# Patient Record
Sex: MALE | Race: BLACK OR AFRICAN AMERICAN | NOT HISPANIC OR LATINO | Employment: FULL TIME | URBAN - NONMETROPOLITAN AREA
[De-identification: names, ages, dates, MRNs, and addresses within clinical notes are randomized per-mention and may not be internally consistent; named-entity substitution may affect disease eponyms.]

---

## 2017-02-10 DIAGNOSIS — I73.9 PAD (PERIPHERAL ARTERY DISEASE) (HCC): Primary | ICD-10-CM

## 2017-03-10 ENCOUNTER — HOSPITAL ENCOUNTER (OUTPATIENT)
Dept: ULTRASOUND IMAGING | Facility: HOSPITAL | Age: 57
Discharge: HOME | End: 2017-03-10
Admitting: NURSE PRACTITIONER

## 2017-03-10 ENCOUNTER — OFFICE VISIT (OUTPATIENT)
Dept: VASCULAR SURGERY | Facility: CLINIC | Age: 57
End: 2017-03-10

## 2017-03-10 VITALS
HEART RATE: 76 BPM | HEIGHT: 67 IN | BODY MASS INDEX: 25.27 KG/M2 | WEIGHT: 161 LBS | SYSTOLIC BLOOD PRESSURE: 152 MMHG | DIASTOLIC BLOOD PRESSURE: 90 MMHG

## 2017-03-10 DIAGNOSIS — E78.5 HYPERLIPIDEMIA, UNSPECIFIED HYPERLIPIDEMIA TYPE: ICD-10-CM

## 2017-03-10 DIAGNOSIS — I73.9 PAD (PERIPHERAL ARTERY DISEASE) (HCC): Primary | ICD-10-CM

## 2017-03-10 DIAGNOSIS — I73.9 PAD (PERIPHERAL ARTERY DISEASE) (HCC): ICD-10-CM

## 2017-03-10 DIAGNOSIS — I10 ESSENTIAL HYPERTENSION: ICD-10-CM

## 2017-03-10 DIAGNOSIS — Z72.0 TOBACCO ABUSE: ICD-10-CM

## 2017-03-10 PROCEDURE — 99213 OFFICE O/P EST LOW 20 MIN: CPT | Performed by: NURSE PRACTITIONER

## 2017-03-10 PROCEDURE — 93923 UPR/LXTR ART STDY 3+ LVLS: CPT

## 2017-03-10 PROCEDURE — 93924 LWR XTR VASC STDY BILAT: CPT | Performed by: SURGERY

## 2017-03-10 RX ORDER — VALSARTAN AND HYDROCHLOROTHIAZIDE 320; 25 MG/1; MG/1
1 TABLET, FILM COATED ORAL DAILY
COMMUNITY
End: 2020-07-30 | Stop reason: HOSPADM

## 2017-03-10 NOTE — PROGRESS NOTES
"03/10/2017      Thanh Weeks MD  [address]   [place] [address]        Milton ANNA Rodrgiuez  1960    Chief Complaint   Patient presents with   • Follow-up     CARLOS results.Right leg is better still some discomfort.No problem with left leg.       Dear Thanh Weeks MD:    HPI     I had the pleasure of seeing you patient in the office today for follow up.  As you recall, the patient is a 56 y.o. male who we are currently following for lower extremity PAD.  Initially he was seen with complaints of significant short distance right lower extremity claudication.  He did undergo revascularization of his right leg by an endovascular approach and is now back for continued follow-up.  He states his right leg continues to be better but he still has some intermittent claudication.  He did have noninvasive testing today, which I did review in office.     Visit Vitals   • /90   • Pulse 76   • Ht 67\" (170.2 cm)   • Wt 161 lb (73 kg)   • BMI 25.22 kg/m2     Physical Exam   Constitutional: He is oriented to person, place, and time. He appears well-developed and well-nourished. No distress.   HENT:   Head: Normocephalic and atraumatic.   Mouth/Throat: Oropharynx is clear and moist and mucous membranes are normal.   Neck: No JVD present. Carotid bruit is not present.   Cardiovascular: Normal rate, regular rhythm, S1 normal, S2 normal, normal heart sounds and intact distal pulses.  Exam reveals no gallop and no friction rub.    No murmur heard.  Pulses:       Femoral pulses are 2+ on the right side, and 2+ on the left side.       Popliteal pulses are 2+ on the right side, and 2+ on the left side.        Dorsalis pedis pulses are 2+ on the right side, and 2+ on the left side.        Posterior tibial pulses are 0 on the right side, and 2+ on the left side.   Right: doppler PT, peroneal  Swelling to bilateral lower extremities      Pulmonary/Chest: Effort normal and breath sounds normal.   Abdominal: " Soft. Normal appearance, normal aorta and bowel sounds are normal. He exhibits no abdominal bruit. There is no hepatosplenomegaly. There is no tenderness.   Musculoskeletal: Normal range of motion.       Vascular Status -  His exam exhibits no right foot edema. His exam exhibits no left foot edema.  Neurological: He is alert and oriented to person, place, and time. He has normal strength. No cranial nerve deficit.   Skin: Skin is warm, dry and intact. He is not diaphoretic.   Psychiatric: He has a normal mood and affect. His behavior is normal. Judgment and thought content normal. Cognition and memory are normal.       DIAGNOSTIC DATA:  History: PAD      Comments: Bilateral lower extremity arterial with multi-level pulse  volume recordings and segmental pressures were performed at rest and  stress.      The right ankle/brachial index is 0.72. The waveforms are biphasic  without dampening.These findings are consistent with moderate arterial  insufficiency of the right lower extremity at rest. The postexercise  ABIs 0.64.      The left ankle/brachial index is 1.08. The waveforms are triphasic  without dampening. These findings are consistent with no significant  arterial insufficiency of the left lower extremity at rest. The  postexercise ABIs 1.06.      IMPRESSION:  Impression:  1. Moderate arterial insufficiency of the right lower extremity at rest.  2. No significant arterial insufficiency of the left lower extremity at  rest.          This report was finalized on 03/10/2017 14:15 by Dr. Victorino Valera MD.  Patient Active Problem List   Diagnosis   • Tobacco abuse   • Hypertension   • PAD (peripheral artery disease)   • Hyperlipidemia   • Diabetes mellitus         ICD-10-CM ICD-9-CM   1. PAD (peripheral artery disease) I73.9 443.9   2. Essential hypertension I10 401.9   3. Hyperlipidemia, unspecified hyperlipidemia type E78.5 272.4   4. Tobacco abuse Z72.0 305.1       Lab Frequency Next Occurrence   US ankle /  brachial indices extremity complete Once 9/1/2016       PLAN: After thoroughly evaluating Milton Rodriguez, I believe the best course of action is to remain conservative from vascular standpoint.  He is doing well and states he has some intermittent claudication.  He does have significant swelling to bilateral lower extremities.  I did give him a prescription for compression stockings in the 20-30 mm pressure gradient range.  Administer to him on how to wear these.  We will see him back in 1 year with repeat noninvasive testing for continued surveillance.  I did  extensively on smoking cessation, and the patient was advised of the continued risks of smoking.  I provided over 10 minutes counseling on this matter.  The patient is to continue taking their medications as previously discussed.   This was all discussed in full with complete understanding.  Thank you for allowing me to participate in the care of your patient.  Please do not hesitate to call with any questions or concerns.  We will keep you aware of any further encounters with Milton Rodriguez.      Sincerely Yours,        DARIEN Sepulveda

## 2017-04-13 ENCOUNTER — OFFICE VISIT (OUTPATIENT)
Dept: CARDIOLOGY | Facility: CLINIC | Age: 57
End: 2017-04-13

## 2017-04-13 VITALS
DIASTOLIC BLOOD PRESSURE: 80 MMHG | BODY MASS INDEX: 24.96 KG/M2 | HEART RATE: 90 BPM | SYSTOLIC BLOOD PRESSURE: 140 MMHG | HEIGHT: 67 IN | RESPIRATION RATE: 18 BRPM | WEIGHT: 159 LBS

## 2017-04-13 DIAGNOSIS — E78.5 HYPERLIPIDEMIA, UNSPECIFIED HYPERLIPIDEMIA TYPE: ICD-10-CM

## 2017-04-13 DIAGNOSIS — I48.0 PAROXYSMAL ATRIAL FIBRILLATION (HCC): ICD-10-CM

## 2017-04-13 DIAGNOSIS — R60.0 LOWER LEG EDEMA: Primary | ICD-10-CM

## 2017-04-13 DIAGNOSIS — I10 ESSENTIAL HYPERTENSION: ICD-10-CM

## 2017-04-13 DIAGNOSIS — I73.9 PAD (PERIPHERAL ARTERY DISEASE) (HCC): ICD-10-CM

## 2017-04-13 DIAGNOSIS — R06.02 SHORTNESS OF BREATH: ICD-10-CM

## 2017-04-13 DIAGNOSIS — Z72.0 TOBACCO ABUSE: ICD-10-CM

## 2017-04-13 PROCEDURE — 99214 OFFICE O/P EST MOD 30 MIN: CPT | Performed by: NURSE PRACTITIONER

## 2017-04-13 PROCEDURE — 93000 ELECTROCARDIOGRAM COMPLETE: CPT | Performed by: NURSE PRACTITIONER

## 2017-04-13 RX ORDER — DILTIAZEM HYDROCHLORIDE 180 MG/1
360 CAPSULE, COATED, EXTENDED RELEASE ORAL DAILY
Status: ON HOLD | COMMUNITY
End: 2020-07-29
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
## 2017-04-21 ENCOUNTER — HOSPITAL ENCOUNTER (OUTPATIENT)
Dept: CARDIOLOGY | Facility: HOSPITAL | Age: 57
Discharge: HOME | End: 2017-04-21
Admitting: NURSE PRACTITIONER

## 2017-04-21 VITALS — BODY MASS INDEX: 25.9 KG/M2 | HEIGHT: 67 IN | WEIGHT: 165 LBS

## 2017-04-21 PROCEDURE — 93306 TTE W/DOPPLER COMPLETE: CPT | Performed by: INTERNAL MEDICINE

## 2017-04-21 PROCEDURE — 93306 TTE W/DOPPLER COMPLETE: CPT

## 2017-04-22 LAB
BH CV ECHO MEAS - AO MAX PG (FULL): 1.6 MMHG
BH CV ECHO MEAS - AO MAX PG: 7.2 MMHG
BH CV ECHO MEAS - AO MEAN PG (FULL): 1 MMHG
BH CV ECHO MEAS - AO MEAN PG: 4 MMHG
BH CV ECHO MEAS - AO ROOT AREA (BSA CORRECTED): 1.8
BH CV ECHO MEAS - AO ROOT AREA: 8.6 CM^2
BH CV ECHO MEAS - AO ROOT DIAM: 3.3 CM
BH CV ECHO MEAS - AO V2 MAX: 134 CM/SEC
BH CV ECHO MEAS - AO V2 MEAN: 89.9 CM/SEC
BH CV ECHO MEAS - AO V2 VTI: 30.6 CM
BH CV ECHO MEAS - AVA(I,A): 3 CM^2
BH CV ECHO MEAS - AVA(I,D): 3 CM^2
BH CV ECHO MEAS - AVA(V,A): 3.1 CM^2
BH CV ECHO MEAS - AVA(V,D): 3.1 CM^2
BH CV ECHO MEAS - BSA(HAYCOCK): 1.9 M^2
BH CV ECHO MEAS - BSA: 1.9 M^2
BH CV ECHO MEAS - BZI_BMI: 25.8 KILOGRAMS/M^2
BH CV ECHO MEAS - BZI_METRIC_HEIGHT: 170.2 CM
BH CV ECHO MEAS - BZI_METRIC_WEIGHT: 74.8 KG
BH CV ECHO MEAS - CONTRAST EF 4CH: 55.1 ML/M^2
BH CV ECHO MEAS - EDV(CUBED): 169.1 ML
BH CV ECHO MEAS - EDV(MOD-SP4): 123 ML
BH CV ECHO MEAS - EDV(TEICH): 149.3 ML
BH CV ECHO MEAS - EF(CUBED): 66.5 %
BH CV ECHO MEAS - EF(MOD-SP4): 55.1 %
BH CV ECHO MEAS - EF(TEICH): 57.4 %
BH CV ECHO MEAS - ESV(CUBED): 56.6 ML
BH CV ECHO MEAS - ESV(MOD-SP4): 55.2 ML
BH CV ECHO MEAS - ESV(TEICH): 63.5 ML
BH CV ECHO MEAS - FS: 30.6 %
BH CV ECHO MEAS - IVS/LVPW: 1
BH CV ECHO MEAS - IVSD: 1.2 CM
BH CV ECHO MEAS - LA DIMENSION: 4.6 CM
BH CV ECHO MEAS - LA/AO: 1.4
BH CV ECHO MEAS - LAT PEAK E' VEL: 10 CM/SEC
BH CV ECHO MEAS - LV DIASTOLIC VOL/BSA (35-75): 66 ML/M^2
BH CV ECHO MEAS - LV MASS(C)D: 287.5 GRAMS
BH CV ECHO MEAS - LV MASS(C)DI: 154.3 GRAMS/M^2
BH CV ECHO MEAS - LV MAX PG: 5.6 MMHG
BH CV ECHO MEAS - LV MEAN PG: 3 MMHG
BH CV ECHO MEAS - LV SYSTOLIC VOL/BSA (12-30): 29.6 ML/M^2
BH CV ECHO MEAS - LV V1 MAX: 118 CM/SEC
BH CV ECHO MEAS - LV V1 MEAN: 76.2 CM/SEC
BH CV ECHO MEAS - LV V1 VTI: 26.3 CM
BH CV ECHO MEAS - LVIDD: 5.5 CM
BH CV ECHO MEAS - LVIDS: 3.8 CM
BH CV ECHO MEAS - LVLD AP4: 8.6 CM
BH CV ECHO MEAS - LVLS AP4: 7.2 CM
BH CV ECHO MEAS - LVOT AREA (M): 3.5 CM^2
BH CV ECHO MEAS - LVOT AREA: 3.5 CM^2
BH CV ECHO MEAS - LVOT DIAM: 2.1 CM
BH CV ECHO MEAS - LVPWD: 1.2 CM
BH CV ECHO MEAS - MED PEAK E' VEL: 7.62 CM/SEC
BH CV ECHO MEAS - MV A MAX VEL: 61.6 CM/SEC
BH CV ECHO MEAS - MV DEC TIME: 0.18 SEC
BH CV ECHO MEAS - MV E MAX VEL: 95.6 CM/SEC
BH CV ECHO MEAS - MV E/A: 1.6
BH CV ECHO MEAS - RAP SYSTOLE: 10 MMHG
BH CV ECHO MEAS - RVSP: 49.2 MMHG
BH CV ECHO MEAS - SI(AO): 140.5 ML/M^2
BH CV ECHO MEAS - SI(CUBED): 60.4 ML/M^2
BH CV ECHO MEAS - SI(LVOT): 48.9 ML/M^2
BH CV ECHO MEAS - SI(MOD-SP4): 36.4 ML/M^2
BH CV ECHO MEAS - SI(TEICH): 46 ML/M^2
BH CV ECHO MEAS - SV(AO): 261.7 ML
BH CV ECHO MEAS - SV(CUBED): 112.5 ML
BH CV ECHO MEAS - SV(LVOT): 91.1 ML
BH CV ECHO MEAS - SV(MOD-SP4): 67.8 ML
BH CV ECHO MEAS - SV(TEICH): 85.8 ML
BH CV ECHO MEAS - TR MAX VEL: 313 CM/SEC
LEFT ATRIUM VOLUME INDEX: 54.8 ML/M2
LEFT ATRIUM VOLUME: 102 CM3
LV EF 2D ECHO EST: 60 %

## 2017-04-24 ENCOUNTER — TELEPHONE (OUTPATIENT)
Dept: CARDIOLOGY | Facility: CLINIC | Age: 57
End: 2017-04-24

## 2017-04-24 NOTE — TELEPHONE ENCOUNTER
----- Message from DARIEN Jones sent at 4/24/2017  8:31 AM CDT -----  Please let patient know his echo is good. Doesn't show heart failure. He needs to follow with his regular doctor and vascular for his legs. Thanks

## 2017-09-29 ENCOUNTER — OFFICE VISIT (OUTPATIENT)
Dept: UROLOGY | Facility: CLINIC | Age: 57
End: 2017-09-29

## 2017-09-29 VITALS — WEIGHT: 173 LBS | BODY MASS INDEX: 27.15 KG/M2 | TEMPERATURE: 98.6 F | HEIGHT: 67 IN

## 2017-09-29 DIAGNOSIS — N52.8 OTHER MALE ERECTILE DYSFUNCTION: Primary | ICD-10-CM

## 2017-09-29 DIAGNOSIS — N43.41 SPERMATOCELE OF EPIDIDYMIS, SINGLE: ICD-10-CM

## 2017-09-29 LAB
BILIRUB BLD-MCNC: NEGATIVE MG/DL
CLARITY, POC: CLEAR
COLOR UR: YELLOW
GLUCOSE UR STRIP-MCNC: NEGATIVE MG/DL
KETONES UR QL: NEGATIVE
LEUKOCYTE EST, POC: ABNORMAL
NITRITE UR-MCNC: NEGATIVE MG/ML
PH UR: 6 [PH] (ref 5–8)
PROT UR STRIP-MCNC: ABNORMAL MG/DL
RBC # UR STRIP: ABNORMAL /UL
SP GR UR: 1.01 (ref 1–1.03)
UROBILINOGEN UR QL: NORMAL

## 2017-09-29 PROCEDURE — 99203 OFFICE O/P NEW LOW 30 MIN: CPT | Performed by: UROLOGY

## 2017-09-29 PROCEDURE — 81003 URINALYSIS AUTO W/O SCOPE: CPT | Performed by: UROLOGY

## 2017-09-29 RX ORDER — FUROSEMIDE 20 MG/1
20 TABLET ORAL 2 TIMES DAILY
COMMUNITY
End: 2017-10-11 | Stop reason: SDUPTHER
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
## 2017-10-11 ENCOUNTER — OFFICE VISIT (OUTPATIENT)
Dept: CARDIOLOGY | Facility: CLINIC | Age: 57
End: 2017-10-11

## 2017-10-11 VITALS
SYSTOLIC BLOOD PRESSURE: 111 MMHG | DIASTOLIC BLOOD PRESSURE: 63 MMHG | HEIGHT: 67 IN | BODY MASS INDEX: 26.21 KG/M2 | HEART RATE: 120 BPM | OXYGEN SATURATION: 98 % | WEIGHT: 167 LBS

## 2017-10-11 DIAGNOSIS — I73.9 PAD (PERIPHERAL ARTERY DISEASE) (HCC): ICD-10-CM

## 2017-10-11 DIAGNOSIS — I48.92 ATRIAL FLUTTER, UNSPECIFIED TYPE (HCC): Primary | ICD-10-CM

## 2017-10-11 DIAGNOSIS — I10 ESSENTIAL HYPERTENSION: ICD-10-CM

## 2017-10-11 DIAGNOSIS — R60.0 LOWER EXTREMITY EDEMA: ICD-10-CM

## 2017-10-11 DIAGNOSIS — M79.605 PAIN IN BOTH LOWER EXTREMITIES: ICD-10-CM

## 2017-10-11 DIAGNOSIS — M79.604 PAIN IN BOTH LOWER EXTREMITIES: ICD-10-CM

## 2017-10-11 DIAGNOSIS — E78.5 HYPERLIPIDEMIA, UNSPECIFIED HYPERLIPIDEMIA TYPE: ICD-10-CM

## 2017-10-11 PROCEDURE — 99214 OFFICE O/P EST MOD 30 MIN: CPT | Performed by: NURSE PRACTITIONER

## 2017-10-11 PROCEDURE — 93000 ELECTROCARDIOGRAM COMPLETE: CPT | Performed by: NURSE PRACTITIONER

## 2017-10-11 RX ORDER — FUROSEMIDE 20 MG/1
20 TABLET ORAL 2 TIMES DAILY
Qty: 65 TABLET | Refills: 11 | Status: SHIPPED | OUTPATIENT
Start: 2017-10-11 | End: 2018-11-30 | Stop reason: SDUPTHER

## 2017-10-11 RX ORDER — NEBIVOLOL 20 MG/1
20 TABLET ORAL DAILY
Qty: 30 TABLET | Refills: 11 | Status: SHIPPED | OUTPATIENT
Start: 2017-10-11 | End: 2017-11-10
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
## 2017-10-12 ENCOUNTER — TELEPHONE (OUTPATIENT)
Dept: CARDIOLOGY | Facility: CLINIC | Age: 57
End: 2017-10-12

## 2017-10-12 NOTE — TELEPHONE ENCOUNTER
----- Message from Delbert Patel MD sent at 10/12/2017  9:17 AM CDT -----  Looks ok to me  ----- Message -----     From: DARIEN Ramsay     Sent: 10/11/2017   5:51 PM       To: Delbert Patel MD    I saw this patient today for atrial flutter, . Asymptomatic. He is already on Eliquis, cardizem 360, bystolic 20, and multaq. I ordered a cardioversion for 3-4 weeks because he missed a dose or 2 of Eliquis. sbp 110. Do you want any changes on his rate/rhythm meds before cardioversion? He also has massive lower extremity edema. Not convinced its heart failure; normal EF, mild to moderate LVH, mild to moderate LAE. Sent him back to vascular, increased his lasix.

## 2017-10-13 DIAGNOSIS — I48.92 ATRIAL FLUTTER, UNSPECIFIED TYPE (HCC): Primary | ICD-10-CM

## 2017-10-26 ENCOUNTER — TELEPHONE (OUTPATIENT)
Dept: VASCULAR SURGERY | Facility: CLINIC | Age: 57
End: 2017-10-26

## 2017-10-27 ENCOUNTER — OFFICE VISIT (OUTPATIENT)
Dept: CARDIOLOGY | Facility: CLINIC | Age: 57
End: 2017-10-27

## 2017-10-27 ENCOUNTER — LAB (OUTPATIENT)
Dept: LAB | Facility: HOSPITAL | Age: 57
End: 2017-10-27

## 2017-10-27 VITALS
SYSTOLIC BLOOD PRESSURE: 115 MMHG | RESPIRATION RATE: 18 BRPM | WEIGHT: 164 LBS | BODY MASS INDEX: 25.74 KG/M2 | HEIGHT: 67 IN | HEART RATE: 125 BPM | DIASTOLIC BLOOD PRESSURE: 75 MMHG

## 2017-10-27 DIAGNOSIS — I73.9 PAD (PERIPHERAL ARTERY DISEASE) (HCC): ICD-10-CM

## 2017-10-27 DIAGNOSIS — I48.92 ATRIAL FLUTTER, UNSPECIFIED TYPE (HCC): ICD-10-CM

## 2017-10-27 DIAGNOSIS — E78.2 MIXED HYPERLIPIDEMIA: ICD-10-CM

## 2017-10-27 DIAGNOSIS — Z72.0 TOBACCO ABUSE: ICD-10-CM

## 2017-10-27 DIAGNOSIS — I48.92 ATRIAL FLUTTER, UNSPECIFIED TYPE (HCC): Primary | ICD-10-CM

## 2017-10-27 DIAGNOSIS — I10 ESSENTIAL HYPERTENSION: ICD-10-CM

## 2017-10-27 DIAGNOSIS — Z79.01 CHRONIC ANTICOAGULATION: ICD-10-CM

## 2017-10-27 DIAGNOSIS — R60.0 LOWER EXTREMITY EDEMA: ICD-10-CM

## 2017-10-27 DIAGNOSIS — E87.6 HYPOKALEMIA: ICD-10-CM

## 2017-10-27 DIAGNOSIS — E11.8 TYPE 2 DIABETES MELLITUS WITH COMPLICATION, WITHOUT LONG-TERM CURRENT USE OF INSULIN (HCC): ICD-10-CM

## 2017-10-27 LAB
ANION GAP SERPL CALCULATED.3IONS-SCNC: 11 MMOL/L (ref 4–13)
BUN BLD-MCNC: 41 MG/DL (ref 5–21)
BUN/CREAT SERPL: 25.6 (ref 7–25)
CALCIUM SPEC-SCNC: 9 MG/DL (ref 8.4–10.4)
CHLORIDE SERPL-SCNC: 100 MMOL/L (ref 98–110)
CO2 SERPL-SCNC: 29 MMOL/L (ref 24–31)
CREAT BLD-MCNC: 1.6 MG/DL (ref 0.5–1.4)
GFR SERPL CREATININE-BSD FRML MDRD: 54 ML/MIN/1.73
GLUCOSE BLD-MCNC: 82 MG/DL (ref 70–100)
POTASSIUM BLD-SCNC: 2.8 MMOL/L (ref 3.5–5.3)
SODIUM BLD-SCNC: 140 MMOL/L (ref 135–145)

## 2017-10-27 PROCEDURE — 80048 BASIC METABOLIC PNL TOTAL CA: CPT | Performed by: NURSE PRACTITIONER

## 2017-10-27 PROCEDURE — 99214 OFFICE O/P EST MOD 30 MIN: CPT | Performed by: NURSE PRACTITIONER

## 2017-10-27 PROCEDURE — 36415 COLL VENOUS BLD VENIPUNCTURE: CPT

## 2017-10-27 PROCEDURE — 93000 ELECTROCARDIOGRAM COMPLETE: CPT | Performed by: NURSE PRACTITIONER

## 2017-10-27 RX ORDER — POTASSIUM CHLORIDE 20 MEQ/1
40 TABLET, EXTENDED RELEASE ORAL DAILY
Qty: 60 TABLET | Refills: 0 | Status: SHIPPED | OUTPATIENT
Start: 2017-10-27 | End: 2017-11-23 | Stop reason: SDUPTHER

## 2017-10-27 RX ORDER — POTASSIUM CHLORIDE 20 MEQ/1
40 TABLET, EXTENDED RELEASE ORAL 2 TIMES DAILY
Qty: 4 TABLET | Refills: 0 | Status: SHIPPED | OUTPATIENT
Start: 2017-10-27 | End: 2017-10-28
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
## 2017-10-30 ENCOUNTER — LAB (OUTPATIENT)
Dept: LAB | Facility: HOSPITAL | Age: 57
End: 2017-10-30

## 2017-10-30 DIAGNOSIS — I48.92 ATRIAL FLUTTER, UNSPECIFIED TYPE (HCC): ICD-10-CM

## 2017-10-30 LAB
ANION GAP SERPL CALCULATED.3IONS-SCNC: 13 MMOL/L (ref 4–13)
BUN BLD-MCNC: 46 MG/DL (ref 5–21)
BUN/CREAT SERPL: 25.1 (ref 7–25)
CALCIUM SPEC-SCNC: 8.8 MG/DL (ref 8.4–10.4)
CHLORIDE SERPL-SCNC: 103 MMOL/L (ref 98–110)
CO2 SERPL-SCNC: 25 MMOL/L (ref 24–31)
CREAT BLD-MCNC: 1.83 MG/DL (ref 0.5–1.4)
GFR SERPL CREATININE-BSD FRML MDRD: 46 ML/MIN/1.73
GLUCOSE BLD-MCNC: 137 MG/DL (ref 70–100)
POTASSIUM BLD-SCNC: 3.5 MMOL/L (ref 3.5–5.3)
SODIUM BLD-SCNC: 141 MMOL/L (ref 135–145)

## 2017-10-30 PROCEDURE — 80048 BASIC METABOLIC PNL TOTAL CA: CPT

## 2017-10-30 PROCEDURE — 36415 COLL VENOUS BLD VENIPUNCTURE: CPT

## 2017-11-10 ENCOUNTER — OFFICE VISIT (OUTPATIENT)
Dept: VASCULAR SURGERY | Facility: CLINIC | Age: 57
End: 2017-11-10

## 2017-11-10 VITALS
HEIGHT: 67 IN | WEIGHT: 166 LBS | DIASTOLIC BLOOD PRESSURE: 62 MMHG | HEART RATE: 126 BPM | SYSTOLIC BLOOD PRESSURE: 102 MMHG | BODY MASS INDEX: 26.06 KG/M2

## 2017-11-10 DIAGNOSIS — Z72.0 TOBACCO ABUSE: ICD-10-CM

## 2017-11-10 DIAGNOSIS — I73.9 PAD (PERIPHERAL ARTERY DISEASE) (HCC): Primary | ICD-10-CM

## 2017-11-10 DIAGNOSIS — I10 ESSENTIAL HYPERTENSION: ICD-10-CM

## 2017-11-10 DIAGNOSIS — R60.0 LOWER EXTREMITY EDEMA: ICD-10-CM

## 2017-11-10 PROCEDURE — 99213 OFFICE O/P EST LOW 20 MIN: CPT | Performed by: NURSE PRACTITIONER

## 2017-11-10 RX ORDER — ALLOPURINOL 100 MG/1
TABLET ORAL
Refills: 5 | Status: ON HOLD | COMMUNITY
Start: 2017-11-01 | End: 2020-07-29

## 2017-11-10 NOTE — PROGRESS NOTES
"11/10/2017       Thanh Weeks MD  [address]   [place] [address]        Milton ANNA Rodriguez  1960    Chief Complaint   Patient presents with   • Follow-up     Complaint of right leg pain.Pain to right thigh and calf when walking.       Dear Thanh Weeks MD:    HPI     I had the pleasure of seeing you patient in the office today for follow up.  As you recall, the patient is a 56 y.o. male who we are currently following for lower extremity PAD.  Initially he was seen with complaints of significant short distance right lower extremity claudication.  He did undergo revascularization of his right leg by an endovascular approach and is now back for continued follow-up.  He states his right leg has been cramping over the past month.   He did have noninvasive testing today, which I did review in office.     /62  Pulse (!) 126  Ht 67\" (170.2 cm)  Wt 166 lb (75.3 kg)  BMI 26 kg/m2  Physical Exam   Constitutional: He is oriented to person, place, and time. He appears well-developed and well-nourished. No distress.   HENT:   Head: Normocephalic and atraumatic.   Mouth/Throat: Oropharynx is clear and moist and mucous membranes are normal.   Neck: No JVD present. Carotid bruit is not present.   Cardiovascular: Normal rate, regular rhythm, S1 normal, S2 normal, normal heart sounds and intact distal pulses.  Exam reveals no gallop and no friction rub.    No murmur heard.  Pulses:       Femoral pulses are 2+ on the right side, and 2+ on the left side.       Popliteal pulses are 2+ on the right side, and 2+ on the left side.        Dorsalis pedis pulses are 2+ on the right side, and 2+ on the left side.        Posterior tibial pulses are 0 on the right side, and 2+ on the left side.   Right: doppler PT, peroneal  Swelling to bilateral lower extremities      Pulmonary/Chest: Effort normal and breath sounds normal.   Abdominal: Soft. Normal appearance, normal aorta and bowel sounds are normal. " He exhibits no abdominal bruit. There is no hepatosplenomegaly. There is no tenderness.   Musculoskeletal: Normal range of motion.       Vascular Status -  His exam exhibits no right foot edema. His exam exhibits no left foot edema.  Neurological: He is alert and oriented to person, place, and time. He has normal strength. No cranial nerve deficit.   Skin: Skin is warm, dry and intact. He is not diaphoretic.   Psychiatric: He has a normal mood and affect. His behavior is normal. Judgment and thought content normal. Cognition and memory are normal.       Patient Active Problem List   Diagnosis   • Tobacco abuse   • Hypertension   • PAD (peripheral artery disease)   • Hyperlipidemia   • Diabetes mellitus   • Atrial flutter   • Lower extremity edema   • Chronic anticoagulation   • Hypokalemia         ICD-10-CM ICD-9-CM   1. PAD (peripheral artery disease) I73.9 443.9   2. Essential hypertension I10 401.9   3. Tobacco abuse Z72.0 305.1   4. Lower extremity edema R60.0 782.3       Lab Frequency Next Occurrence   US ankle / brachial indices extremity complete Once 9/1/2016       PLAN: After thoroughly evaluating Milton Rodriguez, I believe the best course of action is to proceed with testing, as he did not have today.  I will order ABIs and have him come back in about a week.   He does have significant swelling to bilateral lower extremities.  I did, again,  give him a prescription for compression stockings in the 20-30 mm pressure gradient range.   I did  extensively on smoking cessation, and the patient was advised of the continued risks of smoking.  I provided over 10 minutes counseling on this matter.  The patient is to continue taking their medications as previously discussed.   This was all discussed in full with complete understanding.    Thank you for allowing me to participate in the care of your patient.  Please do not hesitate to call with any questions or concerns.  We will keep you aware of any  further encounters with Milton Rodriguez.      Sincerely Yours,        Kina Leon, DARIEN

## 2017-11-14 ENCOUNTER — TELEPHONE (OUTPATIENT)
Dept: VASCULAR SURGERY | Facility: CLINIC | Age: 57
End: 2017-11-14

## 2017-11-14 NOTE — TELEPHONE ENCOUNTER
Patient called and confirmed that he will be present for his testing and appointment on 11/16/2017

## 2017-11-14 NOTE — TELEPHONE ENCOUNTER
Left message for a patient advising that his testing was scheduled for 11/16/2017 at 8:00 at the [facility] with a 7:30 am check in.  His appointment time at the office has been moved up to 9:15 am.  Requested that patient return call to confirm that message was received.

## 2017-11-15 ENCOUNTER — TELEPHONE (OUTPATIENT)
Dept: VASCULAR SURGERY | Facility: CLINIC | Age: 57
End: 2017-11-15

## 2017-11-15 NOTE — TELEPHONE ENCOUNTER
Left message for Mr. Rodriguez reminding him of his appointments tomorrow morning. Reminded him of his testing appointment at 8 am and his follow up appointment with us at 915 am and advised him if he had any questions or needed to reschedule to please give our office a call at [phone]

## 2017-11-16 ENCOUNTER — OFFICE VISIT (OUTPATIENT)
Dept: VASCULAR SURGERY | Facility: CLINIC | Age: 57
End: 2017-11-16

## 2017-11-16 ENCOUNTER — HOSPITAL ENCOUNTER (OUTPATIENT)
Dept: ULTRASOUND IMAGING | Facility: HOSPITAL | Age: 57
Discharge: HOME | End: 2017-11-16
Admitting: NURSE PRACTITIONER

## 2017-11-16 VITALS
HEART RATE: 118 BPM | WEIGHT: 167 LBS | HEIGHT: 67 IN | BODY MASS INDEX: 26.21 KG/M2 | SYSTOLIC BLOOD PRESSURE: 134 MMHG | DIASTOLIC BLOOD PRESSURE: 78 MMHG

## 2017-11-16 DIAGNOSIS — I73.9 PAD (PERIPHERAL ARTERY DISEASE) (HCC): ICD-10-CM

## 2017-11-16 DIAGNOSIS — Z72.0 TOBACCO ABUSE: ICD-10-CM

## 2017-11-16 DIAGNOSIS — I73.9 PAD (PERIPHERAL ARTERY DISEASE) (HCC): Primary | ICD-10-CM

## 2017-11-16 DIAGNOSIS — E78.2 MIXED HYPERLIPIDEMIA: ICD-10-CM

## 2017-11-16 DIAGNOSIS — I10 ESSENTIAL HYPERTENSION: ICD-10-CM

## 2017-11-16 PROCEDURE — 93924 LWR XTR VASC STDY BILAT: CPT | Performed by: SURGERY

## 2017-11-16 PROCEDURE — 99213 OFFICE O/P EST LOW 20 MIN: CPT | Performed by: NURSE PRACTITIONER

## 2017-11-16 PROCEDURE — 93923 UPR/LXTR ART STDY 3+ LVLS: CPT

## 2017-11-16 NOTE — PROGRESS NOTES
"11/16/2017       Thanh Weeks MD  [address]   [place] [address]        Milton ANNA Rodriguez  1960    Chief Complaint   Patient presents with   • Follow-up     Patient here for CARLOS results.Right leg discomfort when walking distance.       Dear Thanh Weeks MD:    HPI     I had the pleasure of seeing you patient in the office today for follow up.  As you recall, the patient is a 56 y.o. male who we are currently following for lower extremity PAD.  Initially he was seen with complaints of significant short distance right lower extremity claudication.  He did undergo revascularization of his right leg by an endovascular approach and is now back for continued follow-up.  He states his right leg has been cramping over the past month.  He can walk about 2.5 blocks before he has discomfort.  He did have noninvasive testing today, which I did review in office.     /78  Pulse 118  Ht 67\" (170.2 cm)  Wt 167 lb (75.8 kg)  BMI 26.16 kg/m2  Physical Exam   Constitutional: He is oriented to person, place, and time. He appears well-developed and well-nourished. No distress.   HENT:   Head: Normocephalic and atraumatic.   Mouth/Throat: Oropharynx is clear and moist and mucous membranes are normal.   Neck: No JVD present. Carotid bruit is not present.   Cardiovascular: Normal rate, regular rhythm, S1 normal, S2 normal, normal heart sounds and intact distal pulses.  Exam reveals no gallop and no friction rub.    No murmur heard.  Pulses:       Femoral pulses are 2+ on the right side, and 2+ on the left side.       Popliteal pulses are 2+ on the right side, and 2+ on the left side.        Dorsalis pedis pulses are 2+ on the right side, and 2+ on the left side.        Posterior tibial pulses are 0 on the right side, and 2+ on the left side.   Right: doppler PT, peroneal  Swelling to bilateral lower extremities      Pulmonary/Chest: Effort normal and breath sounds normal.   Abdominal: Soft. " Normal appearance, normal aorta and bowel sounds are normal. He exhibits no abdominal bruit. There is no hepatosplenomegaly. There is no tenderness.   Musculoskeletal: Normal range of motion.       Vascular Status -  His exam exhibits no right foot edema. His exam exhibits no left foot edema.  Neurological: He is alert and oriented to person, place, and time. He has normal strength. No cranial nerve deficit.   Skin: Skin is warm, dry and intact. He is not diaphoretic.   Psychiatric: He has a normal mood and affect. His behavior is normal. Judgment and thought content normal. Cognition and memory are normal.     Diagnostic Data: Noninvasive testing including ABIs show a right CARLOS of 0.83 that decreases to 0.58 post exercise and left CARLOS of 1.11 that decreases to 0.93 post exercise.    Patient Active Problem List   Diagnosis   • Tobacco abuse   • Hypertension   • PAD (peripheral artery disease)   • Hyperlipidemia   • Diabetes mellitus   • Atrial flutter   • Lower extremity edema   • Chronic anticoagulation   • Hypokalemia         ICD-10-CM ICD-9-CM   1. PAD (peripheral artery disease) I73.9 443.9   2. Tobacco abuse Z72.0 305.1   3. Essential hypertension I10 401.9   4. Mixed hyperlipidemia E78.2 272.2       Lab Frequency Next Occurrence   US ankle / brachial indices extremity complete Once 9/1/2016       PLAN: After thoroughly evaluating Milton Rodriguez, I believe the best course of action is to remain conservative from a vascular standpoint.  His testing is stable.  We will see him back in 6 months with repeat noninvasive testing for continued surveillance.  We did discuss beginning a walking regimen.  He is interested in discussing new walking exercise program.  He does have significant swelling to bilateral lower extremities.  I did, again,  give him a prescription for compression stockings in the 20-30 mm pressure gradient range.   I did  extensively on smoking cessation, and the patient was advised of  the continued risks of smoking.  I provided over 10 minutes counseling on this matter.  I did discuss vascular risk factors as they pertain to the progression of vascular disease including controlling controlling his hypertension, hyperlipidemia, and smoking cessation.  The patient is to continue taking their medications as previously discussed.   This was all discussed in full with complete understanding.    Thank you for allowing me to participate in the care of your patient.  Please do not hesitate to call with any questions or concerns.  We will keep you aware of any further encounters with Milton Rodriguez.      Sincerely Yours,        DARIEN Sepulveda

## 2017-11-16 NOTE — PATIENT INSTRUCTIONS
Steps to Quit Smoking   Smoking tobacco can be harmful to your health and can affect almost every organ in your body. Smoking puts you, and those around you, at risk for developing many serious chronic diseases. Quitting smoking is difficult, but it is one of the best things that you can do for your health. It is never too late to quit.  WHAT ARE THE BENEFITS OF QUITTING SMOKING?  When you quit smoking, you lower your risk of developing serious diseases and conditions, such as:  · Lung cancer or lung disease, such as COPD.  · Heart disease.  · Stroke.  · Heart attack.  · Infertility.  · Osteoporosis and bone fractures.  Additionally, symptoms such as coughing, wheezing, and shortness of breath may get better when you quit. You may also find that you get sick less often because your body is stronger at fighting off colds and infections. If you are pregnant, quitting smoking can help to reduce your chances of having a baby of low birth weight.  HOW DO I GET READY TO QUIT?  When you decide to quit smoking, create a plan to make sure that you are successful. Before you quit:  · Pick a date to quit. Set a date within the next two weeks to give you time to prepare.  · Write down the reasons why you are quitting. Keep this list in places where you will see it often, such as on your bathroom mirror or in your car or wallet.  · Identify the people, places, things, and activities that make you want to smoke (triggers) and avoid them. Make sure to take these actions:    Throw away all cigarettes at home, at work, and in your car.    Throw away smoking accessories, such as ashtrays and lighters.    Clean your car and make sure to empty the ashtray.    Clean your home, including curtains and carpets.  · Tell your family, friends, and coworkers that you are quitting. Support from your loved ones can make quitting easier.  · Talk with your health care provider about your options for quitting smoking.  · Find out what treatment  "options are covered by your health insurance.  WHAT STRATEGIES CAN I USE TO QUIT SMOKING?   Talk with your healthcare provider about different strategies to quit smoking. Some strategies include:  · Quitting smoking altogether instead of gradually lessening how much you smoke over a period of time. Research shows that quitting \"cold turkey\" is more successful than gradually quitting.  · Attending in-person counseling to help you build problem-solving skills. You are more likely to have success in quitting if you attend several counseling sessions. Even short sessions of 10 minutes can be effective.  · Finding resources and support systems that can help you to quit smoking and remain smoke-free after you quit. These resources are most helpful when you use them often. They can include:    Online chats with a counselor.    Telephone quitlines.    Printed self-help materials.    Support groups or group counseling.    Text messaging programs.    Mobile phone applications.  · Taking medicines to help you quit smoking. (If you are pregnant or breastfeeding, talk with your health care provider first.) Some medicines contain nicotine and some do not. Both types of medicines help with cravings, but the medicines that include nicotine help to relieve withdrawal symptoms. Your health care provider may recommend:    Nicotine patches, gum, or lozenges.    Nicotine inhalers or sprays.    Non-nicotine medicine that is taken by mouth.  Talk with your health care provider about combining strategies, such as taking medicines while you are also receiving in-person counseling. Using these two strategies together makes you more likely to succeed in quitting than if you used either strategy on its own.  If you are pregnant or breastfeeding, talk with your health care provider about finding counseling or other support strategies to quit smoking. Do not take medicine to help you quit smoking unless told to do so by your health care " provider.  WHAT THINGS CAN I DO TO MAKE IT EASIER TO QUIT?  Quitting smoking might feel overwhelming at first, but there is a lot that you can do to make it easier. Take these important actions:  · Reach out to your family and friends and ask that they support and encourage you during this time. Call telephone quitlines, reach out to support groups, or work with a counselor for support.  · Ask people who smoke to avoid smoking around you.  · Avoid places that trigger you to smoke, such as bars, parties, or smoke-break areas at work.  · Spend time around people who do not smoke.  · Lessen stress in your life, because stress can be a smoking trigger for some people. To lessen stress, try:    Exercising regularly.    Deep-breathing exercises.    Yoga.    Meditating.    Performing a body scan. This involves closing your eyes, scanning your body from head to toe, and noticing which parts of your body are particularly tense. Purposefully relax the muscles in those areas.  · Download or purchase mobile phone or tablet apps (applications) that can help you stick to your quit plan by providing reminders, tips, and encouragement. There are many free apps, such as QuitGuide from the CDC (Centers for Disease Control and Prevention). You can find other support for quitting smoking (smoking cessation) through smokefree.gov and other websites.  HOW WILL I FEEL WHEN I QUIT SMOKING?  Within the first 24 hours of quitting smoking, you may start to feel some withdrawal symptoms. These symptoms are usually most noticeable 2-3 days after quitting, but they usually do not last beyond 2-3 weeks. Changes or symptoms that you might experience include:  · Mood swings.  · Restlessness, anxiety, or irritation.  · Difficulty concentrating.  · Dizziness.  · Strong cravings for sugary foods in addition to nicotine.  · Mild weight gain.  · Constipation.  · Nausea.  · Coughing or a sore throat.  · Changes in how your medicines work in your  body.  · A depressed mood.  · Difficulty sleeping (insomnia).  After the first 2-3 weeks of quitting, you may start to notice more positive results, such as:  · Improved sense of smell and taste.  · Decreased coughing and sore throat.  · Slower heart rate.  · Lower blood pressure.  · Clearer skin.  · The ability to breathe more easily.  · Fewer sick days.  Quitting smoking is very challenging for most people. Do not get discouraged if you are not successful the first time. Some people need to make many attempts to quit before they achieve long-term success. Do your best to stick to your quit plan, and talk with your health care provider if you have any questions or concerns.     This information is not intended to replace advice given to you by your health care provider. Make sure you discuss any questions you have with your health care provider.     Document Released: 12/12/2002 Document Revised: 05/03/2016 Document Reviewed: 05/03/2016  [employer] Interactive Patient Education ©2017 [employer]  Peripheral Vascular Disease  Peripheral vascular disease (PVD) is a disease of the blood vessels that are not part of your heart and brain. A simple term for PVD is poor circulation. In most cases, PVD narrows the blood vessels that carry blood from your heart to the rest of your body. This can result in a decreased supply of blood to your arms, legs, and internal organs, like your stomach or kidneys. However, it most often affects a person's lower legs and feet.  There are two types of PVD.  · Organic PVD. This is the more common type. It is caused by damage to the structure of blood vessels.  · Functional PVD. This is caused by conditions that make blood vessels contract and tighten (spasm).  Without treatment, PVD tends to get worse over time.  PVD can also lead to acute ischemic limb. This is when an arm or limb suddenly has trouble getting enough blood. This is a medical emergency.  CAUSES  Each type of PVD has many  different causes. The most common cause of PVD is buildup of a fatty material (plaque) inside of your arteries (atherosclerosis). Small amounts of plaque can break off from the walls of the blood vessels and become lodged in a smaller artery. This blocks blood flow and can cause acute ischemic limb.  Other common causes of PVD include:  · Blood clots that form inside of blood vessels.  · Injuries to blood vessels.  · Diseases that cause inflammation of blood vessels or cause blood vessel spasms.  · Health behaviors and health history that increase your risk of developing PVD.  RISK FACTORS   You may have a greater risk of PVD if you:  · Have a family history of PVD.  · Have certain medical conditions, including:    High cholesterol.    Diabetes.    High blood pressure (hypertension).    Coronary heart disease.    Past problems with blood clots.    Past injury, such as burns or a broken bone. These may have damaged blood vessels in your limbs.    Buerger disease. This is caused by inflamed blood vessels in your hands and feet.    Some forms of arthritis.    Rare birth defects that affect the arteries in your legs.  · Use tobacco.  · Do not get enough exercise.  · Are obese.  · Are age 50 or older.  SIGNS AND SYMPTOMS   PVD may cause many different symptoms. Your symptoms depend on what part of your body is not getting enough blood. Some common signs and symptoms include:  · Cramps in your lower legs. This may be a symptom of poor leg circulation (claudication).  · Pain and weakness in your legs while you are physically active that goes away when you rest (intermittent claudication).  · Leg pain when at rest.  · Leg numbness, tingling, or weakness.  · Coldness in a leg or foot, especially when compared with the other leg.  · Skin or hair changes. These can include:    Hair loss.    Shiny skin.    Pale or bluish skin.    Thick toenails.  · Inability to get or maintain an erection (erectile dysfunction).  People with  PVD are more prone to developing ulcers and sores on their toes, feet, or legs. These may take longer than normal to heal.  DIAGNOSIS  Your health care provider may diagnose PVD from your signs and symptoms. The health care provider will also do a physical exam. You may have tests to find out what is causing your PVD and determine its severity. Tests may include:  · Blood pressure recordings from your arms and legs and measurements of the strength of your pulses (pulse volume recordings).  · Imaging studies using sound waves to take pictures of the blood flow through your blood vessels (Doppler ultrasound).  · Injecting a dye into your blood vessels before having imaging studies using:    X-rays (angiogram or arteriogram).    Computer-generated X-rays (CT angiogram).    A powerful electromagnetic field and a computer (magnetic resonance angiogram or MRA).  TREATMENT  Treatment for PVD depends on the cause of your condition and the severity of your symptoms. It also depends on your age. Underlying causes need to be treated and controlled. These include long-lasting (chronic) conditions, such as diabetes, high cholesterol, and high blood pressure. You may need to first try making lifestyle changes and taking medicines. Surgery may be needed if these do not work.  Lifestyle changes may include:  · Quitting smoking.  · Exercising regularly.  · Following a low-fat, low-cholesterol diet.  Medicines may include:  · Blood thinners to prevent blood clots.  · Medicines to improve blood flow.  · Medicines to improve your blood cholesterol levels.  Surgical procedures may include:  · A procedure that uses an inflated balloon to open a blocked artery and improve blood flow (angioplasty).  · A procedure to put in a tube (stent) to keep a blocked artery open (stent implant).  · Surgery to reroute blood flow around a blocked artery (peripheral bypass surgery).  · Surgery to remove dead tissue from an infected wound on the affected  limb.  · Amputation. This is surgical removal of the affected limb. This may be necessary in cases of acute ischemic limb that are not improved through medical or surgical treatments.  HOME CARE INSTRUCTIONS  · Take medicines only as directed by your health care provider.  · Do not use any tobacco products, including cigarettes, chewing tobacco, or electronic cigarettes.  If you need help quitting, ask your health care provider.  · Lose weight if you are overweight, and maintain a healthy weight as directed by your health care provider.  · Eat a diet that is low in fat and cholesterol. If you need help, ask your health care provider.  · Exercise regularly. Ask your health care provider to suggest some good activities for you.  · Use compression stockings or other mechanical devices as directed by your health care provider.  · Take good care of your feet.    Wear comfortable shoes that fit well.    Check your feet often for any cuts or sores.  SEEK MEDICAL CARE IF:  · You have cramps in your legs while walking.  · You have leg pain when you are at rest.  · You have coldness in a leg or foot.  · Your skin changes.  · You have erectile dysfunction.  · You have cuts or sores on your feet that are not healing.  SEEK IMMEDIATE MEDICAL CARE IF:  · Your arm or leg turns cold and blue.  · Your arms or legs become red, warm, swollen, painful, or numb.  · You have chest pain or trouble breathing.  · You suddenly have weakness in your face, arm, or leg.  · You become very confused or lose the ability to speak.  · You suddenly have a very bad headache or lose your vision.     This information is not intended to replace advice given to you by your health care provider. Make sure you discuss any questions you have with your health care provider.     Document Released: 01/25/2006 Document Revised: 01/08/2016 Document Reviewed: 05/28/2015  [employer] Interactive Patient Education ©2017 [employer]

## 2017-11-17 ENCOUNTER — ANESTHESIA EVENT (OUTPATIENT)
Dept: CARDIOLOGY | Facility: HOSPITAL | Age: 57
End: 2017-11-17

## 2017-11-17 ENCOUNTER — ANESTHESIA (OUTPATIENT)
Dept: CARDIOLOGY | Facility: HOSPITAL | Age: 57
End: 2017-11-17

## 2017-11-17 ENCOUNTER — HOSPITAL ENCOUNTER (OUTPATIENT)
Dept: CARDIOLOGY | Facility: HOSPITAL | Age: 57
Discharge: HOME | End: 2017-11-17
Admitting: INTERNAL MEDICINE

## 2017-11-17 VITALS
RESPIRATION RATE: 18 BRPM | BODY MASS INDEX: 25.17 KG/M2 | DIASTOLIC BLOOD PRESSURE: 71 MMHG | HEART RATE: 73 BPM | OXYGEN SATURATION: 99 % | SYSTOLIC BLOOD PRESSURE: 126 MMHG | WEIGHT: 160.38 LBS | HEIGHT: 67 IN | TEMPERATURE: 98.6 F

## 2017-11-17 DIAGNOSIS — I48.92 ATRIAL FLUTTER, UNSPECIFIED TYPE (HCC): ICD-10-CM

## 2017-11-17 PROCEDURE — 93010 ELECTROCARDIOGRAM REPORT: CPT | Performed by: INTERNAL MEDICINE

## 2017-11-17 PROCEDURE — 92960 CARDIOVERSION ELECTRIC EXT: CPT | Performed by: INTERNAL MEDICINE

## 2017-11-17 PROCEDURE — 25010000002 PROPOFOL 10 MG/ML EMULSION: Performed by: NURSE ANESTHETIST, CERTIFIED REGISTERED

## 2017-11-17 PROCEDURE — 93005 ELECTROCARDIOGRAM TRACING: CPT | Performed by: INTERNAL MEDICINE

## 2017-11-17 PROCEDURE — 92960 CARDIOVERSION ELECTRIC EXT: CPT

## 2017-11-17 RX ORDER — SODIUM CHLORIDE 9 MG/ML
50 INJECTION, SOLUTION INTRAVENOUS CONTINUOUS
Status: DISCONTINUED | OUTPATIENT
Start: 2017-11-17 | End: 2017-11-18 | Stop reason: HOSPADM

## 2017-11-17 RX ORDER — PROPOFOL 10 MG/ML
VIAL (ML) INTRAVENOUS AS NEEDED
Status: DISCONTINUED | OUTPATIENT
Start: 2017-11-17 | End: 2017-11-17 | Stop reason: SURG

## 2017-11-17 RX ORDER — SODIUM CHLORIDE 0.9 % (FLUSH) 0.9 %
1-10 SYRINGE (ML) INJECTION AS NEEDED
Status: DISCONTINUED | OUTPATIENT
Start: 2017-11-17 | End: 2017-11-18 | Stop reason: HOSPADM

## 2017-11-17 RX ADMIN — PROPOFOL 50 MG: 10 INJECTION, EMULSION INTRAVENOUS at 13:21

## 2017-11-17 RX ADMIN — PROPOFOL 40 MG: 10 INJECTION, EMULSION INTRAVENOUS at 13:23

## 2017-11-17 RX ADMIN — SODIUM CHLORIDE 50 ML/HR: 9 INJECTION, SOLUTION INTRAVENOUS at 11:30

## 2017-11-17 NOTE — ANESTHESIA POSTPROCEDURE EVALUATION
Patient: Milton Rodriguze    Procedure Summary     Date Anesthesia Start Anesthesia Stop Room / Location    11/17/17 1320 [address] [facility] CLOSE OBSERVATION UNIT       Procedure Diagnosis Scheduled Providers Provider    [facility] Atrial flutter, unspecified type  (atrial flutter) MD Rach Giron, CARIE          Anesthesia Type: general  Last vitals  BP   113/65   Pulse   74   Resp   24   SpO2   100%     Post Anesthesia Care and Evaluation    Patient location during evaluation: PHASE II  Patient participation: complete - patient participated  Level of consciousness: awake  Pain management: adequate  Airway patency: patent  Anesthetic complications: No anesthetic complications  PONV Status: none  Cardiovascular status: acceptable  Respiratory status: acceptable  Hydration status: acceptable

## 2017-11-17 NOTE — ANESTHESIA PREPROCEDURE EVALUATION
Anesthesia Evaluation     Patient summary reviewed and Nursing notes reviewed   no history of anesthetic complications:  NPO Solid Status: > 8 hours  NPO Liquid Status: > 8 hours     Airway   Mallampati: I  TM distance: >3 FB  Neck ROM: full  no difficulty expected  Dental      Pulmonary     breath sounds clear to auscultation  (+) a smoker Current,   Cardiovascular     ECG reviewed  Patient on routine beta blocker and Beta blocker given within 24 hours of surgery  Rhythm: regular  Rate: normal    (+) hypertension, dysrhythmias Atrial Flutter, PVD, hyperlipidemia      Neuro/Psych- negative ROS  (-) seizures, TIA, CVA  GI/Hepatic/Renal/Endo    (+)  renal disease CRI, diabetes mellitus,     Musculoskeletal (-) negative ROS    Abdominal    Substance History - negative use     OB/GYN negative ob/gyn ROS         Other            Phys Exam Other: Upper and lower partials                                Anesthesia Plan    ASA 3     general     intravenous induction   Anesthetic plan and risks discussed with patient.

## 2017-11-27 RX ORDER — POTASSIUM CHLORIDE 1500 MG/1
TABLET, EXTENDED RELEASE ORAL
Qty: 60 TABLET | Refills: 11 | Status: ON HOLD | OUTPATIENT
Start: 2017-11-27 | End: 2020-07-29

## 2018-02-01 ENCOUNTER — TELEPHONE (OUTPATIENT)
Dept: GASTROENTEROLOGY | Facility: CLINIC | Age: 58
End: 2018-02-01

## 2018-02-01 NOTE — TELEPHONE ENCOUNTER
Pt is on schedule for tomorrow  I need something from referring office regarding heme pos stool results, did they do lab work or just stool?    No referral records in computer that I saw    ty

## 2018-02-02 ENCOUNTER — OFFICE VISIT (OUTPATIENT)
Dept: GASTROENTEROLOGY | Facility: CLINIC | Age: 58
End: 2018-02-02

## 2018-02-02 VITALS
WEIGHT: 154 LBS | BODY MASS INDEX: 24.17 KG/M2 | OXYGEN SATURATION: 98 % | DIASTOLIC BLOOD PRESSURE: 80 MMHG | HEART RATE: 76 BPM | TEMPERATURE: 97.1 F | SYSTOLIC BLOOD PRESSURE: 140 MMHG | HEIGHT: 67 IN

## 2018-02-02 DIAGNOSIS — Z79.01 ANTICOAGULATED: ICD-10-CM

## 2018-02-02 DIAGNOSIS — R19.5 HEME POSITIVE STOOL: Primary | ICD-10-CM

## 2018-02-02 PROCEDURE — 99214 OFFICE O/P EST MOD 30 MIN: CPT | Performed by: NURSE PRACTITIONER
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
## 2018-02-16 ENCOUNTER — TELEPHONE (OUTPATIENT)
Dept: CARDIOLOGY | Facility: CLINIC | Age: 58
End: 2018-02-16

## 2018-02-16 NOTE — TELEPHONE ENCOUNTER
Lisa at Dr. Gomez's office calling to check on the surgical clearance for pt to have a endo and colonoscopy and to be off blood thinners.  Please advise.Juan Carlos Killian, CMA

## 2018-02-20 ENCOUNTER — TELEPHONE (OUTPATIENT)
Dept: GASTROENTEROLOGY | Facility: CLINIC | Age: 58
End: 2018-02-20

## 2018-02-20 NOTE — TELEPHONE ENCOUNTER
CALLED SPOKE TO PT'S WIFE INFORMED HER THAT PER DR KIM PT WAS OK TO STOP ELIQUIS 2 DAYS BEFORE PROCEDURE PT'S WIFE VOICED UNDERSTANDING

## 2018-03-09 ENCOUNTER — ANESTHESIA EVENT (OUTPATIENT)
Dept: GASTROENTEROLOGY | Facility: HOSPITAL | Age: 58
End: 2018-03-09

## 2018-03-09 ENCOUNTER — HOSPITAL ENCOUNTER (OUTPATIENT)
Facility: HOSPITAL | Age: 58
Setting detail: HOSPITAL OUTPATIENT SURGERY
Discharge: HOME | End: 2018-03-09
Attending: INTERNAL MEDICINE | Admitting: INTERNAL MEDICINE

## 2018-03-09 ENCOUNTER — ANESTHESIA (OUTPATIENT)
Dept: GASTROENTEROLOGY | Facility: HOSPITAL | Age: 58
End: 2018-03-09

## 2018-03-09 VITALS
BODY MASS INDEX: 25.74 KG/M2 | DIASTOLIC BLOOD PRESSURE: 79 MMHG | RESPIRATION RATE: 23 BRPM | OXYGEN SATURATION: 99 % | TEMPERATURE: 98.1 F | SYSTOLIC BLOOD PRESSURE: 159 MMHG | HEART RATE: 80 BPM | WEIGHT: 164 LBS | HEIGHT: 67 IN

## 2018-03-09 DIAGNOSIS — R19.5 HEME POSITIVE STOOL: ICD-10-CM

## 2018-03-09 LAB — GLUCOSE BLDC GLUCOMTR-MCNC: 86 MG/DL (ref 70–130)

## 2018-03-09 PROCEDURE — 43239 EGD BIOPSY SINGLE/MULTIPLE: CPT | Performed by: INTERNAL MEDICINE

## 2018-03-09 PROCEDURE — 25010000002 PROPOFOL 10 MG/ML EMULSION: Performed by: NURSE ANESTHETIST, CERTIFIED REGISTERED

## 2018-03-09 PROCEDURE — 88305 TISSUE EXAM BY PATHOLOGIST: CPT | Performed by: INTERNAL MEDICINE

## 2018-03-09 PROCEDURE — 82962 GLUCOSE BLOOD TEST: CPT

## 2018-03-09 PROCEDURE — 87081 CULTURE SCREEN ONLY: CPT | Performed by: INTERNAL MEDICINE

## 2018-03-09 PROCEDURE — 45385 COLONOSCOPY W/LESION REMOVAL: CPT | Performed by: INTERNAL MEDICINE

## 2018-03-09 RX ORDER — SODIUM CHLORIDE 9 MG/ML
500 INJECTION, SOLUTION INTRAVENOUS CONTINUOUS PRN
Status: DISCONTINUED | OUTPATIENT
Start: 2018-03-09 | End: 2018-03-09 | Stop reason: HOSPADM

## 2018-03-09 RX ORDER — SODIUM CHLORIDE 0.9 % (FLUSH) 0.9 %
3 SYRINGE (ML) INJECTION AS NEEDED
Status: DISCONTINUED | OUTPATIENT
Start: 2018-03-09 | End: 2018-03-09 | Stop reason: HOSPADM

## 2018-03-09 RX ORDER — METOPROLOL TARTRATE 5 MG/5ML
2 INJECTION INTRAVENOUS ONCE AS NEEDED
Status: COMPLETED | OUTPATIENT
Start: 2018-03-09 | End: 2018-03-09

## 2018-03-09 RX ORDER — IPRATROPIUM BROMIDE AND ALBUTEROL SULFATE 2.5; .5 MG/3ML; MG/3ML
3 SOLUTION RESPIRATORY (INHALATION) ONCE
Status: COMPLETED | OUTPATIENT
Start: 2018-03-09 | End: 2018-03-09

## 2018-03-09 RX ORDER — SODIUM CHLORIDE 9 MG/ML
100 INJECTION, SOLUTION INTRAVENOUS CONTINUOUS
Status: CANCELLED | OUTPATIENT
Start: 2018-03-09

## 2018-03-09 RX ORDER — PROPOFOL 10 MG/ML
VIAL (ML) INTRAVENOUS AS NEEDED
Status: DISCONTINUED | OUTPATIENT
Start: 2018-03-09 | End: 2018-03-09 | Stop reason: SURG

## 2018-03-09 RX ORDER — SODIUM CHLORIDE 0.9 % (FLUSH) 0.9 %
1-10 SYRINGE (ML) INJECTION AS NEEDED
Status: CANCELLED | OUTPATIENT
Start: 2018-03-09

## 2018-03-09 RX ORDER — LIDOCAINE HYDROCHLORIDE 20 MG/ML
INJECTION, SOLUTION INFILTRATION; PERINEURAL AS NEEDED
Status: DISCONTINUED | OUTPATIENT
Start: 2018-03-09 | End: 2018-03-09 | Stop reason: SURG

## 2018-03-09 RX ADMIN — PROPOFOL 50 MG: 10 INJECTION, EMULSION INTRAVENOUS at 09:36

## 2018-03-09 RX ADMIN — PROPOFOL 20 MG: 10 INJECTION, EMULSION INTRAVENOUS at 09:50

## 2018-03-09 RX ADMIN — PROPOFOL 20 MG: 10 INJECTION, EMULSION INTRAVENOUS at 09:49

## 2018-03-09 RX ADMIN — PROPOFOL 20 MG: 10 INJECTION, EMULSION INTRAVENOUS at 09:54

## 2018-03-09 RX ADMIN — PROPOFOL 20 MG: 10 INJECTION, EMULSION INTRAVENOUS at 09:41

## 2018-03-09 RX ADMIN — SODIUM CHLORIDE 500 ML: 9 INJECTION, SOLUTION INTRAVENOUS at 08:28

## 2018-03-09 RX ADMIN — PROPOFOL 20 MG: 10 INJECTION, EMULSION INTRAVENOUS at 09:45

## 2018-03-09 RX ADMIN — PROPOFOL 20 MG: 10 INJECTION, EMULSION INTRAVENOUS at 09:48

## 2018-03-09 RX ADMIN — PROPOFOL 20 MG: 10 INJECTION, EMULSION INTRAVENOUS at 09:38

## 2018-03-09 RX ADMIN — METOPROLOL TARTRATE 2 MG: 1 INJECTION, SOLUTION INTRAVENOUS at 08:28

## 2018-03-09 RX ADMIN — PROPOFOL 20 MG: 10 INJECTION, EMULSION INTRAVENOUS at 09:42

## 2018-03-09 RX ADMIN — PROPOFOL 20 MG: 10 INJECTION, EMULSION INTRAVENOUS at 09:47

## 2018-03-09 RX ADMIN — PROPOFOL 20 MG: 10 INJECTION, EMULSION INTRAVENOUS at 09:43

## 2018-03-09 RX ADMIN — LIDOCAINE HYDROCHLORIDE 50 MG: 20 INJECTION, SOLUTION INFILTRATION; PERINEURAL at 09:34

## 2018-03-09 RX ADMIN — PROPOFOL 20 MG: 10 INJECTION, EMULSION INTRAVENOUS at 09:44

## 2018-03-09 RX ADMIN — PROPOFOL 20 MG: 10 INJECTION, EMULSION INTRAVENOUS at 09:39

## 2018-03-09 RX ADMIN — PROPOFOL 20 MG: 10 INJECTION, EMULSION INTRAVENOUS at 09:40

## 2018-03-09 RX ADMIN — PROPOFOL 20 MG: 10 INJECTION, EMULSION INTRAVENOUS at 09:46

## 2018-03-09 RX ADMIN — PROPOFOL 20 MG: 10 INJECTION, EMULSION INTRAVENOUS at 09:51

## 2018-03-09 RX ADMIN — IPRATROPIUM BROMIDE AND ALBUTEROL SULFATE 3 ML: 2.5; .5 SOLUTION RESPIRATORY (INHALATION) at 08:29

## 2018-03-09 RX ADMIN — PROPOFOL 20 MG: 10 INJECTION, EMULSION INTRAVENOUS at 09:53

## 2018-03-09 RX ADMIN — PROPOFOL 50 MG: 10 INJECTION, EMULSION INTRAVENOUS at 09:34

## 2018-03-09 NOTE — PLAN OF CARE
Problem: Patient Care Overview (Adult)  Goal: Plan of Care Review  Outcome: Ongoing (interventions implemented as appropriate)   03/09/18 0946   Coping/Psychosocial Response Interventions   Plan Of Care Reviewed With patient   Patient Care Overview   Progress improving   Outcome Evaluation   Outcome Summary/Follow up Plan pt tolerating prcoedure well        Problem: GI Endoscopy (Adult)  Goal: Signs and Symptoms of Listed Potential Problems Will be Absent or Manageable (GI Endoscopy)  Outcome: Ongoing (interventions implemented as appropriate)   03/09/18 0946   GI Endoscopy   Problems Assessed (GI Endoscopy) all   Problems Present (GI Endoscopy) none

## 2018-03-09 NOTE — ANESTHESIA POSTPROCEDURE EVALUATION
Patient: Milton Rodriguez    Procedure Summary     Date Anesthesia Start Anesthesia Stop Room / Location    03/09/18 0931 0[facility] ENDOSCOPY 4 / [facility]       Procedure Diagnosis Surgeon Provider    COLONOSCOPY WITH ANESTHESIA (N/A ); ESOPHAGOGASTRODUODENOSCOPY WITH ANESTHESIA (N/A Esophagus) Heme positive stool  (Heme positive stool [R19.5]) DO Tamara Galan, CARIE          Anesthesia Type: general  Last vitals  BP   127/61 (03/09/18 0958)   Temp   98.1 °F (36.7 °C) (03/09/18 0807)   Pulse   84 (03/09/18 0958)   Resp   24 (03/09/18 0958)     SpO2   95 % (03/09/18 0958)     Post Anesthesia Care and Evaluation    Patient location during evaluation: PHASE II  Patient participation: complete - patient participated  Level of consciousness: awake and alert  Pain score: 0  Pain management: adequate  Airway patency: patent  Anesthetic complications: No anesthetic complications    Cardiovascular status: acceptable and stable  Respiratory status: acceptable and unassisted  Hydration status: stable
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
## 2018-03-09 NOTE — PLAN OF CARE
Problem: GI Endoscopy (Adult)  Goal: Signs and Symptoms of Listed Potential Problems Will be Absent or Manageable (GI Endoscopy)  Outcome: Outcome(s) achieved Date Met: 03/09/18

## 2018-03-09 NOTE — PLAN OF CARE
Problem: Patient Care Overview (Adult)  Goal: Plan of Care Review  Outcome: Outcome(s) achieved Date Met: 03/09/18 03/09/18 0959   Coping/Psychosocial Response Interventions   Plan Of Care Reviewed With patient;spouse   Patient Care Overview   Progress improving   Outcome Evaluation   Outcome Summary/Follow up Plan discharge critreria met

## 2018-03-09 NOTE — ANESTHESIA POSTPROCEDURE EVALUATION
Patient: Milton Rodriguez    Procedure Summary     Date Anesthesia Start Anesthesia Stop Room / Location    03/09/18 0931 0[facility] ENDOSCOPY 4 / [facility]       Procedure Diagnosis Surgeon Provider    COLONOSCOPY WITH ANESTHESIA (N/A ); ESOPHAGOGASTRODUODENOSCOPY WITH ANESTHESIA (N/A Esophagus) Heme positive stool  (Heme positive stool [R19.5]) DO Tamara Galan, CARIE          Anesthesia Type: general  Last vitals  BP   159/83 (03/09/18 0807)   Temp   98.1 °F (36.7 °C) (03/09/18 0807)   Pulse   88 (03/09/18 0807)   Resp   20 (03/09/18 0807)     SpO2   98 % (03/09/18 0807)     Post Anesthesia Care and Evaluation    Patient location during evaluation: PHASE II  Patient participation: complete - patient participated  Level of consciousness: awake and alert  Pain score: 0  Pain management: adequate  Airway patency: patent  Anesthetic complications: No anesthetic complications    Cardiovascular status: acceptable and stable  Respiratory status: acceptable and unassisted  Hydration status: stable

## 2018-03-09 NOTE — ANESTHESIA PREPROCEDURE EVALUATION
Anesthesia Evaluation     Patient summary reviewed and Nursing notes reviewed   no history of anesthetic complications:  NPO Solid Status: > 8 hours  NPO Liquid Status: > 2 hours           Airway   Mallampati: I  TM distance: >3 FB  Neck ROM: full  no difficulty expected  Dental          Pulmonary - normal exam    breath sounds clear to auscultation  (+) a smoker (1.5 ppd) Current,   (-) asthma, recent URI, sleep apnea  Cardiovascular   Exercise tolerance: good (4-7 METS) (Limited by leg pain, denies chest pain or SOB)    ECG reviewed  Patient on routine beta blocker and Beta blocker given within 24 hours of surgery  Rhythm: regular  Rate: normal    (+) hypertension, dysrhythmias (Cardioversion 11/17/17) Atrial Flutter, PVD, hyperlipidemia,   (-) past MI, angina, cardiac stents      Neuro/Psych  (-) seizures, TIA, CVA  GI/Hepatic/Renal/Endo    (+)   renal disease CRI, diabetes mellitus,   (-) GERD, liver disease, hypothyroidism, hyperthyroidism    Musculoskeletal     Abdominal    Substance History      OB/GYN          Other            Phys Exam Other: Upper and lower partials                Anesthesia Plan    ASA 3     general     intravenous induction   Anesthetic plan and risks discussed with patient.

## 2018-03-10 LAB — UREASE TISS QL: NEGATIVE

## 2018-03-12 LAB
LAB AP CASE REPORT: NORMAL
Lab: NORMAL
PATH REPORT.FINAL DX SPEC: NORMAL
PATH REPORT.GROSS SPEC: NORMAL

## 2018-03-13 ENCOUNTER — TELEPHONE (OUTPATIENT)
Dept: GASTROENTEROLOGY | Facility: CLINIC | Age: 58
End: 2018-03-13

## 2018-05-18 ENCOUNTER — APPOINTMENT (OUTPATIENT)
Dept: ULTRASOUND IMAGING | Facility: HOSPITAL | Age: 58
End: 2018-05-18

## 2018-06-08 ENCOUNTER — APPOINTMENT (OUTPATIENT)
Dept: ULTRASOUND IMAGING | Facility: HOSPITAL | Age: 58
End: 2018-06-08

## 2018-09-20 NOTE — TELEPHONE ENCOUNTER
Dr. Sapp's office called asking us to send in a refill for the Multaq to [facility].  Pt will need an appt as of the end of Oct.    Juan Carlos Killian, CMA

## 2018-10-19 RX ORDER — FUROSEMIDE 20 MG/1
TABLET ORAL
Qty: 65 TABLET | Refills: 8 | OUTPATIENT
Start: 2018-10-19

## 2018-10-24 ENCOUNTER — HOSPITAL ENCOUNTER (OUTPATIENT)
Dept: GENERAL RADIOLOGY | Facility: HOSPITAL | Age: 58
Discharge: HOME | End: 2018-10-24
Attending: OPHTHALMOLOGY | Admitting: OPHTHALMOLOGY

## 2018-10-24 ENCOUNTER — TRANSCRIBE ORDERS (OUTPATIENT)
Dept: ADMINISTRATIVE | Facility: HOSPITAL | Age: 58
End: 2018-10-24

## 2018-10-24 ENCOUNTER — APPOINTMENT (OUTPATIENT)
Dept: LAB | Facility: HOSPITAL | Age: 58
End: 2018-10-24
Attending: OPHTHALMOLOGY

## 2018-10-24 DIAGNOSIS — H35.359 CYSTOID MACULAR EDEMA, UNSPECIFIED LATERALITY: Primary | ICD-10-CM

## 2018-10-24 LAB
CRP SERPL-MCNC: 0.55 MG/DL (ref 0–0.99)
DEPRECATED RDW RBC AUTO: 48.1 FL (ref 40–54)
ERYTHROCYTE [DISTWIDTH] IN BLOOD BY AUTOMATED COUNT: 15.9 % (ref 12–15)
ERYTHROCYTE [SEDIMENTATION RATE] IN BLOOD: 59 MM/HR (ref 0–15)
HCT VFR BLD AUTO: 32.6 % (ref 40–52)
HGB BLD-MCNC: 11 G/DL (ref 14–18)
MCH RBC QN AUTO: 28.6 PG (ref 28–32)
MCHC RBC AUTO-ENTMCNC: 33.7 G/DL (ref 33–36)
MCV RBC AUTO: 84.7 FL (ref 82–95)
PLATELET # BLD AUTO: 327 10*3/MM3 (ref 130–400)
PMV BLD AUTO: 9.7 FL (ref 6–12)
RBC # BLD AUTO: 3.85 10*6/MM3 (ref 4.8–5.9)
WBC NRBC COR # BLD: 7.48 10*3/MM3 (ref 4.8–10.8)

## 2018-10-24 PROCEDURE — 85549 MURAMIDASE: CPT | Performed by: OPHTHALMOLOGY

## 2018-10-24 PROCEDURE — 86618 LYME DISEASE ANTIBODY: CPT | Performed by: OPHTHALMOLOGY

## 2018-10-24 PROCEDURE — 86611 BARTONELLA ANTIBODY: CPT | Performed by: OPHTHALMOLOGY

## 2018-10-24 PROCEDURE — 85027 COMPLETE CBC AUTOMATED: CPT | Performed by: OPHTHALMOLOGY

## 2018-10-24 PROCEDURE — 86593 SYPHILIS TEST NON-TREP QUANT: CPT | Performed by: OPHTHALMOLOGY

## 2018-10-24 PROCEDURE — 85651 RBC SED RATE NONAUTOMATED: CPT | Performed by: OPHTHALMOLOGY

## 2018-10-24 PROCEDURE — 86780 TREPONEMA PALLIDUM: CPT | Performed by: OPHTHALMOLOGY

## 2018-10-24 PROCEDURE — 36415 COLL VENOUS BLD VENIPUNCTURE: CPT

## 2018-10-24 PROCEDURE — 86038 ANTINUCLEAR ANTIBODIES: CPT | Performed by: OPHTHALMOLOGY

## 2018-10-24 PROCEDURE — 86140 C-REACTIVE PROTEIN: CPT | Performed by: OPHTHALMOLOGY

## 2018-10-24 PROCEDURE — 71046 X-RAY EXAM CHEST 2 VIEWS: CPT

## 2018-10-24 PROCEDURE — 82164 ANGIOTENSIN I ENZYME TEST: CPT | Performed by: OPHTHALMOLOGY

## 2018-10-25 LAB
ACE SERPL-CCNC: 62 U/L (ref 14–82)
ANA SER QL IA: NEGATIVE
B BURGDOR IGG+IGM SER-ACNC: <0.91 ISR (ref 0–0.9)
RPR SER-TITR: NON REACTIVE {TITER}
T PALLIDUM AB (FTA-AB): NON REACTIVE

## 2018-10-26 LAB
B HENSELAE IGG TITR SER IF: NEGATIVE TITER
B HENSELAE IGM TITR SER IF: NEGATIVE TITER
B QUINTANA IGG TITR SER: NEGATIVE TITER
B QUINTANA IGM TITR SER: NEGATIVE TITER
LYSOZYME SERPL-MCNC: 7.4 UG/ML (ref 3–12.8)

## 2018-11-12 DIAGNOSIS — I73.9 PAD (PERIPHERAL ARTERY DISEASE) (HCC): Primary | ICD-10-CM

## 2018-11-29 ENCOUNTER — TELEPHONE (OUTPATIENT)
Dept: VASCULAR SURGERY | Facility: CLINIC | Age: 58
End: 2018-11-29

## 2018-11-30 ENCOUNTER — HOSPITAL ENCOUNTER (OUTPATIENT)
Dept: ULTRASOUND IMAGING | Facility: HOSPITAL | Age: 58
Discharge: HOME | End: 2018-11-30
Admitting: NURSE PRACTITIONER

## 2018-11-30 ENCOUNTER — OFFICE VISIT (OUTPATIENT)
Dept: VASCULAR SURGERY | Facility: CLINIC | Age: 58
End: 2018-11-30

## 2018-11-30 VITALS
HEIGHT: 67 IN | BODY MASS INDEX: 26.21 KG/M2 | SYSTOLIC BLOOD PRESSURE: 146 MMHG | OXYGEN SATURATION: 98 % | DIASTOLIC BLOOD PRESSURE: 86 MMHG | HEART RATE: 80 BPM | WEIGHT: 167 LBS

## 2018-11-30 DIAGNOSIS — I10 ESSENTIAL HYPERTENSION: ICD-10-CM

## 2018-11-30 DIAGNOSIS — I73.9 PAD (PERIPHERAL ARTERY DISEASE) (HCC): ICD-10-CM

## 2018-11-30 DIAGNOSIS — E78.2 MIXED HYPERLIPIDEMIA: ICD-10-CM

## 2018-11-30 DIAGNOSIS — N18.9 CHRONIC KIDNEY DISEASE, UNSPECIFIED CKD STAGE: ICD-10-CM

## 2018-11-30 DIAGNOSIS — Z72.0 TOBACCO ABUSE: ICD-10-CM

## 2018-11-30 DIAGNOSIS — I73.9 PAD (PERIPHERAL ARTERY DISEASE) (HCC): Primary | ICD-10-CM

## 2018-11-30 PROCEDURE — 93924 LWR XTR VASC STDY BILAT: CPT | Performed by: SURGERY

## 2018-11-30 PROCEDURE — 99213 OFFICE O/P EST LOW 20 MIN: CPT | Performed by: NURSE PRACTITIONER

## 2018-11-30 PROCEDURE — 93923 UPR/LXTR ART STDY 3+ LVLS: CPT

## 2018-11-30 RX ORDER — FUROSEMIDE 20 MG/1
40 TABLET ORAL DAILY
Qty: 30 TABLET | Refills: 0 | Status: SHIPPED | OUTPATIENT
Start: 2018-11-30 | End: 2019-01-16 | Stop reason: HOSPADM

## 2018-12-17 RX ORDER — FUROSEMIDE 20 MG/1
TABLET ORAL
Qty: 30 TABLET | Refills: 0 | OUTPATIENT
Start: 2018-12-17

## 2018-12-28 RX ORDER — FUROSEMIDE 20 MG/1
TABLET ORAL
Qty: 30 TABLET | Refills: 0 | OUTPATIENT
Start: 2018-12-28

## 2019-01-14 ENCOUNTER — APPOINTMENT (OUTPATIENT)
Dept: GENERAL RADIOLOGY | Facility: HOSPITAL | Age: 59
End: 2019-01-14

## 2019-01-14 ENCOUNTER — HOSPITAL ENCOUNTER (INPATIENT)
Facility: HOSPITAL | Age: 59
LOS: 2 days | Discharge: HOME | End: 2019-01-16
Attending: FAMILY MEDICINE | Admitting: FAMILY MEDICINE

## 2019-01-14 DIAGNOSIS — I50.9 CONGESTIVE HEART FAILURE, UNSPECIFIED HF CHRONICITY, UNSPECIFIED HEART FAILURE TYPE (HCC): Primary | ICD-10-CM

## 2019-01-14 DIAGNOSIS — Z86.79 HISTORY OF HYPERTENSION: ICD-10-CM

## 2019-01-14 DIAGNOSIS — R09.02 HYPOXIA: ICD-10-CM

## 2019-01-14 DIAGNOSIS — Z72.0 TOBACCO ABUSE: ICD-10-CM

## 2019-01-14 DIAGNOSIS — J90 PLEURAL EFFUSION: ICD-10-CM

## 2019-01-14 DIAGNOSIS — Z86.39 HX OF DIABETES MELLITUS: ICD-10-CM

## 2019-01-14 LAB
A-A DO2: ABNORMAL MMHG
ALBUMIN SERPL-MCNC: 3.6 G/DL (ref 3.5–5)
ALBUMIN/GLOB SERPL: 1.1 G/DL (ref 1.1–2.5)
ALP SERPL-CCNC: 224 U/L (ref 24–120)
ALT SERPL W P-5'-P-CCNC: 22 U/L (ref 0–54)
ANION GAP SERPL CALCULATED.3IONS-SCNC: 9 MMOL/L (ref 4–13)
APTT PPP: 41 SECONDS (ref 24.1–34.8)
ARTERIAL PATENCY WRIST A: POSITIVE
AST SERPL-CCNC: 46 U/L (ref 7–45)
ATMOSPHERIC PRESS: 760 MMHG
BACTERIA UR QL AUTO: ABNORMAL /HPF
BASE EXCESS BLDA CALC-SCNC: -1.2 MMOL/L (ref 0–2)
BASOPHILS # BLD AUTO: 0.07 10*3/MM3 (ref 0–0.2)
BASOPHILS NFR BLD AUTO: 0.8 % (ref 0–2)
BDY SITE: ABNORMAL
BILIRUB SERPL-MCNC: 0.4 MG/DL (ref 0.1–1)
BILIRUB UR QL STRIP: NEGATIVE
BODY TEMPERATURE: 37 C
BUN BLD-MCNC: 12 MG/DL (ref 5–21)
BUN/CREAT SERPL: 9.6 (ref 7–25)
CALCIUM SPEC-SCNC: 8.2 MG/DL (ref 8.4–10.4)
CHLORIDE SERPL-SCNC: 104 MMOL/L (ref 98–110)
CLARITY UR: CLEAR
CO2 SERPL-SCNC: 26 MMOL/L (ref 24–31)
COHGB MFR BLD: 3 % (ref 0–5)
COLOR UR: YELLOW
CREAT BLD-MCNC: 1.25 MG/DL (ref 0.5–1.4)
D-LACTATE SERPL-SCNC: 1.1 MMOL/L (ref 0.5–2)
DEPRECATED RDW RBC AUTO: 47.8 FL (ref 40–54)
EOSINOPHIL # BLD AUTO: 0.22 10*3/MM3 (ref 0–0.7)
EOSINOPHIL NFR BLD AUTO: 2.4 % (ref 0–4)
ERYTHROCYTE [DISTWIDTH] IN BLOOD BY AUTOMATED COUNT: 15.4 % (ref 12–15)
FLUAV AG NPH QL: NEGATIVE
FLUBV AG NPH QL IA: NEGATIVE
GFR SERPL CREATININE-BSD FRML MDRD: 72 ML/MIN/1.73
GLOBULIN UR ELPH-MCNC: 3.4 GM/DL
GLUCOSE BLD-MCNC: 135 MG/DL (ref 70–100)
GLUCOSE UR STRIP-MCNC: NEGATIVE MG/DL
HCO3 BLDA-SCNC: 23.6 MMOL/L (ref 20–26)
HCT VFR BLD AUTO: 39.4 % (ref 40–52)
HCT VFR BLD CALC: 39.4 % (ref 38–51)
HGB BLD-MCNC: 12.9 G/DL (ref 14–18)
HGB BLDA-MCNC: 12.9 G/DL (ref 14–18)
HGB UR QL STRIP.AUTO: ABNORMAL
HOLD SPECIMEN: NORMAL
HYALINE CASTS UR QL AUTO: ABNORMAL /LPF
IMM GRANULOCYTES # BLD AUTO: 0.03 10*3/MM3 (ref 0–0.03)
IMM GRANULOCYTES NFR BLD AUTO: 0.3 % (ref 0–5)
INR PPP: 1.05 (ref 0.91–1.09)
KETONES UR QL STRIP: NEGATIVE
LEUKOCYTE ESTERASE UR QL STRIP.AUTO: NEGATIVE
LYMPHOCYTES # BLD AUTO: 0.32 10*3/MM3 (ref 0.72–4.86)
LYMPHOCYTES NFR BLD AUTO: 3.5 % (ref 15–45)
Lab: ABNORMAL
Lab: ABNORMAL
MCH RBC QN AUTO: 27.9 PG (ref 28–32)
MCHC RBC AUTO-ENTMCNC: 32.7 G/DL (ref 33–36)
MCV RBC AUTO: 85.3 FL (ref 82–95)
METHGB BLD QL: 1 % (ref 0–3)
MODALITY: ABNORMAL
MONOCYTES # BLD AUTO: 1.46 10*3/MM3 (ref 0.19–1.3)
MONOCYTES NFR BLD AUTO: 15.9 % (ref 4–12)
NEUTROPHILS # BLD AUTO: 7.07 10*3/MM3 (ref 1.87–8.4)
NEUTROPHILS NFR BLD AUTO: 77.1 % (ref 39–78)
NITRITE UR QL STRIP: NEGATIVE
NOTE: ABNORMAL
NOTIFIED BY: ABNORMAL
NOTIFIED WHO: ABNORMAL
NRBC BLD AUTO-RTO: 0 /100 WBC (ref 0–0)
NT-PROBNP SERPL-MCNC: ABNORMAL PG/ML (ref 0–900)
OXYHGB MFR BLDV: 78.3 % (ref 94–99)
PCO2 BLDA: 39 MM HG (ref 35–45)
PCO2 TEMP ADJ BLD: ABNORMAL MM HG (ref 35–45)
PH BLDA: 7.39 PH UNITS (ref 7.35–7.45)
PH UR STRIP.AUTO: 5.5 [PH] (ref 5–8)
PH, TEMP CORRECTED: ABNORMAL PH UNITS (ref 7.35–7.45)
PLATELET # BLD AUTO: 302 10*3/MM3 (ref 130–400)
PMV BLD AUTO: 9.9 FL (ref 6–12)
PO2 BLDA: 46.7 MM HG (ref 83–108)
PO2 TEMP ADJ BLD: ABNORMAL MM HG (ref 83–108)
POTASSIUM BLD-SCNC: 3.7 MMOL/L (ref 3.5–5.3)
POTASSIUM BLDA-SCNC: 3.9 MMOL/L (ref 3.5–5.2)
PROT SERPL-MCNC: 7 G/DL (ref 6.3–8.7)
PROT UR QL STRIP: ABNORMAL
PROTHROMBIN TIME: 14 SECONDS (ref 11.9–14.6)
RBC # BLD AUTO: 4.62 10*6/MM3 (ref 4.8–5.9)
RBC # UR: ABNORMAL /HPF
REF LAB TEST METHOD: ABNORMAL
SAO2 % BLDCOA: 81.5 % (ref 94–99)
SODIUM BLD-SCNC: 139 MMOL/L (ref 135–145)
SODIUM BLDA-SCNC: 141 MMOL/L (ref 136–145)
SP GR UR STRIP: 1.01 (ref 1–1.03)
SQUAMOUS #/AREA URNS HPF: ABNORMAL /HPF
TROPONIN I SERPL-MCNC: <0.012 NG/ML (ref 0–0.03)
UROBILINOGEN UR QL STRIP: ABNORMAL
VENTILATOR MODE: ABNORMAL
WBC NRBC COR # BLD: 9.17 10*3/MM3 (ref 4.8–10.8)
WBC UR QL AUTO: ABNORMAL /HPF
WHOLE BLOOD HOLD SPECIMEN: NORMAL
WHOLE BLOOD HOLD SPECIMEN: NORMAL

## 2019-01-14 PROCEDURE — 84484 ASSAY OF TROPONIN QUANT: CPT | Performed by: NURSE PRACTITIONER

## 2019-01-14 PROCEDURE — 85730 THROMBOPLASTIN TIME PARTIAL: CPT | Performed by: NURSE PRACTITIONER

## 2019-01-14 PROCEDURE — 87040 BLOOD CULTURE FOR BACTERIA: CPT | Performed by: NURSE PRACTITIONER

## 2019-01-14 PROCEDURE — 93005 ELECTROCARDIOGRAM TRACING: CPT | Performed by: FAMILY MEDICINE

## 2019-01-14 PROCEDURE — 71045 X-RAY EXAM CHEST 1 VIEW: CPT

## 2019-01-14 PROCEDURE — 94640 AIRWAY INHALATION TREATMENT: CPT

## 2019-01-14 PROCEDURE — 83605 ASSAY OF LACTIC ACID: CPT | Performed by: NURSE PRACTITIONER

## 2019-01-14 PROCEDURE — 85025 COMPLETE CBC W/AUTO DIFF WBC: CPT | Performed by: NURSE PRACTITIONER

## 2019-01-14 PROCEDURE — 87804 INFLUENZA ASSAY W/OPTIC: CPT | Performed by: NURSE PRACTITIONER

## 2019-01-14 PROCEDURE — 85610 PROTHROMBIN TIME: CPT | Performed by: NURSE PRACTITIONER

## 2019-01-14 PROCEDURE — 83050 HGB METHEMOGLOBIN QUAN: CPT

## 2019-01-14 PROCEDURE — 94799 UNLISTED PULMONARY SVC/PX: CPT

## 2019-01-14 PROCEDURE — 99284 EMERGENCY DEPT VISIT MOD MDM: CPT

## 2019-01-14 PROCEDURE — 82375 ASSAY CARBOXYHB QUANT: CPT

## 2019-01-14 PROCEDURE — 93005 ELECTROCARDIOGRAM TRACING: CPT | Performed by: NURSE PRACTITIONER

## 2019-01-14 PROCEDURE — 25010000002 METHYLPREDNISOLONE PER 125 MG: Performed by: NURSE PRACTITIONER

## 2019-01-14 PROCEDURE — 25010000002 CEFTRIAXONE PER 250 MG: Performed by: NURSE PRACTITIONER

## 2019-01-14 PROCEDURE — 94760 N-INVAS EAR/PLS OXIMETRY 1: CPT

## 2019-01-14 PROCEDURE — 93010 ELECTROCARDIOGRAM REPORT: CPT | Performed by: INTERNAL MEDICINE

## 2019-01-14 PROCEDURE — 25010000002 FUROSEMIDE PER 20 MG: Performed by: NURSE PRACTITIONER

## 2019-01-14 PROCEDURE — 83880 ASSAY OF NATRIURETIC PEPTIDE: CPT | Performed by: NURSE PRACTITIONER

## 2019-01-14 PROCEDURE — 36415 COLL VENOUS BLD VENIPUNCTURE: CPT | Performed by: EMERGENCY MEDICINE

## 2019-01-14 PROCEDURE — 84484 ASSAY OF TROPONIN QUANT: CPT | Performed by: FAMILY MEDICINE

## 2019-01-14 PROCEDURE — 80053 COMPREHEN METABOLIC PANEL: CPT | Performed by: NURSE PRACTITIONER

## 2019-01-14 PROCEDURE — 93005 ELECTROCARDIOGRAM TRACING: CPT

## 2019-01-14 PROCEDURE — 81001 URINALYSIS AUTO W/SCOPE: CPT | Performed by: NURSE PRACTITIONER

## 2019-01-14 PROCEDURE — 82805 BLOOD GASES W/O2 SATURATION: CPT

## 2019-01-14 PROCEDURE — 36600 WITHDRAWAL OF ARTERIAL BLOOD: CPT

## 2019-01-14 RX ORDER — SODIUM CHLORIDE 0.9 % (FLUSH) 0.9 %
3-10 SYRINGE (ML) INJECTION AS NEEDED
Status: DISCONTINUED | OUTPATIENT
Start: 2019-01-14 | End: 2019-01-16 | Stop reason: HOSPADM

## 2019-01-14 RX ORDER — SODIUM CHLORIDE 0.9 % (FLUSH) 0.9 %
3 SYRINGE (ML) INJECTION EVERY 12 HOURS SCHEDULED
Status: DISCONTINUED | OUTPATIENT
Start: 2019-01-14 | End: 2019-01-16 | Stop reason: HOSPADM

## 2019-01-14 RX ORDER — FUROSEMIDE 10 MG/ML
40 INJECTION INTRAMUSCULAR; INTRAVENOUS
Status: DISCONTINUED | OUTPATIENT
Start: 2019-01-14 | End: 2019-01-16 | Stop reason: HOSPADM

## 2019-01-14 RX ORDER — SODIUM CHLORIDE 0.9 % (FLUSH) 0.9 %
10 SYRINGE (ML) INJECTION AS NEEDED
Status: DISCONTINUED | OUTPATIENT
Start: 2019-01-14 | End: 2019-01-16 | Stop reason: HOSPADM

## 2019-01-14 RX ORDER — ATORVASTATIN CALCIUM 40 MG/1
80 TABLET, FILM COATED ORAL NIGHTLY
Status: DISCONTINUED | OUTPATIENT
Start: 2019-01-14 | End: 2019-01-16 | Stop reason: HOSPADM

## 2019-01-14 RX ORDER — METHYLPREDNISOLONE SODIUM SUCCINATE 125 MG/2ML
125 INJECTION, POWDER, LYOPHILIZED, FOR SOLUTION INTRAMUSCULAR; INTRAVENOUS ONCE
Status: COMPLETED | OUTPATIENT
Start: 2019-01-14 | End: 2019-01-14

## 2019-01-14 RX ORDER — PANTOPRAZOLE SODIUM 40 MG/1
40 TABLET, DELAYED RELEASE ORAL DAILY
Status: DISCONTINUED | OUTPATIENT
Start: 2019-01-14 | End: 2019-01-16 | Stop reason: HOSPADM

## 2019-01-14 RX ORDER — IPRATROPIUM BROMIDE AND ALBUTEROL SULFATE 2.5; .5 MG/3ML; MG/3ML
3 SOLUTION RESPIRATORY (INHALATION) ONCE
Status: COMPLETED | OUTPATIENT
Start: 2019-01-14 | End: 2019-01-14

## 2019-01-14 RX ORDER — FUROSEMIDE 10 MG/ML
40 INJECTION INTRAMUSCULAR; INTRAVENOUS ONCE
Status: COMPLETED | OUTPATIENT
Start: 2019-01-14 | End: 2019-01-14

## 2019-01-14 RX ORDER — NICOTINE 21 MG/24HR
1 PATCH, TRANSDERMAL 24 HOURS TRANSDERMAL EVERY 24 HOURS
Status: DISCONTINUED | OUTPATIENT
Start: 2019-01-14 | End: 2019-01-16 | Stop reason: HOSPADM

## 2019-01-14 RX ORDER — NEBIVOLOL 5 MG/1
20 TABLET ORAL DAILY
Status: DISCONTINUED | OUTPATIENT
Start: 2019-01-14 | End: 2019-01-16 | Stop reason: HOSPADM

## 2019-01-14 RX ORDER — DILTIAZEM HYDROCHLORIDE 180 MG/1
360 CAPSULE, COATED, EXTENDED RELEASE ORAL DAILY
Status: DISCONTINUED | OUTPATIENT
Start: 2019-01-14 | End: 2019-01-16 | Stop reason: HOSPADM

## 2019-01-14 RX ORDER — POTASSIUM CHLORIDE 750 MG/1
40 CAPSULE, EXTENDED RELEASE ORAL DAILY
Status: DISCONTINUED | OUTPATIENT
Start: 2019-01-14 | End: 2019-01-16 | Stop reason: HOSPADM

## 2019-01-14 RX ADMIN — VALSARTAN: 80 TABLET, FILM COATED ORAL at 21:02

## 2019-01-14 RX ADMIN — POTASSIUM CHLORIDE 40 MEQ: 750 CAPSULE, EXTENDED RELEASE ORAL at 21:00

## 2019-01-14 RX ADMIN — FUROSEMIDE 40 MG: 10 INJECTION, SOLUTION INTRAMUSCULAR; INTRAVENOUS at 16:24

## 2019-01-14 RX ADMIN — NICOTINE 1 PATCH: 14 PATCH, EXTENDED RELEASE TRANSDERMAL at 21:01

## 2019-01-14 RX ADMIN — DRONEDARONE 400 MG: 400 TABLET, FILM COATED ORAL at 21:00

## 2019-01-14 RX ADMIN — MAGNESIUM GLUCONATE 500 MG ORAL TABLET 400 MG: 500 TABLET ORAL at 21:00

## 2019-01-14 RX ADMIN — IPRATROPIUM BROMIDE AND ALBUTEROL SULFATE 3 ML: 2.5; .5 SOLUTION RESPIRATORY (INHALATION) at 16:17

## 2019-01-14 RX ADMIN — SODIUM CHLORIDE, PRESERVATIVE FREE 3 ML: 5 INJECTION INTRAVENOUS at 21:00

## 2019-01-14 RX ADMIN — CEFTRIAXONE SODIUM 1 G: 1 INJECTION, POWDER, FOR SOLUTION INTRAMUSCULAR; INTRAVENOUS at 16:24

## 2019-01-14 RX ADMIN — METHYLPREDNISOLONE SODIUM SUCCINATE 125 MG: 125 INJECTION, POWDER, FOR SOLUTION INTRAMUSCULAR; INTRAVENOUS at 16:22

## 2019-01-14 RX ADMIN — METFORMIN HYDROCHLORIDE: 500 TABLET ORAL at 21:01

## 2019-01-14 RX ADMIN — FUROSEMIDE 40 MG: 10 INJECTION, SOLUTION INTRAVENOUS at 18:26

## 2019-01-14 RX ADMIN — DESMOPRESSIN ACETATE 80 MG: 0.2 TABLET ORAL at 21:00
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
## 2019-01-15 ENCOUNTER — APPOINTMENT (OUTPATIENT)
Dept: GENERAL RADIOLOGY | Facility: HOSPITAL | Age: 59
End: 2019-01-15

## 2019-01-15 ENCOUNTER — APPOINTMENT (OUTPATIENT)
Dept: CARDIOLOGY | Facility: HOSPITAL | Age: 59
End: 2019-01-15
Attending: FAMILY MEDICINE

## 2019-01-15 LAB
ANION GAP SERPL CALCULATED.3IONS-SCNC: 11 MMOL/L (ref 4–13)
ARTICHOKE IGE QN: 172 MG/DL (ref 0–99)
BH CV ECHO MEAS - AO MAX PG (FULL): 2 MMHG
BH CV ECHO MEAS - AO MAX PG: 7 MMHG
BH CV ECHO MEAS - AO MEAN PG (FULL): 2 MMHG
BH CV ECHO MEAS - AO MEAN PG: 5 MMHG
BH CV ECHO MEAS - AO ROOT AREA (BSA CORRECTED): 1.7
BH CV ECHO MEAS - AO ROOT AREA: 8 CM^2
BH CV ECHO MEAS - AO ROOT DIAM: 3.2 CM
BH CV ECHO MEAS - AO V2 MAX: 132 CM/SEC
BH CV ECHO MEAS - AO V2 MEAN: 106 CM/SEC
BH CV ECHO MEAS - AO V2 VTI: 36.3 CM
BH CV ECHO MEAS - AVA(I,A): 3 CM^2
BH CV ECHO MEAS - AVA(I,D): 3 CM^2
BH CV ECHO MEAS - AVA(V,A): 3.5 CM^2
BH CV ECHO MEAS - AVA(V,D): 3.5 CM^2
BH CV ECHO MEAS - BSA(HAYCOCK): 1.9 M^2
BH CV ECHO MEAS - BSA: 1.9 M^2
BH CV ECHO MEAS - BZI_BMI: 26.5 KILOGRAMS/M^2
BH CV ECHO MEAS - BZI_METRIC_HEIGHT: 170.2 CM
BH CV ECHO MEAS - BZI_METRIC_WEIGHT: 76.7 KG
BH CV ECHO MEAS - EDV(CUBED): 157.5 ML
BH CV ECHO MEAS - EDV(MOD-SP4): 214 ML
BH CV ECHO MEAS - EDV(TEICH): 141.3 ML
BH CV ECHO MEAS - EF(CUBED): 62.3 %
BH CV ECHO MEAS - EF(MOD-SP4): 52.8 %
BH CV ECHO MEAS - EF(TEICH): 53.4 %
BH CV ECHO MEAS - ESV(CUBED): 59.3 ML
BH CV ECHO MEAS - ESV(MOD-SP4): 101 ML
BH CV ECHO MEAS - ESV(TEICH): 65.9 ML
BH CV ECHO MEAS - FS: 27.8 %
BH CV ECHO MEAS - IVS/LVPW: 1.5
BH CV ECHO MEAS - IVSD: 1.8 CM
BH CV ECHO MEAS - LA DIMENSION: 4.7 CM
BH CV ECHO MEAS - LA/AO: 1.5
BH CV ECHO MEAS - LAT PEAK E' VEL: 10.6 CM/SEC
BH CV ECHO MEAS - LV DIASTOLIC VOL/BSA (35-75): 113.7 ML/M^2
BH CV ECHO MEAS - LV MASS(C)D: 362.7 GRAMS
BH CV ECHO MEAS - LV MASS(C)DI: 192.7 GRAMS/M^2
BH CV ECHO MEAS - LV MAX PG: 5 MMHG
BH CV ECHO MEAS - LV MEAN PG: 3 MMHG
BH CV ECHO MEAS - LV SYSTOLIC VOL/BSA (12-30): 53.7 ML/M^2
BH CV ECHO MEAS - LV V1 MAX: 112 CM/SEC
BH CV ECHO MEAS - LV V1 MEAN: 80.5 CM/SEC
BH CV ECHO MEAS - LV V1 VTI: 25.9 CM
BH CV ECHO MEAS - LVIDD: 5.4 CM
BH CV ECHO MEAS - LVIDS: 3.9 CM
BH CV ECHO MEAS - LVLD AP4: 10 CM
BH CV ECHO MEAS - LVLS AP4: 8.2 CM
BH CV ECHO MEAS - LVOT AREA (M): 4.2 CM^2
BH CV ECHO MEAS - LVOT AREA: 4.2 CM^2
BH CV ECHO MEAS - LVOT DIAM: 2.3 CM
BH CV ECHO MEAS - LVPWD: 1.2 CM
BH CV ECHO MEAS - MED PEAK E' VEL: 16.2 CM/SEC
BH CV ECHO MEAS - MR ALIAS VEL: 30.8 CM/SEC
BH CV ECHO MEAS - MR ERO: 0.03 CM^2
BH CV ECHO MEAS - MR FLOW RATE: 17.4 CM^3/SEC
BH CV ECHO MEAS - MR MAX PG: 166.9 MMHG
BH CV ECHO MEAS - MR MAX VEL: 646 CM/SEC
BH CV ECHO MEAS - MR MEAN PG: 110 MMHG
BH CV ECHO MEAS - MR MEAN VEL: 497 CM/SEC
BH CV ECHO MEAS - MR PISA RADIUS: 0.3 CM
BH CV ECHO MEAS - MR PISA: 0.57 CM^2
BH CV ECHO MEAS - MR VOLUME: 6.6 ML
BH CV ECHO MEAS - MR VTI: 245 CM
BH CV ECHO MEAS - MV A MAX VEL: 85.4 CM/SEC
BH CV ECHO MEAS - MV AREA (1 DIAM): 8 CM^2
BH CV ECHO MEAS - MV DEC TIME: 0.13 SEC
BH CV ECHO MEAS - MV DIAM: 3.2 CM
BH CV ECHO MEAS - MV E MAX VEL: 109 CM/SEC
BH CV ECHO MEAS - MV E/A: 1.3
BH CV ECHO MEAS - MV FLOW AREA(1DIAM): 8 CM^2
BH CV ECHO MEAS - MV MAX PG: 2.6 MMHG
BH CV ECHO MEAS - MV MEAN PG: 1 MMHG
BH CV ECHO MEAS - MV V2 MAX: 80.4 CM/SEC
BH CV ECHO MEAS - MV V2 MEAN: 53 CM/SEC
BH CV ECHO MEAS - MV V2 VTI: 17.5 CM
BH CV ECHO MEAS - MVA(VTI): 6.1 CM^2
BH CV ECHO MEAS - RAP SYSTOLE: 5 MMHG
BH CV ECHO MEAS - RF(MV,AO)(1 DIAM): -1.1
BH CV ECHO MEAS - RF(MV,LVOT)(1DIAM): 0.24
BH CV ECHO MEAS - RVSP: 59.5 MMHG
BH CV ECHO MEAS - SI(AO): 155.1 ML/M^2
BH CV ECHO MEAS - SI(CUBED): 52.1 ML/M^2
BH CV ECHO MEAS - SI(LVOT): 57.2 ML/M^2
BH CV ECHO MEAS - SI(MOD-SP4): 60 ML/M^2
BH CV ECHO MEAS - SI(MV 1 DIAM): 74.8 ML/M^2
BH CV ECHO MEAS - SI(TEICH): 40.1 ML/M^2
BH CV ECHO MEAS - SV(AO): 291.9 ML
BH CV ECHO MEAS - SV(CUBED): 98.1 ML
BH CV ECHO MEAS - SV(LVOT): 107.6 ML
BH CV ECHO MEAS - SV(MOD-SP4): 113 ML
BH CV ECHO MEAS - SV(MV 1 DIAM): 140.7 ML
BH CV ECHO MEAS - SV(TEICH): 75.4 ML
BH CV ECHO MEAS - TR MAX VEL: 369 CM/SEC
BH CV ECHO MEASUREMENTS AVERAGE E/E' RATIO: 8.13
BUN BLD-MCNC: 17 MG/DL (ref 5–21)
BUN/CREAT SERPL: 12.8 (ref 7–25)
CALCIUM SPEC-SCNC: 8 MG/DL (ref 8.4–10.4)
CHLORIDE SERPL-SCNC: 104 MMOL/L (ref 98–110)
CHOLEST SERPL-MCNC: 294 MG/DL (ref 130–200)
CO2 SERPL-SCNC: 23 MMOL/L (ref 24–31)
CREAT BLD-MCNC: 1.33 MG/DL (ref 0.5–1.4)
DEPRECATED RDW RBC AUTO: 49.5 FL (ref 40–54)
ERYTHROCYTE [DISTWIDTH] IN BLOOD BY AUTOMATED COUNT: 15.3 % (ref 12–15)
GFR SERPL CREATININE-BSD FRML MDRD: 67 ML/MIN/1.73
GLUCOSE BLD-MCNC: 299 MG/DL (ref 70–100)
HBA1C MFR BLD: 6.1 %
HCT VFR BLD AUTO: 38.2 % (ref 40–52)
HDLC SERPL-MCNC: 107 MG/DL
HGB BLD-MCNC: 12.5 G/DL (ref 14–18)
LDLC/HDLC SERPL: 1.53 {RATIO}
LEFT ATRIUM VOLUME INDEX: 76.1 ML/M2
LEFT ATRIUM VOLUME: 143 CM3
MAXIMAL PREDICTED HEART RATE: 161 BPM
MCH RBC QN AUTO: 28.8 PG (ref 28–32)
MCHC RBC AUTO-ENTMCNC: 32.7 G/DL (ref 33–36)
MCV RBC AUTO: 88 FL (ref 82–95)
PLATELET # BLD AUTO: 282 10*3/MM3 (ref 130–400)
PMV BLD AUTO: 11 FL (ref 6–12)
POTASSIUM BLD-SCNC: 4.7 MMOL/L (ref 3.5–5.3)
RBC # BLD AUTO: 4.34 10*6/MM3 (ref 4.8–5.9)
SODIUM BLD-SCNC: 138 MMOL/L (ref 135–145)
STRESS TARGET HR: 137 BPM
TRIGL SERPL-MCNC: 117 MG/DL (ref 0–149)
WBC NRBC COR # BLD: 6.68 10*3/MM3 (ref 4.8–10.8)

## 2019-01-15 PROCEDURE — 83036 HEMOGLOBIN GLYCOSYLATED A1C: CPT | Performed by: FAMILY MEDICINE

## 2019-01-15 PROCEDURE — 80061 LIPID PANEL: CPT | Performed by: FAMILY MEDICINE

## 2019-01-15 PROCEDURE — 25010000002 METHYLPREDNISOLONE PER 125 MG: Performed by: FAMILY MEDICINE

## 2019-01-15 PROCEDURE — 94799 UNLISTED PULMONARY SVC/PX: CPT

## 2019-01-15 PROCEDURE — 80048 BASIC METABOLIC PNL TOTAL CA: CPT | Performed by: FAMILY MEDICINE

## 2019-01-15 PROCEDURE — 25010000002 FUROSEMIDE PER 20 MG: Performed by: FAMILY MEDICINE

## 2019-01-15 PROCEDURE — 93306 TTE W/DOPPLER COMPLETE: CPT

## 2019-01-15 PROCEDURE — 71046 X-RAY EXAM CHEST 2 VIEWS: CPT

## 2019-01-15 PROCEDURE — 93306 TTE W/DOPPLER COMPLETE: CPT | Performed by: INTERNAL MEDICINE

## 2019-01-15 PROCEDURE — 25010000002 PERFLUTREN 6.52 MG/ML SUSPENSION: Performed by: FAMILY MEDICINE

## 2019-01-15 PROCEDURE — 85027 COMPLETE CBC AUTOMATED: CPT | Performed by: FAMILY MEDICINE

## 2019-01-15 RX ORDER — METHYLPREDNISOLONE SODIUM SUCCINATE 125 MG/2ML
60 INJECTION, POWDER, LYOPHILIZED, FOR SOLUTION INTRAMUSCULAR; INTRAVENOUS EVERY 8 HOURS
Status: DISCONTINUED | OUTPATIENT
Start: 2019-01-15 | End: 2019-01-16 | Stop reason: HOSPADM

## 2019-01-15 RX ADMIN — FUROSEMIDE 40 MG: 10 INJECTION, SOLUTION INTRAMUSCULAR; INTRAVENOUS at 17:47

## 2019-01-15 RX ADMIN — DILTIAZEM HYDROCHLORIDE 360 MG: 180 CAPSULE, COATED, EXTENDED RELEASE ORAL at 08:54

## 2019-01-15 RX ADMIN — PANTOPRAZOLE SODIUM 40 MG: 40 TABLET, DELAYED RELEASE ORAL at 08:54

## 2019-01-15 RX ADMIN — SODIUM CHLORIDE, PRESERVATIVE FREE 3 ML: 5 INJECTION INTRAVENOUS at 08:55

## 2019-01-15 RX ADMIN — METFORMIN HYDROCHLORIDE: 500 TABLET ORAL at 17:47

## 2019-01-15 RX ADMIN — DRONEDARONE 400 MG: 400 TABLET, FILM COATED ORAL at 17:47

## 2019-01-15 RX ADMIN — NICOTINE 1 PATCH: 14 PATCH, EXTENDED RELEASE TRANSDERMAL at 20:29

## 2019-01-15 RX ADMIN — FUROSEMIDE 40 MG: 10 INJECTION, SOLUTION INTRAMUSCULAR; INTRAVENOUS at 08:54

## 2019-01-15 RX ADMIN — DESMOPRESSIN ACETATE 80 MG: 0.2 TABLET ORAL at 20:28

## 2019-01-15 RX ADMIN — APIXABAN 5 MG: 5 TABLET, FILM COATED ORAL at 20:27

## 2019-01-15 RX ADMIN — APIXABAN 5 MG: 5 TABLET, FILM COATED ORAL at 08:53

## 2019-01-15 RX ADMIN — SODIUM CHLORIDE, PRESERVATIVE FREE 3 ML: 5 INJECTION INTRAVENOUS at 20:27

## 2019-01-15 RX ADMIN — NEBIVOLOL HYDROCHLORIDE 20 MG: 5 TABLET ORAL at 08:53

## 2019-01-15 RX ADMIN — VALSARTAN: 80 TABLET, FILM COATED ORAL at 08:53

## 2019-01-15 RX ADMIN — POTASSIUM CHLORIDE 40 MEQ: 750 CAPSULE, EXTENDED RELEASE ORAL at 08:53

## 2019-01-15 RX ADMIN — MAGNESIUM GLUCONATE 500 MG ORAL TABLET 400 MG: 500 TABLET ORAL at 08:54

## 2019-01-15 RX ADMIN — PERFLUTREN 8.48 MG: 6.52 INJECTION, SUSPENSION INTRAVENOUS at 10:49

## 2019-01-15 RX ADMIN — DRONEDARONE 400 MG: 400 TABLET, FILM COATED ORAL at 08:54

## 2019-01-15 RX ADMIN — METHYLPREDNISOLONE SODIUM SUCCINATE 60 MG: 125 INJECTION, POWDER, FOR SOLUTION INTRAMUSCULAR; INTRAVENOUS at 17:47

## 2019-01-15 RX ADMIN — METFORMIN HYDROCHLORIDE: 500 TABLET ORAL at 08:53

## 2019-01-15 NOTE — PLAN OF CARE
Problem: Patient Care Overview  Goal: Plan of Care Review  Outcome: Ongoing (interventions implemented as appropriate)   01/15/19 1251   Coping/Psychosocial   Plan of Care Reviewed With patient   Plan of Care Review   Progress improving   OTHER   Outcome Summary vss tel on wean to room air cont gen et 3+ edema iv lasix with supp k+ echo pending denies s/s etoh withdrawal   Coping/Psychosocial   Patient Agreement with Plan of Care agrees     Goal: Individualization and Mutuality  Outcome: Ongoing (interventions implemented as appropriate)    Goal: Discharge Needs Assessment  Outcome: Ongoing (interventions implemented as appropriate)    Goal: Interprofessional Rounds/Family Conf  Outcome: Ongoing (interventions implemented as appropriate)      Problem: Cardiac: Heart Failure (Adult)  Goal: Signs and Symptoms of Listed Potential Problems Will be Absent, Minimized or Managed (Cardiac: Heart Failure)  Outcome: Ongoing (interventions implemented as appropriate)

## 2019-01-15 NOTE — PAYOR COMM NOTE
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      Comment: 85 Value below critical limit        HCO3, Arterial 23.6 mmol/L      Base Excess, Arterial -1.2 mmol/L      Comment: 84 Value below reference range        O2 Saturation, Arterial 81.5 %      Comment: 84 Value below reference range        Hemoglobin, Blood Gas 12.9 g/dL      Comment: 84 Value below reference range        Hematocrit, Blood Gas 39.4 %      Oxyhemoglobin 78.3 %      Comment: 84 Value below reference range        Methemoglobin 1.00 %      Carboxyhemoglobin 3.0 %      A-a Gradiant -- mmHg      Comment: UNABLE TO CALCULATE        Temperature 37.0 C      Sodium, Arterial 141 mmol/L      Potassium, Arterial 3.9 mmol/L      Barometric Pressure for Blood Gas 760 mmHg      Modality Room Air     Ventilator Mode NA     Note --     Notified Who dr culver     Notified By [id number]     Notified Time 01/14/2019 16:04     Collected by [id number]     Comment: Meter: [id number]     :  [id number]        pH, Temp Corrected -- pH Units      pCO2,  Temperature Corrected -- mm Hg      pO2, Temperature Corrected -- mm Hg     Blood Culture Bottle Set [[id number]] Collected:  01/14/19 1600    Specimen:  Blood from Arm, Right Updated:  01/14/19 1700     Extra Tube Hold for add-ons.     Comment: Auto resulted.       Blood Culture - Blood, Arm, Right [[id number]] Collected:  01/14/19 1600    Specimen:  Blood from Arm, Right Updated:  01/14/19 1640    CBC & Differential [[id number]] Collected:  01/14/19 1606    Specimen:  Blood Updated:  01/14/19 1616    Narrative:       The following orders were created for panel order CBC & Differential.  Procedure                               Abnormality         Status                     ---------                               -----------         ------                     CBC Auto Differential[[id number]]        Abnormal            Final result                 Please view results for these tests on the individual orders.    Comprehensive Metabolic Panel [[id number]]  (Abnormal) Collected:  01/14/19 1606    Specimen:  Blood Updated:  01/14/19 1628     Glucose 135 mg/dL      BUN 12 mg/dL      Creatinine 1.25 mg/dL      Sodium 139 mmol/L      Potassium 3.7 mmol/L      Chloride 104 mmol/L      CO2 26.0 mmol/L      Calcium 8.2 mg/dL      Total Protein 7.0 g/dL      Albumin 3.60 g/dL      ALT (SGPT) 22 U/L      AST (SGOT) 46 U/L      Alkaline Phosphatase 224 U/L      Total Bilirubin 0.4 mg/dL      eGFR  African Amer 72 mL/min/1.73      Globulin 3.4 gm/dL      A/G Ratio 1.1 g/dL      BUN/Creatinine Ratio 9.6     Anion Gap 9.0 mmol/L     Protime-INR [[id number]]  (Normal) Collected:  01/14/19 1606    Specimen:  Blood Updated:  01/14/19 1635     Protime 14.0 Seconds      INR 1.05    aPTT [[id number]]  (Abnormal) Collected:  01/14/19 1606    Specimen:  Blood Updated:  01/14/19 1635     PTT 41.0 seconds     BNP [[id number]]  (Abnormal) Collected:  01/14/19 1606    Specimen:  Blood Updated:  01/14/19 1639     proBNP 17,700.0 pg/mL     Troponin  [[id number]]  (Normal) Collected:  01/14/19 1606    Specimen:  Blood Updated:  01/14/19 1639     Troponin I <0.012 ng/mL     CBC Auto Differential [[id number]]  (Abnormal) Collected:  01/14/19 1606    Specimen:  Blood Updated:  01/14/19 1616     WBC 9.17 10*3/mm3      RBC 4.62 10*6/mm3      Hemoglobin 12.9 g/dL      Hematocrit 39.4 %      MCV 85.3 fL      MCH 27.9 pg      MCHC 32.7 g/dL      RDW 15.4 %      RDW-SD 47.8 fl      MPV 9.9 fL      Platelets 302 10*3/mm3      Neutrophil % 77.1 %      Lymphocyte % 3.5 %      Monocyte % 15.9 %      Eosinophil % 2.4 %      Basophil % 0.8 %      Immature Grans % 0.3 %      Neutrophils, Absolute 7.07 10*3/mm3      Lymphocytes, Absolute 0.32 10*3/mm3      Monocytes, Absolute 1.46 10*3/mm3      Eosinophils, Absolute 0.22 10*3/mm3      Basophils, Absolute 0.07 10*3/mm3      Immature Grans, Absolute 0.03 10*3/mm3      nRBC 0.0 /100 WBC     Troponin [[id number]]  (Normal) Collected:  01/14/19 1606    Specimen:  Blood Updated:  01/14/19 1909     Troponin I <0.012 ng/mL     Blood Culture Bottle Set [[id number]] Collected:  01/14/19 1615    Specimen:  Blood from Arm, Left Updated:  01/14/19 1730     Extra Tube Hold for add-ons.     Comment: Auto resulted.       Blood Culture - Blood, Arm, Left [[id number]] Collected:  01/14/19 1615    Specimen:  Blood from Arm, Left Updated:  01/14/19 1640    Lactic Acid, Plasma [[id number]]  (Normal) Collected:  01/14/19 1616    Specimen:  Blood Updated:  01/14/19 1640     Lactate 1.1 mmol/L     Influenza Antigen, Rapid - Swab, Nasopharynx [[id number]]  (Normal) Collected:  01/14/19 1625    Specimen:  Swab from Nasopharynx Updated:  01/14/19 1657     Influenza A Ag, EIA Negative     Influenza B Ag, EIA Negative    Narrative:       Recommend confirmation of negative results by viral culture or molecular assay.    Urinalysis With Culture If Indicated - Urine, Clean Catch [[id number]]  (Abnormal) Collected:  01/14/19 1725    Specimen:  Urine, Clean Catch  Updated:  01/14/19 1742     Color, UA Yellow     Appearance, UA Clear     pH, UA 5.5     Specific Gravity, UA 1.014     Glucose, UA Negative     Ketones, UA Negative     Bilirubin, UA Negative     Blood, UA Moderate (2+)     Protein, UA >=300 mg/dL (3+)     Leuk Esterase, UA Negative     Nitrite, UA Negative     Urobilinogen, UA 0.2 E.U./dL    Urinalysis, Microscopic Only - Urine, Clean Catch [[id number]]  (Abnormal) Collected:  01/14/19 1725    Specimen:  Urine, Clean Catch Updated:  01/14/19 1742     RBC, UA 13-20 /HPF      WBC, UA 0-2 /HPF      Bacteria, UA None Seen /HPF      Squamous Epithelial Cells, UA 0-2 /HPF      Hyaline Casts, UA 3-6 /LPF      Methodology Automated Microscopy    Troponin [[id number]]  (Normal) Collected:  01/14/19 2155    Specimen:  Blood Updated:  01/14/19 2242     Troponin I <0.012 ng/mL           XR Chest 1 View   Final Result   1. Pulmonary edema and small pleural effusions.   2. Enlargement of the cardiac silhouette may be due to cardiomegaly or a   pericardial effusion.           This report was finalized on 01/14/2019 16:50 by Dr. Nilo Levin MD.      XR Chest 2 View    (Results Pending)       ED Course  ED Course as of Willem 15 0039   Mon Jan 14, 2019   1730 Reviewed pt with dr culver- also assessed pt and in agreement with pt care plan. Pt is doing some better after treatment. Will repeat lasix 40mg iv at this time. Call placed to hospitalist for further   [CW]   1735 Spoke with dr rivas - hospitalist on call. Has accepted for admission. Advised to admit to dr dawson. Pt and family advised of care plan- in agreement .   [CW]      ED Course User Index  [CW] Samanta Morgan, APRN          MDM  Number of Diagnoses or Management Options  Congestive heart failure, unspecified HF chronicity, unspecified heart failure type (CMS/HCC): established and worsening  History of hypertension: minor  Hx of diabetes mellitus: minor  Hypoxia: minor  Pleural effusion: minor  Tobacco abuse:  "minor     Amount and/or Complexity of Data Reviewed  Clinical lab tests: ordered and reviewed  Tests in the radiology section of CPT®:  ordered and reviewed  Decide to obtain previous medical records or to obtain history from someone other than the patient: yes    Patient Progress  Patient progress: stable      Final diagnoses:   Congestive heart failure, unspecified HF chronicity, unspecified heart failure type (CMS/HCC)   Pleural effusion   Tobacco abuse   History of hypertension   Hx of diabetes mellitus   Hypoxia          Samanta Morgan APRN  01/15/19 0039      Electronically signed by Samanta Morgan APRN at 1/15/2019 12:39 AM       ICU Vital Signs     Row Name 01/15/19 0452 01/15/19 0003 01/14/19 2000 01/14/19 1848 01/14/19 1817       Height and Weight    Height  --  --  --  170.2 cm (67\")  --    Weight  --  --  --  77.1 kg (170 lb)  --    Ideal Body Weight (IBW) (kg)  --  --  --  68.1  --    BSA (Calculated - sq m)  --  --  --  1.89 sq meters  --    BMI (Calculated)  --  --  --  26.6  --    Weight in (lb) to have BMI = 25  --  --  --  159.3  --       Vitals    Temp  99.3 °F (37.4 °C)  99.4 °F (37.4 °C)  100.4 °F (38 °C)  97.9 °F (36.6 °C)  --    Temp src  Temporal  Temporal  Temporal  Temporal  --    Pulse  78  85  76  67  65    Heart Rate Source  Monitor  Monitor  Monitor  Monitor  --    Resp  20  20  18  18  --    Resp Rate Source  Visual  Visual  Visual  Visual  --    BP  169/77  158/62  146/80  163/66  149/70    BP Location  Right arm  Right arm  Left arm  --  --    BP Method  Automatic  Automatic  Automatic  Automatic  --    Patient Position  Lying  Lying  Lying  Lying  --       Oxygen Therapy    SpO2  91 %  91 %  93 %  94 %  93 %    Pulse Oximetry Type  Intermittent  Intermittent  Intermittent  Intermittent  --    Device (Oxygen Therapy)  room air  room air  nasal cannula  nasal cannula  --    Flow (L/min)  --  --  3  3  --    Row Name 01/14/19 17:09:45 01/14/19 1630 01/14/19 1622 " "01/14/19 1617 01/14/19 1615       Vitals    Pulse  68  96  60  64  65    Resp  27  26  18  18  15    BP  145/58  167/77  --  --  163/71       Oxygen Therapy    SpO2  94 %  92 %  --  93 %  95 %    Device (Oxygen Therapy)  --  --  --  nasal cannula  --    Flow (L/min)  --  --  --  3  --    Row Name 01/14/19 1600 01/14/19 1555 01/14/19 1554             Height and Weight    Height  --  --  170.2 cm (67\")      Weight  --  --  78.9 kg (174 lb)      Ideal Body Weight (IBW) (kg)  --  --  68.1      BSA (Calculated - sq m)  --  --  1.91 sq meters      BMI (Calculated)  --  --  27.2      Weight in (lb) to have BMI = 25  --  --  159.3         Vitals    Temp  --  --  99.3 °F (37.4 °C)      Pulse  --  68  --      Resp  --  22  --      BP  166/66  --  --         Oxygen Therapy    SpO2  --  84 %  (Abnormal)   --      Device (Oxygen Therapy)  --  room air  --          Intake & Output (last day)       01/14 0701 - 01/15 0700 01/15 0701 - 01/16 0700    Urine (mL/kg/hr) 550     Total Output 550     Net -550           Urine Unmeasured Occurrence 3 x         Lines, Drains & Airways    Active LDAs     Name:   Placement date:   Placement time:   Site:   Days:    Peripheral IV 01/14/19 1600 Right Antecubital   01/14/19    1600    Antecubital   less than 1    Peripheral IV 01/14/19 1615 Left Forearm   01/14/19    1615    Forearm   less than 1         Inactive LDAs     None                Hospital Medications (all)       Dose Frequency Start End    apixaban (ELIQUIS) tablet 5 mg 5 mg Every 12 Hours Scheduled 1/14/2019     Sig - Route: Take 1 tablet by mouth Every 12 (Twelve) Hours. - Oral    atorvastatin (LIPITOR) tablet 80 mg 80 mg Nightly 1/14/2019     Sig - Route: Take 2 tablets by mouth Every Night. - Oral    cefTRIAXone (ROCEPHIN) 1 g/10mL IV PUSH syringe 1 g Once 1/14/2019 1/14/2019    Sig - Route: Infuse 10 mL into a venous catheter 1 (One) Time. - Intravenous    Cosign for Ordering: Accepted by Kiel Arellano Jr., MD on 1/14/2019  " 4:38 PM    diltiaZEM CD (CARDIZEM CD) 24 hr capsule 360 mg 360 mg Daily 1/14/2019     Sig - Route: Take 2 capsules by mouth Daily. - Oral    dronedarone (MULTAQ) tablet 400 mg 400 mg 2 Times Daily With Meals 1/14/2019     Sig - Route: Take 1 tablet by mouth 2 (Two) Times a Day With Meals. - Oral    furosemide (LASIX) injection 40 mg 40 mg Once 1/14/2019 1/14/2019    Sig - Route: Infuse 4 mL into a venous catheter 1 (One) Time. - Intravenous    Cosign for Ordering: Accepted by Kiel Arellano Jr., MD on 1/14/2019  4:38 PM    furosemide (LASIX) injection 40 mg 40 mg Once 1/14/2019 1/14/2019    Sig - Route: Infuse 4 mL into a venous catheter 1 (One) Time. - Intravenous    Cosign for Ordering: Accepted by Kiel Arellano Jr., MD on 1/14/2019  8:38 PM    furosemide (LASIX) injection 40 mg 40 mg 2 Times Daily (Diuretics) 1/14/2019     Sig - Route: Infuse 4 mL into a venous catheter 2 (Two) Times a Day. - Intravenous    ipratropium-albuterol (DUO-NEB) nebulizer solution 3 mL 3 mL Once 1/14/2019 1/14/2019    Sig - Route: Take 3 mL by nebulization 1 (One) Time. - Nebulization    Cosign for Ordering: Required by [facility]     linagliptin (TRADJENTA) 5 mg, metFORMIN (GLUCOPHAGE) 500 mg for JENTADUETO 5-500  2 Times Daily With Meals 1/14/2019     Sig - Route: Take  by mouth 2 (Two) Times a Day With Meals. - Oral    magnesium oxide (MAGOX) tablet 400 mg 400 mg Daily 1/14/2019     Sig - Route: Take 1 tablet by mouth Daily. - Oral    methylPREDNISolone sodium succinate (SOLU-Medrol) injection 125 mg 125 mg Once 1/14/2019 1/14/2019    Sig - Route: Infuse 2 mL into a venous catheter 1 (One) Time. - Intravenous    Cosign for Ordering: Accepted by Kiel Arellano Jr., MD on 1/14/2019  4:38 PM    nebivolol (BYSTOLIC) tablet 20 mg 20 mg Daily 1/14/2019     Sig - Route: Take 4 tablets by mouth Daily. - Oral    nicotine (NICODERM CQ) 14 MG/24HR patch 1 patch 1 patch Every 24 Hours 1/14/2019     Sig - Route: Place 1 patch  "on the skin as directed by provider Daily. - Transdermal    pantoprazole (PROTONIX) EC tablet 40 mg 40 mg Daily 1/14/2019     Sig - Route: Take 1 tablet by mouth Daily. - Oral    potassium chloride (MICRO-K) CR capsule 40 mEq 40 mEq Daily 1/14/2019     Sig - Route: Take 4 capsules by mouth Daily. - Oral    sodium chloride 0.9 % flush 10 mL 10 mL As Needed 1/14/2019     Sig - Route: Infuse 10 mL into a venous catheter As Needed for Line Care. - Intravenous    Cosign for Ordering: Accepted by Kiel Arellano Jr., MD on 1/14/2019  4:38 PM    Linked Group 1:  \"And\" Linked Group Details        sodium chloride 0.9 % flush 3 mL 3 mL Every 12 Hours Scheduled 1/14/2019     Sig - Route: Infuse 3 mL into a venous catheter Every 12 (Twelve) Hours. - Intravenous    sodium chloride 0.9 % flush 3-10 mL 3-10 mL As Needed 1/14/2019     Sig - Route: Infuse 3-10 mL into a venous catheter As Needed for Line Care. - Intravenous    valsartan (DIOVAN) 320 mg, hydrochlorothiazide (HYDRODIURIL) 25 mg  Daily 1/14/2019     Sig - Route: Take  by mouth Daily. - Oral          Lab Results (last 72 hours)     Procedure Component Value Units Date/Time    Lipid Panel [[id number]]  (Abnormal) Collected:  01/15/19 0350    Specimen:  Blood Updated:  01/15/19 0448     Total Cholesterol 294 mg/dL      Triglycerides 117 mg/dL      HDL Cholesterol 107 mg/dL      LDL Cholesterol  172 mg/dL      LDL/HDL Ratio 1.53    Blood Culture - Blood, Arm, Right [[id number]] Collected:  01/14/19 1600    Specimen:  Blood from Arm, Right Updated:  01/15/19 0445     Blood Culture No growth at less than 24 hours    Blood Culture - Blood, Arm, Left [[id number]] Collected:  01/14/19 1615    Specimen:  Blood from Arm, Left Updated:  01/15/19 0445     Blood Culture No growth at less than 24 hours    Hemoglobin A1c [[id number]] Collected:  01/15/19 0348    Specimen:  Blood Updated:  01/15/19 0428     Hemoglobin A1C 6.1 %     Narrative:       Less than 6.0           Non-Diabetic " Range  6.0-7.0                 ADA Therapeutic Target  Greater than 7.0        Action Suggested    Basic Metabolic Panel [[id number]]  (Abnormal) Collected:  01/15/19 0350    Specimen:  Blood Updated:  01/15/19 0414     Glucose 299 mg/dL      BUN 17 mg/dL      Creatinine 1.33 mg/dL      Sodium 138 mmol/L      Potassium 4.7 mmol/L      Chloride 104 mmol/L      CO2 23.0 mmol/L      Calcium 8.0 mg/dL      eGFR  African Amer 67 mL/min/1.73      BUN/Creatinine Ratio 12.8     Anion Gap 11.0 mmol/L     Narrative:       GFR Normal >60  Chronic Kidney Disease <60  Kidney Failure <15    CBC (No Diff) [[id number]]  (Abnormal) Collected:  01/15/19 0348    Specimen:  Blood Updated:  01/15/19 0400     WBC 6.68 10*3/mm3      RBC 4.34 10*6/mm3      Hemoglobin 12.5 g/dL      Hematocrit 38.2 %      MCV 88.0 fL      MCH 28.8 pg      MCHC 32.7 g/dL      RDW 15.3 %      RDW-SD 49.5 fl      MPV 11.0 fL      Platelets 282 10*3/mm3     Troponin [[id number]]  (Normal) Collected:  01/14/19 2155    Specimen:  Blood Updated:  01/14/19 2242     Troponin I <0.012 ng/mL     Troponin [[id number]]  (Normal) Collected:  01/14/19 1606    Specimen:  Blood Updated:  01/14/19 1909     Troponin I <0.012 ng/mL     Urinalysis With Culture If Indicated - Urine, Clean Catch [[id number]]  (Abnormal) Collected:  01/14/19 1725    Specimen:  Urine, Clean Catch Updated:  01/14/19 1742     Color, UA Yellow     Appearance, UA Clear     pH, UA 5.5     Specific Gravity, UA 1.014     Glucose, UA Negative     Ketones, UA Negative     Bilirubin, UA Negative     Blood, UA Moderate (2+)     Protein, UA >=300 mg/dL (3+)     Leuk Esterase, UA Negative     Nitrite, UA Negative     Urobilinogen, UA 0.2 E.U./dL    Urinalysis, Microscopic Only - Urine, Clean Catch [[id number]]  (Abnormal) Collected:  01/14/19 1725    Specimen:  Urine, Clean Catch Updated:  01/14/19 1742     RBC, UA 13-20 /HPF      WBC, UA 0-2 /HPF      Bacteria, UA None Seen /HPF      Squamous Epithelial Cells, UA  0-2 /HPF      Hyaline Casts, UA 3-6 /LPF      Methodology Automated Microscopy    [place] Draw [[id number]] Collected:  01/14/19 1606    Specimen:  Blood Updated:  01/14/19 1730    Narrative:       The following orders were created for panel order [place] Draw.  Procedure                               Abnormality         Status                     ---------                               -----------         ------                     Green Top (Gel)[[id number]]                                  Final result               Blood Culture Bottle Set[[id number]]                         Final result                 Please view results for these tests on the individual orders.    Blood Culture Bottle Set [[id number]] Collected:  01/14/19 1615    Specimen:  Blood from Arm, Left Updated:  01/14/19 1730     Extra Tube Hold for add-ons.     Comment: Auto resulted.       [place] Draw [[id number]] Collected:  01/14/19 1600    Specimen:  Blood Updated:  01/14/19 1715    Narrative:       The following orders were created for panel order [place] Draw.  Procedure                               Abnormality         Status                     ---------                               -----------         ------                     Light Blue Top[[id number]]                                   Final result               Green Top (Gel)[[id number]]                                  Final result               Lavender Top[[id number]]                                     Final result               Red Top[[id number]]                                          Final result               Blood Culture Bottle Set[[id number]]                         Final result                 Please view results for these tests on the individual orders.    Green Top (Gel) [[id number]] Collected:  01/14/19 1606    Specimen:  Blood Updated:  01/14/19 1715     Extra Tube Hold for add-ons.     Comment: Auto resulted.       Light Blue Top [[id number]] Collected:  01/14/19 1606     Specimen:  Blood Updated:  01/14/19 1715     Extra Tube hold for add-on     Comment: Auto resulted       Green Top (Gel) [[id number]] Collected:  01/14/19 1606    Specimen:  Blood Updated:  01/14/19 1715     Extra Tube Hold for add-ons.     Comment: Auto resulted.       Lavender Top [[id number]] Collected:  01/14/19 1606    Specimen:  Blood Updated:  01/14/19 1715     Extra Tube hold for add-on     Comment: Auto resulted       Red Top [[id number]] Collected:  01/14/19 1606    Specimen:  Blood Updated:  01/14/19 1715     Extra Tube Hold for add-ons.     Comment: Auto resulted.       Blood Culture Bottle Set [[id number]] Collected:  01/14/19 1600    Specimen:  Blood from Arm, Right Updated:  01/14/19 1700     Extra Tube Hold for add-ons.     Comment: Auto resulted.       Influenza Antigen, Rapid - Swab, Nasopharynx [[id number]]  (Normal) Collected:  01/14/19 1625    Specimen:  Swab from Nasopharynx Updated:  01/14/19 1657     Influenza A Ag, EIA Negative     Influenza B Ag, EIA Negative    Narrative:       Recommend confirmation of negative results by viral culture or molecular assay.    Lactic Acid, Plasma [[id number]]  (Normal) Collected:  01/14/19 1616    Specimen:  Blood Updated:  01/14/19 1640     Lactate 1.1 mmol/L     BNP [[id number]]  (Abnormal) Collected:  01/14/19 1606    Specimen:  Blood Updated:  01/14/19 1639     proBNP 17,700.0 pg/mL     Troponin [[id number]]  (Normal) Collected:  01/14/19 1606    Specimen:  Blood Updated:  01/14/19 1639     Troponin I <0.012 ng/mL     Protime-INR [[id number]]  (Normal) Collected:  01/14/19 1606    Specimen:  Blood Updated:  01/14/19 1635     Protime 14.0 Seconds      INR 1.05    aPTT [[id number]]  (Abnormal) Collected:  01/14/19 1606    Specimen:  Blood Updated:  01/14/19 1635     PTT 41.0 seconds     Comprehensive Metabolic Panel [[id number]]  (Abnormal) Collected:  01/14/19 1606    Specimen:  Blood Updated:  01/14/19 1628     Glucose 135 mg/dL      BUN 12 mg/dL      Creatinine  1.25 mg/dL      Sodium 139 mmol/L      Potassium 3.7 mmol/L      Chloride 104 mmol/L      CO2 26.0 mmol/L      Calcium 8.2 mg/dL      Total Protein 7.0 g/dL      Albumin 3.60 g/dL      ALT (SGPT) 22 U/L      AST (SGOT) 46 U/L      Alkaline Phosphatase 224 U/L      Total Bilirubin 0.4 mg/dL      eGFR  African Amer 72 mL/min/1.73      Globulin 3.4 gm/dL      A/G Ratio 1.1 g/dL      BUN/Creatinine Ratio 9.6     Anion Gap 9.0 mmol/L     CBC & Differential [[id number]] Collected:  01/14/19 1606    Specimen:  Blood Updated:  01/14/19 1616    Narrative:       The following orders were created for panel order CBC & Differential.  Procedure                               Abnormality         Status                     ---------                               -----------         ------                     CBC Auto Differential[[id number]]        Abnormal            Final result                 Please view results for these tests on the individual orders.    CBC Auto Differential [[id number]]  (Abnormal) Collected:  01/14/19 1606    Specimen:  Blood Updated:  01/14/19 1616     WBC 9.17 10*3/mm3      RBC 4.62 10*6/mm3      Hemoglobin 12.9 g/dL      Hematocrit 39.4 %      MCV 85.3 fL      MCH 27.9 pg      MCHC 32.7 g/dL      RDW 15.4 %      RDW-SD 47.8 fl      MPV 9.9 fL      Platelets 302 10*3/mm3      Neutrophil % 77.1 %      Lymphocyte % 3.5 %      Monocyte % 15.9 %      Eosinophil % 2.4 %      Basophil % 0.8 %      Immature Grans % 0.3 %      Neutrophils, Absolute 7.07 10*3/mm3      Lymphocytes, Absolute 0.32 10*3/mm3      Monocytes, Absolute 1.46 10*3/mm3      Eosinophils, Absolute 0.22 10*3/mm3      Basophils, Absolute 0.07 10*3/mm3      Immature Grans, Absolute 0.03 10*3/mm3      nRBC 0.0 /100 WBC     Blood Gas, Arterial With Co-Ox [[id number]]  (Abnormal) Collected:  01/14/19 1558    Specimen:  Arterial Blood Updated:  01/14/19 1602     Site Right Radial     Pedrito's Test Positive     pH, Arterial 7.390 pH units      pCO2,  Arterial 39.0 mm Hg      pO2, Arterial 46.7 mm Hg      Comment: 85 Value below critical limit        HCO3, Arterial 23.6 mmol/L      Base Excess, Arterial -1.2 mmol/L      Comment: 84 Value below reference range        O2 Saturation, Arterial 81.5 %      Comment: 84 Value below reference range        Hemoglobin, Blood Gas 12.9 g/dL      Comment: 84 Value below reference range        Hematocrit, Blood Gas 39.4 %      Oxyhemoglobin 78.3 %      Comment: 84 Value below reference range        Methemoglobin 1.00 %      Carboxyhemoglobin 3.0 %      A-a Gradiant -- mmHg      Comment: UNABLE TO CALCULATE        Temperature 37.0 C      Sodium, Arterial 141 mmol/L      Potassium, Arterial 3.9 mmol/L      Barometric Pressure for Blood Gas 760 mmHg      Modality Room Air     Ventilator Mode NA     Note --     Notified Who dr culver     Notified By [id number]     Notified Time 01/14/2019 16:04     Collected by [id number]     Comment: Meter: [id number]     :  [id number]        pH, Temp Corrected -- pH Units      pCO2, Temperature Corrected -- mm Hg      pO2, Temperature Corrected -- mm Hg           Imaging Results (last 72 hours)     Procedure Component Value Units Date/Time    XR Chest 1 View [[id number]] Collected:  01/14/19 1649     Updated:  01/14/19 1653    Narrative:       XR CHEST 1 VW- 1/14/2019 4:45 PM CST     HISTORY: cough/ dyspnea       COMPARISON: 10/24/2018.     FINDINGS:   Diffuse interstitial and airspace opacities are seen in the lungs. There  is a small amount of pleural fluid bilaterally. The cardiac silhouette  is enlarged. There is atherosclerosis in the aorta. Atherosclerosis is  also seen in the upper extreme the arteries..      The osseous structures and surrounding soft tissues demonstrate no acute  abnormality.       Impression:       1. Pulmonary edema and small pleural effusions.  2. Enlargement of the cardiac silhouette may be due to cardiomegaly or a  pericardial effusion.        This report was  finalized on 01/14/2019 16:50 by Dr. Nilo Levin MD.          ECG/EMG Results (last 72 hours)     Procedure Component Value Units Date/Time    ECG 12 Lead [[id number]] Collected:  01/14/19 1559     Updated:  01/14/19 1610    SCANNED EKG [[id number]] Resulted:  01/14/19      Updated:  01/15/19 0525          Orders (last 72 hrs)     Start     Ordered    01/15/19 0800  XR Chest 2 View  1 Time Imaging      01/14/19 1843    01/15/19 0600  Hemoglobin A1c  Morning Draw      01/14/19 1843    01/15/19 0600  Lipid Panel  Morning Draw      01/14/19 1843    01/15/19 0600  Basic Metabolic Panel  Morning Draw      01/14/19 1843    01/15/19 0600  CBC (No Diff)  Morning Draw      01/14/19 1843 01/14/19 2100  apixaban (ELIQUIS) tablet 5 mg  Every 12 Hours Scheduled      01/14/19 1843 01/14/19 2100  atorvastatin (LIPITOR) tablet 80 mg  Nightly      01/14/19 1843 01/14/19 2100  sodium chloride 0.9 % flush 3 mL  Every 12 Hours Scheduled      01/14/19 1843 01/14/19 1930  diltiaZEM CD (CARDIZEM CD) 24 hr capsule 360 mg  Daily      01/14/19 1843 01/14/19 1930  dronedarone (MULTAQ) tablet 400 mg  2 Times Daily With Meals      01/14/19 1843 01/14/19 1930  potassium chloride (MICRO-K) CR capsule 40 mEq  Daily      01/14/19 1843 01/14/19 1930  magnesium oxide (MAGOX) tablet 400 mg  Daily      01/14/19 1843 01/14/19 1930  nebivolol (BYSTOLIC) tablet 20 mg  Daily      01/14/19 1843 01/14/19 1930  pantoprazole (PROTONIX) EC tablet 40 mg  Daily      01/14/19 1843 01/14/19 1930  linagliptin (TRADJENTA) 5 mg, metFORMIN (GLUCOPHAGE) 500 mg for JENTADUETO 5-500  2 Times Daily With Meals      01/14/19 1843 01/14/19 1930  valsartan (DIOVAN) 320 mg, hydrochlorothiazide (HYDRODIURIL) 25 mg  Daily      01/14/19 1843 01/14/19 1900  furosemide (LASIX) injection 40 mg  2 Times Daily (Diuretics)      01/14/19 1843 01/14/19 1900  nicotine (NICODERM CQ) 14 MG/24HR patch 1 patch  Every 24 Hours      01/14/19 1843     01/14/19 1844  Cardiac Monitoring  Continuous      01/14/19 1843    01/14/19 1844  Vital Signs  Per Hospital Policy      01/14/19 1843    01/14/19 1844  Daily Weights  Daily      01/14/19 1843    01/14/19 1844  Cardiac Rehab Evaluation and Enrollment  Once     Provider:  (Not yet assigned)    01/14/19 1843    01/14/19 1844  Insert Peripheral IV  Once      01/14/19 1843    01/14/19 1844  Saline Lock & Maintain IV Access  Continuous      01/14/19 1843    01/14/19 1844  VTE Prophylaxis Not Indicated:  Once      01/14/19 1843    01/14/19 1844  Diet Regular; Cardiac  Diet Effective Now      01/14/19 1843    01/14/19 1844  Troponin  Now Then Every 3 Hours      01/14/19 1843    01/14/19 1844  Adult Transthoracic Echo Complete W/ Cont if Necessary Per Protocol  Once      01/14/19 1843    01/14/19 1843  sodium chloride 0.9 % flush 3-10 mL  As Needed      01/14/19 1843    01/14/19 1810  Code Status and Medical Interventions:  Continuous      01/14/19 1814    01/14/19 1744  Inpatient Admission  Once      01/14/19 1744    01/14/19 1742  Urinalysis, Microscopic Only - Urine, Clean Catch  Once      01/14/19 1741    01/14/19 1732  furosemide (LASIX) injection 40 mg  Once      01/14/19 1730    01/14/19 1627  Blood Culture - Blood, Blood, Venous Line  Once      01/14/19 1627    01/14/19 1627  Blood Culture - Blood, Blood, Venous Line  Once      01/14/19 1627    01/14/19 1616  CBC Auto Differential  Once      01/14/19 1615    01/14/19 1615  methylPREDNISolone sodium succinate (SOLU-Medrol) injection 125 mg  Once      01/14/19 1613    01/14/19 1615  cefTRIAXone (ROCEPHIN) 1 g/10mL IV PUSH syringe  Once      01/14/19 1613    01/14/19 1615  furosemide (LASIX) injection 40 mg  Once      01/14/19 1613    01/14/19 1612  ipratropium-albuterol (DUO-NEB) nebulizer solution 3 mL  Once      01/14/19 1610    01/14/19 1612  Influenza Antigen, Rapid - Swab, Nasopharynx  STAT      01/14/19 1613    01/14/19 1612  Monitor Blood Pressure  Per Hospital  Policy      01/14/19 1613    01/14/19 1612  Cardiac Monitoring  Once,   Status:  Canceled      01/14/19 1613    01/14/19 1612  Pulse Oximetry, Continuous  Per Hospital Policy      01/14/19 1613    01/14/19 1612  Insert peripheral IV  Once      01/14/19 1613    01/14/19 1612  Lactic Acid, Plasma  STAT      01/14/19 1613    01/14/19 1611  sodium chloride 0.9 % flush 10 mL  As Needed      01/14/19 1613    01/14/19 1611  CBC & Differential  Once      01/14/19 1613    01/14/19 1611  Comprehensive Metabolic Panel  Once      01/14/19 1613    01/14/19 1611  Protime-INR  Once      01/14/19 1613    01/14/19 1611  aPTT  Once      01/14/19 1613    01/14/19 1611  Urinalysis With Culture If Indicated - Urine, Clean Catch  Once      01/14/19 1613    01/14/19 1611  BNP  Once      01/14/19 1613    01/14/19 1611  Troponin  Once      01/14/19 1613    01/14/19 1611  ECG 12 Lead  Once      01/14/19 1613    01/14/19 1611  XR Chest 1 View  1 Time Imaging      01/14/19 1613    01/14/19 1606  Reading Draw  STAT      01/14/19 1606    01/14/19 1606  Reading Draw  STAT      01/14/19 1606    01/14/19 1606  Green Top (Gel)  PROCEDURE ONCE      01/14/19 1606    01/14/19 1606  Blood Culture Bottle Set  PROCEDURE ONCE      01/14/19 1606    01/14/19 1606  Light Blue Top  PROCEDURE ONCE      01/14/19 1606    01/14/19 1606  Green Top (Gel)  PROCEDURE ONCE      01/14/19 1606    01/14/19 1606  Lavender Top  PROCEDURE ONCE      01/14/19 1606    01/14/19 1606  Red Top  PROCEDURE ONCE      01/14/19 1606    01/14/19 1606  Blood Culture Bottle Set  PROCEDURE ONCE      01/14/19 1606    01/14/19 1603  Blood Gas, Arterial With Co-Ox  Once      01/14/19 1558    01/14/19 1557  Blood Gas, Arterial With Co-Ox  STAT      01/14/19 1556 01/14/19 1557  ECG 12 Lead  Once      01/14/19 1556    --  SCANNED - TELEMETRY        01/14/19 0000    --  SCANNED EKG      01/14/19 0000          Ventilator/Non-Invasive Ventilation Settings (From admission, onward)    None         Physician Progress Notes (last 72 hours) (Notes from 1/12/2019  7:22 AM through 1/15/2019  7:22 AM)     No notes of this type exist for this encounter.        Consult Notes (last 72 hours) (Notes from 1/12/2019  7:22 AM through 1/15/2019  7:22 AM)     No notes of this type exist for this encounter.        Sofía Nunez, RN   Registered Nurse   Cardiology   Plan of Care   Signed   Date of Service:  1/15/2019  2:57 AM               Signed           Problem: Patient Care Overview  Goal: Plan of Care Review  Outcome: Ongoing (interventions implemented as appropriate)        Problem: Patient Care Overview  Goal: Plan of Care Review    01/15/19 0253   Coping/Psychosocial   Plan of Care Reviewed With patient   Plan of Care Review   Progress no change   OTHER   Outcome Summary 3+ edema to BLE.Cont. to wheeze but has no c/o SOA and o2 sats are WN: on 3l/nc. Echo in April was 60%. Cardioversion in November 2018. NS per tele. VSS. Cont. to monitor CIWA/ Call MD with concerns.
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
## 2019-01-15 NOTE — PROGRESS NOTES
[facility] Medicine Services  INPATIENT PROGRESS NOTE    Patient Name: Milton Rodriguez  Date of Admission: 1/14/2019  Today's Date: 01/15/19  Length of Stay: 1  Primary Care Physician: Thanh Weeks MD    Subjective   Chief Complaint: feels a little more short of breath today.  HPI   Does not feel he is urinating that much.  No  Nausea.  No vomiting  No chest pain.  Legs less swollen.         Review of Systems     All pertinent negatives and positives are as above. All other systems have been reviewed and are negative unless otherwise stated.     Objective    Temp:  [97.5 °F (36.4 °C)-100.4 °F (38 °C)] 97.7 °F (36.5 °C)  Heart Rate:  [65-85] 73  Resp:  [16-27] 16  BP: (145-178)/(58-80) 145/76  Physical Exam   Constitutional: He is oriented to person, place, and time. He appears well-developed and well-nourished.   HENT:   Head: Normocephalic and atraumatic.   Eyes: Conjunctivae and EOM are normal. Pupils are equal, round, and reactive to light.   Neck: Normal range of motion. Neck supple. No JVD present.   Cardiovascular: Normal rate, regular rhythm, normal heart sounds and intact distal pulses.   Pulmonary/Chest: Effort normal.   Wheezing     Abdominal: Soft. Bowel sounds are normal.   Musculoskeletal: Normal range of motion. He exhibits edema (remains edematous but has improved with wrinkling visible.).   Neurological: He is alert and oriented to person, place, and time.   Skin: Skin is warm and dry.   Psychiatric: He has a normal mood and affect. His behavior is normal.   Nursing note and vitals reviewed.          Results Review:  I have reviewed the labs, radiology results, and diagnostic studies.    Laboratory Data:   Results from last 7 days   Lab Units  01/15/19   0348  01/14/19   1606   WBC 10*3/mm3  6.68  9.17   HEMOGLOBIN g/dL  12.5*  12.9*   HEMATOCRIT %  38.2*  39.4*   PLATELETS 10*3/mm3  282  302        Results from last 7 days   Lab Units  01/15/19   0350   01/14/19   1606  01/14/19   1558   SODIUM mmol/L  138  139   --    SODIUM, ARTERIAL mmol/L   --    --   141   POTASSIUM mmol/L  4.7  3.7   --    CHLORIDE mmol/L  104  104   --    CO2 mmol/L  23.0*  26.0   --    BUN mg/dL  17  12   --    CREATININE mg/dL  1.33  1.25   --    CALCIUM mg/dL  8.0*  8.2*   --    BILIRUBIN mg/dL   --   0.4   --    ALK PHOS U/L   --   224*   --    ALT (SGPT) U/L   --   22   --    AST (SGOT) U/L   --   46*   --    GLUCOSE mg/dL  299*  135*   --        Culture Data:   Blood Culture   Date Value Ref Range Status   01/14/2019 No growth at 24 hours  Preliminary   01/14/2019 No growth at 24 hours  Preliminary       Radiology Data:   Imaging Results (last 24 hours)     Procedure Component Value Units Date/Time    XR Chest 2 View [[id number]] Collected:  01/15/19 0947     Updated:  01/15/19 0954    Narrative:       XR CHEST 2 VW- 1/15/2019 9:25 AM CST     HISTORY: Heart Failure; I50.9-Heart failure, unspecified; W79-Zipdeqd  effusion, not elsewhere classified; Z72.0-Tobacco use; Z86.79-Personal  history of other diseases of the circulatory system; Z86.39-Personal  history of other endocrine, nutritional and metabolic disease;  R09.02-Hypoxemia       COMPARISON: January 14, 2019.     FINDINGS:   Upright frontal and lateral radiographs of the chest were obtained.     Mild residual pulmonary edema is noted in the right lung base. However  this is improved from January 14 Cardiac silhouette is normal. Vascular  calcifications present aortic arch.. The osseous structures and  surrounding soft tissues demonstrate no acute abnormality.       Impression:       1. Residual interstitial edema is present in the right lung base.  However this is improved from January 14.        This report was finalized on 01/15/2019 09:51 by Dr. Eze Tapia MD.    XR Chest 1 View [[id number]] Collected:  01/14/19 1649     Updated:  01/14/19 1653    Narrative:       XR CHEST 1 VW- 1/14/2019 4:45 PM CST     HISTORY: cough/  dyspnea       COMPARISON: 10/24/2018.     FINDINGS:   Diffuse interstitial and airspace opacities are seen in the lungs. There  is a small amount of pleural fluid bilaterally. The cardiac silhouette  is enlarged. There is atherosclerosis in the aorta. Atherosclerosis is  also seen in the upper extreme the arteries..      The osseous structures and surrounding soft tissues demonstrate no acute  abnormality.       Impression:       1. Pulmonary edema and small pleural effusions.  2. Enlargement of the cardiac silhouette may be due to cardiomegaly or a  pericardial effusion.        This report was finalized on 01/14/2019 16:50 by Dr. Nilo Levin MD.          I have reviewed the patient's current medications.     Assessment/Plan     Active Hospital Problems    Diagnosis   • Congestive heart failure (CMS/HCC)     Assessment    Acute exacerbation of chf, type unknown.  Cardiomyopathy  Atrial fib paroxysmal  Hyperlipidemia  Hypertension  Wheezing     Plan    Add steroids.   Continue diuresis  Lab in AM  cmp cbc  Nebs prn    Discharge Planning: I expect the patient to be discharged to home in 1-2 days.    Mariana Jaquez,    01/15/19   4:48 PM

## 2019-01-15 NOTE — PLAN OF CARE
Problem: Patient Care Overview  Goal: Plan of Care Review  Outcome: Ongoing (interventions implemented as appropriate)   01/15/19 1932   Coping/Psychosocial   Plan of Care Reviewed With patient;spouse   Plan of Care Review   Progress no change   OTHER   Outcome Summary Initial nutrition assessment. He reports his appetite is ok, but not as good as usual. He tries to follow a low salt diet at home. Provided further cardiac/low Na+ diet education to pt and answered diet questions. Provided literature to take home to help with food choices and seasoning foods. Encouraged daily weights to monitor fluid retention. Encouraged to call RD office with diet questions s/p d/c.

## 2019-01-15 NOTE — PLAN OF CARE
Problem: Patient Care Overview  Goal: Plan of Care Review  Outcome: Ongoing (interventions implemented as appropriate)      Problem: Patient Care Overview  Goal: Plan of Care Review   01/15/19 0253   Coping/Psychosocial   Plan of Care Reviewed With patient   Plan of Care Review   Progress no change   OTHER   Outcome Summary 3+ edema to BLE.Cont. to wheeze but has no c/o SOA and o2 sats are WN: on 3l/nc. Echo in April was 60%. Cardioversion in November 2018. NS per tele. VSS. Cont. to monitor CIWA/ Call MD with concerns.

## 2019-01-16 VITALS
HEART RATE: 73 BPM | SYSTOLIC BLOOD PRESSURE: 150 MMHG | TEMPERATURE: 97.3 F | DIASTOLIC BLOOD PRESSURE: 80 MMHG | HEIGHT: 67 IN | OXYGEN SATURATION: 94 % | WEIGHT: 166 LBS | BODY MASS INDEX: 26.06 KG/M2 | RESPIRATION RATE: 18 BRPM

## 2019-01-16 LAB
ALBUMIN SERPL-MCNC: 2.6 G/DL (ref 3.5–5)
ALBUMIN/GLOB SERPL: 1 G/DL (ref 1.1–2.5)
ALP SERPL-CCNC: 160 U/L (ref 24–120)
ALT SERPL W P-5'-P-CCNC: 27 U/L (ref 0–54)
ANION GAP SERPL CALCULATED.3IONS-SCNC: 8 MMOL/L (ref 4–13)
AST SERPL-CCNC: 16 U/L (ref 7–45)
BASOPHILS # BLD AUTO: 0.01 10*3/MM3 (ref 0–0.2)
BASOPHILS NFR BLD AUTO: 0.1 % (ref 0–2)
BILIRUB SERPL-MCNC: 0.2 MG/DL (ref 0.1–1)
BUN BLD-MCNC: 27 MG/DL (ref 5–21)
BUN/CREAT SERPL: 17 (ref 7–25)
CALCIUM SPEC-SCNC: 7.9 MG/DL (ref 8.4–10.4)
CHLORIDE SERPL-SCNC: 100 MMOL/L (ref 98–110)
CO2 SERPL-SCNC: 28 MMOL/L (ref 24–31)
CREAT BLD-MCNC: 1.59 MG/DL (ref 0.5–1.4)
DEPRECATED RDW RBC AUTO: 46 FL (ref 40–54)
EOSINOPHIL # BLD AUTO: 0 10*3/MM3 (ref 0–0.7)
EOSINOPHIL NFR BLD AUTO: 0 % (ref 0–4)
ERYTHROCYTE [DISTWIDTH] IN BLOOD BY AUTOMATED COUNT: 14.9 % (ref 12–15)
GFR SERPL CREATININE-BSD FRML MDRD: 54 ML/MIN/1.73
GLOBULIN UR ELPH-MCNC: 2.7 GM/DL
GLUCOSE BLD-MCNC: 316 MG/DL (ref 70–100)
GLUCOSE BLDC GLUCOMTR-MCNC: 231 MG/DL (ref 70–130)
GLUCOSE BLDC GLUCOMTR-MCNC: 263 MG/DL (ref 70–130)
HCT VFR BLD AUTO: 36.2 % (ref 40–52)
HGB BLD-MCNC: 12 G/DL (ref 14–18)
IMM GRANULOCYTES # BLD AUTO: 0.03 10*3/MM3 (ref 0–0.03)
IMM GRANULOCYTES NFR BLD AUTO: 0.4 % (ref 0–5)
LYMPHOCYTES # BLD AUTO: 0.18 10*3/MM3 (ref 0.72–4.86)
LYMPHOCYTES NFR BLD AUTO: 2.4 % (ref 15–45)
MCH RBC QN AUTO: 28.1 PG (ref 28–32)
MCHC RBC AUTO-ENTMCNC: 33.1 G/DL (ref 33–36)
MCV RBC AUTO: 84.8 FL (ref 82–95)
MONOCYTES # BLD AUTO: 0.22 10*3/MM3 (ref 0.19–1.3)
MONOCYTES NFR BLD AUTO: 2.9 % (ref 4–12)
NEUTROPHILS # BLD AUTO: 7.21 10*3/MM3 (ref 1.87–8.4)
NEUTROPHILS NFR BLD AUTO: 94.2 % (ref 39–78)
NRBC BLD AUTO-RTO: 0 /100 WBC (ref 0–0)
PLATELET # BLD AUTO: 306 10*3/MM3 (ref 130–400)
PMV BLD AUTO: 10.5 FL (ref 6–12)
POTASSIUM BLD-SCNC: 4.2 MMOL/L (ref 3.5–5.3)
PROT SERPL-MCNC: 5.3 G/DL (ref 6.3–8.7)
RBC # BLD AUTO: 4.27 10*6/MM3 (ref 4.8–5.9)
SODIUM BLD-SCNC: 136 MMOL/L (ref 135–145)
WBC NRBC COR # BLD: 7.65 10*3/MM3 (ref 4.8–10.8)

## 2019-01-16 PROCEDURE — 85025 COMPLETE CBC W/AUTO DIFF WBC: CPT | Performed by: FAMILY MEDICINE

## 2019-01-16 PROCEDURE — 80053 COMPREHEN METABOLIC PANEL: CPT | Performed by: FAMILY MEDICINE

## 2019-01-16 PROCEDURE — 82962 GLUCOSE BLOOD TEST: CPT

## 2019-01-16 PROCEDURE — 63710000001 INSULIN LISPRO (HUMAN) PER 5 UNITS: Performed by: INTERNAL MEDICINE

## 2019-01-16 PROCEDURE — 25010000002 METHYLPREDNISOLONE PER 125 MG: Performed by: FAMILY MEDICINE

## 2019-01-16 PROCEDURE — 25010000002 FUROSEMIDE PER 20 MG: Performed by: FAMILY MEDICINE

## 2019-01-16 RX ORDER — NICOTINE 21 MG/24HR
1 PATCH, TRANSDERMAL 24 HOURS TRANSDERMAL EVERY 24 HOURS
Qty: 30 PATCH | Refills: 0 | Status: SHIPPED | OUTPATIENT
Start: 2019-01-16 | End: 2019-08-16

## 2019-01-16 RX ORDER — FUROSEMIDE 40 MG/1
40 TABLET ORAL 2 TIMES DAILY
Qty: 60 TABLET | Refills: 0 | Status: ON HOLD | OUTPATIENT
Start: 2019-01-16 | End: 2020-07-29

## 2019-01-16 RX ORDER — PREDNISONE 10 MG/1
TABLET ORAL
Qty: 30 TABLET | Refills: 0 | Status: SHIPPED | OUTPATIENT
Start: 2019-01-16 | End: 2019-08-16

## 2019-01-16 RX ORDER — NICOTINE POLACRILEX 4 MG
15 LOZENGE BUCCAL
Status: DISCONTINUED | OUTPATIENT
Start: 2019-01-16 | End: 2019-01-16 | Stop reason: HOSPADM

## 2019-01-16 RX ORDER — DEXTROSE MONOHYDRATE 25 G/50ML
25 INJECTION, SOLUTION INTRAVENOUS
Status: DISCONTINUED | OUTPATIENT
Start: 2019-01-16 | End: 2019-01-16 | Stop reason: HOSPADM

## 2019-01-16 RX ADMIN — SODIUM CHLORIDE, PRESERVATIVE FREE 3 ML: 5 INJECTION INTRAVENOUS at 09:36

## 2019-01-16 RX ADMIN — MAGNESIUM GLUCONATE 500 MG ORAL TABLET 400 MG: 500 TABLET ORAL at 09:36

## 2019-01-16 RX ADMIN — POTASSIUM CHLORIDE 40 MEQ: 750 CAPSULE, EXTENDED RELEASE ORAL at 09:36

## 2019-01-16 RX ADMIN — METHYLPREDNISOLONE SODIUM SUCCINATE 60 MG: 125 INJECTION, POWDER, FOR SOLUTION INTRAMUSCULAR; INTRAVENOUS at 03:55

## 2019-01-16 RX ADMIN — FUROSEMIDE 40 MG: 10 INJECTION, SOLUTION INTRAMUSCULAR; INTRAVENOUS at 09:34

## 2019-01-16 RX ADMIN — APIXABAN 5 MG: 5 TABLET, FILM COATED ORAL at 09:36

## 2019-01-16 RX ADMIN — INSULIN LISPRO 8 UNITS: 100 INJECTION, SOLUTION INTRAVENOUS; SUBCUTANEOUS at 09:35

## 2019-01-16 RX ADMIN — METHYLPREDNISOLONE SODIUM SUCCINATE 60 MG: 125 INJECTION, POWDER, FOR SOLUTION INTRAMUSCULAR; INTRAVENOUS at 09:35

## 2019-01-16 RX ADMIN — DILTIAZEM HYDROCHLORIDE 360 MG: 180 CAPSULE, COATED, EXTENDED RELEASE ORAL at 09:35

## 2019-01-16 RX ADMIN — DRONEDARONE 400 MG: 400 TABLET, FILM COATED ORAL at 09:36

## 2019-01-16 RX ADMIN — METFORMIN HYDROCHLORIDE: 500 TABLET ORAL at 09:35

## 2019-01-16 RX ADMIN — INSULIN LISPRO 8 UNITS: 100 INJECTION, SOLUTION INTRAVENOUS; SUBCUTANEOUS at 06:05

## 2019-01-16 RX ADMIN — NEBIVOLOL HYDROCHLORIDE 20 MG: 5 TABLET ORAL at 09:35

## 2019-01-16 RX ADMIN — PANTOPRAZOLE SODIUM 40 MG: 40 TABLET, DELAYED RELEASE ORAL at 09:35

## 2019-01-16 RX ADMIN — VALSARTAN: 80 TABLET, FILM COATED ORAL at 09:36

## 2019-01-16 NOTE — DISCHARGE SUMMARY
[facility] Medicine Services  DISCHARGE SUMMARY       Date of Admission: 1/14/2019  Date of Discharge:  1/16/2019  Primary Care Physician: Thanh Weeks MD    Presenting Problem/History of Present Illness:  Shortness of breath.    Final Discharge Diagnoses:  Acute exacerbation of chf, diastolic.  Cardiomyopathy  Atrial fib paroxysmal  Hyperlipidemia  Hypertension  Wheezing     Consults: none    Procedures Performed: none    Pertinent Test Results:   Imaging Results (last 7 days)     Procedure Component Value Units Date/Time    XR Chest 2 View [[id number]] Collected:  01/15/19 0947     Updated:  01/15/19 0954    Narrative:       XR CHEST 2 VW- 1/15/2019 9:25 AM CST     HISTORY: Heart Failure; I50.9-Heart failure, unspecified; W69-Yzbmiff  effusion, not elsewhere classified; Z72.0-Tobacco use; Z86.79-Personal  history of other diseases of the circulatory system; Z86.39-Personal  history of other endocrine, nutritional and metabolic disease;  R09.02-Hypoxemia       COMPARISON: January 14, 2019.     FINDINGS:   Upright frontal and lateral radiographs of the chest were obtained.     Mild residual pulmonary edema is noted in the right lung base. However  this is improved from January 14 Cardiac silhouette is normal. Vascular  calcifications present aortic arch.. The osseous structures and  surrounding soft tissues demonstrate no acute abnormality.       Impression:       1. Residual interstitial edema is present in the right lung base.  However this is improved from January 14.        This report was finalized on 01/15/2019 09:51 by Dr. Eze Tapia MD.    XR Chest 1 View [[id number]] Collected:  01/14/19 1649     Updated:  01/14/19 1653    Narrative:       XR CHEST 1 VW- 1/14/2019 4:45 PM CST     HISTORY: cough/ dyspnea       COMPARISON: 10/24/2018.     FINDINGS:   Diffuse interstitial and airspace opacities are seen in the lungs. There  is a small amount of pleural fluid  bilaterally. The cardiac silhouette  is enlarged. There is atherosclerosis in the aorta. Atherosclerosis is  also seen in the upper extreme the arteries..      The osseous structures and surrounding soft tissues demonstrate no acute  abnormality.       Impression:       1. Pulmonary edema and small pleural effusions.  2. Enlargement of the cardiac silhouette may be due to cardiomegaly or a  pericardial effusion.        This report was finalized on 01/14/2019 16:50 by Dr. Nilo Levin MD.        Lab Results (last 7 days)     Procedure Component Value Units Date/Time    POC Glucose Once [[id number]]  (Abnormal) Collected:  01/16/19 0810    Specimen:  Blood Updated:  01/16/19 0841     Glucose 231 mg/dL      Comment: : [id number] Eduardo EuraisaMeter ID: [id number]       POC Glucose Once [[id number]]  (Abnormal) Collected:  01/16/19 0646    Specimen:  Blood Updated:  01/16/19 0659     Glucose 263 mg/dL      Comment: : [id number] Irineo Osborn  GMeter ID: [id number]       Comprehensive Metabolic Panel [[id number]]  (Abnormal) Collected:  01/16/19 0429    Specimen:  Blood Updated:  01/16/19 0510     Glucose 316 mg/dL      BUN 27 mg/dL      Creatinine 1.59 mg/dL      Sodium 136 mmol/L      Potassium 4.2 mmol/L      Chloride 100 mmol/L      CO2 28.0 mmol/L      Calcium 7.9 mg/dL      Total Protein 5.3 g/dL      Albumin 2.60 g/dL      ALT (SGPT) 27 U/L      AST (SGOT) 16 U/L      Alkaline Phosphatase 160 U/L      Total Bilirubin 0.2 mg/dL      eGFR   Amer 54 mL/min/1.73      Globulin 2.7 gm/dL      A/G Ratio 1.0 g/dL      BUN/Creatinine Ratio 17.0     Anion Gap 8.0 mmol/L     CBC & Differential [[id number]] Collected:  01/16/19 0429    Specimen:  Blood Updated:  01/16/19 0502    Narrative:       The following orders were created for panel order CBC & Differential.  Procedure                               Abnormality         Status                     ---------                               -----------          ------                     CBC Auto Differential[[id number]]        Abnormal            Final result                 Please view results for these tests on the individual orders.    CBC Auto Differential [[id number]]  (Abnormal) Collected:  01/16/19 0429    Specimen:  Blood Updated:  01/16/19 0502     WBC 7.65 10*3/mm3      RBC 4.27 10*6/mm3      Hemoglobin 12.0 g/dL      Hematocrit 36.2 %      MCV 84.8 fL      MCH 28.1 pg      MCHC 33.1 g/dL      RDW 14.9 %      RDW-SD 46.0 fl      MPV 10.5 fL      Platelets 306 10*3/mm3      Neutrophil % 94.2 %      Lymphocyte % 2.4 %      Monocyte % 2.9 %      Eosinophil % 0.0 %      Basophil % 0.1 %      Immature Grans % 0.4 %      Neutrophils, Absolute 7.21 10*3/mm3      Lymphocytes, Absolute 0.18 10*3/mm3      Monocytes, Absolute 0.22 10*3/mm3      Eosinophils, Absolute 0.00 10*3/mm3      Basophils, Absolute 0.01 10*3/mm3      Immature Grans, Absolute 0.03 10*3/mm3      nRBC 0.0 /100 WBC     Blood Culture - Blood, Arm, Right [[id number]] Collected:  01/14/19 1600    Specimen:  Blood from Arm, Right Updated:  01/15/19 1645     Blood Culture No growth at 24 hours    Blood Culture - Blood, Arm, Left [[id number]] Collected:  01/14/19 1615    Specimen:  Blood from Arm, Left Updated:  01/15/19 1645     Blood Culture No growth at 24 hours    Lipid Panel [[id number]]  (Abnormal) Collected:  01/15/19 0350    Specimen:  Blood Updated:  01/15/19 0448     Total Cholesterol 294 mg/dL      Triglycerides 117 mg/dL      HDL Cholesterol 107 mg/dL      LDL Cholesterol  172 mg/dL      LDL/HDL Ratio 1.53    Hemoglobin A1c [[id number]] Collected:  01/15/19 0348    Specimen:  Blood Updated:  01/15/19 0428     Hemoglobin A1C 6.1 %     Narrative:       Less than 6.0           Non-Diabetic Range  6.0-7.0                 ADA Therapeutic Target  Greater than 7.0        Action Suggested    Basic Metabolic Panel [[id number]]  (Abnormal) Collected:  01/15/19 0350    Specimen:  Blood Updated:  01/15/19 0415      Glucose 299 mg/dL      BUN 17 mg/dL      Creatinine 1.33 mg/dL      Sodium 138 mmol/L      Potassium 4.7 mmol/L      Chloride 104 mmol/L      CO2 23.0 mmol/L      Calcium 8.0 mg/dL      eGFR  African Amer 67 mL/min/1.73      BUN/Creatinine Ratio 12.8     Anion Gap 11.0 mmol/L     Narrative:       GFR Normal >60  Chronic Kidney Disease <60  Kidney Failure <15    CBC (No Diff) [[id number]]  (Abnormal) Collected:  01/15/19 0348    Specimen:  Blood Updated:  01/15/19 0400     WBC 6.68 10*3/mm3      RBC 4.34 10*6/mm3      Hemoglobin 12.5 g/dL      Hematocrit 38.2 %      MCV 88.0 fL      MCH 28.8 pg      MCHC 32.7 g/dL      RDW 15.3 %      RDW-SD 49.5 fl      MPV 11.0 fL      Platelets 282 10*3/mm3     Troponin [[id number]]  (Normal) Collected:  01/14/19 2155    Specimen:  Blood Updated:  01/14/19 2242     Troponin I <0.012 ng/mL     Troponin [[id number]]  (Normal) Collected:  01/14/19 1606    Specimen:  Blood Updated:  01/14/19 1909     Troponin I <0.012 ng/mL     Urinalysis With Culture If Indicated - Urine, Clean Catch [[id number]]  (Abnormal) Collected:  01/14/19 1725    Specimen:  Urine, Clean Catch Updated:  01/14/19 1742     Color, UA Yellow     Appearance, UA Clear     pH, UA 5.5     Specific Gravity, UA 1.014     Glucose, UA Negative     Ketones, UA Negative     Bilirubin, UA Negative     Blood, UA Moderate (2+)     Protein, UA >=300 mg/dL (3+)     Leuk Esterase, UA Negative     Nitrite, UA Negative     Urobilinogen, UA 0.2 E.U./dL    Urinalysis, Microscopic Only - Urine, Clean Catch [[id number]]  (Abnormal) Collected:  01/14/19 1725    Specimen:  Urine, Clean Catch Updated:  01/14/19 1742     RBC, UA 13-20 /HPF      WBC, UA 0-2 /HPF      Bacteria, UA None Seen /HPF      Squamous Epithelial Cells, UA 0-2 /HPF      Hyaline Casts, UA 3-6 /LPF      Methodology Automated Microscopy    [place] Draw [[id number]] Collected:  01/14/19 1606    Specimen:  Blood Updated:  01/14/19 1730    Narrative:       The following orders  were created for panel order [place] Draw.  Procedure                               Abnormality         Status                     ---------                               -----------         ------                     Green Top (Gel)[[id number]]                                  Final result               Blood Culture Bottle Set[[id number]]                         Final result                 Please view results for these tests on the individual orders.    Blood Culture Bottle Set [[id number]] Collected:  01/14/19 1615    Specimen:  Blood from Arm, Left Updated:  01/14/19 1730     Extra Tube Hold for add-ons.     Comment: Auto resulted.       [place] Draw [[id number]] Collected:  01/14/19 1600    Specimen:  Blood Updated:  01/14/19 1715    Narrative:       The following orders were created for panel order [place] Draw.  Procedure                               Abnormality         Status                     ---------                               -----------         ------                     Light Blue Top[[id number]]                                   Final result               Green Top (Gel)[[id number]]                                  Final result               Lavender Top[[id number]]                                     Final result               Red Top[[id number]]                                          Final result               Blood Culture Bottle Set[[id number]]                         Final result                 Please view results for these tests on the individual orders.    Green Top (Gel) [[id number]] Collected:  01/14/19 1606    Specimen:  Blood Updated:  01/14/19 1715     Extra Tube Hold for add-ons.     Comment: Auto resulted.       Light Blue Top [[id number]] Collected:  01/14/19 1606    Specimen:  Blood Updated:  01/14/19 1715     Extra Tube hold for add-on     Comment: Auto resulted       Green Top (Gel) [[id number]] Collected:  01/14/19 1606    Specimen:  Blood Updated:  01/14/19 1715     Extra Tube Hold  for add-ons.     Comment: Auto resulted.       Lavender Top [[id number]] Collected:  01/14/19 1606    Specimen:  Blood Updated:  01/14/19 1715     Extra Tube hold for add-on     Comment: Auto resulted       Red Top [[id number]] Collected:  01/14/19 1606    Specimen:  Blood Updated:  01/14/19 1715     Extra Tube Hold for add-ons.     Comment: Auto resulted.       Blood Culture Bottle Set [[id number]] Collected:  01/14/19 1600    Specimen:  Blood from Arm, Right Updated:  01/14/19 1700     Extra Tube Hold for add-ons.     Comment: Auto resulted.       Influenza Antigen, Rapid - Swab, Nasopharynx [[id number]]  (Normal) Collected:  01/14/19 1625    Specimen:  Swab from Nasopharynx Updated:  01/14/19 1657     Influenza A Ag, EIA Negative     Influenza B Ag, EIA Negative    Narrative:       Recommend confirmation of negative results by viral culture or molecular assay.    Lactic Acid, Plasma [[id number]]  (Normal) Collected:  01/14/19 1616    Specimen:  Blood Updated:  01/14/19 1640     Lactate 1.1 mmol/L     BNP [[id number]]  (Abnormal) Collected:  01/14/19 1606    Specimen:  Blood Updated:  01/14/19 1639     proBNP 17,700.0 pg/mL     Troponin [[id number]]  (Normal) Collected:  01/14/19 1606    Specimen:  Blood Updated:  01/14/19 1639     Troponin I <0.012 ng/mL     Protime-INR [[id number]]  (Normal) Collected:  01/14/19 1606    Specimen:  Blood Updated:  01/14/19 1635     Protime 14.0 Seconds      INR 1.05    aPTT [[id number]]  (Abnormal) Collected:  01/14/19 1606    Specimen:  Blood Updated:  01/14/19 1635     PTT 41.0 seconds     Comprehensive Metabolic Panel [[id number]]  (Abnormal) Collected:  01/14/19 1606    Specimen:  Blood Updated:  01/14/19 1628     Glucose 135 mg/dL      BUN 12 mg/dL      Creatinine 1.25 mg/dL      Sodium 139 mmol/L      Potassium 3.7 mmol/L      Chloride 104 mmol/L      CO2 26.0 mmol/L      Calcium 8.2 mg/dL      Total Protein 7.0 g/dL      Albumin 3.60 g/dL      ALT (SGPT) 22 U/L      AST (SGOT) 46  U/L      Alkaline Phosphatase 224 U/L      Total Bilirubin 0.4 mg/dL      eGFR  African Amer 72 mL/min/1.73      Globulin 3.4 gm/dL      A/G Ratio 1.1 g/dL      BUN/Creatinine Ratio 9.6     Anion Gap 9.0 mmol/L     CBC & Differential [[id number]] Collected:  01/14/19 1606    Specimen:  Blood Updated:  01/14/19 1616    Narrative:       The following orders were created for panel order CBC & Differential.  Procedure                               Abnormality         Status                     ---------                               -----------         ------                     CBC Auto Differential[[id number]]        Abnormal            Final result                 Please view results for these tests on the individual orders.    CBC Auto Differential [[id number]]  (Abnormal) Collected:  01/14/19 1606    Specimen:  Blood Updated:  01/14/19 1616     WBC 9.17 10*3/mm3      RBC 4.62 10*6/mm3      Hemoglobin 12.9 g/dL      Hematocrit 39.4 %      MCV 85.3 fL      MCH 27.9 pg      MCHC 32.7 g/dL      RDW 15.4 %      RDW-SD 47.8 fl      MPV 9.9 fL      Platelets 302 10*3/mm3      Neutrophil % 77.1 %      Lymphocyte % 3.5 %      Monocyte % 15.9 %      Eosinophil % 2.4 %      Basophil % 0.8 %      Immature Grans % 0.3 %      Neutrophils, Absolute 7.07 10*3/mm3      Lymphocytes, Absolute 0.32 10*3/mm3      Monocytes, Absolute 1.46 10*3/mm3      Eosinophils, Absolute 0.22 10*3/mm3      Basophils, Absolute 0.07 10*3/mm3      Immature Grans, Absolute 0.03 10*3/mm3      nRBC 0.0 /100 WBC     Blood Gas, Arterial With Co-Ox [[id number]]  (Abnormal) Collected:  01/14/19 1558    Specimen:  Arterial Blood Updated:  01/14/19 1602     Site Right Radial     Pedrito's Test Positive     pH, Arterial 7.390 pH units      pCO2, Arterial 39.0 mm Hg      pO2, Arterial 46.7 mm Hg      Comment: 85 Value below critical limit        HCO3, Arterial 23.6 mmol/L      Base Excess, Arterial -1.2 mmol/L      Comment: 84 Value below reference range        O2  "Saturation, Arterial 81.5 %      Comment: 84 Value below reference range        Hemoglobin, Blood Gas 12.9 g/dL      Comment: 84 Value below reference range        Hematocrit, Blood Gas 39.4 %      Oxyhemoglobin 78.3 %      Comment: 84 Value below reference range        Methemoglobin 1.00 %      Carboxyhemoglobin 3.0 %      A-a Gradiant -- mmHg      Comment: UNABLE TO CALCULATE        Temperature 37.0 C      Sodium, Arterial 141 mmol/L      Potassium, Arterial 3.9 mmol/L      Barometric Pressure for Blood Gas 760 mmHg      Modality Room Air     Ventilator Mode NA     Note --     Notified Who dr culver     Notified By [id number]     Notified Time 01/14/2019 16:04     Collected by [id number]     Comment: Meter: [id number]     :  [id number]        pH, Temp Corrected -- pH Units      pCO2, Temperature Corrected -- mm Hg      pO2, Temperature Corrected -- mm Hg         Hospital Course:  The patient is a 58 y.o. male who presented to [facility] with increased shortness of breath.  Orthopnea.  Dyspnea with exertion.  Admitted.  Diuresed.  Felt better.   Leg swelling improved.   Did have some wheezing.  Steroids given   Wheezing improved.   Denies COPD and asthma.  Wants to go home.  Stable to do so.      Physical Exam on Discharge:  /80 (BP Location: Right arm, Patient Position: Sitting)   Pulse 73   Temp 97.3 °F (36.3 °C) (Oral)   Resp 18   Ht 170.2 cm (67.01\")   Wt 75.3 kg (166 lb)   SpO2 94%   BMI 25.99 kg/m²   Physical Exam   Constitutional: He is oriented to person, place, and time. He appears well-developed and well-nourished.   Eyes: Conjunctivae and EOM are normal. Pupils are equal, round, and reactive to light.   Neck: Normal range of motion. Neck supple. No JVD present.   Cardiovascular: Normal rate, regular rhythm, normal heart sounds and intact distal pulses.   Pulmonary/Chest: Effort normal.   Continues with expiratory wheezing with improvement over yesterday.   Abdominal: Soft. " Bowel sounds are normal.   Musculoskeletal: Normal range of motion.   Neurological: He is alert and oriented to person, place, and time.   Skin: Skin is warm and dry.   Psychiatric: He has a normal mood and affect. His behavior is normal.   Nursing note and vitals reviewed.      Condition on Discharge: stable improved    Discharge Disposition:  Home or Self Care    Discharge Medications:     Discharge Medications      New Medications      Instructions Start Date   nicotine 14 MG/24HR patch  Commonly known as:  NICODERM CQ   1 patch, Transdermal, Every 24 Hours      predniSONE 10 MG tablet  Commonly known as:  DELTASONE   Take 4 daily x3 then 3 daily x3 then 2 daily x3 then 1 daily x3 then stop         Changes to Medications      Instructions Start Date   furosemide 40 MG tablet  Commonly known as:  LASIX  What changed:    · medication strength  · when to take this  · additional instructions   40 mg, Oral, 2 Times Daily         Continue These Medications      Instructions Start Date   allopurinol 100 MG tablet  Commonly known as:  ZYLOPRIM   TAKE 1 TABLET BY MOUTH EVERY DAY      BYSTOLIC PO   20 mg, Oral, Daily      diltiaZEM  MG 24 hr capsule  Commonly known as:  CARDIZEM CD   360 mg, Oral, Daily      dronedarone 400 MG tablet  Commonly known as:  MULTAQ   400 mg, Oral, 2 Times Daily With Meals      ELIQUIS PO   5 mg, Oral, 2 Times Daily      FERROUS SULFATE PO   1 tablet, Oral, Daily      JANUMET  MG per tablet  Generic drug:  sitaGLIPtin-metFORMIN   1 tablet, Oral, 2 Times Daily With Meals      KLOR-CON 20 MEQ CR tablet  Generic drug:  potassium chloride   TAKE 2 TABLETS BY MOUTH DAILY.      LIPITOR PO   80 mg, Oral      MAGNESIUM PO   1 tablet, Oral, Daily      PROTONIX PO   40 mg, Oral, Daily      valsartan-hydrochlorothiazide 320-25 MG per tablet  Commonly known as:  DIOVAN-HCT   1 tablet, Oral, Daily           Discharge Diet:   Diet Instructions     Diet: Regular, Consistent Carbohydrate, Cardiac       Discharge Diet:   Regular  Consistent Carbohydrate  Cardiac           Activity at Discharge:   Activity Instructions     Activity as Tolerated      Additional Activity Instructions:      Support hose 15-20 mm Hg pressure knee high on every am off every pm    Elevate legs when sitting    No work until released by PCP                    Follow-up Appointments:   Future Appointments   Date Time Provider Department Center   5/24/2019  3:00 PM Kina Leon APRN [facility] None     PCP five days     Test Results Pending at Discharge: none    Mariana Jaquez DO  01/16/19  1:28 PM    Time: 35 minutes.

## 2019-01-16 NOTE — DISCHARGE INSTR - ACTIVITY
Support hose 15-20 mm Hg pressure knee high on every am off every pm    Elevate legs when sitting    No work until released by PCP

## 2019-01-16 NOTE — PLAN OF CARE
Problem: Patient Care Overview  Goal: Plan of Care Review  Outcome: Ongoing (interventions implemented as appropriate)   01/16/19 2660   Coping/Psychosocial   Plan of Care Reviewed With patient   Plan of Care Review   Progress improving   OTHER   Outcome Summary No c/o pain. cont. to diurese well. good UO. VSS. Edema still present. Up ad mary jo. Lungs still course with wheezes but on room air now. VSS Sinus 66-82. cont. to monitor. call with concerns.

## 2019-01-17 ENCOUNTER — READMISSION MANAGEMENT (OUTPATIENT)
Dept: CALL CENTER | Facility: HOSPITAL | Age: 59
End: 2019-01-17

## 2019-01-17 NOTE — PAYOR COMM NOTE
"DC HOME 1-16-19  [id number]    Michael Lauren D (58 y.o. Male)     Date of Birth Social Security Number Address Home Phone MRN    1960  [address] [facility] [address] [phone] [id number]    Latter day Marital Status          Rastafarian        Admission Date Admission Type Admitting Provider Attending Provider Department, Room/Bed    1/14/19 Emergency Mariana Jaquez DO  [facility] 4B, 434/1    Discharge Date Discharge Disposition Discharge Destination        1/16/2019 Home or Self Care              Attending Provider:  (none)   Allergies:  No Known Allergies    Isolation:  None   Infection:  None   Code Status:  Prior    Ht:  170.2 cm (67.01\")   Wt:  75.3 kg (166 lb)    Admission Cmt:  None   Principal Problem:  None                Active Insurance as of 1/14/2019     Primary Coverage     Payor Plan Insurance Group Employer/Plan Group    [employer]HE[employer] BLUE CROSS AN[employer]M BLUE CROSS BLUE SHIELD PPO [id number]     Payor Plan Address Payor Plan Phone Number Payor Plan Fax Number Effective Dates    [address] [phone]  1/1/2019 - None Entered    [facility] [id number]       Subscriber Name Subscriber Birth Date Member ID       LAUREN RODRIGUEZ 1960 [id number]                 Emergency Contacts      (Rel.) Home Phone Work Phone Mobile Phone    Jodi Rodriguez (Spouse) [phone] -- [phone]               Discharge Summary      Mariana Jaquez DO at 1/16/2019  1:28 PM                [facility]  DISCHARGE SUMMARY       Date of Admission: 1/14/2019  Date of Discharge:  1/16/2019  Primary Care Physician: Thanh Weeks MD    Presenting Problem/History of Present Illness:  Shortness of breath.    Final Discharge Diagnoses:  Acute exacerbation of chf, diastolic.  Cardiomyopathy  Atrial fib paroxysmal  Hyperlipidemia  Hypertension  Wheezing     Consults: none    Procedures Performed: none    Pertinent " Test Results:   Imaging Results (last 7 days)     Procedure Component Value Units Date/Time    XR Chest 2 View [[id number]] Collected:  01/15/19 0947     Updated:  01/15/19 0954    Narrative:       XR CHEST 2 VW- 1/15/2019 9:25 AM CST     HISTORY: Heart Failure; I50.9-Heart failure, unspecified; H07-Yoyczbc  effusion, not elsewhere classified; Z72.0-Tobacco use; Z86.79-Personal  history of other diseases of the circulatory system; Z86.39-Personal  history of other endocrine, nutritional and metabolic disease;  R09.02-Hypoxemia       COMPARISON: January 14, 2019.     FINDINGS:   Upright frontal and lateral radiographs of the chest were obtained.     Mild residual pulmonary edema is noted in the right lung base. However  this is improved from January 14 Cardiac silhouette is normal. Vascular  calcifications present aortic arch.. The osseous structures and  surrounding soft tissues demonstrate no acute abnormality.       Impression:       1. Residual interstitial edema is present in the right lung base.  However this is improved from January 14.        This report was finalized on 01/15/2019 09:51 by Dr. Eze Tapia MD.    XR Chest 1 View [[id number]] Collected:  01/14/19 1649     Updated:  01/14/19 1653    Narrative:       XR CHEST 1 VW- 1/14/2019 4:45 PM CST     HISTORY: cough/ dyspnea       COMPARISON: 10/24/2018.     FINDINGS:   Diffuse interstitial and airspace opacities are seen in the lungs. There  is a small amount of pleural fluid bilaterally. The cardiac silhouette  is enlarged. There is atherosclerosis in the aorta. Atherosclerosis is  also seen in the upper extreme the arteries..      The osseous structures and surrounding soft tissues demonstrate no acute  abnormality.       Impression:       1. Pulmonary edema and small pleural effusions.  2. Enlargement of the cardiac silhouette may be due to cardiomegaly or a  pericardial effusion.        This report was finalized on 01/14/2019 16:50 by Dr. Corcoran  MD Poonam.        Lab Results (last 7 days)     Procedure Component Value Units Date/Time    POC Glucose Once [[id number]]  (Abnormal) Collected:  01/16/19 0810    Specimen:  Blood Updated:  01/16/19 0841     Glucose 231 mg/dL      Comment: : [id number] Eduardo EuraisaMeter ID: [id number]       POC Glucose Once [[id number]]  (Abnormal) Collected:  01/16/19 0646    Specimen:  Blood Updated:  01/16/19 0659     Glucose 263 mg/dL      Comment: : [id number] Irineo Stoner ID: [id number]       Comprehensive Metabolic Panel [[id number]]  (Abnormal) Collected:  01/16/19 0429    Specimen:  Blood Updated:  01/16/19 0510     Glucose 316 mg/dL      BUN 27 mg/dL      Creatinine 1.59 mg/dL      Sodium 136 mmol/L      Potassium 4.2 mmol/L      Chloride 100 mmol/L      CO2 28.0 mmol/L      Calcium 7.9 mg/dL      Total Protein 5.3 g/dL      Albumin 2.60 g/dL      ALT (SGPT) 27 U/L      AST (SGOT) 16 U/L      Alkaline Phosphatase 160 U/L      Total Bilirubin 0.2 mg/dL      eGFR   Amer 54 mL/min/1.73      Globulin 2.7 gm/dL      A/G Ratio 1.0 g/dL      BUN/Creatinine Ratio 17.0     Anion Gap 8.0 mmol/L     CBC & Differential [[id number]] Collected:  01/16/19 0429    Specimen:  Blood Updated:  01/16/19 0502    Narrative:       The following orders were created for panel order CBC & Differential.  Procedure                               Abnormality         Status                     ---------                               -----------         ------                     CBC Auto Differential[[id number]]        Abnormal            Final result                 Please view results for these tests on the individual orders.    CBC Auto Differential [[id number]]  (Abnormal) Collected:  01/16/19 0429    Specimen:  Blood Updated:  01/16/19 0502     WBC 7.65 10*3/mm3      RBC 4.27 10*6/mm3      Hemoglobin 12.0 g/dL      Hematocrit 36.2 %      MCV 84.8 fL      MCH 28.1 pg      MCHC 33.1 g/dL      RDW 14.9 %      RDW-SD 46.0 fl       MPV 10.5 fL      Platelets 306 10*3/mm3      Neutrophil % 94.2 %      Lymphocyte % 2.4 %      Monocyte % 2.9 %      Eosinophil % 0.0 %      Basophil % 0.1 %      Immature Grans % 0.4 %      Neutrophils, Absolute 7.21 10*3/mm3      Lymphocytes, Absolute 0.18 10*3/mm3      Monocytes, Absolute 0.22 10*3/mm3      Eosinophils, Absolute 0.00 10*3/mm3      Basophils, Absolute 0.01 10*3/mm3      Immature Grans, Absolute 0.03 10*3/mm3      nRBC 0.0 /100 WBC     Blood Culture - Blood, Arm, Right [[id number]] Collected:  01/14/19 1600    Specimen:  Blood from Arm, Right Updated:  01/15/19 1645     Blood Culture No growth at 24 hours    Blood Culture - Blood, Arm, Left [[id number]] Collected:  01/14/19 1615    Specimen:  Blood from Arm, Left Updated:  01/15/19 1645     Blood Culture No growth at 24 hours    Lipid Panel [[id number]]  (Abnormal) Collected:  01/15/19 0350    Specimen:  Blood Updated:  01/15/19 0448     Total Cholesterol 294 mg/dL      Triglycerides 117 mg/dL      HDL Cholesterol 107 mg/dL      LDL Cholesterol  172 mg/dL      LDL/HDL Ratio 1.53    Hemoglobin A1c [[id number]] Collected:  01/15/19 0348    Specimen:  Blood Updated:  01/15/19 0428     Hemoglobin A1C 6.1 %     Narrative:       Less than 6.0           Non-Diabetic Range  6.0-7.0                 ADA Therapeutic Target  Greater than 7.0        Action Suggested    Basic Metabolic Panel [[id number]]  (Abnormal) Collected:  01/15/19 0350    Specimen:  Blood Updated:  01/15/19 0414     Glucose 299 mg/dL      BUN 17 mg/dL      Creatinine 1.33 mg/dL      Sodium 138 mmol/L      Potassium 4.7 mmol/L      Chloride 104 mmol/L      CO2 23.0 mmol/L      Calcium 8.0 mg/dL      eGFR  African Amer 67 mL/min/1.73      BUN/Creatinine Ratio 12.8     Anion Gap 11.0 mmol/L     Narrative:       GFR Normal >60  Chronic Kidney Disease <60  Kidney Failure <15    CBC (No Diff) [[id number]]  (Abnormal) Collected:  01/15/19 0348    Specimen:  Blood Updated:  01/15/19 0400     WBC 6.68  10*3/mm3      RBC 4.34 10*6/mm3      Hemoglobin 12.5 g/dL      Hematocrit 38.2 %      MCV 88.0 fL      MCH 28.8 pg      MCHC 32.7 g/dL      RDW 15.3 %      RDW-SD 49.5 fl      MPV 11.0 fL      Platelets 282 10*3/mm3     Troponin [[id number]]  (Normal) Collected:  01/14/19 2155    Specimen:  Blood Updated:  01/14/19 2242     Troponin I <0.012 ng/mL     Troponin [[id number]]  (Normal) Collected:  01/14/19 1606    Specimen:  Blood Updated:  01/14/19 1909     Troponin I <0.012 ng/mL     Urinalysis With Culture If Indicated - Urine, Clean Catch [[id number]]  (Abnormal) Collected:  01/14/19 1725    Specimen:  Urine, Clean Catch Updated:  01/14/19 1742     Color, UA Yellow     Appearance, UA Clear     pH, UA 5.5     Specific Gravity, UA 1.014     Glucose, UA Negative     Ketones, UA Negative     Bilirubin, UA Negative     Blood, UA Moderate (2+)     Protein, UA >=300 mg/dL (3+)     Leuk Esterase, UA Negative     Nitrite, UA Negative     Urobilinogen, UA 0.2 E.U./dL    Urinalysis, Microscopic Only - Urine, Clean Catch [[id number]]  (Abnormal) Collected:  01/14/19 1725    Specimen:  Urine, Clean Catch Updated:  01/14/19 1742     RBC, UA 13-20 /HPF      WBC, UA 0-2 /HPF      Bacteria, UA None Seen /HPF      Squamous Epithelial Cells, UA 0-2 /HPF      Hyaline Casts, UA 3-6 /LPF      Methodology Automated Microscopy    [place] Draw [[id number]] Collected:  01/14/19 1606    Specimen:  Blood Updated:  01/14/19 1730    Narrative:       The following orders were created for panel order [place] Draw.  Procedure                               Abnormality         Status                     ---------                               -----------         ------                     Green Top (Gel)[[id number]]                                  Final result               Blood Culture Bottle Set[[id number]]                         Final result                 Please view results for these tests on the individual orders.    Blood Culture Bottle Set  [[id number]] Collected:  01/14/19 1615    Specimen:  Blood from Arm, Left Updated:  01/14/19 1730     Extra Tube Hold for add-ons.     Comment: Auto resulted.       [place] Draw [[id number]] Collected:  01/14/19 1600    Specimen:  Blood Updated:  01/14/19 1715    Narrative:       The following orders were created for panel order [place] Draw.  Procedure                               Abnormality         Status                     ---------                               -----------         ------                     Light Blue Top[[id number]]                                   Final result               Green Top (Gel)[[id number]]                                  Final result               Lavender Top[[id number]]                                     Final result               Red Top[[id number]]                                          Final result               Blood Culture Bottle Set[[id number]]                         Final result                 Please view results for these tests on the individual orders.    Green Top (Gel) [[id number]] Collected:  01/14/19 1606    Specimen:  Blood Updated:  01/14/19 1715     Extra Tube Hold for add-ons.     Comment: Auto resulted.       Light Blue Top [[id number]] Collected:  01/14/19 1606    Specimen:  Blood Updated:  01/14/19 1715     Extra Tube hold for add-on     Comment: Auto resulted       Green Top (Gel) [[id number]] Collected:  01/14/19 1606    Specimen:  Blood Updated:  01/14/19 1715     Extra Tube Hold for add-ons.     Comment: Auto resulted.       Lavender Top [[id number]] Collected:  01/14/19 1606    Specimen:  Blood Updated:  01/14/19 1715     Extra Tube hold for add-on     Comment: Auto resulted       Red Top [[id number]] Collected:  01/14/19 1606    Specimen:  Blood Updated:  01/14/19 1715     Extra Tube Hold for add-ons.     Comment: Auto resulted.       Blood Culture Bottle Set [[id number]] Collected:  01/14/19 1600    Specimen:  Blood from Arm, Right Updated:  01/14/19 1700      Extra Tube Hold for add-ons.     Comment: Auto resulted.       Influenza Antigen, Rapid - Swab, Nasopharynx [[id number]]  (Normal) Collected:  01/14/19 1625    Specimen:  Swab from Nasopharynx Updated:  01/14/19 1657     Influenza A Ag, EIA Negative     Influenza B Ag, EIA Negative    Narrative:       Recommend confirmation of negative results by viral culture or molecular assay.    Lactic Acid, Plasma [[id number]]  (Normal) Collected:  01/14/19 1616    Specimen:  Blood Updated:  01/14/19 1640     Lactate 1.1 mmol/L     BNP [[id number]]  (Abnormal) Collected:  01/14/19 1606    Specimen:  Blood Updated:  01/14/19 1639     proBNP 17,700.0 pg/mL     Troponin [[id number]]  (Normal) Collected:  01/14/19 1606    Specimen:  Blood Updated:  01/14/19 1639     Troponin I <0.012 ng/mL     Protime-INR [[id number]]  (Normal) Collected:  01/14/19 1606    Specimen:  Blood Updated:  01/14/19 1635     Protime 14.0 Seconds      INR 1.05    aPTT [[id number]]  (Abnormal) Collected:  01/14/19 1606    Specimen:  Blood Updated:  01/14/19 1635     PTT 41.0 seconds     Comprehensive Metabolic Panel [[id number]]  (Abnormal) Collected:  01/14/19 1606    Specimen:  Blood Updated:  01/14/19 1628     Glucose 135 mg/dL      BUN 12 mg/dL      Creatinine 1.25 mg/dL      Sodium 139 mmol/L      Potassium 3.7 mmol/L      Chloride 104 mmol/L      CO2 26.0 mmol/L      Calcium 8.2 mg/dL      Total Protein 7.0 g/dL      Albumin 3.60 g/dL      ALT (SGPT) 22 U/L      AST (SGOT) 46 U/L      Alkaline Phosphatase 224 U/L      Total Bilirubin 0.4 mg/dL      eGFR  African Amer 72 mL/min/1.73      Globulin 3.4 gm/dL      A/G Ratio 1.1 g/dL      BUN/Creatinine Ratio 9.6     Anion Gap 9.0 mmol/L     CBC & Differential [[id number]] Collected:  01/14/19 1606    Specimen:  Blood Updated:  01/14/19 1616    Narrative:       The following orders were created for panel order CBC & Differential.  Procedure                               Abnormality         Status                      ---------                               -----------         ------                     CBC Auto Differential[[id number]]        Abnormal            Final result                 Please view results for these tests on the individual orders.    CBC Auto Differential [[id number]]  (Abnormal) Collected:  01/14/19 1606    Specimen:  Blood Updated:  01/14/19 1616     WBC 9.17 10*3/mm3      RBC 4.62 10*6/mm3      Hemoglobin 12.9 g/dL      Hematocrit 39.4 %      MCV 85.3 fL      MCH 27.9 pg      MCHC 32.7 g/dL      RDW 15.4 %      RDW-SD 47.8 fl      MPV 9.9 fL      Platelets 302 10*3/mm3      Neutrophil % 77.1 %      Lymphocyte % 3.5 %      Monocyte % 15.9 %      Eosinophil % 2.4 %      Basophil % 0.8 %      Immature Grans % 0.3 %      Neutrophils, Absolute 7.07 10*3/mm3      Lymphocytes, Absolute 0.32 10*3/mm3      Monocytes, Absolute 1.46 10*3/mm3      Eosinophils, Absolute 0.22 10*3/mm3      Basophils, Absolute 0.07 10*3/mm3      Immature Grans, Absolute 0.03 10*3/mm3      nRBC 0.0 /100 WBC     Blood Gas, Arterial With Co-Ox [[id number]]  (Abnormal) Collected:  01/14/19 1558    Specimen:  Arterial Blood Updated:  01/14/19 1602     Site Right Radial     Pedrito's Test Positive     pH, Arterial 7.390 pH units      pCO2, Arterial 39.0 mm Hg      pO2, Arterial 46.7 mm Hg      Comment: 85 Value below critical limit        HCO3, Arterial 23.6 mmol/L      Base Excess, Arterial -1.2 mmol/L      Comment: 84 Value below reference range        O2 Saturation, Arterial 81.5 %      Comment: 84 Value below reference range        Hemoglobin, Blood Gas 12.9 g/dL      Comment: 84 Value below reference range        Hematocrit, Blood Gas 39.4 %      Oxyhemoglobin 78.3 %      Comment: 84 Value below reference range        Methemoglobin 1.00 %      Carboxyhemoglobin 3.0 %      A-a Gradiant -- mmHg      Comment: UNABLE TO CALCULATE        Temperature 37.0 C      Sodium, Arterial 141 mmol/L      Potassium, Arterial 3.9 mmol/L      Barometric  "Pressure for Blood Gas 760 mmHg      Modality Room Air     Ventilator Mode NA     Note --     Notified Who dr culver     Notified By [id number]     Notified Time 01/14/2019 16:04     Collected by [id number]     Comment: Meter: [id number]     :  [id number]        pH, Temp Corrected -- pH Units      pCO2, Temperature Corrected -- mm Hg      pO2, Temperature Corrected -- mm Hg         Hospital Course:  The patient is a 58 y.o. male who presented to [facility] with increased shortness of breath.  Orthopnea.  Dyspnea with exertion.  Admitted.  Diuresed.  Felt better.   Leg swelling improved.   Did have some wheezing.  Steroids given   Wheezing improved.   Denies COPD and asthma.  Wants to go home.  Stable to do so.      Physical Exam on Discharge:  /80 (BP Location: Right arm, Patient Position: Sitting)   Pulse 73   Temp 97.3 °F (36.3 °C) (Oral)   Resp 18   Ht 170.2 cm (67.01\")   Wt 75.3 kg (166 lb)   SpO2 94%   BMI 25.99 kg/m²    Physical Exam   Constitutional: He is oriented to person, place, and time. He appears well-developed and well-nourished.   Eyes: Conjunctivae and EOM are normal. Pupils are equal, round, and reactive to light.   Neck: Normal range of motion. Neck supple. No JVD present.   Cardiovascular: Normal rate, regular rhythm, normal heart sounds and intact distal pulses.   Pulmonary/Chest: Effort normal.   Continues with expiratory wheezing with improvement over yesterday.   Abdominal: Soft. Bowel sounds are normal.   Musculoskeletal: Normal range of motion.   Neurological: He is alert and oriented to person, place, and time.   Skin: Skin is warm and dry.   Psychiatric: He has a normal mood and affect. His behavior is normal.   Nursing note and vitals reviewed.      Condition on Discharge: stable improved    Discharge Disposition:  Home or Self Care    Discharge Medications:     Discharge Medications      New Medications      Instructions Start Date   nicotine 14 MG/24HR " patch  Commonly known as:  NICODERM CQ   1 patch, Transdermal, Every 24 Hours      predniSONE 10 MG tablet  Commonly known as:  DELTASONE   Take 4 daily x3 then 3 daily x3 then 2 daily x3 then 1 daily x3 then stop         Changes to Medications      Instructions Start Date   furosemide 40 MG tablet  Commonly known as:  LASIX  What changed:    · medication strength  · when to take this  · additional instructions   40 mg, Oral, 2 Times Daily         Continue These Medications      Instructions Start Date   allopurinol 100 MG tablet  Commonly known as:  ZYLOPRIM   TAKE 1 TABLET BY MOUTH EVERY DAY      BYSTOLIC PO   20 mg, Oral, Daily      diltiaZEM  MG 24 hr capsule  Commonly known as:  CARDIZEM CD   360 mg, Oral, Daily      dronedarone 400 MG tablet  Commonly known as:  MULTAQ   400 mg, Oral, 2 Times Daily With Meals      ELIQUIS PO   5 mg, Oral, 2 Times Daily      FERROUS SULFATE PO   1 tablet, Oral, Daily      JANUMET  MG per tablet  Generic drug:  sitaGLIPtin-metFORMIN   1 tablet, Oral, 2 Times Daily With Meals      KLOR-CON 20 MEQ CR tablet  Generic drug:  potassium chloride   TAKE 2 TABLETS BY MOUTH DAILY.      LIPITOR PO   80 mg, Oral      MAGNESIUM PO   1 tablet, Oral, Daily      PROTONIX PO   40 mg, Oral, Daily      valsartan-hydrochlorothiazide 320-25 MG per tablet  Commonly known as:  DIOVAN-HCT   1 tablet, Oral, Daily           Discharge Diet:   Diet Instructions     Diet: Regular, Consistent Carbohydrate, Cardiac      Discharge Diet:   Regular  Consistent Carbohydrate  Cardiac           Activity at Discharge:   Activity Instructions     Activity as Tolerated      Additional Activity Instructions:      Support hose 15-20 mm Hg pressure knee high on every am off every pm    Elevate legs when sitting    No work until released by PCP                    Follow-up Appointments:   Future Appointments   Date Time Provider Department Center   5/24/2019  3:00 PM Kina Leon APRN [facility] None      PCP five days     Test Results Pending at Discharge: none    Mariana Jaquez DO  01/16/19  1:28 PM    Time: 35 minutes.      Electronically signed by Mariana Jaquez DO at 1/16/2019  1:37 PM

## 2019-01-17 NOTE — OUTREACH NOTE
Prep Survey      Responses   Facility patient discharged from?  [facility]   Is patient eligible?  Yes   Discharge diagnosis  AE of Diastolic CHF, cardiomyopathy, PAF, HLD, HTN, wheezing   Does the patient have one of the following disease processes/diagnoses(primary or secondary)?  CHF   Does the patient have Home health ordered?  No   Is there a DME ordered?  No   Comments regarding appointments  See AVS   Prep survey completed?  Yes          Dulce Lemus RN

## 2019-01-18 ENCOUNTER — READMISSION MANAGEMENT (OUTPATIENT)
Dept: CALL CENTER | Facility: HOSPITAL | Age: 59
End: 2019-01-18

## 2019-01-18 NOTE — OUTREACH NOTE
CHF Week 1 Survey      Responses   Facility patient discharged from?  [facility]   Does the patient have one of the following disease processes/diagnoses(primary or secondary)?  CHF   Is there a successful TCM telephone encounter documented?  No   CHF Week 1 attempt successful?  Yes   Call start time  1132   Call end time  1146   Discharge diagnosis  AE of Diastolic CHF, cardiomyopathy, PAF, HLD, HTN, wheezing   Is patient permission given to speak with other caregiver?  Yes   List who call center can speak with  can speak to wife, Jodi Medina reviewed with patient/caregiver?  Yes   Is the patient having any side effects they believe may be caused by any medication additions or changes?  No   Does the patient have all medications ordered at discharge?  Yes   Is the patient taking all medications as directed (includes completed medication regime)?  Yes   Does the patient have a primary care provider?   Yes   Does the patient have an appointment with their PCP within 7 days of discharge?  Yes   Comments regarding PCP  Dr. Sapp   Has the patient kept scheduled appointments due by today?  N/A   Has home health visited the patient within 72 hours of discharge?  N/A   Psychosocial issues?  No   Did the patient receive a copy of their discharge instructions?  Yes   Nursing interventions  Reviewed instructions with patient   What is the patient's perception of their health status since discharge?  Improving   Nursing interventions  Nurse provided patient education   Is the patient weighing daily?  No   Does the patient have scales?  Yes   Daily weight interventions  Education provided on importance of daily weight   Is the patient able to teach back Heart Failure diet management?  Yes   Is the patient able to teach back Heart Failure Zones?  Yes   Is the patient able to teach back signs and symptoms of worsening condition? (i.e. weight gain, shortness of air, etc.)  Yes [Green zone]   If the patient is a current smoker,  are they able to teach back resources for cessation?  Smoking cessation medications   Is the patient/caregiver able to teach back the hierarchy of who to call/visit for symptoms/problems? PCP, Specialist, Home health nurse, Urgent Care, ED, 911  Yes   Additional teach back comments  On nicotine patches. States he hasn't gotten compression stockings yet but plans on getting them. Discussed salt substitute.    CHF Week 1 call completed?  Yes          Cate Kelly RN

## 2019-01-19 LAB
BACTERIA SPEC AEROBE CULT: NORMAL
BACTERIA SPEC AEROBE CULT: NORMAL

## 2019-01-21 LAB
ALBUMIN SERPL-MCNC: 3.1 G/DL (ref 3.5–5.2)
ALP BLD-CCNC: 173 U/L (ref 40–130)
ALT SERPL-CCNC: 22 U/L (ref 5–41)
ANION GAP SERPL CALCULATED.3IONS-SCNC: 15 MMOL/L (ref 7–19)
AST SERPL-CCNC: 16 U/L (ref 5–40)
BASOPHILS ABSOLUTE: 0 K/UL (ref 0–0.2)
BASOPHILS RELATIVE PERCENT: 0.2 % (ref 0–1)
BILIRUB SERPL-MCNC: <0.2 MG/DL (ref 0.2–1.2)
BUN BLDV-MCNC: 32 MG/DL (ref 6–20)
CALCIUM SERPL-MCNC: 8.6 MG/DL (ref 8.6–10)
CHLORIDE BLD-SCNC: 91 MMOL/L (ref 98–111)
CO2: 33 MMOL/L (ref 22–29)
CREAT SERPL-MCNC: 1.7 MG/DL (ref 0.5–1.2)
EOSINOPHILS ABSOLUTE: 0.1 K/UL (ref 0–0.6)
EOSINOPHILS RELATIVE PERCENT: 1 % (ref 0–5)
GFR NON-AFRICAN AMERICAN: 41
GLUCOSE BLD-MCNC: 458 MG/DL (ref 74–109)
HCT VFR BLD CALC: 44.2 % (ref 42–52)
HEMOGLOBIN: 14.2 G/DL (ref 14–18)
LYMPHOCYTES ABSOLUTE: 0.3 K/UL (ref 1.1–4.5)
LYMPHOCYTES RELATIVE PERCENT: 2.5 % (ref 20–40)
MCH RBC QN AUTO: 28.1 PG (ref 27–31)
MCHC RBC AUTO-ENTMCNC: 32.1 G/DL (ref 33–37)
MCV RBC AUTO: 87.4 FL (ref 80–94)
MONOCYTES ABSOLUTE: 0.7 K/UL (ref 0–0.9)
MONOCYTES RELATIVE PERCENT: 5.9 % (ref 0–10)
NEUTROPHILS ABSOLUTE: 11.2 K/UL (ref 1.5–7.5)
NEUTROPHILS RELATIVE PERCENT: 90 % (ref 50–65)
PDW BLD-RTO: 14 % (ref 11.5–14.5)
PLATELET # BLD: 361 K/UL (ref 130–400)
PMV BLD AUTO: 11.1 FL (ref 9.4–12.4)
POTASSIUM SERPL-SCNC: 3.4 MMOL/L (ref 3.5–5)
RBC # BLD: 5.06 M/UL (ref 4.7–6.1)
RHEUMATOID FACTOR: 10 IU/ML
SEDIMENTATION RATE, ERYTHROCYTE: 67 MM/HR (ref 0–15)
SODIUM BLD-SCNC: 139 MMOL/L (ref 136–145)
TOTAL PROTEIN: 6.9 G/DL (ref 6.6–8.7)
WBC # BLD: 12.5 K/UL (ref 4.8–10.8)

## 2019-01-24 LAB
ANA IGG, ELISA: NORMAL
HLA B27: NEGATIVE

## 2019-01-25 ENCOUNTER — READMISSION MANAGEMENT (OUTPATIENT)
Dept: CALL CENTER | Facility: HOSPITAL | Age: 59
End: 2019-01-25

## 2019-01-25 NOTE — OUTREACH NOTE
CHF Week 2 Survey      Responses   Facility patient discharged from?  [facility]   Does the patient have one of the following disease processes/diagnoses(primary or secondary)?  CHF   Week 2 attempt successful?  Yes   Call start time  1756   Call end time  1759   Discharge diagnosis  AE of Diastolic CHF, cardiomyopathy, PAF, HLD, HTN, wheezing   Meds reviewed with patient/caregiver?  Yes   Is the patient having any side effects they believe may be caused by any medication additions or changes?  No   Does the patient have all medications ordered at discharge?  Yes   Is the patient taking all medications as directed (includes completed medication regime)?  Yes   Medication comments  Amoxicillin for congestion   Does the patient have a primary care provider?   Yes   Does the patient have an appointment with their PCP within 7 days of discharge?  Yes   Has the patient kept scheduled appointments due by today?  Yes   Has home health visited the patient within 72 hours of discharge?  N/A   Psychosocial issues?  No   Did the patient receive a copy of their discharge instructions?  Yes   Nursing interventions  Reviewed instructions with patient   What is the patient's perception of their health status since discharge?  Improving   Nursing interventions  Nurse provided patient education   Is the patient weighing daily?  Yes   Does the patient have scales?  Yes   Daily weight interventions  Education provided on importance of daily weight   Is the patient able to teach back Heart Failure diet management?  Yes   Is the patient able to teach back Heart Failure Zones?  Yes   Is the patient able to teach back signs and symptoms of worsening condition? (i.e. weight gain, shortness of air, etc.)  Yes   Is the patient/caregiver able to teach back the hierarchy of who to call/visit for symptoms/problems? PCP, Specialist, Home health nurse, Urgent Care, ED, 911  Yes   Additional teach back comments  Has lost 30 lbs and is feeling very  good.   CHF Week 2 call completed?  Yes          Chelo Khalil RN

## 2019-02-01 ENCOUNTER — READMISSION MANAGEMENT (OUTPATIENT)
Dept: CALL CENTER | Facility: HOSPITAL | Age: 59
End: 2019-02-01

## 2019-02-01 NOTE — OUTREACH NOTE
CHF Week 3 Survey      Responses   Facility patient discharged from?  [facility]   Does the patient have one of the following disease processes/diagnoses(primary or secondary)?  CHF   Week 3 attempt successful?  No   Unsuccessful attempts  Attempt 1          Kim Hollis RN

## 2019-02-04 ENCOUNTER — READMISSION MANAGEMENT (OUTPATIENT)
Dept: CALL CENTER | Facility: HOSPITAL | Age: 59
End: 2019-02-04

## 2019-02-04 NOTE — OUTREACH NOTE
CHF Week 3 Survey      Responses   Facility patient discharged from?  [facility]   Does the patient have one of the following disease processes/diagnoses(primary or secondary)?  CHF   Week 3 attempt successful?  No   Unsuccessful attempts  Attempt 2          Amanda Spaulding RN

## 2019-03-04 ENCOUNTER — APPOINTMENT (OUTPATIENT)
Dept: GENERAL RADIOLOGY | Facility: HOSPITAL | Age: 59
End: 2019-03-04

## 2019-03-04 ENCOUNTER — HOSPITAL ENCOUNTER (EMERGENCY)
Facility: HOSPITAL | Age: 59
Discharge: HOME | End: 2019-03-04
Admitting: EMERGENCY MEDICINE

## 2019-03-04 ENCOUNTER — APPOINTMENT (OUTPATIENT)
Dept: CT IMAGING | Facility: HOSPITAL | Age: 59
End: 2019-03-04

## 2019-03-04 VITALS
DIASTOLIC BLOOD PRESSURE: 68 MMHG | HEART RATE: 71 BPM | WEIGHT: 148 LBS | TEMPERATURE: 98.2 F | HEIGHT: 67 IN | OXYGEN SATURATION: 98 % | BODY MASS INDEX: 23.23 KG/M2 | SYSTOLIC BLOOD PRESSURE: 160 MMHG | RESPIRATION RATE: 17 BRPM

## 2019-03-04 DIAGNOSIS — G91.2 NORMAL PRESSURE HYDROCEPHALUS (HCC): Primary | ICD-10-CM

## 2019-03-04 DIAGNOSIS — R73.9 HYPERGLYCEMIA: ICD-10-CM

## 2019-03-04 LAB
ACETONE BLD QL: NEGATIVE
ALBUMIN SERPL-MCNC: 3.4 G/DL (ref 3.5–5)
ALBUMIN/GLOB SERPL: 1.3 G/DL (ref 1.1–2.5)
ALP SERPL-CCNC: 219 U/L (ref 24–120)
ALT SERPL W P-5'-P-CCNC: 142 U/L (ref 0–54)
ANION GAP SERPL CALCULATED.3IONS-SCNC: 4 MMOL/L (ref 4–13)
AST SERPL-CCNC: 101 U/L (ref 7–45)
ATMOSPHERIC PRESS: 758 MMHG
BACTERIA UR QL AUTO: ABNORMAL /HPF
BASE EXCESS BLDV CALC-SCNC: -1.7 MMOL/L (ref 0–2)
BASOPHILS # BLD AUTO: 0.05 10*3/MM3 (ref 0–0.2)
BASOPHILS NFR BLD AUTO: 0.6 % (ref 0–2)
BDY SITE: ABNORMAL
BILIRUB SERPL-MCNC: 0.3 MG/DL (ref 0.1–1)
BILIRUB UR QL STRIP: NEGATIVE
BODY TEMPERATURE: 37 C
BUN BLD-MCNC: 34 MG/DL (ref 5–21)
BUN/CREAT SERPL: 24.3 (ref 7–25)
CALCIUM SPEC-SCNC: 8.1 MG/DL (ref 8.4–10.4)
CHLORIDE SERPL-SCNC: 102 MMOL/L (ref 98–110)
CLARITY UR: CLEAR
CO2 SERPL-SCNC: 26 MMOL/L (ref 24–31)
COLOR UR: YELLOW
CREAT BLD-MCNC: 1.4 MG/DL (ref 0.5–1.4)
DEPRECATED RDW RBC AUTO: 44.2 FL (ref 40–54)
EOSINOPHIL # BLD AUTO: 0.69 10*3/MM3 (ref 0–0.7)
EOSINOPHIL NFR BLD AUTO: 8.7 % (ref 0–4)
ERYTHROCYTE [DISTWIDTH] IN BLOOD BY AUTOMATED COUNT: 14.3 % (ref 12–15)
GFR SERPL CREATININE-BSD FRML MDRD: 63 ML/MIN/1.73
GLOBULIN UR ELPH-MCNC: 2.7 GM/DL
GLUCOSE BLD-MCNC: 452 MG/DL (ref 70–100)
GLUCOSE BLDC GLUCOMTR-MCNC: 363 MG/DL (ref 70–130)
GLUCOSE BLDC GLUCOMTR-MCNC: 483 MG/DL (ref 70–130)
GLUCOSE UR STRIP-MCNC: ABNORMAL MG/DL
HCO3 BLDV-SCNC: 24.7 MMOL/L (ref 22–28)
HCT VFR BLD AUTO: 33.4 % (ref 40–52)
HGB BLD-MCNC: 11.3 G/DL (ref 14–18)
HGB UR QL STRIP.AUTO: NEGATIVE
HOLD SPECIMEN: NORMAL
HOLD SPECIMEN: NORMAL
HYALINE CASTS UR QL AUTO: ABNORMAL /LPF
IMM GRANULOCYTES # BLD AUTO: 0.02 10*3/MM3 (ref 0–0.05)
IMM GRANULOCYTES NFR BLD AUTO: 0.3 % (ref 0–5)
KETONES UR QL STRIP: NEGATIVE
LEUKOCYTE ESTERASE UR QL STRIP.AUTO: NEGATIVE
LYMPHOCYTES # BLD AUTO: 0.46 10*3/MM3 (ref 0.72–4.86)
LYMPHOCYTES NFR BLD AUTO: 5.8 % (ref 15–45)
Lab: ABNORMAL
MCH RBC QN AUTO: 28.8 PG (ref 28–32)
MCHC RBC AUTO-ENTMCNC: 33.8 G/DL (ref 33–36)
MCV RBC AUTO: 85.2 FL (ref 82–95)
MODALITY: ABNORMAL
MONOCYTES # BLD AUTO: 0.71 10*3/MM3 (ref 0.19–1.3)
MONOCYTES NFR BLD AUTO: 9 % (ref 4–12)
NEUTROPHILS # BLD AUTO: 5.98 10*3/MM3 (ref 1.87–8.4)
NEUTROPHILS NFR BLD AUTO: 75.6 % (ref 39–78)
NITRITE UR QL STRIP: NEGATIVE
NRBC BLD AUTO-RTO: 0 /100 WBC (ref 0–0)
NT-PROBNP SERPL-MCNC: 4520 PG/ML (ref 0–900)
PCO2 BLDV: 47.4 MM HG (ref 41–51)
PH BLDV: 7.33 PH UNITS (ref 7.32–7.42)
PH UR STRIP.AUTO: <=5 [PH] (ref 5–8)
PLATELET # BLD AUTO: 226 10*3/MM3 (ref 130–400)
PMV BLD AUTO: 10.4 FL (ref 6–12)
PO2 BLDV: 28 MM HG (ref 27–53)
POTASSIUM BLD-SCNC: 3.8 MMOL/L (ref 3.5–5.3)
PROT SERPL-MCNC: 6.1 G/DL (ref 6.3–8.7)
PROT UR QL STRIP: ABNORMAL
RBC # BLD AUTO: 3.92 10*6/MM3 (ref 4.8–5.9)
RBC # UR: ABNORMAL /HPF
REF LAB TEST METHOD: ABNORMAL
SAO2 % BLDCOV: 53.2 % (ref 45–75)
SODIUM BLD-SCNC: 132 MMOL/L (ref 135–145)
SP GR UR STRIP: 1.02 (ref 1–1.03)
SQUAMOUS #/AREA URNS HPF: ABNORMAL /HPF
TROPONIN I SERPL-MCNC: <0.012 NG/ML (ref 0–0.03)
UROBILINOGEN UR QL STRIP: ABNORMAL
VENTILATOR MODE: ABNORMAL
WBC NRBC COR # BLD: 7.91 10*3/MM3 (ref 4.8–10.8)
WBC UR QL AUTO: ABNORMAL /HPF
WHOLE BLOOD HOLD SPECIMEN: NORMAL
WHOLE BLOOD HOLD SPECIMEN: NORMAL

## 2019-03-04 PROCEDURE — 63710000001 INSULIN REGULAR HUMAN PER 5 UNITS: Performed by: PHYSICIAN ASSISTANT

## 2019-03-04 PROCEDURE — 71045 X-RAY EXAM CHEST 1 VIEW: CPT

## 2019-03-04 PROCEDURE — 93005 ELECTROCARDIOGRAM TRACING: CPT | Performed by: PHYSICIAN ASSISTANT

## 2019-03-04 PROCEDURE — 99284 EMERGENCY DEPT VISIT MOD MDM: CPT

## 2019-03-04 PROCEDURE — 70450 CT HEAD/BRAIN W/O DYE: CPT

## 2019-03-04 PROCEDURE — 93010 ELECTROCARDIOGRAM REPORT: CPT | Performed by: INTERNAL MEDICINE

## 2019-03-04 PROCEDURE — 84484 ASSAY OF TROPONIN QUANT: CPT | Performed by: PHYSICIAN ASSISTANT

## 2019-03-04 PROCEDURE — 82803 BLOOD GASES ANY COMBINATION: CPT

## 2019-03-04 PROCEDURE — 81001 URINALYSIS AUTO W/SCOPE: CPT | Performed by: PHYSICIAN ASSISTANT

## 2019-03-04 PROCEDURE — 83880 ASSAY OF NATRIURETIC PEPTIDE: CPT | Performed by: PHYSICIAN ASSISTANT

## 2019-03-04 PROCEDURE — 82009 KETONE BODYS QUAL: CPT | Performed by: PHYSICIAN ASSISTANT

## 2019-03-04 PROCEDURE — 85025 COMPLETE CBC W/AUTO DIFF WBC: CPT

## 2019-03-04 PROCEDURE — 80053 COMPREHEN METABOLIC PANEL: CPT

## 2019-03-04 PROCEDURE — 82962 GLUCOSE BLOOD TEST: CPT

## 2019-03-04 RX ADMIN — INSULIN HUMAN 5 UNITS: 100 INJECTION, SOLUTION PARENTERAL at 21:04
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
## 2019-03-25 ENCOUNTER — OFFICE VISIT (OUTPATIENT)
Dept: NEUROSURGERY | Facility: CLINIC | Age: 59
End: 2019-03-25

## 2019-03-25 VITALS — HEIGHT: 67 IN | BODY MASS INDEX: 23.23 KG/M2 | WEIGHT: 148 LBS

## 2019-03-25 DIAGNOSIS — Z86.39 H/O HYPERGLYCEMIA: ICD-10-CM

## 2019-03-25 DIAGNOSIS — G93.89 CEREBRAL VENTRICULOMEGALY: Primary | ICD-10-CM

## 2019-03-25 PROCEDURE — 99214 OFFICE O/P EST MOD 30 MIN: CPT | Performed by: NURSE PRACTITIONER

## 2019-03-25 RX ORDER — PANTOPRAZOLE SODIUM 40 MG/1
40 TABLET, DELAYED RELEASE ORAL DAILY
Refills: 0 | Status: ON HOLD | COMMUNITY
Start: 2019-03-05 | End: 2020-07-30 | Stop reason: SDUPTHER

## 2019-03-25 RX ORDER — METFORMIN HYDROCHLORIDE 750 MG/1
TABLET, EXTENDED RELEASE ORAL
Refills: 5 | Status: ON HOLD | COMMUNITY
Start: 2019-03-22 | End: 2020-07-29

## 2019-03-25 RX ORDER — BLOOD-GLUCOSE METER
EACH MISCELLANEOUS SEE ADMIN INSTRUCTIONS
Refills: 0 | Status: ON HOLD | COMMUNITY
Start: 2019-02-01 | End: 2020-07-29

## 2019-03-25 RX ORDER — CLOBETASOL PROPIONATE 0.5 MG/G
OINTMENT TOPICAL
Refills: 3 | Status: ON HOLD | COMMUNITY
Start: 2019-03-21 | End: 2020-07-29

## 2019-03-25 RX ORDER — SILDENAFIL 100 MG/1
TABLET, FILM COATED ORAL
Refills: 3 | COMMUNITY
Start: 2019-03-22 | End: 2019-08-16

## 2019-03-25 RX ORDER — PEN NEEDLE, DIABETIC 29 G X1/2"
NEEDLE, DISPOSABLE MISCELLANEOUS
Refills: 0 | Status: ON HOLD | COMMUNITY
Start: 2019-03-11 | End: 2020-07-29

## 2019-03-25 RX ORDER — INSULIN DETEMIR 100 [IU]/ML
INJECTION, SOLUTION SUBCUTANEOUS
Refills: 1 | COMMUNITY
Start: 2019-03-19 | End: 2019-06-17

## 2019-03-25 RX ORDER — TRIPROLIDINE/PSEUDOEPHEDRINE 2.5MG-60MG
TABLET ORAL
Refills: 3 | Status: ON HOLD | COMMUNITY
Start: 2019-03-18 | End: 2020-07-29

## 2019-03-25 RX ORDER — NEPAFENAC 0.3 %
SUSPENSION, DROPS(FINAL DOSAGE FORM)(ML) OPHTHALMIC (EYE)
Status: ON HOLD | COMMUNITY
Start: 2019-03-17 | End: 2020-07-29

## 2019-03-27 RX ORDER — DRONEDARONE 400 MG/1
TABLET, FILM COATED ORAL
Qty: 60 TABLET | Refills: 0 | Status: SHIPPED | OUTPATIENT
Start: 2019-03-27 | End: 2019-07-15 | Stop reason: SDUPTHER

## 2019-04-24 RX ORDER — DRONEDARONE 400 MG/1
TABLET, FILM COATED ORAL
Qty: 60 TABLET | Refills: 0 | OUTPATIENT
Start: 2019-04-24

## 2019-05-09 RX ORDER — DRONEDARONE 400 MG/1
TABLET, FILM COATED ORAL
Qty: 60 TABLET | Refills: 0 | OUTPATIENT
Start: 2019-05-09

## 2019-05-23 ENCOUNTER — TELEPHONE (OUTPATIENT)
Dept: VASCULAR SURGERY | Facility: CLINIC | Age: 59
End: 2019-05-23

## 2019-05-23 NOTE — TELEPHONE ENCOUNTER
Left message for Mr Rodriguez on his mobile letting him know his appointment for Friday, May 24th, 2019 at 3 pm with Kina LEDEZMA is cancelled due to no testing be scheduled. I did let Mr Rodriguez know I have him rescheduled for Wednesday, May 29th, 2019 [facility] and follow up afterwards at 2 pm with Kina LEDEZMA. I did advise if he had any questions, needed to reschedule or this didn't work for him to please give the office at call at [phone] and any of the girls who answer will be able to help them.     Spoke with Mrs Rodriguez on the home number listed letting her know we have cancelled Mr Rodriguez's appointment for Friday, May 24th, 2019 due to testing not being scheduled. I did let Mrs Rodriguez know we have Mr Rodriguez rescheduled for Wednesday, May 29th, 2019 [facility] 1230 [facility] for a test and follow up afterwards at 2 pm with Kina LEDEZMA. Mrs Rodriguez stated she was going to have Mr Rodriguez give us a call tomorrow as she wasn't sure if Wednesday would work for him. I verbalized understanding and let her know he could call anytime tomorrow.

## 2019-05-28 ENCOUNTER — TELEPHONE (OUTPATIENT)
Dept: VASCULAR SURGERY | Facility: CLINIC | Age: 59
End: 2019-05-28

## 2019-05-29 ENCOUNTER — APPOINTMENT (OUTPATIENT)
Dept: ULTRASOUND IMAGING | Facility: HOSPITAL | Age: 59
End: 2019-05-29

## 2019-06-14 ENCOUNTER — TELEPHONE (OUTPATIENT)
Dept: VASCULAR SURGERY | Facility: CLINIC | Age: 59
End: 2019-06-14

## 2019-06-14 NOTE — TELEPHONE ENCOUNTER
Left message reminding Mr Rodriguez of his appointment for Monday, June 17th, 2019. Reminded [facility] and follow up afterwards at 1015 am with Kina LEDEZMA. Also advised if he had any questions or needed to reschedule to please call the office at [phone].

## 2019-06-17 ENCOUNTER — HOSPITAL ENCOUNTER (OUTPATIENT)
Dept: ULTRASOUND IMAGING | Facility: HOSPITAL | Age: 59
Discharge: HOME | End: 2019-06-17
Admitting: NURSE PRACTITIONER

## 2019-06-17 ENCOUNTER — OFFICE VISIT (OUTPATIENT)
Dept: VASCULAR SURGERY | Facility: CLINIC | Age: 59
End: 2019-06-17

## 2019-06-17 VITALS
HEIGHT: 67 IN | OXYGEN SATURATION: 98 % | BODY MASS INDEX: 24.08 KG/M2 | HEART RATE: 64 BPM | DIASTOLIC BLOOD PRESSURE: 64 MMHG | SYSTOLIC BLOOD PRESSURE: 128 MMHG | WEIGHT: 153.4 LBS

## 2019-06-17 DIAGNOSIS — I89.0 LYMPHEDEMA: ICD-10-CM

## 2019-06-17 DIAGNOSIS — I73.9 PAD (PERIPHERAL ARTERY DISEASE) (HCC): Primary | ICD-10-CM

## 2019-06-17 DIAGNOSIS — I10 ESSENTIAL HYPERTENSION: ICD-10-CM

## 2019-06-17 DIAGNOSIS — I73.9 PAD (PERIPHERAL ARTERY DISEASE) (HCC): ICD-10-CM

## 2019-06-17 DIAGNOSIS — Z72.0 TOBACCO ABUSE: ICD-10-CM

## 2019-06-17 DIAGNOSIS — E78.2 MIXED HYPERLIPIDEMIA: ICD-10-CM

## 2019-06-17 PROCEDURE — 93923 UPR/LXTR ART STDY 3+ LVLS: CPT

## 2019-06-17 PROCEDURE — 99214 OFFICE O/P EST MOD 30 MIN: CPT | Performed by: NURSE PRACTITIONER

## 2019-06-17 PROCEDURE — 99406 BEHAV CHNG SMOKING 3-10 MIN: CPT | Performed by: NURSE PRACTITIONER

## 2019-06-17 PROCEDURE — 93924 LWR XTR VASC STDY BILAT: CPT | Performed by: SURGERY

## 2019-06-17 NOTE — PROGRESS NOTES
"6/17/2019       Thanh Weeks MD  [address]   [place] [address]        Milton CASTILLO Michael  1960    Chief Complaint   Patient presents with   • Follow-up     6 MOnth Follow UP For Peripheral Artery Disease. Test [id number] US pad ankle / brach ind ext comp. Patient denies any stroke like symptoms.        Dear Thanh Weeks MD:    HPI     I had the pleasure of seeing you patient in the office today for follow up.  As you recall, the patient is a 58 y.o. male who we are currently following for lower extremity PAD.  Initially he was seen with complaints of significant short distance right lower extremity claudication.  He did undergo revascularization of his right lower extremity and is now back for follow up.  He denies any claudication to his lower extremities.  He states he does wear compression stockings but will give himself a break from them at times.   He is maintained on Eliquis and Lipitor.  Unfortunately, he does continue to smoke.  He did have noninvasive testing today, which I did review in office.     Review of Systems   Constitutional: Negative.    HENT: Negative.    Eyes: Negative.    Respiratory: Negative.  Negative for shortness of breath.    Cardiovascular: Positive for leg swelling.   Gastrointestinal: Negative.    Endocrine: Negative.    Genitourinary: Negative.    Musculoskeletal: Negative.    Skin: Negative.    Allergic/Immunologic: Negative.    Neurological: Negative.    Hematological: Negative.    Psychiatric/Behavioral: Negative.    All other systems reviewed and are negative.      /64 (BP Location: Right arm, Patient Position: Sitting, Cuff Size: Adult)   Pulse 64   Ht 170.2 cm (67\")   Wt 69.6 kg (153 lb 6.4 oz)   SpO2 98%   BMI 24.03 kg/m²   Physical Exam   Constitutional: He is oriented to person, place, and time. He appears well-developed and well-nourished. No distress.   HENT:   Head: Normocephalic and atraumatic.   Mouth/Throat: Oropharynx is clear " and moist and mucous membranes are normal.   Neck: No JVD present. Carotid bruit is not present.   Cardiovascular: Normal rate, regular rhythm, S1 normal, S2 normal and normal heart sounds. Exam reveals no gallop and no friction rub.   No murmur heard.  Pulses:       Femoral pulses are 2+ on the right side, and 2+ on the left side.       Popliteal pulses are 2+ on the right side, and 2+ on the left side.        Dorsalis pedis pulses are 2+ on the right side, and 2+ on the left side.        Posterior tibial pulses are 0 on the right side, and 2+ on the left side.   Right: doppler PT, peroneal  Swelling to bilateral lower extremities      Pulmonary/Chest: Effort normal and breath sounds normal.   Abdominal: Soft. Normal appearance, normal aorta and bowel sounds are normal. He exhibits no abdominal bruit. There is no hepatosplenomegaly. There is no tenderness.   Musculoskeletal: Normal range of motion. He exhibits edema (BLE).     Vascular Status -  His right foot exhibits no edema. His left foot exhibits no edema.  Neurological: He is alert and oriented to person, place, and time. He has normal strength. No cranial nerve deficit.   Skin: Skin is warm, dry and intact. He is not diaphoretic.   Psychiatric: He has a normal mood and affect. His behavior is normal. Judgment and thought content normal. Cognition and memory are normal.     Diagnostic Data:   Noninvasive testing including ABIs show right CARLOS 0.65 that decreases to 0.39 post exercise and left CARLOS of 0.85 that decreases to 0.63 post exercise.     Patient Active Problem List   Diagnosis   • Tobacco abuse   • Hypertension   • PAD (peripheral artery disease) (CMS/HCC)   • Hyperlipidemia   • Diabetes mellitus (CMS/HCC)   • Atrial flutter (CMS/HCC)   • Lower extremity edema   • Chronic anticoagulation   • Hypokalemia   • Heme positive stool   • Congestive heart failure (CMS/HCC)         ICD-10-CM ICD-9-CM   1. PAD (peripheral artery disease) (CMS/HCC) I73.9 443.9    2. Essential hypertension I10 401.9   3. Mixed hyperlipidemia E78.2 272.2   4. Tobacco abuse Z72.0 305.1   5. Lymphedema I89.0 457.1       PLAN: After thoroughly evaluating Milton Rodriguez, I believe the best course of action is to remain conservative from a vascular standpoint.  He denies any claudication to his lower extremities, despite testing being close to what it was years ago prior to his surgery.  He does continue to have leg swelling consistent with lymphedema.  He wears compression but not everyday, which I did instruct him on what to do.  We will see him back in 6 months with repeat noninvasive testing for continued surveillance.  We did discuss beginning a walking regimen.  He is interested in discussing new walking exercise program.   I would like him to continue wearing compression stockings in the 20-30 mm pressure gradient range.   I will also refer him for home lymphedema pumps.  I think he would be a great candidate.  I did discuss vascular risk factors as they pertain to the progression of vascular disease including controlling controlling his hypertension, diabetes mellitus,  hyperlipidemia, and smoking cessation.  I advised Milton of the risks of continuing to use tobacco, and I provided him with tobacco cessation educational materials in the After Visit Summary. During this visit, I spent 5 minutes counseling the patient regarding tobacco cessation. The patient is to continue taking their medications as previously discussed.   This was all discussed in full with complete understanding.    Thank you for allowing me to participate in the care of your patient.  Please do not hesitate to call with any questions or concerns.  We will keep you aware of any further encounters with Milton Rodriguez.      Sincerely Yours,        DARIEN Sepulveda

## 2019-07-15 NOTE — TELEPHONE ENCOUNTER
Pt made appt for 8/16/19 and needs refill on Multaq sent to [facility].  We will only be able to give him a 1 month supply because he has not been seen since 2017 and he needs to keep this appt to get further refills.  Juan Carlos Killian, CMA

## 2019-08-13 RX ORDER — DRONEDARONE 400 MG/1
TABLET, FILM COATED ORAL
Qty: 60 TABLET | Refills: 0 | OUTPATIENT
Start: 2019-08-13

## 2019-08-16 ENCOUNTER — OFFICE VISIT (OUTPATIENT)
Dept: CARDIOLOGY | Facility: CLINIC | Age: 59
End: 2019-08-16

## 2019-08-16 VITALS
HEART RATE: 77 BPM | DIASTOLIC BLOOD PRESSURE: 70 MMHG | BODY MASS INDEX: 23.86 KG/M2 | SYSTOLIC BLOOD PRESSURE: 120 MMHG | OXYGEN SATURATION: 98 % | RESPIRATION RATE: 18 BRPM | HEIGHT: 67 IN | WEIGHT: 152 LBS

## 2019-08-16 DIAGNOSIS — E78.2 MIXED HYPERLIPIDEMIA: ICD-10-CM

## 2019-08-16 DIAGNOSIS — Z72.0 TOBACCO ABUSE: ICD-10-CM

## 2019-08-16 DIAGNOSIS — I48.92 ATRIAL FLUTTER, UNSPECIFIED TYPE (HCC): Primary | ICD-10-CM

## 2019-08-16 DIAGNOSIS — I10 ESSENTIAL HYPERTENSION: ICD-10-CM

## 2019-08-16 DIAGNOSIS — I89.0 LYMPHEDEMA: ICD-10-CM

## 2019-08-16 DIAGNOSIS — I73.9 PAD (PERIPHERAL ARTERY DISEASE) (HCC): ICD-10-CM

## 2019-08-16 DIAGNOSIS — E11.8 TYPE 2 DIABETES MELLITUS WITH COMPLICATION, WITHOUT LONG-TERM CURRENT USE OF INSULIN (HCC): ICD-10-CM

## 2019-08-16 PROCEDURE — 99214 OFFICE O/P EST MOD 30 MIN: CPT | Performed by: NURSE PRACTITIONER

## 2019-08-16 PROCEDURE — 99406 BEHAV CHNG SMOKING 3-10 MIN: CPT | Performed by: NURSE PRACTITIONER

## 2019-08-16 PROCEDURE — 93000 ELECTROCARDIOGRAM COMPLETE: CPT | Performed by: NURSE PRACTITIONER
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
## 2019-12-12 ENCOUNTER — TELEPHONE (OUTPATIENT)
Dept: VASCULAR SURGERY | Facility: CLINIC | Age: 59
End: 2019-12-12

## 2019-12-12 NOTE — TELEPHONE ENCOUNTER
Left message reminding Mr Rodriguez of his appointments for Friday, December 13th, 2019. Reminded Mr Rodriguez to arrive at the [facility] at 730 am for testing and follow up afterwards at 1045 am with Kina LEDEZMA. I did let Mr Rodriguez know he could come over after testing as I have a 915 and 930 appointment slots open. Also advised if he had any questions or needed to reschedule to please call the office at [phone].

## 2019-12-13 ENCOUNTER — APPOINTMENT (OUTPATIENT)
Dept: ULTRASOUND IMAGING | Facility: HOSPITAL | Age: 59
End: 2019-12-13

## 2020-03-09 RX ORDER — APIXABAN 5 MG/1
TABLET, FILM COATED ORAL
Qty: 180 TABLET | Refills: 4 | Status: ON HOLD | OUTPATIENT
Start: 2020-03-09 | End: 2020-07-29

## 2020-07-29 ENCOUNTER — APPOINTMENT (OUTPATIENT)
Dept: CT IMAGING | Facility: HOSPITAL | Age: 60
End: 2020-07-29

## 2020-07-29 ENCOUNTER — APPOINTMENT (OUTPATIENT)
Dept: GENERAL RADIOLOGY | Facility: HOSPITAL | Age: 60
End: 2020-07-29

## 2020-07-29 ENCOUNTER — HOSPITAL ENCOUNTER (INPATIENT)
Facility: HOSPITAL | Age: 60
LOS: 1 days | Discharge: HOME | End: 2020-07-30
Attending: EMERGENCY MEDICINE | Admitting: INTERNAL MEDICINE

## 2020-07-29 DIAGNOSIS — I63.9 CEREBROVASCULAR ACCIDENT (CVA), UNSPECIFIED MECHANISM (HCC): Primary | ICD-10-CM

## 2020-07-29 DIAGNOSIS — D64.9 ANEMIA, UNSPECIFIED TYPE: ICD-10-CM

## 2020-07-29 DIAGNOSIS — E87.6 HYPOKALEMIA: ICD-10-CM

## 2020-07-29 DIAGNOSIS — N28.9 RENAL INSUFFICIENCY: ICD-10-CM

## 2020-07-29 PROBLEM — N18.30 CKD (CHRONIC KIDNEY DISEASE) STAGE 3, GFR 30-59 ML/MIN (HCC): Status: ACTIVE | Noted: 2020-07-29

## 2020-07-29 PROBLEM — G45.9 TRANSIENT ISCHEMIC ATTACK (TIA): Status: ACTIVE | Noted: 2020-07-29

## 2020-07-29 PROBLEM — I50.32 CHRONIC DIASTOLIC CHF (CONGESTIVE HEART FAILURE) (HCC): Status: ACTIVE | Noted: 2020-07-29

## 2020-07-29 LAB
ABO GROUP BLD: NORMAL
ALBUMIN SERPL-MCNC: 4.2 G/DL (ref 3.5–5.2)
ALBUMIN/GLOB SERPL: 1.6 G/DL
ALP SERPL-CCNC: 113 U/L (ref 39–117)
ALT SERPL W P-5'-P-CCNC: 38 U/L (ref 1–41)
AMPHET+METHAMPHET UR QL: NEGATIVE
AMPHETAMINES UR QL: NEGATIVE
ANION GAP SERPL CALCULATED.3IONS-SCNC: 15 MMOL/L (ref 5–15)
AST SERPL-CCNC: 22 U/L (ref 1–40)
BACTERIA UR QL AUTO: ABNORMAL /HPF
BARBITURATES UR QL SCN: NEGATIVE
BASOPHILS # BLD AUTO: 0.07 10*3/MM3 (ref 0–0.2)
BASOPHILS NFR BLD AUTO: 0.9 % (ref 0–1.5)
BENZODIAZ UR QL SCN: NEGATIVE
BILIRUB SERPL-MCNC: 0.2 MG/DL (ref 0–1.2)
BILIRUB UR QL STRIP: NEGATIVE
BLD GP AB SCN SERPL QL: NEGATIVE
BUN SERPL-MCNC: 31 MG/DL (ref 8–23)
BUN/CREAT SERPL: 19.3 (ref 7–25)
BUPRENORPHINE SERPL-MCNC: NEGATIVE NG/ML
CALCIUM SPEC-SCNC: 8.9 MG/DL (ref 8.6–10.5)
CANNABINOIDS SERPL QL: NEGATIVE
CHLORIDE SERPL-SCNC: 97 MMOL/L (ref 98–107)
CLARITY UR: CLEAR
CO2 SERPL-SCNC: 20 MMOL/L (ref 22–29)
COCAINE UR QL: NEGATIVE
COLOR UR: YELLOW
CREAT SERPL-MCNC: 1.61 MG/DL (ref 0.76–1.27)
DEPRECATED RDW RBC AUTO: 44.2 FL (ref 37–54)
EOSINOPHIL # BLD AUTO: 0.99 10*3/MM3 (ref 0–0.4)
EOSINOPHIL NFR BLD AUTO: 12.6 % (ref 0.3–6.2)
ERYTHROCYTE [DISTWIDTH] IN BLOOD BY AUTOMATED COUNT: 17.3 % (ref 12.3–15.4)
GFR SERPL CREATININE-BSD FRML MDRD: 53 ML/MIN/1.73
GLOBULIN UR ELPH-MCNC: 2.7 GM/DL
GLUCOSE BLDC GLUCOMTR-MCNC: 161 MG/DL (ref 70–130)
GLUCOSE SERPL-MCNC: 166 MG/DL (ref 65–99)
GLUCOSE UR STRIP-MCNC: ABNORMAL MG/DL
HCT VFR BLD AUTO: 21.5 % (ref 37.5–51)
HGB BLD-MCNC: 6.5 G/DL (ref 13–17.7)
HGB UR QL STRIP.AUTO: NEGATIVE
HOLD SPECIMEN: NORMAL
HOLD SPECIMEN: NORMAL
HYALINE CASTS UR QL AUTO: ABNORMAL /LPF
IMM GRANULOCYTES # BLD AUTO: 0.02 10*3/MM3 (ref 0–0.05)
IMM GRANULOCYTES NFR BLD AUTO: 0.3 % (ref 0–0.5)
INR PPP: 1.11 (ref 0.91–1.09)
KETONES UR QL STRIP: NEGATIVE
LEUKOCYTE ESTERASE UR QL STRIP.AUTO: NEGATIVE
LYMPHOCYTES # BLD AUTO: 0.37 10*3/MM3 (ref 0.7–3.1)
LYMPHOCYTES NFR BLD AUTO: 4.7 % (ref 19.6–45.3)
MAGNESIUM SERPL-MCNC: 2.3 MG/DL (ref 1.6–2.4)
MCH RBC QN AUTO: 21.5 PG (ref 26.6–33)
MCHC RBC AUTO-ENTMCNC: 30.2 G/DL (ref 31.5–35.7)
MCV RBC AUTO: 71 FL (ref 79–97)
METHADONE UR QL SCN: NEGATIVE
MONOCYTES # BLD AUTO: 0.67 10*3/MM3 (ref 0.1–0.9)
MONOCYTES NFR BLD AUTO: 8.5 % (ref 5–12)
NEUTROPHILS NFR BLD AUTO: 5.76 10*3/MM3 (ref 1.7–7)
NEUTROPHILS NFR BLD AUTO: 73 % (ref 42.7–76)
NITRITE UR QL STRIP: NEGATIVE
NRBC BLD AUTO-RTO: 0 /100 WBC (ref 0–0.2)
OPIATES UR QL: NEGATIVE
OXYCODONE UR QL SCN: NEGATIVE
PCP UR QL SCN: NEGATIVE
PH UR STRIP.AUTO: <=5 [PH] (ref 5–8)
PLATELET # BLD AUTO: 371 10*3/MM3 (ref 140–450)
PMV BLD AUTO: 10 FL (ref 6–12)
POTASSIUM SERPL-SCNC: 3.1 MMOL/L (ref 3.5–5.2)
PROPOXYPH UR QL: NEGATIVE
PROT SERPL-MCNC: 6.9 G/DL (ref 6–8.5)
PROT UR QL STRIP: ABNORMAL
PROTHROMBIN TIME: 13.9 SECONDS (ref 11.9–14.6)
RBC # BLD AUTO: 3.03 10*6/MM3 (ref 4.14–5.8)
RBC # UR: ABNORMAL /HPF
REF LAB TEST METHOD: ABNORMAL
RH BLD: POSITIVE
SARS-COV-2 RDRP RESP QL NAA+PROBE: NOT DETECTED
SODIUM SERPL-SCNC: 132 MMOL/L (ref 136–145)
SP GR UR STRIP: 1.01 (ref 1–1.03)
SQUAMOUS #/AREA URNS HPF: ABNORMAL /HPF
T&S EXPIRATION DATE: NORMAL
TRICYCLICS UR QL SCN: NEGATIVE
UROBILINOGEN UR QL STRIP: ABNORMAL
WBC # BLD AUTO: 7.88 10*3/MM3 (ref 3.4–10.8)
WBC UR QL AUTO: ABNORMAL /HPF
WHOLE BLOOD HOLD SPECIMEN: NORMAL
WHOLE BLOOD HOLD SPECIMEN: NORMAL

## 2020-07-29 PROCEDURE — 71045 X-RAY EXAM CHEST 1 VIEW: CPT

## 2020-07-29 PROCEDURE — 81001 URINALYSIS AUTO W/SCOPE: CPT | Performed by: INTERNAL MEDICINE

## 2020-07-29 PROCEDURE — 82607 VITAMIN B-12: CPT | Performed by: CLINICAL NURSE SPECIALIST

## 2020-07-29 PROCEDURE — 86920 COMPATIBILITY TEST SPIN: CPT

## 2020-07-29 PROCEDURE — 86901 BLOOD TYPING SEROLOGIC RH(D): CPT | Performed by: EMERGENCY MEDICINE

## 2020-07-29 PROCEDURE — 25010000003 POTASSIUM CHLORIDE PER 2 MEQ: Performed by: EMERGENCY MEDICINE

## 2020-07-29 PROCEDURE — 80053 COMPREHEN METABOLIC PANEL: CPT | Performed by: EMERGENCY MEDICINE

## 2020-07-29 PROCEDURE — 93005 ELECTROCARDIOGRAM TRACING: CPT | Performed by: EMERGENCY MEDICINE

## 2020-07-29 PROCEDURE — 83735 ASSAY OF MAGNESIUM: CPT | Performed by: EMERGENCY MEDICINE

## 2020-07-29 PROCEDURE — 99285 EMERGENCY DEPT VISIT HI MDM: CPT

## 2020-07-29 PROCEDURE — 80306 DRUG TEST PRSMV INSTRMNT: CPT | Performed by: INTERNAL MEDICINE

## 2020-07-29 PROCEDURE — 86900 BLOOD TYPING SEROLOGIC ABO: CPT | Performed by: EMERGENCY MEDICINE

## 2020-07-29 PROCEDURE — 82962 GLUCOSE BLOOD TEST: CPT

## 2020-07-29 PROCEDURE — 86850 RBC ANTIBODY SCREEN: CPT | Performed by: EMERGENCY MEDICINE

## 2020-07-29 PROCEDURE — 70450 CT HEAD/BRAIN W/O DYE: CPT

## 2020-07-29 PROCEDURE — 93010 ELECTROCARDIOGRAM REPORT: CPT | Performed by: INTERNAL MEDICINE

## 2020-07-29 PROCEDURE — 85610 PROTHROMBIN TIME: CPT | Performed by: EMERGENCY MEDICINE

## 2020-07-29 PROCEDURE — 87635 SARS-COV-2 COVID-19 AMP PRB: CPT | Performed by: EMERGENCY MEDICINE

## 2020-07-29 PROCEDURE — 25010000002 MAGNESIUM SULFATE 2 GM/50ML SOLUTION: Performed by: INTERNAL MEDICINE

## 2020-07-29 PROCEDURE — 85025 COMPLETE CBC W/AUTO DIFF WBC: CPT | Performed by: EMERGENCY MEDICINE

## 2020-07-29 RX ORDER — DILTIAZEM HYDROCHLORIDE 360 MG/1
360 CAPSULE, EXTENDED RELEASE ORAL DAILY
COMMUNITY
End: 2020-08-21 | Stop reason: SDUPTHER

## 2020-07-29 RX ORDER — POTASSIUM CHLORIDE 29.8 MG/ML
20 INJECTION INTRAVENOUS ONCE
Status: DISCONTINUED | OUTPATIENT
Start: 2020-07-29 | End: 2020-07-29

## 2020-07-29 RX ORDER — DEXTROSE MONOHYDRATE 25 G/50ML
25 INJECTION, SOLUTION INTRAVENOUS
Status: DISCONTINUED | OUTPATIENT
Start: 2020-07-29 | End: 2020-07-30 | Stop reason: HOSPADM

## 2020-07-29 RX ORDER — NICOTINE POLACRILEX 4 MG
15 LOZENGE BUCCAL
Status: DISCONTINUED | OUTPATIENT
Start: 2020-07-29 | End: 2020-07-30 | Stop reason: HOSPADM

## 2020-07-29 RX ORDER — POTASSIUM CHLORIDE 20 MEQ/1
20 TABLET, EXTENDED RELEASE ORAL DAILY
Status: ON HOLD | COMMUNITY
End: 2020-07-30 | Stop reason: SDDI

## 2020-07-29 RX ORDER — SODIUM CHLORIDE 0.9 % (FLUSH) 0.9 %
10 SYRINGE (ML) INJECTION AS NEEDED
Status: DISCONTINUED | OUTPATIENT
Start: 2020-07-29 | End: 2020-07-30 | Stop reason: HOSPADM

## 2020-07-29 RX ORDER — ASPIRIN 300 MG/1
300 SUPPOSITORY RECTAL DAILY
Status: DISCONTINUED | OUTPATIENT
Start: 2020-07-30 | End: 2020-07-30

## 2020-07-29 RX ORDER — POTASSIUM CHLORIDE 14.9 MG/ML
20 INJECTION INTRAVENOUS ONCE
Status: DISCONTINUED | OUTPATIENT
Start: 2020-07-29 | End: 2020-07-29

## 2020-07-29 RX ORDER — SILDENAFIL 100 MG/1
100 TABLET, FILM COATED ORAL DAILY PRN
Status: ON HOLD | COMMUNITY
End: 2020-07-30

## 2020-07-29 RX ORDER — NICOTINE 21 MG/24HR
1 PATCH, TRANSDERMAL 24 HOURS TRANSDERMAL
Status: DISCONTINUED | OUTPATIENT
Start: 2020-07-29 | End: 2020-07-30 | Stop reason: HOSPADM

## 2020-07-29 RX ORDER — POTASSIUM CHLORIDE 750 MG/1
40 CAPSULE, EXTENDED RELEASE ORAL
Status: DISPENSED | OUTPATIENT
Start: 2020-07-29 | End: 2020-07-30

## 2020-07-29 RX ORDER — ALBUTEROL SULFATE 90 UG/1
2 AEROSOL, METERED RESPIRATORY (INHALATION) EVERY 4 HOURS PRN
Status: ON HOLD | COMMUNITY
End: 2020-07-30

## 2020-07-29 RX ORDER — ATORVASTATIN CALCIUM 40 MG/1
80 TABLET, FILM COATED ORAL NIGHTLY
Status: DISCONTINUED | OUTPATIENT
Start: 2020-07-29 | End: 2020-07-30 | Stop reason: HOSPADM

## 2020-07-29 RX ORDER — MAGNESIUM SULFATE HEPTAHYDRATE 40 MG/ML
2 INJECTION, SOLUTION INTRAVENOUS ONCE
Status: COMPLETED | OUTPATIENT
Start: 2020-07-29 | End: 2020-07-29

## 2020-07-29 RX ORDER — ASPIRIN 81 MG/1
81 TABLET, CHEWABLE ORAL DAILY
Status: DISCONTINUED | OUTPATIENT
Start: 2020-07-30 | End: 2020-07-30

## 2020-07-29 RX ORDER — SODIUM CHLORIDE 0.9 % (FLUSH) 0.9 %
10 SYRINGE (ML) INJECTION EVERY 12 HOURS SCHEDULED
Status: DISCONTINUED | OUTPATIENT
Start: 2020-07-29 | End: 2020-07-30 | Stop reason: HOSPADM

## 2020-07-29 RX ADMIN — NICOTINE 1 PATCH: 21 PATCH, EXTENDED RELEASE TRANSDERMAL at 23:30

## 2020-07-29 RX ADMIN — APIXABAN 5 MG: 5 TABLET, FILM COATED ORAL at 23:30

## 2020-07-29 RX ADMIN — MAGNESIUM SULFATE HEPTAHYDRATE 2 G: 40 INJECTION, SOLUTION INTRAVENOUS at 23:31

## 2020-07-29 RX ADMIN — POTASSIUM CHLORIDE 40 MEQ: 750 CAPSULE, EXTENDED RELEASE ORAL at 23:30

## 2020-07-29 RX ADMIN — SODIUM CHLORIDE, PRESERVATIVE FREE 10 ML: 5 INJECTION INTRAVENOUS at 23:31

## 2020-07-29 RX ADMIN — ATORVASTATIN CALCIUM 80 MG: 40 TABLET, FILM COATED ORAL at 23:30

## 2020-07-30 ENCOUNTER — APPOINTMENT (OUTPATIENT)
Dept: ULTRASOUND IMAGING | Facility: HOSPITAL | Age: 60
End: 2020-07-30

## 2020-07-30 ENCOUNTER — APPOINTMENT (OUTPATIENT)
Dept: CARDIOLOGY | Facility: HOSPITAL | Age: 60
End: 2020-07-30

## 2020-07-30 ENCOUNTER — NURSE TRIAGE (OUTPATIENT)
Dept: CALL CENTER | Facility: HOSPITAL | Age: 60
End: 2020-07-30

## 2020-07-30 ENCOUNTER — APPOINTMENT (OUTPATIENT)
Dept: GENERAL RADIOLOGY | Facility: HOSPITAL | Age: 60
End: 2020-07-30

## 2020-07-30 VITALS
TEMPERATURE: 98.4 F | DIASTOLIC BLOOD PRESSURE: 65 MMHG | WEIGHT: 144 LBS | SYSTOLIC BLOOD PRESSURE: 121 MMHG | HEART RATE: 87 BPM | RESPIRATION RATE: 16 BRPM | OXYGEN SATURATION: 99 % | BODY MASS INDEX: 22.6 KG/M2 | HEIGHT: 67 IN

## 2020-07-30 PROBLEM — F10.10 ALCOHOL ABUSE: Status: ACTIVE | Noted: 2020-07-30

## 2020-07-30 LAB
ALBUMIN SERPL-MCNC: 4 G/DL (ref 3.5–5.2)
ALBUMIN/GLOB SERPL: 1.6 G/DL
ALP SERPL-CCNC: 118 U/L (ref 39–117)
ALT SERPL W P-5'-P-CCNC: 43 U/L (ref 1–41)
ANION GAP SERPL CALCULATED.3IONS-SCNC: 12 MMOL/L (ref 5–15)
AST SERPL-CCNC: 40 U/L (ref 1–40)
BH CV ECHO MEAS - AO MAX PG (FULL): 2.7 MMHG
BH CV ECHO MEAS - AO MAX PG: 6.8 MMHG
BH CV ECHO MEAS - AO MEAN PG (FULL): 2 MMHG
BH CV ECHO MEAS - AO MEAN PG: 4 MMHG
BH CV ECHO MEAS - AO ROOT AREA (BSA CORRECTED): 1.9
BH CV ECHO MEAS - AO ROOT AREA: 8.6 CM^2
BH CV ECHO MEAS - AO ROOT DIAM: 3.3 CM
BH CV ECHO MEAS - AO V2 MAX: 130 CM/SEC
BH CV ECHO MEAS - AO V2 MEAN: 96.4 CM/SEC
BH CV ECHO MEAS - AO V2 VTI: 29.5 CM
BH CV ECHO MEAS - AVA(I,A): 2.8 CM^2
BH CV ECHO MEAS - AVA(I,D): 2.8 CM^2
BH CV ECHO MEAS - AVA(V,A): 2.7 CM^2
BH CV ECHO MEAS - AVA(V,D): 2.7 CM^2
BH CV ECHO MEAS - BSA(HAYCOCK): 1.8 M^2
BH CV ECHO MEAS - BSA: 1.8 M^2
BH CV ECHO MEAS - BZI_BMI: 22.4 KILOGRAMS/M^2
BH CV ECHO MEAS - BZI_METRIC_HEIGHT: 170.2 CM
BH CV ECHO MEAS - BZI_METRIC_WEIGHT: 64.9 KG
BH CV ECHO MEAS - EDV(CUBED): 87.5 ML
BH CV ECHO MEAS - EDV(MOD-SP4): 110 ML
BH CV ECHO MEAS - EDV(TEICH): 89.6 ML
BH CV ECHO MEAS - EF(CUBED): 74.7 %
BH CV ECHO MEAS - EF(MOD-SP4): 68.2 %
BH CV ECHO MEAS - EF(TEICH): 66.7 %
BH CV ECHO MEAS - ESV(CUBED): 22.2 ML
BH CV ECHO MEAS - ESV(MOD-SP4): 35 ML
BH CV ECHO MEAS - ESV(TEICH): 29.8 ML
BH CV ECHO MEAS - FS: 36.7 %
BH CV ECHO MEAS - IVS/LVPW: 1.8
BH CV ECHO MEAS - IVSD: 1.6 CM
BH CV ECHO MEAS - LA DIMENSION: 3.7 CM
BH CV ECHO MEAS - LA/AO: 1.1
BH CV ECHO MEAS - LAT PEAK E' VEL: 8.2 CM/SEC
BH CV ECHO MEAS - LV DIASTOLIC VOL/BSA (35-75): 62.7 ML/M^2
BH CV ECHO MEAS - LV MASS(C)D: 204.9 GRAMS
BH CV ECHO MEAS - LV MASS(C)DI: 116.9 GRAMS/M^2
BH CV ECHO MEAS - LV MAX PG: 4.1 MMHG
BH CV ECHO MEAS - LV MEAN PG: 2 MMHG
BH CV ECHO MEAS - LV SYSTOLIC VOL/BSA (12-30): 20 ML/M^2
BH CV ECHO MEAS - LV V1 MAX: 101 CM/SEC
BH CV ECHO MEAS - LV V1 MEAN: 63.8 CM/SEC
BH CV ECHO MEAS - LV V1 VTI: 23.5 CM
BH CV ECHO MEAS - LVIDD: 4.4 CM
BH CV ECHO MEAS - LVIDS: 2.8 CM
BH CV ECHO MEAS - LVLD AP4: 8.1 CM
BH CV ECHO MEAS - LVLS AP4: 6.2 CM
BH CV ECHO MEAS - LVOT AREA (M): 3.5 CM^2
BH CV ECHO MEAS - LVOT AREA: 3.5 CM^2
BH CV ECHO MEAS - LVOT DIAM: 2.1 CM
BH CV ECHO MEAS - LVPWD: 0.9 CM
BH CV ECHO MEAS - MED PEAK E' VEL: 7.07 CM/SEC
BH CV ECHO MEAS - MV A MAX VEL: 99.9 CM/SEC
BH CV ECHO MEAS - MV DEC TIME: 0.29 SEC
BH CV ECHO MEAS - MV E MAX VEL: 66.9 CM/SEC
BH CV ECHO MEAS - MV E/A: 0.67
BH CV ECHO MEAS - SI(AO): 143.9 ML/M^2
BH CV ECHO MEAS - SI(CUBED): 37.3 ML/M^2
BH CV ECHO MEAS - SI(LVOT): 46.4 ML/M^2
BH CV ECHO MEAS - SI(MOD-SP4): 42.8 ML/M^2
BH CV ECHO MEAS - SI(TEICH): 34.1 ML/M^2
BH CV ECHO MEAS - SV(AO): 252.3 ML
BH CV ECHO MEAS - SV(CUBED): 65.3 ML
BH CV ECHO MEAS - SV(LVOT): 81.4 ML
BH CV ECHO MEAS - SV(MOD-SP4): 75 ML
BH CV ECHO MEAS - SV(TEICH): 59.8 ML
BH CV ECHO MEASUREMENTS AVERAGE E/E' RATIO: 8.76
BILIRUB SERPL-MCNC: 0.7 MG/DL (ref 0–1.2)
BUN SERPL-MCNC: 33 MG/DL (ref 8–23)
BUN/CREAT SERPL: 22.8 (ref 7–25)
CALCIUM SPEC-SCNC: 9 MG/DL (ref 8.6–10.5)
CHLORIDE SERPL-SCNC: 104 MMOL/L (ref 98–107)
CHOLEST SERPL-MCNC: 142 MG/DL (ref 0–200)
CO2 SERPL-SCNC: 24 MMOL/L (ref 22–29)
CREAT SERPL-MCNC: 1.45 MG/DL (ref 0.76–1.27)
DEPRECATED RDW RBC AUTO: 49.1 FL (ref 37–54)
ERYTHROCYTE [DISTWIDTH] IN BLOOD BY AUTOMATED COUNT: 18.3 % (ref 12.3–15.4)
GFR SERPL CREATININE-BSD FRML MDRD: 60 ML/MIN/1.73
GLOBULIN UR ELPH-MCNC: 2.5 GM/DL
GLUCOSE BLDC GLUCOMTR-MCNC: 171 MG/DL (ref 70–130)
GLUCOSE BLDC GLUCOMTR-MCNC: 179 MG/DL (ref 70–130)
GLUCOSE BLDC GLUCOMTR-MCNC: 254 MG/DL (ref 70–130)
GLUCOSE BLDC GLUCOMTR-MCNC: 295 MG/DL (ref 70–130)
GLUCOSE SERPL-MCNC: 229 MG/DL (ref 65–99)
HBA1C MFR BLD: 6.9 % (ref 4.8–5.6)
HCT VFR BLD AUTO: 24.6 % (ref 37.5–51)
HDLC SERPL-MCNC: 54 MG/DL (ref 40–60)
HGB BLD-MCNC: 7.7 G/DL (ref 13–17.7)
IRON 24H UR-MRATE: 109 MCG/DL (ref 59–158)
IRON SATN MFR SERPL: 23 % (ref 20–50)
LDLC SERPL CALC-MCNC: 44 MG/DL (ref 0–100)
LDLC/HDLC SERPL: 0.81 {RATIO}
LEFT ATRIUM VOLUME INDEX: 55.4 ML/M2
LEFT ATRIUM VOLUME: 97 CM3
MAXIMAL PREDICTED HEART RATE: 160 BPM
MCH RBC QN AUTO: 23.1 PG (ref 26.6–33)
MCHC RBC AUTO-ENTMCNC: 31.3 G/DL (ref 31.5–35.7)
MCV RBC AUTO: 73.7 FL (ref 79–97)
PLATELET # BLD AUTO: 388 10*3/MM3 (ref 140–450)
PMV BLD AUTO: 10 FL (ref 6–12)
POTASSIUM SERPL-SCNC: 3.5 MMOL/L (ref 3.5–5.2)
PROT SERPL-MCNC: 6.5 G/DL (ref 6–8.5)
RBC # BLD AUTO: 3.34 10*6/MM3 (ref 4.14–5.8)
SODIUM SERPL-SCNC: 140 MMOL/L (ref 136–145)
STRESS TARGET HR: 136 BPM
TIBC SERPL-MCNC: 477 MCG/DL (ref 298–536)
TRANSFERRIN SERPL-MCNC: 320 MG/DL (ref 200–360)
TRIGL SERPL-MCNC: 221 MG/DL (ref 0–150)
TSH SERPL DL<=0.05 MIU/L-ACNC: 1.1 UIU/ML (ref 0.27–4.2)
VIT B12 BLD-MCNC: 985 PG/ML (ref 211–946)
VLDLC SERPL-MCNC: 44.2 MG/DL
WBC # BLD AUTO: 9.82 10*3/MM3 (ref 3.4–10.8)

## 2020-07-30 PROCEDURE — 84443 ASSAY THYROID STIM HORMONE: CPT | Performed by: INTERNAL MEDICINE

## 2020-07-30 PROCEDURE — 83036 HEMOGLOBIN GLYCOSYLATED A1C: CPT | Performed by: INTERNAL MEDICINE

## 2020-07-30 PROCEDURE — 80053 COMPREHEN METABOLIC PANEL: CPT | Performed by: INTERNAL MEDICINE

## 2020-07-30 PROCEDURE — 82962 GLUCOSE BLOOD TEST: CPT

## 2020-07-30 PROCEDURE — 97165 OT EVAL LOW COMPLEX 30 MIN: CPT | Performed by: OCCUPATIONAL THERAPIST

## 2020-07-30 PROCEDURE — 84466 ASSAY OF TRANSFERRIN: CPT | Performed by: INTERNAL MEDICINE

## 2020-07-30 PROCEDURE — 93306 TTE W/DOPPLER COMPLETE: CPT | Performed by: INTERNAL MEDICINE

## 2020-07-30 PROCEDURE — 25010000002 THIAMINE PER 100 MG: Performed by: CLINICAL NURSE SPECIALIST

## 2020-07-30 PROCEDURE — 85027 COMPLETE CBC AUTOMATED: CPT | Performed by: INTERNAL MEDICINE

## 2020-07-30 PROCEDURE — 99255 IP/OBS CONSLTJ NEW/EST HI 80: CPT | Performed by: CLINICAL NURSE SPECIALIST

## 2020-07-30 PROCEDURE — 93306 TTE W/DOPPLER COMPLETE: CPT

## 2020-07-30 PROCEDURE — 36430 TRANSFUSION BLD/BLD COMPNT: CPT

## 2020-07-30 PROCEDURE — P9016 RBC LEUKOCYTES REDUCED: HCPCS

## 2020-07-30 PROCEDURE — 93880 EXTRACRANIAL BILAT STUDY: CPT | Performed by: SURGERY

## 2020-07-30 PROCEDURE — 83540 ASSAY OF IRON: CPT | Performed by: INTERNAL MEDICINE

## 2020-07-30 PROCEDURE — 93880 EXTRACRANIAL BILAT STUDY: CPT

## 2020-07-30 PROCEDURE — 80061 LIPID PANEL: CPT | Performed by: INTERNAL MEDICINE

## 2020-07-30 PROCEDURE — 86900 BLOOD TYPING SEROLOGIC ABO: CPT

## 2020-07-30 PROCEDURE — 63710000001 INSULIN LISPRO (HUMAN) PER 5 UNITS: Performed by: INTERNAL MEDICINE

## 2020-07-30 PROCEDURE — 74018 RADEX ABDOMEN 1 VIEW: CPT

## 2020-07-30 PROCEDURE — 99406 BEHAV CHNG SMOKING 3-10 MIN: CPT

## 2020-07-30 RX ORDER — ATORVASTATIN CALCIUM 80 MG/1
80 TABLET, FILM COATED ORAL NIGHTLY
Qty: 30 TABLET | Refills: 0 | Status: SHIPPED | OUTPATIENT
Start: 2020-07-30

## 2020-07-30 RX ORDER — PANTOPRAZOLE SODIUM 40 MG/1
40 TABLET, DELAYED RELEASE ORAL DAILY
Qty: 30 TABLET | Refills: 0 | Status: SHIPPED | OUTPATIENT
Start: 2020-07-30 | End: 2021-06-24

## 2020-07-30 RX ORDER — FERROUS SULFATE 325(65) MG
325 TABLET ORAL 2 TIMES DAILY WITH MEALS
Status: DISCONTINUED | OUTPATIENT
Start: 2020-07-30 | End: 2020-07-30 | Stop reason: HOSPADM

## 2020-07-30 RX ORDER — FUROSEMIDE 40 MG/1
40 TABLET ORAL DAILY
COMMUNITY
End: 2021-06-11 | Stop reason: HOSPADM

## 2020-07-30 RX ORDER — THIAMINE MONONITRATE (VIT B1) 100 MG
100 TABLET ORAL DAILY
COMMUNITY
End: 2020-08-21 | Stop reason: SDUPTHER

## 2020-07-30 RX ORDER — LANOLIN ALCOHOL/MO/W.PET/CERES
100 CREAM (GRAM) TOPICAL DAILY
Qty: 30 TABLET | Refills: 0 | Status: SHIPPED | OUTPATIENT
Start: 2020-07-30 | End: 2021-05-28

## 2020-07-30 RX ORDER — ALLOPURINOL 100 MG/1
100 TABLET ORAL DAILY
COMMUNITY

## 2020-07-30 RX ORDER — PANTOPRAZOLE SODIUM 40 MG/1
40 TABLET, DELAYED RELEASE ORAL DAILY
Status: DISCONTINUED | OUTPATIENT
Start: 2020-07-30 | End: 2020-07-30 | Stop reason: HOSPADM

## 2020-07-30 RX ADMIN — FERROUS SULFATE TAB 325 MG (65 MG ELEMENTAL FE) 325 MG: 325 (65 FE) TAB at 09:29

## 2020-07-30 RX ADMIN — FERROUS SULFATE TAB 325 MG (65 MG ELEMENTAL FE) 325 MG: 325 (65 FE) TAB at 17:35

## 2020-07-30 RX ADMIN — PANTOPRAZOLE SODIUM 40 MG: 40 TABLET, DELAYED RELEASE ORAL at 09:27

## 2020-07-30 RX ADMIN — DILTIAZEM HYDROCHLORIDE 120 MG: 120 CAPSULE, COATED, EXTENDED RELEASE ORAL at 09:31

## 2020-07-30 RX ADMIN — DRONEDARONE 400 MG: 400 TABLET, FILM COATED ORAL at 09:29

## 2020-07-30 RX ADMIN — ASPIRIN 81 MG: 81 TABLET, CHEWABLE ORAL at 09:27

## 2020-07-30 RX ADMIN — APIXABAN 5 MG: 5 TABLET, FILM COATED ORAL at 09:27

## 2020-07-30 RX ADMIN — INSULIN LISPRO 2 UNITS: 100 INJECTION, SOLUTION INTRAVENOUS; SUBCUTANEOUS at 12:33

## 2020-07-30 RX ADMIN — NICOTINE 1 PATCH: 21 PATCH, EXTENDED RELEASE TRANSDERMAL at 09:28

## 2020-07-30 RX ADMIN — SODIUM CHLORIDE, PRESERVATIVE FREE 10 ML: 5 INJECTION INTRAVENOUS at 09:34

## 2020-07-30 RX ADMIN — DRONEDARONE 400 MG: 400 TABLET, FILM COATED ORAL at 17:35

## 2020-07-30 RX ADMIN — INSULIN LISPRO 6 UNITS: 100 INJECTION, SOLUTION INTRAVENOUS; SUBCUTANEOUS at 09:27

## 2020-07-30 RX ADMIN — THIAMINE HYDROCHLORIDE 100 MG: 100 INJECTION, SOLUTION INTRAMUSCULAR; INTRAVENOUS at 11:18

## 2020-07-31 ENCOUNTER — READMISSION MANAGEMENT (OUTPATIENT)
Dept: CALL CENTER | Facility: HOSPITAL | Age: 60
End: 2020-07-31

## 2020-07-31 LAB
BH BB BLOOD EXPIRATION DATE: NORMAL
BH BB BLOOD TYPE BARCODE: 5100
BH BB DISPENSE STATUS: NORMAL
BH BB PRODUCT CODE: NORMAL
BH BB UNIT NUMBER: NORMAL
CROSSMATCH INTERPRETATION: NORMAL
UNIT  ABO: NORMAL
UNIT  RH: NORMAL

## 2020-07-31 NOTE — TELEPHONE ENCOUNTER
"3A CN to assist pt.    Reason for Disposition  • [1] Caller requesting NON-URGENT health information AND [2] PCP's office is the best resource    Additional Information  • Negative: [1] Caller is not with the adult (patient) AND [2] reporting urgent symptoms  • Negative: Lab result questions  • Negative: Medication questions  • Negative: Caller can't be reached by phone  • Negative: Caller has already spoken to PCP or another triager  • Negative: RN needs further essential information from caller in order to complete triage  • Negative: Requesting regular office appointment  • Negative: Health Information question, no triage required and triager able to answer question  • Negative: General information question, no triage required and triager able to answer question    Answer Assessment - Initial Assessment Questions  1. REASON FOR CALL or QUESTION: \"What is your reason for calling today?\" or \"How can I best help you?\" or \"What question do you have that I can help answer?\"      Nicotine patch missing on discharge orders    Protocols used: INFORMATION ONLY CALL - NO TRIAGE-ADULT-      "

## 2020-07-31 NOTE — OUTREACH NOTE
Prep Survey      Responses   [facility] facility patient discharged from?  [place]   Is LACE score < 7 ?  No   Eligibility  Readm Mgmt   Discharge diagnosis  TIA, Alcohol abuse, CKD III, chronic diastolic CHF, chronic anticoagulation, aflutter, PAD, HTN, HLD, DM II   COVID-19 Test Status  Negative   Does the patient have one of the following disease processes/diagnoses(primary or secondary)?  Stroke (TIA) [CHF is chronic no exac. ]   Does the patient have Home health ordered?  No   Is there a DME ordered?  No   Comments regarding appointments  Pt will be notified   Medication alerts for this patient  see AVS   Prep survey completed?  Yes          Dulce Lemus RN

## 2020-08-04 ENCOUNTER — DOCUMENTATION (OUTPATIENT)
Dept: NEUROLOGY | Facility: CLINIC | Age: 60
End: 2020-08-04

## 2020-08-04 DIAGNOSIS — I65.21 CAROTID STENOSIS, RIGHT: Primary | ICD-10-CM

## 2020-08-04 NOTE — PROGRESS NOTES
I did review CUS done 7/30/2020 and it showed right carotid stenosis 50-69% with heavy plaque burden at the right bifurcation and less than 50% stenosis on left ICA.  I did speak with the patient about these findings and patient is agreeable to CTA neck. Patient also needs f/u appointment with neurology and message sent to staff to schedule in 3 weeks.

## 2020-08-05 ENCOUNTER — READMISSION MANAGEMENT (OUTPATIENT)
Dept: CALL CENTER | Facility: HOSPITAL | Age: 60
End: 2020-08-05

## 2020-08-05 NOTE — OUTREACH NOTE
Stroke Week 1 Survey      Responses   [facility] patient discharged from?  [place]   Does the patient have one of the following disease processes/diagnoses(primary or secondary)?  Stroke (TIA)   Is there a successful TCM telephone encounter documented?  No   Week 1 attempt successful?  No   Unsuccessful attempts  Attempt 1          Kim Hollis RN

## 2020-08-06 ENCOUNTER — TELEPHONE (OUTPATIENT)
Dept: VASCULAR SURGERY | Facility: CLINIC | Age: 60
End: 2020-08-06

## 2020-08-10 ENCOUNTER — OFFICE VISIT (OUTPATIENT)
Dept: GASTROENTEROLOGY | Facility: CLINIC | Age: 60
End: 2020-08-10

## 2020-08-10 VITALS
HEART RATE: 78 BPM | OXYGEN SATURATION: 98 % | HEIGHT: 67 IN | WEIGHT: 182 LBS | TEMPERATURE: 97.8 F | DIASTOLIC BLOOD PRESSURE: 68 MMHG | SYSTOLIC BLOOD PRESSURE: 124 MMHG | BODY MASS INDEX: 28.56 KG/M2

## 2020-08-10 DIAGNOSIS — D64.9 ANEMIA, UNSPECIFIED TYPE: Primary | ICD-10-CM

## 2020-08-10 DIAGNOSIS — Z79.01 ANTICOAGULATED: ICD-10-CM

## 2020-08-10 PROCEDURE — 99213 OFFICE O/P EST LOW 20 MIN: CPT | Performed by: INTERNAL MEDICINE
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
## 2020-08-11 ENCOUNTER — READMISSION MANAGEMENT (OUTPATIENT)
Dept: CALL CENTER | Facility: HOSPITAL | Age: 60
End: 2020-08-11

## 2020-08-11 NOTE — OUTREACH NOTE
Stroke Week 1 Survey      Responses   [facility] patient discharged from?  [facility]   Does the patient have one of the following disease processes/diagnoses(primary or secondary)?  Stroke (TIA)   Is there a successful TCM telephone encounter documented?  No   Week 1 attempt successful?  Yes   Call start time  1503   Call end time  1506   Discharge diagnosis  TIA, Alcohol abuse, CKD III, chronic diastolic CHF, chronic anticoagulation, aflutter, PAD, HTN, HLD, DM II   Is patient permission given to speak with other caregiver?  Yes   List who call center can speak with  wife   Person spoke with today (if not patient) and relationship  wife   Meds reviewed with patient/caregiver?  Yes   Is the patient having any side effects they believe may be caused by any medication additions or changes?  No   Does the patient have all medications ordered at discharge?  Yes   Is the patient taking all medications as directed (includes completed medication regime)?  Yes   Does the patient have a primary care provider?   Yes   Does the patient have an appointment with their PCP within 7 days of discharge?  Yes   Has the patient kept scheduled appointments due by today?  Yes   Psychosocial issues?  No   Does the patient require any assistance with activities of daily living such as eating, bathing, dressing, walking, etc.?  No   Does the patient have any residual symptoms from stroke/TIA?  No   Does the patient understand the diet ordered at discharge?  Yes   Comments  Pt's wife feels that he is back to baseline.    Did the patient receive a copy of their discharge instructions?  Yes   Nursing interventions  Reviewed instructions with patient   What is the patient's perception of their health status since discharge?  Returned to baseline/stable   Is the patient able to teach back FAST for Stroke?  Yes   Is the patient/caregiver able to teach back the risk factors for a stroke?  High blood pressure-goal below 120/80, Smoking,  Diabetes   Is the patient/caregiver able to teach back signs and symptoms related to disease process for when to call PCP?  Yes   Is the patient/caregiver able to teach back the hierarchy of who to call/visit for symptoms/problems? PCP, Specialist, Home health nurse, Urgent Care, ED, 911  Yes   Week 1 call completed?  Yes   Revoked  No further contact(revokes)-requires comment   Graduated/Revoked comments  baseline          Kim Hollis RN

## 2020-08-13 ENCOUNTER — TELEPHONE (OUTPATIENT)
Dept: VASCULAR SURGERY | Facility: CLINIC | Age: 60
End: 2020-08-13

## 2020-08-13 ENCOUNTER — LAB (OUTPATIENT)
Dept: LAB | Facility: HOSPITAL | Age: 60
End: 2020-08-13

## 2020-08-13 DIAGNOSIS — D64.9 ANEMIA, UNSPECIFIED TYPE: ICD-10-CM

## 2020-08-13 LAB
DEPRECATED RDW RBC AUTO: 58.5 FL (ref 37–54)
ERYTHROCYTE [DISTWIDTH] IN BLOOD BY AUTOMATED COUNT: 23.2 % (ref 12.3–15.4)
HCT VFR BLD AUTO: 22.1 % (ref 37.5–51)
HGB BLD-MCNC: 6.5 G/DL (ref 13–17.7)
MCH RBC QN AUTO: 22.9 PG (ref 26.6–33)
MCHC RBC AUTO-ENTMCNC: 29.4 G/DL (ref 31.5–35.7)
MCV RBC AUTO: 77.8 FL (ref 79–97)
PLATELET # BLD AUTO: 238 10*3/MM3 (ref 140–450)
PMV BLD AUTO: 9.4 FL (ref 6–12)
RBC # BLD AUTO: 2.84 10*6/MM3 (ref 4.14–5.8)
WBC # BLD AUTO: 7.93 10*3/MM3 (ref 3.4–10.8)

## 2020-08-13 PROCEDURE — 85027 COMPLETE CBC AUTOMATED: CPT

## 2020-08-13 NOTE — TELEPHONE ENCOUNTER
Pt called acting like he needed to reschedule his appt with Kina after the CT tomorrow. I explained he didn't that we set them up that way. I informed the pt npo after midnight and do not take metformin. Pt expressed understanding.

## 2020-08-13 NOTE — TELEPHONE ENCOUNTER
Left message reminding Mr Rodriguez of his appointments for Friday, August 14th, 2020. Reminded Mr Rodriguez to arrive at the [facility] at 930 am for testing with nothing to eat or drink 6 hours prior and follow up afterwards at 1130 am with Kina LEDEZMA. Also advised Mr Rodriguez if he had any questions or needed to reschedule to please call the office at [phone].

## 2020-08-14 ENCOUNTER — HOSPITAL ENCOUNTER (OUTPATIENT)
Dept: CT IMAGING | Facility: HOSPITAL | Age: 60
Discharge: HOME | End: 2020-08-14
Admitting: CLINICAL NURSE SPECIALIST

## 2020-08-14 ENCOUNTER — TELEPHONE (OUTPATIENT)
Dept: VASCULAR SURGERY | Facility: CLINIC | Age: 60
End: 2020-08-14

## 2020-08-14 ENCOUNTER — LAB (OUTPATIENT)
Dept: LAB | Facility: HOSPITAL | Age: 60
End: 2020-08-14

## 2020-08-14 DIAGNOSIS — I65.21 CAROTID STENOSIS, RIGHT: ICD-10-CM

## 2020-08-14 DIAGNOSIS — I65.21 STENOSIS OF RIGHT INTERNAL CAROTID ARTERY: Primary | ICD-10-CM

## 2020-08-14 PROCEDURE — 0 IOPAMIDOL PER 1 ML: Performed by: CLINICAL NURSE SPECIALIST

## 2020-08-14 PROCEDURE — 82565 ASSAY OF CREATININE: CPT

## 2020-08-14 PROCEDURE — 70498 CT ANGIOGRAPHY NECK: CPT

## 2020-08-14 RX ADMIN — IOPAMIDOL 100 ML: 755 INJECTION, SOLUTION INTRAVENOUS at 10:28

## 2020-08-17 LAB — CREAT BLDA-MCNC: 1.6 MG/DL (ref 0.6–1.3)

## 2020-08-18 ENCOUNTER — OFFICE VISIT (OUTPATIENT)
Dept: VASCULAR SURGERY | Facility: CLINIC | Age: 60
End: 2020-08-18

## 2020-08-18 VITALS
HEIGHT: 67 IN | DIASTOLIC BLOOD PRESSURE: 62 MMHG | SYSTOLIC BLOOD PRESSURE: 106 MMHG | BODY MASS INDEX: 25.9 KG/M2 | OXYGEN SATURATION: 97 % | WEIGHT: 165 LBS | HEART RATE: 71 BPM

## 2020-08-18 DIAGNOSIS — I10 ESSENTIAL HYPERTENSION: ICD-10-CM

## 2020-08-18 DIAGNOSIS — Z72.0 TOBACCO ABUSE: ICD-10-CM

## 2020-08-18 DIAGNOSIS — I73.9 PAD (PERIPHERAL ARTERY DISEASE) (HCC): ICD-10-CM

## 2020-08-18 DIAGNOSIS — E78.2 MIXED HYPERLIPIDEMIA: ICD-10-CM

## 2020-08-18 DIAGNOSIS — I65.23 BILATERAL CAROTID ARTERY STENOSIS: Primary | ICD-10-CM

## 2020-08-18 PROBLEM — Q21.12 PFO (PATENT FORAMEN OVALE): Status: ACTIVE | Noted: 2020-08-18

## 2020-08-18 PROCEDURE — 99214 OFFICE O/P EST MOD 30 MIN: CPT | Performed by: SURGERY

## 2020-08-18 RX ORDER — NICOTINE 21 MG/24HR
PATCH, TRANSDERMAL 24 HOURS TRANSDERMAL
COMMUNITY
Start: 2020-08-05 | End: 2021-06-23 | Stop reason: SINTOL
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
## 2020-08-18 NOTE — PATIENT INSTRUCTIONS
Carotid Endarterectomy  A carotid endarterectomy is a surgery to remove a blockage in the carotid arteries. The carotid arteries are the large blood vessels on both sides of the neck that supply blood to the brain. Carotid artery disease, also called carotid artery stenosis, is the narrowing or blockage of one or both carotid arteries. Carotid artery disease is usually caused by atherosclerosis, which is a buildup of fat and plaque in the arteries. Some buildup of plaque normally occurs with aging. The plaque may partially or totally block blood flow or cause a clot to form in the carotid arteries. This may cause a stroke.  Tell a health care provider about:  · Any allergies you have.  · All medicines you are taking, including vitamins, herbs, eye drops, creams, and over-the-counter medicines.  · Any problems you or family members have had with anesthetic medicines.  · Any blood disorders you have.  · Any surgeries you have had.  · Any medical conditions you have, or have had, including diabetes, kidney problems, and infections.  · Whether you are pregnant or may be pregnant.  What are the risks?  Generally, this is a safe procedure. However, problems may occur, including:  · Infection.  · Bleeding.  · Blood clots.  · Allergic reactions to medicines.  · Damage to nerves near the carotid arteries. This can cause a hoarse voice or weakness of muscles in your face.  · Stroke.  · Seizures.  · Heart attack (myocardial infarction).  · Narrowing of the opened blood vessel (restenosis). This may require another surgery.  What happens before the procedure?  Staying hydrated  Follow instructions from your health care provider about hydration, which may include:  · Up to 2 hours before the procedure - you may continue to drink clear liquids, such as water, clear fruit juice, black coffee, and plain tea.    Eating and drinking restrictions  Follow instructions from your health care provider about eating and drinking, which may  include:  · 8 hours before the procedure - stop eating heavy meals or foods, such as meat, fried foods, or fatty foods.  · 6 hours before the procedure - stop eating light meals or foods, such as toast or cereal.  · 6 hours before the procedure - stop drinking milk or drinks that contain milk.  · 2 hours before the procedure - stop drinking clear liquids.  Medicines  · Ask your health care provider about:  ? Changing or stopping your regular medicines. This is especially important if you are taking diabetes medicines or blood thinners.  ? Taking medicines such as aspirin and ibuprofen. These medicines can thin your blood. Do not take these medicines unless your health care provider tells you to take them.  ? Taking over-the-counter medicines, vitamins, herbs, and supplements.  General instructions  · Do not use any products that contain nicotine or tobacco for at least 4-6 weeks, or as soon as possible, before the procedure. These products include cigarettes, e-cigarettes, and chewing tobacco. If you need help quitting, ask your health care provider.  · You may need to have blood tests, a test to check heart rhythm (electrocardiogram), or a test to check blood flow (angiogram).  · Plan to have someone take you home from the hospital or clinic.  · Ask your health care provider:  ? How your surgery site will be marked.  ? What steps will be taken to help prevent infection. These may include:  § Removing hair at the surgery site.  § Washing skin with a germ-killing soap.  § Taking antibiotic medicine.  What happens during the procedure?    · An IV will be inserted into one of your veins.  · You will be given one or more of the following:  ? A medicine to help you relax (sedative).  ? A medicine to make you fall asleep (general anesthetic).  · The surgeon will make a small incision in your neck to expose the carotid artery.  · A tube may be inserted into the carotid artery above and below the blockage. This tube will  allow blood to flow around the blockage during the surgery.  · An incision will be made in the carotid artery at the location of the blockage.  · The blockage will be removed. In some cases, a section of the carotid artery may be removed and a graft patch may be used to repair the artery.  · The carotid artery will be closed with stitches (sutures).  · If a tube was inserted into the artery to allow blood flow around the blockage during surgery, the tube will be removed. Once the tube is removed, blood flow through the carotid artery will be restored.  · The incision in the neck will be closed with sutures.  · A bandage (dressing) will be placed over your incision.  The procedure may vary among health care providers and hospitals.  What happens after the procedure?  · Your blood pressure, heart rate, breathing rate, and blood oxygen level will be monitored until you leave the hospital or clinic.  · You may have some pain or an ache in your neck for about 2 weeks. This is normal.  · Do not drive for 24 hours if you were given a sedative during your procedure.  Summary  · A carotid endarterectomy is a surgery to remove a blockage in the carotid arteries.  · The carotid arteries are the large blood vessels on both sides of the neck that supply blood to the brain.  · Before the procedure, ask your health care provider about changing or stopping your regular medicines.  · Follow instructions from your health care provider about eating and drinking before the procedure.  · After the procedure, do not drive for 24 hours if you were given a sedative.  This information is not intended to replace advice given to you by your health care provider. Make sure you discuss any questions you have with your health care provider.  Document Released: 08/20/2014 Document Revised: 08/27/2019 Document Reviewed: 08/27/2019  [employer]vier Patient Education © 2020 [employer]

## 2020-08-18 NOTE — PROGRESS NOTES
Subjective:     Encounter Date:08/21/2020      Patient ID: Milton Rodriguez is a 59 y.o. male with a history of paroxsymal atrial flutter, HTN, HLD, PAD, DM2.    Chief Complaint: follow up  Atrial Flutter   This is a chronic problem. The current episode started more than 1 year ago. The problem occurs rarely. The problem has been rapidly improving. Pertinent negatives include no chest pain, coughing, fatigue, rash or weakness.   Hypertension   This is a chronic problem. The current episode started more than 1 year ago. The problem has been rapidly improving since onset. The problem is controlled. Associated symptoms include peripheral edema. Pertinent negatives include no chest pain, malaise/fatigue, orthopnea, palpitations, PND or shortness of breath.   Hyperlipidemia   This is a chronic problem. The current episode started more than 1 year ago. The problem is controlled. Recent lipid tests were reviewed and are low. Exacerbating diseases include diabetes. Pertinent negatives include no chest pain or shortness of breath.     Patient presents today for a routine follow up. He currently follows with Dr. Patel for his history of atrial flutter. He was recently hospitalized for a presumed TIA/small lacunar infarct. 2D echo was obtained during his stay that noted presence of a PFO. Per neurology, it was not recommended to close. He continues to follow with vascular and is scheduled for a right carotid endarterectomy. He reports compliance with his medications. He has been on a nicotine patch for 5 days and has not smoked in 5 days. He has lymphedema and reports noncompliance with his lymphedema pumps. He follows with an endocrinologist in [place] for his diabetes. He denies chest pain, shortness of breath, palpitations, orthopnea and PND.     The following portions of the patient's history were reviewed and updated as appropriate: allergies, current medications, past family history, past medical history,  past social history, past surgical history and problem list.    No Known Allergies    Current Outpatient Medications:   •  allopurinol (ZYLOPRIM) 100 MG tablet, Take 100 mg by mouth Daily., Disp: , Rfl:   •  apixaban (ELIQUIS) 5 MG tablet tablet, Take 1 tablet by mouth Every 12 (Twelve) Hours., Disp: 60 tablet, Rfl: 11  •  atorvastatin (Lipitor) 80 MG tablet, Take 1 tablet by mouth Every Night., Disp: 30 tablet, Rfl: 0  •  dilTIAZem CD (CARDIZEM CD) 360 MG 24 hr capsule, Take 1 capsule by mouth Daily., Disp: 30 capsule, Rfl: 11  •  dronedarone (Multaq) 400 MG tablet, Take 1 tablet by mouth 2 (Two) Times a Day With Meals., Disp: 60 tablet, Rfl: 11  •  Empagliflozin (Jardiance) 25 MG tablet, Take 1 tablet by mouth Daily., Disp: , Rfl:   •  ferrous sulfate 325 (65 FE) MG tablet, Take 1 tablet by mouth 2 (two) times a day., Disp: , Rfl:   •  furosemide (LASIX) 40 MG tablet, Take 40 mg by mouth Daily., Disp: , Rfl:   •  magnesium oxide (MAGOX) 400 (241.3 Mg) MG tablet tablet, Take 400 mg by mouth Daily., Disp: , Rfl:   •  metFORMIN ER (GLUCOPHAGE-XR) 750 MG 24 hr tablet, Take 750 mg by mouth 2 (Two) Times a Day., Disp: , Rfl:   •  nicotine (NICODERM CQ) 21 MG/24HR patch, , Disp: , Rfl:   •  pantoprazole (PROTONIX) 40 MG EC tablet, Take 1 tablet by mouth Daily., Disp: 30 tablet, Rfl: 0  •  thiamine (VITAMIN B1) 100 MG tablet, Take 1 tablet by mouth Daily., Disp: 30 tablet, Rfl: 0  Past Medical History:   Diagnosis Date   • Abnormal PFT    • Acute diastolic heart failure (CMS/HCC)    • Arrhythmia     a-fib/a-flutter   • Atrial fibrillation (CMS/HCC)    • Atrial flutter (CMS/HCC)    • Atrial flutter by electrocardiogram (CMS/HCC)     11/2014- ECHO: EF 55-60%, mild PHTN, left atrium mild-moderately dilated, mild-moderate TR.   • Cerebrovascular accident (CVA) (CMS/HCC) 7/29/2020   • CHF (congestive heart failure) (CMS/[facility])    • Diabetes mellitus (CMS/[facility])    • Edema    • Essential hypertension    • Hyperlipidemia    •  Hypertension    • Iron deficiency anemia    • PAD (peripheral artery disease) (CMS/[facility])    • Pulmonary HTN (CMS/HCC)    • Tobacco abuse    • Type 2 diabetes mellitus with autonomic neuropathy (CMS/[facility])        Social History     Socioeconomic History   • Marital status:      Spouse name: Not on file   • Number of children: Not on file   • Years of education: Not on file   • Highest education level: Not on file   Tobacco Use   • Smoking status: Former Smoker     Packs/day: 1.00     Years: 30.00     Pack years: 30.00     Types: Cigarettes     Last attempt to quit: 2020     Years since quittin.0   • Smokeless tobacco: Never Used   • Tobacco comment: trying to quit (wearing nicotine patch)   Substance and Sexual Activity   • Alcohol use: Not Currently     Alcohol/week: 2.0 - 3.0 standard drinks     Types: 2 - 3 Cans of beer per week     Comment: WEEKENDS   • Drug use: No   • Sexual activity: Defer       Review of Systems   Constitution: Negative for fatigue, malaise/fatigue, weight gain and weight loss.   Cardiovascular: Positive for leg swelling (chronic). Negative for chest pain, dyspnea on exertion, irregular heartbeat, near-syncope, orthopnea, palpitations, paroxysmal nocturnal dyspnea and syncope.   Respiratory: Negative for cough, shortness of breath, sleep disturbances due to breathing, sputum production and wheezing.    Skin: Negative for dry skin, flushing, itching and rash.   Gastrointestinal: Negative for hematemesis and hematochezia.   Neurological: Negative for dizziness, light-headedness, loss of balance and weakness.   All other systems reviewed and are negative.         Objective:     Physical Exam   Constitutional: He is oriented to person, place, and time. He appears well-developed and well-nourished. No distress.   HENT:   Head: Normocephalic.   Mouth/Throat: No oropharyngeal exudate.   Eyes: Pupils are equal, round, and reactive to light. Conjunctivae and EOM are normal. No scleral  "icterus.   Neck: Normal range of motion. Neck supple.   Cardiovascular: Normal rate, regular rhythm, normal heart sounds and intact distal pulses.   No murmur heard.  Pulmonary/Chest: Effort normal and breath sounds normal. No respiratory distress. He has no wheezes. He has no rales. He exhibits no tenderness.   Abdominal: Soft. Bowel sounds are normal. He exhibits no distension. There is no tenderness.   Musculoskeletal: Normal range of motion. He exhibits no edema.   Neurological: He is alert and oriented to person, place, and time. He has normal reflexes.   Skin: Skin is warm and dry. No rash noted. He is not diaphoretic. No erythema. No pallor.   Psychiatric: He has a normal mood and affect. His behavior is normal.   Vitals reviewed.        Procedures  /68   Pulse 87   Ht 170.2 cm (67\")   Wt 69.9 kg (154 lb)   SpO2 98%   BMI 24.12 kg/m²     Lab Review:   I have reviewed previous office notes, recent labs and recent cardiac testing.     Lab Results   Component Value Date    CHOL 142 07/30/2020    TRIG 221 (H) 07/30/2020    HDL 54 07/30/2020    LDL 44 07/30/2020     Lab Results   Component Value Date    GLUCOSE 229 (H) 07/30/2020    BUN 33 (H) 07/30/2020    CREATININE 1.60 (H) 08/14/2020    EGFRIFNONA 41 (A) 01/21/2019    EGFRIFAFRI 60 (L) 07/30/2020    BCR 22.8 07/30/2020    K 3.5 07/30/2020    CO2 24.0 07/30/2020    CALCIUM 9.0 07/30/2020    ALBUMIN 4.00 07/30/2020    AST 40 07/30/2020    ALT 43 (H) 07/30/2020       Results for orders placed during the hospital encounter of 07/29/20   Adult Transthoracic Echo Complete W/ Cont if Necessary Per Protocol (With Agitated Saline)    Narrative · Left ventricular wall thickness is consistent with mild-to-moderate   concentric hypertrophy.  · Left ventricular systolic function is normal. Estimated ejection   fraction is 66 to 70%.  · Left ventricular diastolic dysfunction (grade I) consistent with   impaired relaxation.  · Left atrial cavity size is " "mild-to-moderately dilated.  · Normal right ventricular cavity size and systolic function noted.  · There is no significant (greater than mild) valvular dysfunction.  · There is no evidence of intracardiac mass or thrombus.  · There is a right-to-left shunt seen on bubble study consistent with a   PFO.              Assessment:          Diagnosis Plan   1. Atrial flutter, unspecified type (CMS/[facility])     2. Essential hypertension     3. Mixed hyperlipidemia     4. PAD (peripheral artery disease) (CMS/[facility])     5. Type 2 diabetes mellitus with diabetic autonomic neuropathy, without long-term current use of insulin (CMS/[facility])     6. CKD (chronic kidney disease) stage 3, GFR 30-59 ml/min (CMS/[facility])     7. Tobacco abuse     8. PFO (patent foramen ovale)            Plan:       1. Atrial flutter- NSR today. Controlled on Cardizem, Multaq and Eliquis. Ok to hold eliquis 48hrs prior to endarterectomy. Recommend resuming ASAP when felt safe with vascular.  [id number] (high risk, 6.7%). Refills for meds sent to pharmacy.  2. HTN- controlled. Continue current therapy.  3. HLD- controlled with LDL at 44. On statin.   4. PAD- followed by vascular.   5. DM2- controlled with A1C at 6.3. Managed per endocrinology in [place].   6. CKD- stable. creatinine at 1.60.   7. Tobacco abuse- in early remission  8. PFO- noted on echo. Per Neurology at his last hospitalization, does not require closure.     Risk assessment- from a revised cardiac risk index standpoint, patient is low risk for a major cardiac event with this surgery. This is primarily because he has no known history of coronary/ischemic heart disease, congestive heart failure, insulin-dependent diabetes mellitus, or creatinine greater than 2. Though the risk is low (0.9%), it is not zero. However, this is non-modifiable because he has no signs or symptoms of the following \"active cardiac conditions\"- acute coronary syndrome, acutely decompensated congestive heart failure, " uncontrolled arrhythmias, or significant valvular disease. Therefore, recommend proceeding with the planned surgery without further delay.      Follow up in 1 year or sooner if symptoms worsen.

## 2020-08-21 ENCOUNTER — OFFICE VISIT (OUTPATIENT)
Dept: CARDIOLOGY | Facility: CLINIC | Age: 60
End: 2020-08-21

## 2020-08-21 ENCOUNTER — LAB (OUTPATIENT)
Dept: LAB | Facility: HOSPITAL | Age: 60
End: 2020-08-21

## 2020-08-21 VITALS
SYSTOLIC BLOOD PRESSURE: 135 MMHG | HEART RATE: 87 BPM | WEIGHT: 154 LBS | DIASTOLIC BLOOD PRESSURE: 68 MMHG | BODY MASS INDEX: 24.17 KG/M2 | OXYGEN SATURATION: 98 % | HEIGHT: 67 IN

## 2020-08-21 DIAGNOSIS — N18.30 CKD (CHRONIC KIDNEY DISEASE) STAGE 3, GFR 30-59 ML/MIN (HCC): ICD-10-CM

## 2020-08-21 DIAGNOSIS — Q21.12 PFO (PATENT FORAMEN OVALE): ICD-10-CM

## 2020-08-21 DIAGNOSIS — I10 ESSENTIAL HYPERTENSION: ICD-10-CM

## 2020-08-21 DIAGNOSIS — Z72.0 TOBACCO ABUSE: ICD-10-CM

## 2020-08-21 DIAGNOSIS — E78.2 MIXED HYPERLIPIDEMIA: ICD-10-CM

## 2020-08-21 DIAGNOSIS — I48.92 ATRIAL FLUTTER, UNSPECIFIED TYPE (HCC): Primary | ICD-10-CM

## 2020-08-21 DIAGNOSIS — E11.43 TYPE 2 DIABETES MELLITUS WITH DIABETIC AUTONOMIC NEUROPATHY, WITHOUT LONG-TERM CURRENT USE OF INSULIN (HCC): ICD-10-CM

## 2020-08-21 DIAGNOSIS — I73.9 PAD (PERIPHERAL ARTERY DISEASE) (HCC): ICD-10-CM

## 2020-08-21 LAB
COLLECT DATE SP2 STL: ABNORMAL
COLLECT DATE SP3 STL: ABNORMAL
COLLECT DATE STL: ABNORMAL
HEMOCCULT STL QL: NEGATIVE
HEMOCCULT STL QL: POSITIVE
HEMOCCULT STL QL: POSITIVE
Lab: 1449
Lab: 2348
Lab: ABNORMAL

## 2020-08-21 PROCEDURE — 82272 OCCULT BLD FECES 1-3 TESTS: CPT | Performed by: INTERNAL MEDICINE

## 2020-08-21 PROCEDURE — 99213 OFFICE O/P EST LOW 20 MIN: CPT | Performed by: NURSE PRACTITIONER

## 2020-08-21 RX ORDER — METFORMIN HYDROCHLORIDE 750 MG/1
750 TABLET, EXTENDED RELEASE ORAL 2 TIMES DAILY
Status: ON HOLD | COMMUNITY
End: 2021-06-11 | Stop reason: SDUPTHER

## 2020-08-21 RX ORDER — DILTIAZEM HYDROCHLORIDE 360 MG/1
360 CAPSULE, EXTENDED RELEASE ORAL DAILY
Qty: 30 CAPSULE | Refills: 11 | Status: SHIPPED | OUTPATIENT
Start: 2020-08-21 | End: 2021-06-11 | Stop reason: HOSPADM

## 2020-08-24 ENCOUNTER — TELEPHONE (OUTPATIENT)
Dept: GASTROENTEROLOGY | Facility: CLINIC | Age: 60
End: 2020-08-24

## 2020-08-24 ENCOUNTER — PREP FOR SURGERY (OUTPATIENT)
Dept: OTHER | Facility: HOSPITAL | Age: 60
End: 2020-08-24

## 2020-08-24 DIAGNOSIS — R19.5 HEME POSITIVE STOOL: Primary | ICD-10-CM

## 2020-08-25 ENCOUNTER — TELEPHONE (OUTPATIENT)
Dept: GASTROENTEROLOGY | Facility: CLINIC | Age: 60
End: 2020-08-25

## 2020-08-25 NOTE — TELEPHONE ENCOUNTER
----- Message from DARIEN Lewis sent at 8/25/2020  9:24 AM CDT -----  Ok to hold Eliquis 48hrs prior to procedure. Recommend resuming ASAP when felt safe from a bleeding standpoint.   ----- Message -----  From: Talib Mcclure MA  Sent: 8/24/2020   3:28 PM CDT  To: DARIEN Lewis

## 2020-08-26 ENCOUNTER — PREP FOR SURGERY (OUTPATIENT)
Dept: OTHER | Facility: HOSPITAL | Age: 60
End: 2020-08-26

## 2020-08-26 DIAGNOSIS — I65.21 STENOSIS OF RIGHT INTERNAL CAROTID ARTERY: Primary | ICD-10-CM

## 2020-08-26 DIAGNOSIS — Z01.818 PREOP TESTING: ICD-10-CM

## 2020-08-26 DIAGNOSIS — Z51.81 ENCOUNTER FOR MONITORING ANTIPLATELET THERAPY: ICD-10-CM

## 2020-08-26 DIAGNOSIS — Z79.02 ENCOUNTER FOR MONITORING ANTIPLATELET THERAPY: ICD-10-CM

## 2020-08-26 RX ORDER — BUPIVACAINE HCL/0.9 % NACL/PF 0.1 %
2 PLASTIC BAG, INJECTION (ML) EPIDURAL ONCE
Status: CANCELLED | OUTPATIENT
Start: 2020-08-26 | End: 2020-08-26

## 2020-09-04 PROBLEM — I65.21 STENOSIS OF RIGHT INTERNAL CAROTID ARTERY: Status: ACTIVE | Noted: 2020-09-04

## 2020-09-04 PROBLEM — Z01.818 PREOP TESTING: Status: ACTIVE | Noted: 2020-09-04

## 2020-09-08 ENCOUNTER — TELEPHONE (OUTPATIENT)
Dept: VASCULAR SURGERY | Facility: CLINIC | Age: 60
End: 2020-09-08

## 2020-09-08 ENCOUNTER — TRANSCRIBE ORDERS (OUTPATIENT)
Dept: ADMINISTRATIVE | Facility: HOSPITAL | Age: 60
End: 2020-09-08

## 2020-09-08 DIAGNOSIS — Z01.818 PREOP TESTING: Primary | ICD-10-CM

## 2020-09-08 NOTE — TELEPHONE ENCOUNTER
Left message requesting patient to return my call in reference to upcoming procedure with Dr. Valera.  Left office number for patient return mu call.

## 2020-09-09 ENCOUNTER — TELEPHONE (OUTPATIENT)
Dept: VASCULAR SURGERY | Facility: CLINIC | Age: 60
End: 2020-09-09

## 2020-09-09 NOTE — TELEPHONE ENCOUNTER
Spoke with patient and advised of upcoming procedure.  Patient pre work is scheduled for 9/11/2020 at 1245 pm.  Patient procedure is scheduled for 9/14/2020 at 700 am.  Patient to hold his Eliquis for two days with the last dose being on 9/11/2020.  Patient advised of Covid testing, masking and visitation, including no overnight guest.    Patient advised of location time and prep.  Patient expressed understanding for all that was discussed.

## 2020-09-10 ENCOUNTER — TELEPHONE (OUTPATIENT)
Dept: VASCULAR SURGERY | Facility: CLINIC | Age: 60
End: 2020-09-10

## 2020-09-10 NOTE — TELEPHONE ENCOUNTER
Left message reminding patient of his arrival time on Monday of 700 am for his procedure with Dr. Valera. Reminded of visitation and masking rules including no over night visitors.

## 2020-09-11 ENCOUNTER — APPOINTMENT (OUTPATIENT)
Dept: PREADMISSION TESTING | Facility: HOSPITAL | Age: 60
End: 2020-09-11

## 2020-09-11 ENCOUNTER — LAB (OUTPATIENT)
Dept: LAB | Facility: HOSPITAL | Age: 60
End: 2020-09-11

## 2020-09-11 ENCOUNTER — HOSPITAL ENCOUNTER (OUTPATIENT)
Dept: GENERAL RADIOLOGY | Facility: HOSPITAL | Age: 60
Discharge: HOME | End: 2020-09-11

## 2020-09-11 VITALS
DIASTOLIC BLOOD PRESSURE: 65 MMHG | OXYGEN SATURATION: 98 % | BODY MASS INDEX: 23.7 KG/M2 | HEART RATE: 87 BPM | SYSTOLIC BLOOD PRESSURE: 171 MMHG | WEIGHT: 147.49 LBS | HEIGHT: 66 IN | RESPIRATION RATE: 20 BRPM

## 2020-09-11 DIAGNOSIS — Z51.81 ENCOUNTER FOR MONITORING ANTIPLATELET THERAPY: ICD-10-CM

## 2020-09-11 DIAGNOSIS — I65.21 STENOSIS OF RIGHT INTERNAL CAROTID ARTERY: ICD-10-CM

## 2020-09-11 DIAGNOSIS — Z01.818 PREOP TESTING: ICD-10-CM

## 2020-09-11 DIAGNOSIS — Z79.02 ENCOUNTER FOR MONITORING ANTIPLATELET THERAPY: ICD-10-CM

## 2020-09-11 LAB
ANION GAP SERPL CALCULATED.3IONS-SCNC: 9 MMOL/L (ref 5–15)
APTT PPP: 29 SECONDS (ref 24.1–35)
BASOPHILS # BLD AUTO: 0.06 10*3/MM3 (ref 0–0.2)
BASOPHILS NFR BLD AUTO: 0.9 % (ref 0–1.5)
BUN SERPL-MCNC: 33 MG/DL (ref 8–23)
BUN/CREAT SERPL: 24.6 (ref 7–25)
CALCIUM SPEC-SCNC: 9 MG/DL (ref 8.6–10.5)
CHLORIDE SERPL-SCNC: 114 MMOL/L (ref 98–107)
CO2 SERPL-SCNC: 19 MMOL/L (ref 22–29)
CREAT SERPL-MCNC: 1.34 MG/DL (ref 0.76–1.27)
DEPRECATED RDW RBC AUTO: 65.2 FL (ref 37–54)
EOSINOPHIL # BLD AUTO: 0.89 10*3/MM3 (ref 0–0.4)
EOSINOPHIL NFR BLD AUTO: 13.4 % (ref 0.3–6.2)
ERYTHROCYTE [DISTWIDTH] IN BLOOD BY AUTOMATED COUNT: 22.3 % (ref 12.3–15.4)
GFR SERPL CREATININE-BSD FRML MDRD: 66 ML/MIN/1.73
GLUCOSE SERPL-MCNC: 157 MG/DL (ref 65–99)
HCT VFR BLD AUTO: 33.9 % (ref 37.5–51)
HGB BLD-MCNC: 10.8 G/DL (ref 13–17.7)
IMM GRANULOCYTES # BLD AUTO: 0.01 10*3/MM3 (ref 0–0.05)
IMM GRANULOCYTES NFR BLD AUTO: 0.2 % (ref 0–0.5)
INR PPP: 1.12 (ref 0.91–1.09)
LYMPHOCYTES # BLD AUTO: 0.33 10*3/MM3 (ref 0.7–3.1)
LYMPHOCYTES NFR BLD AUTO: 5 % (ref 19.6–45.3)
MCH RBC QN AUTO: 26.4 PG (ref 26.6–33)
MCHC RBC AUTO-ENTMCNC: 31.9 G/DL (ref 31.5–35.7)
MCV RBC AUTO: 82.9 FL (ref 79–97)
MONOCYTES # BLD AUTO: 0.53 10*3/MM3 (ref 0.1–0.9)
MONOCYTES NFR BLD AUTO: 8 % (ref 5–12)
NEUTROPHILS NFR BLD AUTO: 4.84 10*3/MM3 (ref 1.7–7)
NEUTROPHILS NFR BLD AUTO: 72.5 % (ref 42.7–76)
NRBC BLD AUTO-RTO: 0 /100 WBC (ref 0–0.2)
PLATELET # BLD AUTO: 239 10*3/MM3 (ref 140–450)
PMV BLD AUTO: 9.9 FL (ref 6–12)
POTASSIUM SERPL-SCNC: 3.6 MMOL/L (ref 3.5–5.2)
PROTHROMBIN TIME: 14 SECONDS (ref 11.9–14.6)
RBC # BLD AUTO: 4.09 10*6/MM3 (ref 4.14–5.8)
SODIUM SERPL-SCNC: 142 MMOL/L (ref 136–145)
WBC # BLD AUTO: 6.66 10*3/MM3 (ref 3.4–10.8)

## 2020-09-11 PROCEDURE — 80048 BASIC METABOLIC PNL TOTAL CA: CPT | Performed by: NURSE PRACTITIONER

## 2020-09-11 PROCEDURE — 71046 X-RAY EXAM CHEST 2 VIEWS: CPT

## 2020-09-11 PROCEDURE — 36415 COLL VENOUS BLD VENIPUNCTURE: CPT

## 2020-09-11 PROCEDURE — 85730 THROMBOPLASTIN TIME PARTIAL: CPT | Performed by: NURSE PRACTITIONER

## 2020-09-11 PROCEDURE — 85610 PROTHROMBIN TIME: CPT | Performed by: NURSE PRACTITIONER

## 2020-09-11 PROCEDURE — U0003 INFECTIOUS AGENT DETECTION BY NUCLEIC ACID (DNA OR RNA); SEVERE ACUTE RESPIRATORY SYNDROME CORONAVIRUS 2 (SARS-COV-2) (CORONAVIRUS DISEASE [COVID-19]), AMPLIFIED PROBE TECHNIQUE, MAKING USE OF HIGH THROUGHPUT TECHNOLOGIES AS DESCRIBED BY CMS-2020-01-R: HCPCS | Performed by: SURGERY

## 2020-09-11 PROCEDURE — 85025 COMPLETE CBC W/AUTO DIFF WBC: CPT | Performed by: NURSE PRACTITIONER

## 2020-09-11 PROCEDURE — C9803 HOPD COVID-19 SPEC COLLECT: HCPCS | Performed by: SURGERY

## 2020-09-11 NOTE — DISCHARGE INSTRUCTIONS
DAY OF SURGERY INSTRUCTIONS        YOUR SURGEON: dr hickman    PROCEDURE: ***Right Carotid Endarterectomy With Eeg - Right    DATE OF SURGERY: ***9/14/2020    ARRIVAL TIME: AS DIRECTED BY OFFICE    YOU MAY TAKE THE FOLLOWING MEDICATION(S) THE MORNING OF SURGERY WITH A SIP OF WATER: ***multaq, diltiazem per anesthesia    ALL OTHER HOME MEDICATION CHECK WITH YOUR PHYSICIAN      DO NOT TAKE ANY ERECTILE DYSFUNCTION MEDICATIONS (EX: CIALIS, VIAGRA) 24 HOURS PRIOR TO SURGERY                      MANAGING PAIN AFTER SURGERY    We know you are probably wondering what your pain will be like after surgery.  Following surgery it is unrealistic to expect you will not have pain.   Pain is how our bodies let us know that something is wrong or cautions us to be careful.  That said, our goal is to make your pain tolerable.    Methods we may use to treat your pain include (oral or IV medications, PCAs, epidurals, nerve blocks, etc.)   While some procedures require IV pain medications for a short time after surgery, transitioning to pain medications by mouth allows for better management of pain.   Your nurse will encourage you to take oral pain medications whenever possible.  IV medications work almost immediately, but only last a short while.  Taking medications by mouth allows for a more constant level of medication in your blood stream for a longer period of time.      Once your pain is out of control it is harder to get back under control.  It is important you are aware when your next dose of pain medication is due.  If you are admitted, your nurse may write the time of your next dose on the white board in your room to help you remember.      We are interested in your pain and encourage you to inform us about aggravating factors during your visit.   Many times a simple repositioning every few hours can make a big difference.    If your physician says it is okay, do not let your pain prevent you from getting out of bed. Be sure  to call your nurse for assistance prior to getting up so you do not fall.      Before surgery, please decide your tolerable pain goal.  These faces help describe the pain ratings we use on a 0-10 scale.   Be prepared to tell us your goal and whether or not you take pain or anxiety medications at home.          BEFORE YOU COME TO THE HOSPITAL  (Pre-op instructions)  • Do not eat, drink, smoke or chew gum after midnight the night before surgery.  This also includes no mints.  • Morning of surgery take only the medicines you have been instructed with a sip of water unless otherwise instructed  by your physician.  • Do not shave, wear makeup or dark nail polish.  • Remove all jewelry including rings.  • Leave anything you consider valuable at home.  • Leave your suitcase in the car until after your surgery.  • Bring the following with you if applicable:  o Picture ID and insurance, Medicare or Medicaid cards  o Co-pay/deductible required by insurance (cash, check, credit card)  o Copy of advance directive, living will or power-of- documents if not brought to PAT  o CPAP or BIPAP mask and tubing  o Relaxation aids ( book, magazine), etc.  o Hearing aids                        ON THE DAY OF SURGERY  · On the day of surgery check in at registration located at the main entrance of the hospital.   ? You will be registered and given a beeper with instructions where to wait in the main lobby.  ? When your beeper lights up and vibrates a member of the Outpatient Surgery staff will meet you at the double doors under the stair steps and escort you to your preoperative room.   · You may have cloth compression devices placed on your legs. These help to prevent blood clots and reduce swelling in your legs.  · An IV may be inserted into one of your veins.  · In the operating room, you may be given one or more of the following:  ? A medicine to help you relax (sedative).  ? A medicine to numb the area (local anesthetic).  ? A  "medicine to make you fall asleep (general anesthetic).  ? A medicine that is injected into an area of your body to numb everything below the injection site (regional anesthetic).  · Your surgical site will be marked or identified.  · You may be given an antibiotic through your IV to help prevent infection.  Contact a health care provider if you:  · Develop a fever of more than 100.4°F (38°C) or other feelings of illness during the 48 hours before your surgery.  · Have symptoms that get worse.  Have questions or concerns about your surgery    General Anesthesia/Surgery, Adult  General anesthesia is the use of medicines to make a person \"go to sleep\" (unconscious) for a medical procedure. General anesthesia must be used for certain procedures, and is often recommended for procedures that:  · Last a long time.  · Require you to be still or in an unusual position.  · Are major and can cause blood loss.  The medicines used for general anesthesia are called general anesthetics. As well as making you unconscious for a certain amount of time, these medicines:  · Prevent pain.  · Control your blood pressure.  · Relax your muscles.  Tell a health care provider about:  · Any allergies you have.  · All medicines you are taking, including vitamins, herbs, eye drops, creams, and over-the-counter medicines.  · Any problems you or family members have had with anesthetic medicines.  · Types of anesthetics you have had in the past.  · Any blood disorders you have.  · Any surgeries you have had.  · Any medical conditions you have.  · Any recent upper respiratory, chest, or ear infections.  · Any history of:  ? Heart or lung conditions, such as heart failure, sleep apnea, asthma, or chronic obstructive pulmonary disease (COPD).  ?  service.  ? Depression or anxiety.  · Any tobacco or drug use, including marijuana or alcohol use.  · Whether you are pregnant or may be pregnant.  What are the risks?  Generally, this is a safe " procedure. However, problems may occur, including:  · Allergic reaction.  · Lung and heart problems.  · Inhaling food or liquid from the stomach into the lungs (aspiration).  · Nerve injury.  · Air in the bloodstream, which can lead to stroke.  · Extreme agitation or confusion (delirium) when you wake up from the anesthetic.  · Waking up during your procedure and being unable to move. This is rare.  These problems are more likely to develop if you are having a major surgery or if you have an advanced or serious medical condition. You can prevent some of these complications by answering all of your health care provider's questions thoroughly and by following all instructions before your procedure.  General anesthesia can cause side effects, including:  · Nausea or vomiting.  · A sore throat from the breathing tube.  · Hoarseness.  · Wheezing or coughing.  · Shaking chills.  · Tiredness.  · Body aches.  · Anxiety.  · Sleepiness or drowsiness.  · Confusion or agitation.  RISKS AND COMPLICATIONS OF SURGERY  Your health care provider will discuss possible risks and complications with you before surgery. Common risks and complications include:    · Problems due to the use of anesthetics.  · Blood loss and replacement (does not apply to minor surgical procedures).  · Temporary increase in pain due to surgery.  · Uncorrected pain or problems that the surgery was meant to correct.  · Infection.  · New damage.    What happens before the procedure?    Medicines  Ask your health care provider about:  · Changing or stopping your regular medicines. This is especially important if you are taking diabetes medicines or blood thinners.  · Taking medicines such as aspirin and ibuprofen. These medicines can thin your blood. Do not take these medicines unless your health care provider tells you to take them.  · Taking over-the-counter medicines, vitamins, herbs, and supplements. Do not take these during the week before your procedure  unless your health care provider approves them.  General instructions  · Starting 3-6 weeks before the procedure, do not use any products that contain nicotine or tobacco, such as cigarettes and e-cigarettes. If you need help quitting, ask your health care provider.  · If you brush your teeth on the morning of the procedure, make sure to spit out all of the toothpaste.  · Tell your health care provider if you become ill or develop a cold, cough, or fever.  · If instructed by your health care provider, bring your sleep apnea device with you on the day of your surgery (if applicable).  · Ask your health care provider if you will be going home the same day, the following day, or after a longer hospital stay.  ? Plan to have someone take you home from the hospital or clinic.  ? Plan to have a responsible adult care for you for at least 24 hours after you leave the hospital or clinic. This is important.  What happens during the procedure?  · You will be given anesthetics through both of the following:  ? A mask placed over your nose and mouth.  ? An IV in one of your veins.  · You may receive a medicine to help you relax (sedative).  · After you are unconscious, a breathing tube may be inserted down your throat to help you breathe. This will be removed before you wake up.  · An anesthesia specialist will stay with you throughout your procedure. He or she will:  ? Keep you comfortable and safe by continuing to give you medicines and adjusting the amount of medicine that you get.  ? Monitor your blood pressure, pulse, and oxygen levels to make sure that the anesthetics do not cause any problems.  The procedure may vary among health care providers and hospitals.  What happens after the procedure?  · Your blood pressure, temperature, heart rate, breathing rate, and blood oxygen level will be monitored until the medicines you were given have worn off.  · You will wake up in a recovery area. You may wake up slowly.  · If you  feel anxious or agitated, you may be given medicine to help you calm down.  · If you will be going home the same day, your health care provider may check to make sure you can walk, drink, and urinate.  · Your health care provider will treat any pain or side effects you have before you go home.  · Do not drive for 24 hours if you were given a sedative.  Summary  · General anesthesia is used to keep you still and prevent pain during a procedure.  · It is important to tell your healthcare provider about your medical history and any surgeries you have had, and previous experience with anesthesia.  · Follow your healthcare provider’s instructions about when to stop eating, drinking, or taking certain medicines before your procedure.  · Plan to have someone take you home from the hospital or clinic.  This information is not intended to replace advice given to you by your health care provider. Make sure you discuss any questions you have with your health care provider.  Document Released: 03/26/2009 Document Revised: 08/03/2018 Document Reviewed: 08/03/2018  [employer] Interactive Patient Education © 2019 [employer]       Fall Prevention in Hospitals, Adult  As a hospital patient, your condition and the treatments you receive can increase your risk for falls. Some additional risk factors for falls in a hospital include:  · Being in an unfamiliar environment.  · Being on bed rest.  · Your surgery.  · Taking certain medicines.  · Your tubing requirements, such as intravenous (IV) therapy or catheters.  It is important that you learn how to decrease fall risks while at the hospital. Below are important tips that can help prevent falls.  SAFETY TIPS FOR PREVENTING FALLS  Talk about your risk of falling.  · Ask your health care provider why you are at risk for falling. Is it your medicine, illness, tubing placement, or something else?  · Make a plan with your health care provider to keep you safe from falls.  · Ask your health  care provider or pharmacist about side effects of your medicines. Some medicines can make you dizzy or affect your coordination.  Ask for help.  · Ask for help before getting out of bed. You may need to press your call button.  · Ask for assistance in getting safely to the toilet.  · Ask for a walker or cane to be put at your bedside. Ask that most of the side rails on your bed be placed up before your health care provider leaves the room.  · Ask family or friends to sit with you.  · Ask for things that are out of your reach, such as your glasses, hearing aids, telephone, bedside table, or call button.  Follow these tips to avoid falling:  · Stay lying or seated, rather than standing, while waiting for help.  · Wear rubber-soled slippers or shoes whenever you walk in the hospital.  · Avoid quick, sudden movements.  ¨ Change positions slowly.  ¨ Sit on the side of your bed before standing.  ¨ Stand up slowly and wait before you start to walk.  · Let your health care provider know if there is a spill on the floor.  · Pay careful attention to the medical equipment, electrical cords, and tubes around you.  · When you need help, use your call button by your bed or in the bathroom. Wait for one of your health care providers to help you.  · If you feel dizzy or unsure of your footing, return to bed and wait for assistance.  · Avoid being distracted by the TV, telephone, or another person in your room.  · Do not lean or support yourself on rolling objects, such as IV poles or bedside tables.     This information is not intended to replace advice given to you by your health care provider. Make sure you discuss any questions you have with your health care provider.     Document Released: 12/15/2001 Document Revised: 01/08/2016 Document Reviewed: 08/25/2013  [employer] Interactive Patient Education ©2016 [employer]       [facility] 4% Patient Instruction Sheet    Chlorhexidine Before Surgery  Chlorhexidine  gluconate (CHG) is a germ-killing (antiseptic) solution that is used to clean the skin. It gets rid of the bacteria that normally live on the skin. Cleaning your skin with CHG before surgery helps lower the risk for infection after surgery.    How to use CHG solution  · You will take 2 showers, one shower the night before surgery, the second shower the morning of surgery before coming to the hospital.  · Use CHG only as told by your health care provider, and follow the instructions on the label.  · Use CHG solution while taking a shower. Follow these steps when using CHG solution (unless your health care provider gives you different instructions):  1. Start the shower.  2. Use your normal soap and shampoo to wash your face and hair.  3. Turn off the shower or move out of the shower stream.  4. Pour the CHG onto a clean washcloth. Do not use any type of brush or rough-edged sponge.  5. Starting at your neck, lather your body down to your toes. Make sure you:  6. Pay special attention to the part of your body where you will be having surgery. Scrub this area for at least 1 minute.  7. Use the full amount of CHG as directed. Usually, this is one half bottle for each shower.  8. Do not use CHG on your head or face. If the solution gets into your ears or eyes, rinse them well with water.  9. Avoid your genital area.  10. Avoid any areas of skin that have broken skin, cuts, or scrapes.  11. Scrub your back and under your arms. Make sure to wash skin folds.  12. Let the lather sit on your skin for 1-2 minutes or as long as told by your health care  provider.  13. Thoroughly rinse your entire body in the shower. Make sure that all body creases and crevices are rinsed well.  14. Dry off with a clean towel. Do not put any substances on your body afterward, such as powder, lotion, or perfume.  15. Put on clean clothes or pajamas.  16. If it is the night before your surgery, sleep in clean sheets.    What are the risks?  Risks  of using CHG include:  · A skin reaction.  · Hearing loss, if CHG gets in your ears.  · Eye injury, if CHG gets in your eyes and is not rinsed out.  · The CHG product catching fire.  Make sure that you avoid smoking and flames after applying CHG to your skin.  Do not use CHG:  · If you have a chlorhexidine allergy or have previously reacted to chlorhexidine.  · On babies younger than 2 months of age.      On the day of surgery, when you are taken to your room in Outpatient Surgery you will be given a CHG prepackaged cloth to wipe the site for your surgery.  How to use CHG prepackaged cloths  · Follow the instructions on the label.  · Use the CHG cloth on clean, dry skin. Follow these steps when using a CHG cloth (unless your health care provider gives you different instructions):  1. Using the CHG cloth, vigorously scrub the part of your body where you will be having surgery. Scrub using a back-and-forth motion for 3 minutes. The area on your body should be completely wet with CHG when you are finished scrubbing.  2. Do not rinse. Discard the cloth and let the area air-dry for 1 minute. Do not put any substances on your body afterward, such as powder, lotion, or perfume.  Contact a health care provider if:  · Your skin gets irritated after scrubbing.  · You have questions about using your solution or cloth.  Get help right away if:  · Your eyes become very red or swollen.  · Your eyes itch badly.  · Your skin itches badly and is red or swollen.  · Your hearing changes.  · You have trouble seeing.  · You have swelling or tingling in your mouth or throat.  · You have trouble breathing.  · You swallow any chlorhexidine.  Summary  · Chlorhexidine gluconate (CHG) is a germ-killing (antiseptic) solution that is used to clean the skin. Cleaning your skin with CHG before surgery helps lower the risk for infection after surgery.  · You may be given CHG to use at home. It may be in a bottle or in a prepackaged cloth to use on  your skin. Carefully follow your health care provider's instructions and the instructions on the product label.  · Do not use CHG if you have a chlorhexidine allergy.  · Contact your health care provider if your skin gets irritated after scrubbing.  This information is not intended to replace advice given to you by your health care provider. Make sure you discuss any questions you have with your health care provider.  Document Released: 09/11/2013 Document Revised: 11/15/2018 Document Reviewed: 11/15/2018  [employer] Interactive Patient Education © 2019 [employer]          PATIENT/FAMILY/RESPONSIBLE PARTY VERBALIZES UNDERSTANDING OF ABOVE EDUCATION.  COPY OF PAIN SCALE GIVEN AND REVIEWED WITH VERBALIZED UNDERSTANDING.

## 2020-09-12 LAB
COVID LABCORP PRIORITY: NORMAL
SARS-COV-2 RNA RESP QL NAA+PROBE: NOT DETECTED

## 2020-09-14 ENCOUNTER — HOSPITAL ENCOUNTER (INPATIENT)
Facility: HOSPITAL | Age: 60
LOS: 1 days | Discharge: HOME | End: 2020-09-15
Attending: SURGERY | Admitting: SURGERY

## 2020-09-14 ENCOUNTER — ANESTHESIA (OUTPATIENT)
Dept: PERIOP | Facility: HOSPITAL | Age: 60
End: 2020-09-14

## 2020-09-14 ENCOUNTER — APPOINTMENT (OUTPATIENT)
Dept: ULTRASOUND IMAGING | Facility: HOSPITAL | Age: 60
End: 2020-09-14

## 2020-09-14 ENCOUNTER — ANESTHESIA EVENT (OUTPATIENT)
Dept: PERIOP | Facility: HOSPITAL | Age: 60
End: 2020-09-14

## 2020-09-14 DIAGNOSIS — I65.21 STENOSIS OF RIGHT INTERNAL CAROTID ARTERY: ICD-10-CM

## 2020-09-14 DIAGNOSIS — Z01.818 PREOP TESTING: ICD-10-CM

## 2020-09-14 LAB
ABO GROUP BLD: NORMAL
BLD GP AB SCN SERPL QL: NEGATIVE
GLUCOSE BLDC GLUCOMTR-MCNC: 132 MG/DL (ref 70–130)
GLUCOSE BLDC GLUCOMTR-MCNC: 198 MG/DL (ref 70–130)
RH BLD: POSITIVE
T&S EXPIRATION DATE: NORMAL

## 2020-09-14 PROCEDURE — 88304 TISSUE EXAM BY PATHOLOGIST: CPT | Performed by: SURGERY

## 2020-09-14 PROCEDURE — 86901 BLOOD TYPING SEROLOGIC RH(D): CPT | Performed by: ANESTHESIOLOGY

## 2020-09-14 PROCEDURE — 25010000002 CEFAZOLIN PER 500 MG: Performed by: SURGERY

## 2020-09-14 PROCEDURE — 82962 GLUCOSE BLOOD TEST: CPT

## 2020-09-14 PROCEDURE — 25010000002 PHENYLEPHRINE 10 MG/ML SOLUTION 1 ML VIAL: Performed by: NURSE ANESTHETIST, CERTIFIED REGISTERED

## 2020-09-14 PROCEDURE — 25010000003 CEFAZOLIN PER 500 MG: Performed by: SURGERY

## 2020-09-14 PROCEDURE — 25010000002 HEPARIN (PORCINE) PER 1000 UNITS: Performed by: NURSE ANESTHETIST, CERTIFIED REGISTERED

## 2020-09-14 PROCEDURE — 25010000002 ONDANSETRON PER 1 MG: Performed by: NURSE ANESTHETIST, CERTIFIED REGISTERED

## 2020-09-14 PROCEDURE — 25010000002 FENTANYL CITRATE (PF) 100 MCG/2ML SOLUTION: Performed by: NURSE ANESTHETIST, CERTIFIED REGISTERED

## 2020-09-14 PROCEDURE — 25010000003 LIDOCAINE 1 % SOLUTION: Performed by: SURGERY

## 2020-09-14 PROCEDURE — 86900 BLOOD TYPING SEROLOGIC ABO: CPT | Performed by: ANESTHESIOLOGY

## 2020-09-14 PROCEDURE — 03CK0ZZ EXTIRPATION OF MATTER FROM RIGHT INTERNAL CAROTID ARTERY, OPEN APPROACH: ICD-10-PCS | Performed by: SURGERY

## 2020-09-14 PROCEDURE — 25010000002 PROPOFOL 10 MG/ML EMULSION: Performed by: NURSE ANESTHETIST, CERTIFIED REGISTERED

## 2020-09-14 PROCEDURE — 35301 RECHANNELING OF ARTERY: CPT | Performed by: SURGERY

## 2020-09-14 PROCEDURE — 86850 RBC ANTIBODY SCREEN: CPT | Performed by: ANESTHESIOLOGY

## 2020-09-14 PROCEDURE — 93882 EXTRACRANIAL UNI/LTD STUDY: CPT

## 2020-09-14 PROCEDURE — C1768 GRAFT, VASCULAR: HCPCS | Performed by: SURGERY

## 2020-09-14 PROCEDURE — 25010000002 HEPARIN (PORCINE) PER 1000 UNITS: Performed by: SURGERY

## 2020-09-14 PROCEDURE — 03UK0KZ SUPPLEMENT RIGHT INTERNAL CAROTID ARTERY WITH NONAUTOLOGOUS TISSUE SUBSTITUTE, OPEN APPROACH: ICD-10-PCS | Performed by: SURGERY

## 2020-09-14 PROCEDURE — 88311 DECALCIFY TISSUE: CPT | Performed by: SURGERY

## 2020-09-14 DEVICE — LIGACLIP MCA MULTIPLE CLIP APPLIERS, 20 SMALL CLIPS
Type: IMPLANTABLE DEVICE | Site: NECK | Status: FUNCTIONAL
Brand: LIGACLIP

## 2020-09-14 DEVICE — LIGACLIP MCA MULTIPLE CLIP APPLIERS, 20 MEDIUM CLIPS
Type: IMPLANTABLE DEVICE | Site: NECK | Status: FUNCTIONAL
Brand: LIGACLIP

## 2020-09-14 DEVICE — PTCH VASC XENOSURE BIO 0.8X8CM: Type: IMPLANTABLE DEVICE | Site: NECK | Status: FUNCTIONAL

## 2020-09-14 RX ORDER — SODIUM CHLORIDE 0.9 % (FLUSH) 0.9 %
3 SYRINGE (ML) INJECTION AS NEEDED
Status: DISCONTINUED | OUTPATIENT
Start: 2020-09-14 | End: 2020-09-14 | Stop reason: HOSPADM

## 2020-09-14 RX ORDER — FERROUS SULFATE 325(65) MG
325 TABLET ORAL 2 TIMES DAILY
Status: DISCONTINUED | OUTPATIENT
Start: 2020-09-14 | End: 2020-09-15 | Stop reason: HOSPADM

## 2020-09-14 RX ORDER — ONDANSETRON 4 MG/1
4 TABLET, FILM COATED ORAL EVERY 6 HOURS PRN
Status: DISCONTINUED | OUTPATIENT
Start: 2020-09-14 | End: 2020-09-15 | Stop reason: HOSPADM

## 2020-09-14 RX ORDER — NALOXONE HCL 0.4 MG/ML
0.4 VIAL (ML) INJECTION
Status: DISCONTINUED | OUTPATIENT
Start: 2020-09-14 | End: 2020-09-15 | Stop reason: HOSPADM

## 2020-09-14 RX ORDER — SODIUM CHLORIDE 0.9 % (FLUSH) 0.9 %
10 SYRINGE (ML) INJECTION AS NEEDED
Status: DISCONTINUED | OUTPATIENT
Start: 2020-09-14 | End: 2020-09-14 | Stop reason: HOSPADM

## 2020-09-14 RX ORDER — HYDROCODONE BITARTRATE AND ACETAMINOPHEN 5; 325 MG/1; MG/1
1 TABLET ORAL EVERY 4 HOURS PRN
Status: DISCONTINUED | OUTPATIENT
Start: 2020-09-14 | End: 2020-09-15 | Stop reason: HOSPADM

## 2020-09-14 RX ORDER — LABETALOL HYDROCHLORIDE 5 MG/ML
5 INJECTION, SOLUTION INTRAVENOUS
Status: DISCONTINUED | OUTPATIENT
Start: 2020-09-14 | End: 2020-09-14 | Stop reason: HOSPADM

## 2020-09-14 RX ORDER — OXYCODONE AND ACETAMINOPHEN 7.5; 325 MG/1; MG/1
2 TABLET ORAL ONCE AS NEEDED
Status: DISCONTINUED | OUTPATIENT
Start: 2020-09-14 | End: 2020-09-14 | Stop reason: HOSPADM

## 2020-09-14 RX ORDER — LABETALOL HYDROCHLORIDE 5 MG/ML
INJECTION, SOLUTION INTRAVENOUS AS NEEDED
Status: DISCONTINUED | OUTPATIENT
Start: 2020-09-14 | End: 2020-09-14 | Stop reason: SURG

## 2020-09-14 RX ORDER — SODIUM CHLORIDE 0.9 % (FLUSH) 0.9 %
3 SYRINGE (ML) INJECTION EVERY 12 HOURS SCHEDULED
Status: DISCONTINUED | OUTPATIENT
Start: 2020-09-14 | End: 2020-09-14 | Stop reason: HOSPADM

## 2020-09-14 RX ORDER — ACETAMINOPHEN 500 MG
1000 TABLET ORAL ONCE
Status: COMPLETED | OUTPATIENT
Start: 2020-09-14 | End: 2020-09-14

## 2020-09-14 RX ORDER — LIDOCAINE HYDROCHLORIDE 10 MG/ML
INJECTION, SOLUTION INFILTRATION; PERINEURAL AS NEEDED
Status: DISCONTINUED | OUTPATIENT
Start: 2020-09-14 | End: 2020-09-14 | Stop reason: HOSPADM

## 2020-09-14 RX ORDER — ASPIRIN 81 MG/1
81 TABLET ORAL DAILY
Status: DISCONTINUED | OUTPATIENT
Start: 2020-09-14 | End: 2020-09-15 | Stop reason: HOSPADM

## 2020-09-14 RX ORDER — SODIUM CHLORIDE, SODIUM LACTATE, POTASSIUM CHLORIDE, CALCIUM CHLORIDE 600; 310; 30; 20 MG/100ML; MG/100ML; MG/100ML; MG/100ML
100 INJECTION, SOLUTION INTRAVENOUS CONTINUOUS
Status: DISCONTINUED | OUTPATIENT
Start: 2020-09-14 | End: 2020-09-14 | Stop reason: HOSPADM

## 2020-09-14 RX ORDER — ONDANSETRON 2 MG/ML
4 INJECTION INTRAMUSCULAR; INTRAVENOUS EVERY 6 HOURS PRN
Status: DISCONTINUED | OUTPATIENT
Start: 2020-09-14 | End: 2020-09-15 | Stop reason: HOSPADM

## 2020-09-14 RX ORDER — THIAMINE MONONITRATE (VIT B1) 100 MG
100 TABLET ORAL DAILY
Status: DISCONTINUED | OUTPATIENT
Start: 2020-09-14 | End: 2020-09-15 | Stop reason: HOSPADM

## 2020-09-14 RX ORDER — NEOSTIGMINE METHYLSULFATE 5 MG/5 ML
SYRINGE (ML) INTRAVENOUS AS NEEDED
Status: DISCONTINUED | OUTPATIENT
Start: 2020-09-14 | End: 2020-09-14 | Stop reason: SURG

## 2020-09-14 RX ORDER — PANTOPRAZOLE SODIUM 40 MG/1
40 TABLET, DELAYED RELEASE ORAL DAILY
Status: DISCONTINUED | OUTPATIENT
Start: 2020-09-14 | End: 2020-09-15 | Stop reason: HOSPADM

## 2020-09-14 RX ORDER — SODIUM CHLORIDE 0.9 % (FLUSH) 0.9 %
3 SYRINGE (ML) INJECTION EVERY 12 HOURS SCHEDULED
Status: DISCONTINUED | OUTPATIENT
Start: 2020-09-14 | End: 2020-09-15 | Stop reason: HOSPADM

## 2020-09-14 RX ORDER — ONDANSETRON 2 MG/ML
INJECTION INTRAMUSCULAR; INTRAVENOUS AS NEEDED
Status: DISCONTINUED | OUTPATIENT
Start: 2020-09-14 | End: 2020-09-14 | Stop reason: SURG

## 2020-09-14 RX ORDER — FUROSEMIDE 40 MG/1
40 TABLET ORAL DAILY
Status: DISCONTINUED | OUTPATIENT
Start: 2020-09-14 | End: 2020-09-15 | Stop reason: HOSPADM

## 2020-09-14 RX ORDER — LIDOCAINE HYDROCHLORIDE 10 MG/ML
0.5 INJECTION, SOLUTION EPIDURAL; INFILTRATION; INTRACAUDAL; PERINEURAL ONCE AS NEEDED
Status: DISCONTINUED | OUTPATIENT
Start: 2020-09-14 | End: 2020-09-14 | Stop reason: HOSPADM

## 2020-09-14 RX ORDER — LABETALOL HYDROCHLORIDE 5 MG/ML
20 INJECTION, SOLUTION INTRAVENOUS
Status: DISCONTINUED | OUTPATIENT
Start: 2020-09-14 | End: 2020-09-15 | Stop reason: HOSPADM

## 2020-09-14 RX ORDER — SODIUM CHLORIDE 0.9 % (FLUSH) 0.9 %
10 SYRINGE (ML) INJECTION AS NEEDED
Status: DISCONTINUED | OUTPATIENT
Start: 2020-09-14 | End: 2020-09-15 | Stop reason: HOSPADM

## 2020-09-14 RX ORDER — ACETAMINOPHEN 325 MG/1
650 TABLET ORAL EVERY 4 HOURS PRN
Status: DISCONTINUED | OUTPATIENT
Start: 2020-09-14 | End: 2020-09-15 | Stop reason: HOSPADM

## 2020-09-14 RX ORDER — SODIUM CHLORIDE 0.9 % (FLUSH) 0.9 %
3-10 SYRINGE (ML) INJECTION AS NEEDED
Status: DISCONTINUED | OUTPATIENT
Start: 2020-09-14 | End: 2020-09-14 | Stop reason: HOSPADM

## 2020-09-14 RX ORDER — ROCURONIUM BROMIDE 10 MG/ML
INJECTION, SOLUTION INTRAVENOUS AS NEEDED
Status: DISCONTINUED | OUTPATIENT
Start: 2020-09-14 | End: 2020-09-14 | Stop reason: SURG

## 2020-09-14 RX ORDER — NICOTINE 21 MG/24HR
1 PATCH, TRANSDERMAL 24 HOURS TRANSDERMAL EVERY 24 HOURS
Status: DISCONTINUED | OUTPATIENT
Start: 2020-09-14 | End: 2020-09-15 | Stop reason: HOSPADM

## 2020-09-14 RX ORDER — SODIUM CHLORIDE 9 MG/ML
100 INJECTION, SOLUTION INTRAVENOUS CONTINUOUS
Status: DISCONTINUED | OUTPATIENT
Start: 2020-09-14 | End: 2020-09-15 | Stop reason: HOSPADM

## 2020-09-14 RX ORDER — ALLOPURINOL 100 MG/1
100 TABLET ORAL DAILY
Status: DISCONTINUED | OUTPATIENT
Start: 2020-09-14 | End: 2020-09-15 | Stop reason: HOSPADM

## 2020-09-14 RX ORDER — BUPIVACAINE HCL/0.9 % NACL/PF 0.1 %
2 PLASTIC BAG, INJECTION (ML) EPIDURAL EVERY 8 HOURS
Status: COMPLETED | OUTPATIENT
Start: 2020-09-14 | End: 2020-09-15

## 2020-09-14 RX ORDER — SODIUM CHLORIDE, SODIUM LACTATE, POTASSIUM CHLORIDE, CALCIUM CHLORIDE 600; 310; 30; 20 MG/100ML; MG/100ML; MG/100ML; MG/100ML
1000 INJECTION, SOLUTION INTRAVENOUS CONTINUOUS
Status: DISCONTINUED | OUTPATIENT
Start: 2020-09-14 | End: 2020-09-14 | Stop reason: HOSPADM

## 2020-09-14 RX ORDER — ONDANSETRON 2 MG/ML
4 INJECTION INTRAMUSCULAR; INTRAVENOUS AS NEEDED
Status: DISCONTINUED | OUTPATIENT
Start: 2020-09-14 | End: 2020-09-14 | Stop reason: HOSPADM

## 2020-09-14 RX ORDER — NICARDIPINE HYDROCHLORIDE 2.5 MG/ML
INJECTION INTRAVENOUS AS NEEDED
Status: DISCONTINUED | OUTPATIENT
Start: 2020-09-14 | End: 2020-09-14 | Stop reason: SURG

## 2020-09-14 RX ORDER — NALOXONE HCL 0.4 MG/ML
0.04 VIAL (ML) INJECTION AS NEEDED
Status: DISCONTINUED | OUTPATIENT
Start: 2020-09-14 | End: 2020-09-14 | Stop reason: HOSPADM

## 2020-09-14 RX ORDER — OXYCODONE AND ACETAMINOPHEN 10; 325 MG/1; MG/1
1 TABLET ORAL ONCE AS NEEDED
Status: COMPLETED | OUTPATIENT
Start: 2020-09-14 | End: 2020-09-14

## 2020-09-14 RX ORDER — FENTANYL CITRATE 50 UG/ML
25 INJECTION, SOLUTION INTRAMUSCULAR; INTRAVENOUS
Status: DISCONTINUED | OUTPATIENT
Start: 2020-09-14 | End: 2020-09-14 | Stop reason: HOSPADM

## 2020-09-14 RX ORDER — METFORMIN HYDROCHLORIDE 750 MG/1
750 TABLET, EXTENDED RELEASE ORAL 2 TIMES DAILY
Status: DISCONTINUED | OUTPATIENT
Start: 2020-09-14 | End: 2020-09-15 | Stop reason: HOSPADM

## 2020-09-14 RX ORDER — ATORVASTATIN CALCIUM 40 MG/1
80 TABLET, FILM COATED ORAL NIGHTLY
Status: DISCONTINUED | OUTPATIENT
Start: 2020-09-14 | End: 2020-09-15 | Stop reason: HOSPADM

## 2020-09-14 RX ORDER — LIDOCAINE HYDROCHLORIDE 40 MG/ML
SOLUTION TOPICAL AS NEEDED
Status: DISCONTINUED | OUTPATIENT
Start: 2020-09-14 | End: 2020-09-14 | Stop reason: SURG

## 2020-09-14 RX ORDER — HEPARIN SODIUM 1000 [USP'U]/ML
INJECTION, SOLUTION INTRAVENOUS; SUBCUTANEOUS AS NEEDED
Status: DISCONTINUED | OUTPATIENT
Start: 2020-09-14 | End: 2020-09-14 | Stop reason: SURG

## 2020-09-14 RX ORDER — FENTANYL CITRATE 50 UG/ML
INJECTION, SOLUTION INTRAMUSCULAR; INTRAVENOUS AS NEEDED
Status: DISCONTINUED | OUTPATIENT
Start: 2020-09-14 | End: 2020-09-14 | Stop reason: SURG

## 2020-09-14 RX ORDER — BUPIVACAINE HCL/0.9 % NACL/PF 0.1 %
2 PLASTIC BAG, INJECTION (ML) EPIDURAL ONCE
Status: COMPLETED | OUTPATIENT
Start: 2020-09-14 | End: 2020-09-14

## 2020-09-14 RX ORDER — MORPHINE SULFATE 2 MG/ML
2 INJECTION, SOLUTION INTRAMUSCULAR; INTRAVENOUS
Status: DISCONTINUED | OUTPATIENT
Start: 2020-09-14 | End: 2020-09-14 | Stop reason: HOSPADM

## 2020-09-14 RX ORDER — PROPOFOL 10 MG/ML
VIAL (ML) INTRAVENOUS AS NEEDED
Status: DISCONTINUED | OUTPATIENT
Start: 2020-09-14 | End: 2020-09-14 | Stop reason: SURG

## 2020-09-14 RX ORDER — BUPIVACAINE HYDROCHLORIDE 5 MG/ML
INJECTION, SOLUTION PERINEURAL AS NEEDED
Status: DISCONTINUED | OUTPATIENT
Start: 2020-09-14 | End: 2020-09-14 | Stop reason: HOSPADM

## 2020-09-14 RX ORDER — FLUMAZENIL 0.1 MG/ML
0.2 INJECTION INTRAVENOUS AS NEEDED
Status: DISCONTINUED | OUTPATIENT
Start: 2020-09-14 | End: 2020-09-14 | Stop reason: HOSPADM

## 2020-09-14 RX ADMIN — OXYCODONE HYDROCHLORIDE AND ACETAMINOPHEN 1 TABLET: 10; 325 TABLET ORAL at 12:37

## 2020-09-14 RX ADMIN — ACETAMINOPHEN 1000 MG: 500 TABLET, FILM COATED ORAL at 08:06

## 2020-09-14 RX ADMIN — SODIUM CHLORIDE, POTASSIUM CHLORIDE, SODIUM LACTATE AND CALCIUM CHLORIDE 1000 ML: 600; 310; 30; 20 INJECTION, SOLUTION INTRAVENOUS at 07:38

## 2020-09-14 RX ADMIN — ASPIRIN 81 MG: 81 TABLET ORAL at 17:16

## 2020-09-14 RX ADMIN — LIDOCAINE HYDROCHLORIDE 40 MG: 20 INJECTION, SOLUTION INTRAVENOUS at 09:31

## 2020-09-14 RX ADMIN — METFORMIN HYDROCHLORIDE 750 MG: 750 TABLET, EXTENDED RELEASE ORAL at 20:51

## 2020-09-14 RX ADMIN — HYDROCODONE BITARTRATE AND ACETAMINOPHEN 1 TABLET: 5; 325 TABLET ORAL at 21:16

## 2020-09-14 RX ADMIN — ONDANSETRON HYDROCHLORIDE 4 MG: 2 SOLUTION INTRAMUSCULAR; INTRAVENOUS at 11:16

## 2020-09-14 RX ADMIN — Medication 3 MG: at 11:16

## 2020-09-14 RX ADMIN — PHENYLEPHRINE HYDROCHLORIDE 1 MCG/KG/MIN: 10 INJECTION INTRAVENOUS at 09:31

## 2020-09-14 RX ADMIN — HEPARIN SODIUM 1000 UNITS: 1000 INJECTION, SOLUTION INTRAVENOUS; SUBCUTANEOUS at 10:29

## 2020-09-14 RX ADMIN — HYDROCODONE BITARTRATE AND ACETAMINOPHEN 1 TABLET: 5; 325 TABLET ORAL at 17:16

## 2020-09-14 RX ADMIN — ROCURONIUM BROMIDE 50 MG: 10 INJECTION INTRAVENOUS at 09:31

## 2020-09-14 RX ADMIN — PROPOFOL 150 MG: 10 INJECTION, EMULSION INTRAVENOUS at 09:31

## 2020-09-14 RX ADMIN — FENTANYL CITRATE 100 MCG: 50 INJECTION, SOLUTION INTRAMUSCULAR; INTRAVENOUS at 09:31

## 2020-09-14 RX ADMIN — LABETALOL 20 MG/4 ML (5 MG/ML) INTRAVENOUS SYRINGE 10 MG: at 10:53

## 2020-09-14 RX ADMIN — APIXABAN 5 MG: 5 TABLET, FILM COATED ORAL at 17:17

## 2020-09-14 RX ADMIN — SODIUM CHLORIDE 100 ML/HR: 9 INJECTION, SOLUTION INTRAVENOUS at 16:12

## 2020-09-14 RX ADMIN — LIDOCAINE HYDROCHLORIDE 1 EACH: 40 SOLUTION TOPICAL at 09:31

## 2020-09-14 RX ADMIN — Medication 2 G: at 09:31

## 2020-09-14 RX ADMIN — HEPARIN SODIUM 7000 UNITS: 1000 INJECTION, SOLUTION INTRAVENOUS; SUBCUTANEOUS at 10:00

## 2020-09-14 RX ADMIN — DRONEDARONE 400 MG: 400 TABLET, FILM COATED ORAL at 17:18

## 2020-09-14 RX ADMIN — FERROUS SULFATE TAB 325 MG (65 MG ELEMENTAL FE) 325 MG: 325 (65 FE) TAB at 20:51

## 2020-09-14 RX ADMIN — ATORVASTATIN CALCIUM 80 MG: 40 TABLET, FILM COATED ORAL at 20:51

## 2020-09-14 RX ADMIN — NICARDIPINE HYDROCHLORIDE 250 MCG: 25 INJECTION INTRAVENOUS at 10:53

## 2020-09-14 RX ADMIN — MAGNESIUM GLUCONATE 500 MG ORAL TABLET 400 MG: 500 TABLET ORAL at 17:17

## 2020-09-14 RX ADMIN — CEFAZOLIN SODIUM 2 G: 10 INJECTION, POWDER, FOR SOLUTION INTRAVENOUS at 17:38

## 2020-09-14 RX ADMIN — GLYCOPYRROLATE 0.4 MG: 0.2 INJECTION, SOLUTION INTRAMUSCULAR; INTRAVENOUS at 11:16

## 2020-09-14 NOTE — PLAN OF CARE
Goal Outcome Evaluation:  Plan of Care Reviewed With: patient  Progress: no change  Outcome Summary: SWELLING TO NECK, PRESSURE HELD, NOT ALOT OF OUTPUT FROM [place], ICE PACK TO NECK, SURGEON AWARE, CONTINUE TO MONITOR. DRESSING HAS DRIED DRAINAGE, PAIN MED X 1, VSS, BP MARGINALLY HIGH, BUT NOT HIGH ENOUGH TO REQUIRE MEDS. PRN MEDS AVAILABLE.

## 2020-09-14 NOTE — ANESTHESIA PROCEDURE NOTES
Arterial Line      Patient location during procedure: pre-op  Start time: 9/14/2020 8:04 AM  Stop Time:9/14/2020 8:05 AM       Line placed for hemodynamic monitoring, ABGs/Labs/ISTAT and MD/Surgeon request.  Performed By   Anesthesiologist: Sil Rubio MD  Preanesthetic Checklist  Completed: patient identified, site marked, surgical consent, pre-op evaluation, timeout performed, IV checked, risks and benefits discussed and monitors and equipment checked  Arterial Line Prep   Sterile Tech: gloves, sterile barriers and cap  Prep: ChloraPrep  Patient monitoring: blood pressure monitoring, continuous pulse oximetry and EKG  Arterial Line Procedure   Laterality:right  Location:  radial artery  Catheter size: 20 G   Guidance: ultrasound guided  PROCEDURE NOTE/ULTRASOUND INTERPRETATION.  Using ultrasound guidance the potential vascular sites for insertion of the catheter were visualized to determine the patency of the vessel to be used for vascular access.  After selecting the appropriate site for insertion, the needle was visualized under ultrasound being inserted into the radial artery, followed by ultrasound confirmation of wire and catheter placement. There were no abnormalities seen on ultrasound; an image was taken; and the patient tolerated the procedure with no complications.   Number of attempts: 1  Successful placement: yes  Post Assessment   Dressing Type: occlusive dressing applied, secured with tape and wrist guard applied.   Complications no  Circ/Move/Sens Assessment: normal and unchanged.   Patient Tolerance: patient tolerated the procedure well with no apparent complications
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
## 2020-09-14 NOTE — ANESTHESIA POSTPROCEDURE EVALUATION
Patient: Milton Rodriguez    Procedure Summary     Date: 09/14/20 Room / Location: [facility] OR  / [facility] HYBRID OR 12    Anesthesia Start: 0926 Anesthesia Stop: 1132    Procedure: RIGHT CAROTID ENDARTERECTOMY WITH EEG (Right Neck) Diagnosis:       Stenosis of right internal carotid artery      Preop testing      (Stenosis of right internal carotid artery [I65.21])      (Preop testing [Z01.818])    Surgeon: Victorino Valera DO Provider: Fady Raphael CRNA    Anesthesia Type: general ASA Status: 3          Anesthesia Type: general    Vitals  Vitals Value Taken Time   /74 09/14/20 1303   Temp 98.2 °F (36.8 °C) 09/14/20 1240   Pulse 69 09/14/20 1304   Resp 12 09/14/20 1255   SpO2 94 % 09/14/20 1304   Vitals shown include unvalidated device data.        Post Anesthesia Care and Evaluation    Patient location during evaluation: PACU  Patient participation: complete - patient participated  Level of consciousness: awake and alert  Pain management: adequate  Airway patency: patent  Anesthetic complications: No anesthetic complications    Cardiovascular status: acceptable  Respiratory status: acceptable  Hydration status: acceptable    Comments: Blood pressure 149/56, pulse 68, temperature 98.2 °F (36.8 °C), temperature source Temporal, resp. rate 12, SpO2 92 %.    Pt discharged from PACU based on annabella score >8

## 2020-09-14 NOTE — NURSING NOTE
Neck swelling to midline and into right clavicle area.  Pressure held for 10 minutes and ice pack in place.  Area looking better at this time.  Dr. Valera called and notified.  Orders to hold more pressure and continue to monitor

## 2020-09-14 NOTE — ANESTHESIA PREPROCEDURE EVALUATION
Anesthesia Evaluation     Patient summary reviewed   no history of anesthetic complications:  NPO Solid Status: > 8 hours             Airway   Mallampati: I  TM distance: >3 FB  Neck ROM: full  Dental    (+) partials        Pulmonary    (+) a smoker Current Smoked day of surgery,   (-) asthma, sleep apnea  Cardiovascular   Exercise tolerance: good (4-7 METS)    ECG reviewed    (+) hypertension, dysrhythmias Atrial Fib, Atrial Flutter, CHF Diastolic >=55%, hyperlipidemia,     ROS comment: Echo:  ·Left ventricular wall thickness is consistent with mild-to-moderate concentric hypertrophy.  ·Left ventricular systolic function is normal. Estimated ejection fraction is 66 to 70%.  ·Left ventricular diastolic dysfunction (grade I) consistent with impaired relaxation.  ·Left atrial cavity size is mild-to-moderately dilated.  ·Normal right ventricular cavity size and systolic function noted.  ·There is no significant (greater than mild) valvular dysfunction.  ·There is no evidence of intracardiac mass or thrombus.  ·There is a right-to-left shunt seen on bubble study consistent with a PFO.    Neuro/Psych  (+) TIA,     (-) seizures  GI/Hepatic/Renal/Endo    (+)  GERD,  renal disease CRI, diabetes mellitus,     Musculoskeletal     Abdominal    Substance History      OB/GYN          Other   blood dyscrasia anemia,                     Anesthesia Plan    ASA 3     general   (Arterial line)  intravenous induction     Anesthetic plan, all risks, benefits, and alternatives have been provided, discussed and informed consent has been obtained with: patient.  Use of blood products discussed with patient  Consented to blood products.

## 2020-09-14 NOTE — OP NOTE
It is a great pleasure to see this very pleasant 61-year-old lady in follow-up consultation today.  She returns today for cystoscopy.  We have also performed a CT urogram.  She was found on this occasion to have a urinary tract infection with a very significant growth of E. coli which is now been effectively treated and today the urinalysis is quite clear.  CT ABDOMEN AND PELVIS WITHOUT AND WITH CONTRAST  10/28/2019 2:04 PM       HISTORY: Cloudy urine. History of recurrent UTIs. Pneumaturia.     COMPARISON: Renea 3, 2019     TECHNIQUE: Volumetric helical acquisition of CT images from the lung  bases through the symphysis pubis before and after the uneventful  administration of 100 mL Isovue-370 intravenous contrast. Radiation  dose for this scan was reduced using automated exposure control,  adjustment of the mA and/or kV according to patient size, or iterative  reconstruction technique.     FINDINGS: At least three stones measuring up to 8 mm are seen in the  left kidney. There is no hydroureter or hydronephrosis. There is no  perinephric fat stranding. Kidneys are normal in size and  configuration. No suspicious filling defects seen in the opacified  intrarenal collecting systems, ureters, or bladder to suggest a  urothelial malignancy. Left renal cyst is increased in size, now  measuring 8 cm, previously 5.7 cm. This appears thick-walled. An  infected cyst given the history of recurrent urinary tract infections  and pneumaturia is a consideration in the differential. The liver,  spleen, adrenal glands, and pancreas demonstrate no worrisome focal  lesion.  Pelvic structures unremarkable. Normal caliber aorta.  Unremarkable gallbladder.  No free fluid. No free air in the abdomen.  There are no abdominal or pelvic lymph nodes that are abnormal by size  criteria.  There are no dilated loops of small intestine or large  bowel to suggest ileus or obstruction.The visualized lung bases are  unremarkable. Bone windows  Milton Rodriguez  9/14/2020     PREOPERATIVE DIAGNOSIS: Stenosis of right internal carotid artery [I65.21]  Preop testing [Z01.818]     POSTOPERATIVE DIAGNOSIS: Post-Op Diagnosis Codes:     * Stenosis of right internal carotid artery [I65.21]     * Preop testing [Z01.818]     PROCEDURE PERFORMED:   1.  Right carotid endarterectomy with bovine patch angioplasty  2.  Continuous EEG monitoring  3.  Completion arterial duplex     SURGEON: Victorino Valera DO      ANESTHESIA: General.    PREPARATION: Routine.    STAFF: Circulator: Peace Pinedo RN  Scrub Person: Asif Feldman; Angela Mcneil  Vendor Representative: Servando Castillo  Assistant: Marina Rice    Estimated Blood Loss: 100ml    SPECIMENS: Carotid plaque    COMPLICATIONS: None    INDICATIONS: Milton Rodriguez is a 59 y.o. male who we are currently following for lower extremity PAD.  Initially he was seen with complaints of significant short distance right lower extremity claudication.  He did undergo revascularization of his right lower extremity and is now back for follow up.  He denies any claudication to his lower extremities.    He is maintained on Eliquis and Lipitor.  Unfortunately, he does continue to smoke however is wearing a nicotine patch and trying to quit.  He was recently hospitalized with complaints of clumsiness and weakness of his left upper extremity.  He denied any slurred speech facial drooping, or left leg involvement.  He did have a carotid duplex showing significant stenosis needing further evaluation.  CTA of the neck was performed, which I did review in office. The indications, risks, and possible complications of the procedure were explained to the patient, who voiced understanding and wished to proceed with surgery.     PROCEDURE IN DETAIL:   The patient was taken to the operating room and placed on the operating table in a supine position. After general anesthesia was obtained, the right neck and chest  reveal no destructive lesions.                                                                        IMPRESSION:   1. No evidence for renal, ureteral, or bladder calculi.  2. No masses or suspicious filling defects within the opacified  urinary collecting system.  3. Increased size of the thick-walled cystic lesion containing  calcifications in the mid left kidney; an infected cyst and/or abscess  is a consideration in the differential given the hi       Procedure.  Cystoscopy.   Surgeon.    Dyllan  Anesthesia.  Local anesthesia.  Description.  With the patient in the supine position, with the genital area prepped and draped in the customary fashion, with local anesthetic and the urethra, the flexible cystoscope was Inserted.  The urethra is unremarkable.  The interior of the bladder is carefully inspected.  There is no trabeculation.  There is no evidence of a fistula into the bladder.  There is no evidence of neoplasm or stone in the bladder.  There are no other remarkable features.    Impression.  There were 3 issues with addressed today with both review of the radiologic studies and a cystoscopy  1.  Pneumaturia.  We found no evidence of vesicocolic fistula or any other fistula into the bladder.  2.  History of recurrent urinary tract infection we found no obvious problem in the bladder that would contribute to these infections.  I am going to recommend she take Septra 1 single strength once a day for the next 6 months as prophylaxis against infection.  If she gets further infections after that or even during that she is to see me promptly.  3.  I noticed to on the CT scan that the cystic lesion in the left kidney has enlarged over the last 4 months which is a little concerning there is some calcium in the wall of this cyst which is been considered Bosniak 2 with a septation within it.  Her clinical situation does not suggest that this is an abscess.  I am concerned about whether they could be the possibility  was prepped and draped in a sterile manner.  A longitudinal incision was then made along the anterior border the sternocleidomastoid muscle.  Careful dissection was made down through the subcutaneous tissues using the Bovie cautery to ensure hemostasis.  Any crossing veins were ligated with 3-0 silk suture and hemoclips.  The sternocleidomastoid muscle was retracted laterally.  The carotid sheath was entered over the common carotid artery.  The Metzenbaum scissors were used to free up the common carotid artery from its local attachments.  A large vessel loop was placed for proximal vascular control.  Dissection was then carried out distally along the carotid artery encountering the facial vein which was ligated with 2-0 silk suture.  This gave rise to the bifurcation.  The superior thyroid artery was identified and encircled with a 2-0 silk suture for vascular control.  The external carotid artery was dissected free and a large vessel loop was placed for vascular control.  The very distal internal carotid artery was carefully dissected free and a small vessel loop was placed for vascular control.  At this point full exposure was established.  The ansa cervicalis, hypoglossal, vagus nerves were all identified.  The patient was given 9000 units of intravenous heparin.  After 3 minutes the distal internal carotid artery was test clamped.  After 2 minutes there were no EEG monitor changes.  The common and external were then clamped in succession.  Using 11 blade an arteriotomy was made in the common carotid artery.  It was extended up along the distal internal carotid artery with the Hutchison scissors.  There was a significant amount of heavy plaque burden in the proximal internal carotid artery.  Using the [place] elevator the standard endarterectomy was performed removing all the plaque burden.  Heparinized saline was used to irrigate the wound bed and all loose debris was picked clean.  The bovine pericardial patch was  "of a malignancy within the wall of the cyst although I do not think this likely.  I am going to have a discussion with 1 of my colleagues about this as to whether we should aspirate the cyst for malignant cells or consider more significant involvement including possibly surgery.    I did go this very carefully with the patient in detail today.  There are a number of issues clearly from what are noted above that we had to discuss following the procedure.  I answered all her questions.    Plan.  She will start one single strength Septra daily for the next 6 months.  I would have discussions with my partner Dr. Jef Young about the cystic lesion in the kidney as to his opinion about whether further measures should be taken or whether we should continue observation.    Time.  We did have a lengthy discussion following the procedure for 25 minutes which included discussions of the radiologic studies, the findings of cystoscopy, and then careful and pragmatic discussions about the 3 issues which have been noted in detail above    \"This dictation was performed with voice recognition software and may contain errors,  omissions and inadvertent word substitution.\"    " brought to the field and starting distally on the internal carotid artery the patch anastomosis was performed with a 6-0 Prolene in a running fashion.  Both sutures were brought down to the midline.  The patch was then appropriately fashioned proximally.  Starting with 5-0 Prolene the patch anastomosis was continued in a running fashion to meet in the midline.  Prior to completion of the patch anastomosis the appropriate flushing maneuvers were performed and anastomosis was completed.  Flow was reestablished.  Hemostasis was observed.  A completion arterial duplex was performed which showed adequate flow velocities in the common, internal, and external carotid arteries.  There was no evidence of flap formation, debris, or thrombosis.  At this point I felt the result was excellent and no further intervention was warranted.  A separate stab incision was made on the inferior part of the neck and a 15 [place]an round ITALO drain was placed.  It was secured to skin with a 2-0 nylon.  Gelfoam with thrombin was used to ensure hemostasis.  Antibiotic saline was used to irrigate the wound bed.  The platysma muscle was then reapproximated using a 3-0 Vicryl in a running fashion.  The skin was then anesthetized using 18 mL of 0.5% Marcaine plain.  The skin was then reapproximated using a 4-0 Monocryl in a subcuticular fashion.  The wound was then cleaned.   Sterile dressings were applied. The patient tolerated the procedure well. Sponge and needle counts were correct. The patient was then awakened and extubated in the operating room and taken to the recovery room in good condition.The patient awoke from anesthesia neurologically intact and there were no EEG monitor changes throughout.      Victorino Valera,   Date: 9/14/2020 Time: 11:23 CDT    CC:Thanh Weeks MD

## 2020-09-14 NOTE — ANESTHESIA PROCEDURE NOTES
Airway  Urgency: elective    Date/Time: 9/14/2020 9:31 AM  Airway not difficult    General Information and Staff    Patient location during procedure: OR  CRNA: Fady Raphael CRNA    Indications and Patient Condition  Indications for airway management: airway protection    Preoxygenated: yes  MILS maintained throughout  Mask difficulty assessment: 1 - vent by mask    Final Airway Details  Final airway type: endotracheal airway      Successful airway: ETT  Cuffed: yes   Successful intubation technique: direct laryngoscopy  Facilitating devices/methods: intubating stylet  Endotracheal tube insertion site: oral  Blade: Adriel  Blade size: 4  ETT size (mm): 7.5  Cormack-Lehane Classification: grade I - full view of glottis  Placement verified by: chest auscultation and capnometry   Cuff volume (mL): 8  Measured from: lips  ETT/EBT  to lips (cm): 21  Number of attempts at approach: 1  Assessment: lips, teeth, and gum same as pre-op and atraumatic intubation

## 2020-09-15 VITALS
HEIGHT: 66 IN | WEIGHT: 147.49 LBS | TEMPERATURE: 97.7 F | BODY MASS INDEX: 23.7 KG/M2 | OXYGEN SATURATION: 96 % | RESPIRATION RATE: 16 BRPM | HEART RATE: 73 BPM | SYSTOLIC BLOOD PRESSURE: 153 MMHG | DIASTOLIC BLOOD PRESSURE: 72 MMHG

## 2020-09-15 LAB
LAB AP CASE REPORT: NORMAL
PATH REPORT.FINAL DX SPEC: NORMAL
PATH REPORT.GROSS SPEC: NORMAL

## 2020-09-15 PROCEDURE — 25010000002 CEFAZOLIN PER 500 MG: Performed by: SURGERY

## 2020-09-15 PROCEDURE — 99024 POSTOP FOLLOW-UP VISIT: CPT | Performed by: NURSE PRACTITIONER

## 2020-09-15 RX ORDER — ASPIRIN 81 MG/1
81 TABLET ORAL DAILY
Start: 2020-09-15 | End: 2020-10-14 | Stop reason: SDDI

## 2020-09-15 RX ORDER — NICOTINE 21 MG/24HR
1 PATCH, TRANSDERMAL 24 HOURS TRANSDERMAL EVERY 24 HOURS
Qty: 30 PATCH | Refills: 2 | Status: SHIPPED | OUTPATIENT
Start: 2020-09-15 | End: 2021-06-11 | Stop reason: HOSPADM

## 2020-09-15 RX ORDER — HYDROCODONE BITARTRATE AND ACETAMINOPHEN 5; 325 MG/1; MG/1
1 TABLET ORAL EVERY 4 HOURS PRN
Qty: 20 TABLET | Refills: 0 | Status: SHIPPED | OUTPATIENT
Start: 2020-09-15 | End: 2020-09-21

## 2020-09-15 RX ADMIN — HYDROCODONE BITARTRATE AND ACETAMINOPHEN 1 TABLET: 5; 325 TABLET ORAL at 06:19

## 2020-09-15 RX ADMIN — FUROSEMIDE 40 MG: 40 TABLET ORAL at 04:23

## 2020-09-15 RX ADMIN — HYDROCODONE BITARTRATE AND ACETAMINOPHEN 1 TABLET: 5; 325 TABLET ORAL at 01:14

## 2020-09-15 RX ADMIN — PANTOPRAZOLE SODIUM 40 MG: 40 TABLET, DELAYED RELEASE ORAL at 08:13

## 2020-09-15 RX ADMIN — LABETALOL HYDROCHLORIDE 20 MG: 5 INJECTION, SOLUTION INTRAVENOUS at 04:24

## 2020-09-15 RX ADMIN — CEFAZOLIN SODIUM 2 G: 10 INJECTION, POWDER, FOR SOLUTION INTRAVENOUS at 02:05

## 2020-09-15 RX ADMIN — Medication 100 MG: at 08:13

## 2020-09-15 RX ADMIN — FERROUS SULFATE TAB 325 MG (65 MG ELEMENTAL FE) 325 MG: 325 (65 FE) TAB at 08:13

## 2020-09-15 RX ADMIN — ALLOPURINOL 100 MG: 100 TABLET ORAL at 08:13

## 2020-09-15 RX ADMIN — HYDROCODONE BITARTRATE AND ACETAMINOPHEN 1 TABLET: 5; 325 TABLET ORAL at 10:46

## 2020-09-15 RX ADMIN — METFORMIN HYDROCHLORIDE 750 MG: 750 TABLET, EXTENDED RELEASE ORAL at 08:13

## 2020-09-15 RX ADMIN — ASPIRIN 81 MG: 81 TABLET ORAL at 08:13

## 2020-09-15 RX ADMIN — APIXABAN 5 MG: 5 TABLET, FILM COATED ORAL at 08:13

## 2020-09-15 RX ADMIN — FUROSEMIDE 40 MG: 40 TABLET ORAL at 08:13

## 2020-09-15 RX ADMIN — LABETALOL HYDROCHLORIDE 20 MG: 5 INJECTION, SOLUTION INTRAVENOUS at 00:32

## 2020-09-15 RX ADMIN — DRONEDARONE 400 MG: 400 TABLET, FILM COATED ORAL at 08:13

## 2020-09-15 RX ADMIN — DILTIAZEM HYDROCHLORIDE 360 MG: 240 CAPSULE, EXTENDED RELEASE ORAL at 08:13

## 2020-09-15 RX ADMIN — MAGNESIUM GLUCONATE 500 MG ORAL TABLET 400 MG: 500 TABLET ORAL at 08:13

## 2020-09-15 NOTE — PLAN OF CARE
Goal Outcome Evaluation:  Plan of Care Reviewed With: patient  Progress: improving  Outcome Summary: Some oozing noted from incision after ITALO was d/c'd. Has now dried and no further drainage noted. Will be discharged home.

## 2020-09-15 NOTE — PAYOR COMM NOTE
"Lauren Silver (59 y.o. Male) [id number]    D/C  Notification    D/C 9/15    [facility] phone    Fax        Date of Birth Social Security Number Address Home Phone MRN    1960  [id number]  [place] [phone] [id number]    Jehovah's witness Marital Status          Sikhism        Admission Date Admission Type Admitting Provider Attending Provider Department, Room/Bed    9/14/20 Elective Victorino Valera DO  [facility] 3C, 380/1    Discharge Date Discharge Disposition Discharge Destination        9/15/2020 Home or Self Care              Attending Provider: (none)   Allergies: No Known Allergies    Isolation: None   Infection: None   Code Status: CPR    Ht: 167.6 cm (65.98\")   Wt: 66.9 kg (147 lb 7.8 oz)    Admission Cmt: None   Principal Problem: Stenosis of right internal carotid artery [I65.21] More...                 Active Insurance as of 9/14/2020     Primary Coverage     Payor Plan Insurance Group Employer/Plan Group    [employer] BLUE [employer]OSS BLUE [employer]IELD PPO [id number]     Payor Plan Address Payor Plan Phone Number Payor Plan Fax Number Effective Dates    [address] [phone]  1/1/2019 - None Entered    [place] [address]       Subscriber Name Subscriber Birth Date Member ID       LAUREN SILVER 1960 [id number]                 Emergency Contacts      (Rel.) Home Phone Work Phone Mobile Phone    Jodi Silver (Spouse) [phone] -- [phone]               Operative/Procedure Notes (last 48 hours) (Notes from 09/13/20 1254 through 09/15/20 1254)      Victorino Valera DO at 09/14/20 0847        Lauren Silver  9/14/2020     PREOPERATIVE DIAGNOSIS: Stenosis of right internal carotid artery [I65.21]  Preop testing [Z01.818]     POSTOPERATIVE DIAGNOSIS: Post-Op Diagnosis Codes:     * Stenosis of right internal carotid artery [I65.21]     * Preop " testing [Z01.818]     PROCEDURE PERFORMED:   1.  Right carotid endarterectomy with bovine patch angioplasty  2.  Continuous EEG monitoring  3.  Completion arterial duplex     SURGEON: Victorino Valera DO      ANESTHESIA: General.    PREPARATION: Routine.    STAFF: Willieulator: Peace Pinedo, RN  Scrub Person: Asif Feldman; Angela Mcneil  Vendor Representative: Servando Castillo  Assistant: Marina Rice    Estimated Blood Loss: 100ml    SPECIMENS: Carotid plaque    COMPLICATIONS: None    INDICATIONS: Milton Rodriguez is a 59 y.o. male who we are currently following for lower extremity PAD.  Initially he was seen with complaints of significant short distance right lower extremity claudication.  He did undergo revascularization of his right lower extremity and is now back for follow up.  He denies any claudication to his lower extremities.    He is maintained on Eliquis and Lipitor.  Unfortunately, he does continue to smoke however is wearing a nicotine patch and trying to quit.  He was recently hospitalized with complaints of clumsiness and weakness of his left upper extremity.  He denied any slurred speech facial drooping, or left leg involvement.  He did have a carotid duplex showing significant stenosis needing further evaluation.  CTA of the neck was performed, which I did review in office. The indications, risks, and possible complications of the procedure were explained to the patient, who voiced understanding and wished to proceed with surgery.     PROCEDURE IN DETAIL:   The patient was taken to the operating room and placed on the operating table in a supine position. After general anesthesia was obtained, the right neck and chest was prepped and draped in a sterile manner.  A longitudinal incision was then made along the anterior border the sternocleidomastoid muscle.  Careful dissection was made down through the subcutaneous tissues using the Bovie cautery to ensure hemostasis.  Any  crossing veins were ligated with 3-0 silk suture and hemoclips.  The sternocleidomastoid muscle was retracted laterally.  The carotid sheath was entered over the common carotid artery.  The Metzenbaum scissors were used to free up the common carotid artery from its local attachments.  A large vessel loop was placed for proximal vascular control.  Dissection was then carried out distally along the carotid artery encountering the facial vein which was ligated with 2-0 silk suture.  This gave rise to the bifurcation.  The superior thyroid artery was identified and encircled with a 2-0 silk suture for vascular control.  The external carotid artery was dissected free and a large vessel loop was placed for vascular control.  The very distal internal carotid artery was carefully dissected free and a small vessel loop was placed for vascular control.  At this point full exposure was established.  The ansa cervicalis, hypoglossal, vagus nerves were all identified.  The patient was given 9000 units of intravenous heparin.  After 3 minutes the distal internal carotid artery was test clamped.  After 2 minutes there were no EEG monitor changes.  The common and external were then clamped in succession.  Using 11 blade an arteriotomy was made in the common carotid artery.  It was extended up along the distal internal carotid artery with the Hutchison scissors.  There was a significant amount of heavy plaque burden in the proximal internal carotid artery.  Using the [place] elevator the standard endarterectomy was performed removing all the plaque burden.  Heparinized saline was used to irrigate the wound bed and all loose debris was picked clean.  The bovine pericardial patch was brought to the field and starting distally on the internal carotid artery the patch anastomosis was performed with a 6-0 Prolene in a running fashion.  Both sutures were brought down to the midline.  The patch was then appropriately fashioned proximally.   Starting with 5-0 Prolene the patch anastomosis was continued in a running fashion to meet in the midline.  Prior to completion of the patch anastomosis the appropriate flushing maneuvers were performed and anastomosis was completed.  Flow was reestablished.  Hemostasis was observed.  A completion arterial duplex was performed which showed adequate flow velocities in the common, internal, and external carotid arteries.  There was no evidence of flap formation, debris, or thrombosis.  At this point I felt the result was excellent and no further intervention was warranted.  A separate stab incision was made on the inferior part of the neck and a 15 [place] round ITALO drain was placed.  It was secured to skin with a 2-0 nylon.  Gelfoam with thrombin was used to ensure hemostasis.  Antibiotic saline was used to irrigate the wound bed.  The platysma muscle was then reapproximated using a 3-0 Vicryl in a running fashion.  The skin was then anesthetized using 18 mL of 0.5% Marcaine plain.  The skin was then reapproximated using a 4-0 Monocryl in a subcuticular fashion.  The wound was then cleaned.   Sterile dressings were applied. The patient tolerated the procedure well. Sponge and needle counts were correct. The patient was then awakened and extubated in the operating room and taken to the recovery room in good condition.The patient awoke from anesthesia neurologically intact and there were no EEG monitor changes throughout.      Horacio Eng DO  Date: 9/14/2020 Time: 11:23 CDT    CC:Thanh Weeks MD    Electronically signed by Horacio Eng DO at 09/14/20 1124       Discharge Order (From admission, onward)     Start     Ordered    09/15/20 0812  Discharge patient  Once     Expected Discharge Date: 09/15/20    Expected Discharge Time: Morning    Discharge Disposition: Home or Self Care    Physician of Record for Attribution - Please select from Treatment Team: HORACIO ENG [[id number]]    Review needed by  CMO to determine Physician of Record: No       Question Answer Comment   Physician of Record for Attribution - Please select from Treatment Team HORACIO ENG    Review needed by CMO to determine Physician of Record No        09/15/20 0871

## 2020-09-15 NOTE — PLAN OF CARE
Problem: Patient Care Overview  Goal: Plan of Care Review  Outcome: Ongoing, Progressing  Flowsheets (Taken 9/15/2020 0328)  Progress: improving  Plan of Care Reviewed With: patient  Outcome Summary: Pt c/o mild to mod pain in R neck. Cold applied and PO pain med as available w/ desired results. Mod drainage to incision site, ITALO stripped often. Swelling and firmness of periwound tissue has improved. Cognitive/neuro checks intact, no changes. Pt up, voiding w/out difficulty. Elevated BP, labetalol prn x1. IVF, IV abx as ordered. Cont to monitor.

## 2020-09-15 NOTE — DISCHARGE SUMMARY
Date of Discharge:  9/15/2020    Discharge Diagnosis: Stenosis of right internal carotid artery [I65.21]    Presenting Problem/History of Present Illness  Stenosis of right internal carotid artery [I65.21]  Preop testing [Z01.818]  Stenosis of right internal carotid artery [I65.21]  Stenosis of right internal carotid artery [I65.21]       Hospital Course  Patient is a 59 y.o. male who we are currently following for lower extremity PAD.  Initially he was seen with complaints of significant short distance right lower extremity claudication.  He did undergo revascularization of his right lower extremity and is now back for follow up.  He denies any claudication to his lower extremities.    He is maintained on Eliquis and Lipitor.  Unfortunately, he does continue to smoke however is wearing a nicotine patch and trying to quit.  He was recently hospitalized with complaints of clumsiness and weakness of his left upper extremity.  He was found to have significant right carotid stenosis and underwent a right carotid endarterectomy without incident.   He was transferred to  for continued care.  Overnight, he has done well.  His vitals have remained stable, right neck soft without hematoma with mild swelling, and he remains neurologically intact.  He is stable and ready for discharge.  We will see him back in 2 weeks for post operative follow up.  I will send postoperative pain medication as well as a nicotine patch.  I did remove his Marcelo Esposito drain.  His medications will stay the same.  Written and verbal instructions were given.  This all was discussed in full with complete understanding.       Procedures Performed  Procedure(s):  RIGHT CAROTID ENDARTERECTOMY WITH EEG       Consults:   Consults     No orders found for last 30 day(s).            Condition on Discharge: Stable    Discharge Medications     Discharge Medications      New Medications      Instructions Start Date   aspirin 81 MG EC tablet   81 mg, Oral,  Daily      HYDROcodone-acetaminophen 5-325 MG per tablet  Commonly known as: NORCO   1 tablet, Oral, Every 4 Hours PRN         Changes to Medications      Instructions Start Date   nicotine 21 MG/24HR patch  Commonly known as: NICODERM CQ  What changed: Another medication with the same name was added. Make sure you understand how and when to take each.   No dose, route, or frequency recorded.      nicotine 14 MG/24HR patch  Commonly known as: NICODERM CQ  What changed: You were already taking a medication with the same name, and this prescription was added. Make sure you understand how and when to take each.   1 patch, Transdermal, Every 24 Hours         Continue These Medications      Instructions Start Date   allopurinol 100 MG tablet  Commonly known as: ZYLOPRIM   100 mg, Oral, Daily      apixaban 5 MG tablet tablet  Commonly known as: ELIQUIS   5 mg, Oral, Every 12 Hours Scheduled      atorvastatin 80 MG tablet  Commonly known as: Lipitor   80 mg, Oral, Nightly      dilTIAZem  MG 24 hr capsule  Commonly known as: CARDIZEM CD   360 mg, Oral, Daily      dronedarone 400 MG tablet  Commonly known as: Multaq   400 mg, Oral, 2 Times Daily With Meals      ferrous sulfate 325 (65 FE) MG tablet   1 tablet, Oral, 2 times daily      furosemide 40 MG tablet  Commonly known as: LASIX   40 mg, Oral, Daily      Jardiance 25 MG tablet  Generic drug: Empagliflozin   1 tablet, Oral, Daily      magnesium oxide 400 (241.3 Mg) MG tablet tablet  Commonly known as: MAGOX   400 mg, Oral, Daily      metFORMIN  MG 24 hr tablet  Commonly known as: GLUCOPHAGE-XR   750 mg, Oral, 2 Times Daily      pantoprazole 40 MG EC tablet  Commonly known as: PROTONIX   40 mg, Oral, Daily      thiamine 100 MG tablet  Commonly known as: VITAMIN B1   100 mg, Oral, Daily             Discharge Diet: Diabetic diet, thin liquids    Activity at Discharge:  No heavy lifting more than 10 pounds for 1 week.  No bending, stooping, or  straining    Follow-up Appointments  Future Appointments   Date Time Provider Department Center   10/2/2020  1:30 PM Kina Leon APRN [facility] PAD PAD   10/14/2020  9:45 AM Fady Blank MD [facility] PAD PAD   8/30/2021 10:15 AM Kiki Desir APRN [facility] PAD [facility]          I did spend more than 30 minutes reviewing the chart, face to face encounter, and organizing discharge.    DARIEN Sepulveda  09/15/20  13:23 CDT

## 2020-09-16 ENCOUNTER — NURSE TRIAGE (OUTPATIENT)
Dept: CALL CENTER | Facility: HOSPITAL | Age: 60
End: 2020-09-16

## 2020-09-16 ENCOUNTER — HOSPITAL ENCOUNTER (EMERGENCY)
Facility: HOSPITAL | Age: 60
Discharge: HOME | End: 2020-09-16
Attending: EMERGENCY MEDICINE | Admitting: EMERGENCY MEDICINE

## 2020-09-16 ENCOUNTER — READMISSION MANAGEMENT (OUTPATIENT)
Dept: CALL CENTER | Facility: HOSPITAL | Age: 60
End: 2020-09-16

## 2020-09-16 ENCOUNTER — APPOINTMENT (OUTPATIENT)
Dept: CT IMAGING | Facility: HOSPITAL | Age: 60
End: 2020-09-16

## 2020-09-16 VITALS
SYSTOLIC BLOOD PRESSURE: 164 MMHG | RESPIRATION RATE: 18 BRPM | OXYGEN SATURATION: 97 % | HEIGHT: 67 IN | DIASTOLIC BLOOD PRESSURE: 78 MMHG | HEART RATE: 98 BPM | WEIGHT: 152 LBS | BODY MASS INDEX: 23.86 KG/M2 | TEMPERATURE: 98.4 F

## 2020-09-16 DIAGNOSIS — G89.18 POST-OPERATIVE PAIN: Primary | ICD-10-CM

## 2020-09-16 LAB
ALBUMIN SERPL-MCNC: 4 G/DL (ref 3.5–5.2)
ALBUMIN/GLOB SERPL: 1.5 G/DL
ALP SERPL-CCNC: 146 U/L (ref 39–117)
ALT SERPL W P-5'-P-CCNC: 36 U/L (ref 1–41)
ANION GAP SERPL CALCULATED.3IONS-SCNC: 12 MMOL/L (ref 5–15)
APTT PPP: 31 SECONDS (ref 24.1–35)
AST SERPL-CCNC: 20 U/L (ref 1–40)
BASOPHILS # BLD AUTO: 0.07 10*3/MM3 (ref 0–0.2)
BASOPHILS NFR BLD AUTO: 0.6 % (ref 0–1.5)
BILIRUB SERPL-MCNC: <0.2 MG/DL (ref 0–1.2)
BUN SERPL-MCNC: 23 MG/DL (ref 8–23)
BUN/CREAT SERPL: 14.8 (ref 7–25)
CALCIUM SPEC-SCNC: 9 MG/DL (ref 8.6–10.5)
CHLORIDE SERPL-SCNC: 105 MMOL/L (ref 98–107)
CO2 SERPL-SCNC: 23 MMOL/L (ref 22–29)
CREAT SERPL-MCNC: 1.55 MG/DL (ref 0.76–1.27)
DEPRECATED RDW RBC AUTO: 67.7 FL (ref 37–54)
EOSINOPHIL # BLD AUTO: 1.5 10*3/MM3 (ref 0–0.4)
EOSINOPHIL NFR BLD AUTO: 13.6 % (ref 0.3–6.2)
ERYTHROCYTE [DISTWIDTH] IN BLOOD BY AUTOMATED COUNT: 22.6 % (ref 12.3–15.4)
GFR SERPL CREATININE-BSD FRML MDRD: 56 ML/MIN/1.73
GLOBULIN UR ELPH-MCNC: 2.6 GM/DL
GLUCOSE SERPL-MCNC: 291 MG/DL (ref 65–99)
HCT VFR BLD AUTO: 31.2 % (ref 37.5–51)
HGB BLD-MCNC: 9.8 G/DL (ref 13–17.7)
HOLD SPECIMEN: NORMAL
HOLD SPECIMEN: NORMAL
IMM GRANULOCYTES # BLD AUTO: 0.04 10*3/MM3 (ref 0–0.05)
IMM GRANULOCYTES NFR BLD AUTO: 0.4 % (ref 0–0.5)
INR PPP: 1.09 (ref 0.91–1.09)
LYMPHOCYTES # BLD AUTO: 0.41 10*3/MM3 (ref 0.7–3.1)
LYMPHOCYTES NFR BLD AUTO: 3.7 % (ref 19.6–45.3)
MCH RBC QN AUTO: 26.7 PG (ref 26.6–33)
MCHC RBC AUTO-ENTMCNC: 31.4 G/DL (ref 31.5–35.7)
MCV RBC AUTO: 85 FL (ref 79–97)
MONOCYTES # BLD AUTO: 1.1 10*3/MM3 (ref 0.1–0.9)
MONOCYTES NFR BLD AUTO: 10 % (ref 5–12)
NEUTROPHILS NFR BLD AUTO: 7.9 10*3/MM3 (ref 1.7–7)
NEUTROPHILS NFR BLD AUTO: 71.7 % (ref 42.7–76)
NRBC BLD AUTO-RTO: 0 /100 WBC (ref 0–0.2)
PLATELET # BLD AUTO: 239 10*3/MM3 (ref 140–450)
PMV BLD AUTO: 10.3 FL (ref 6–12)
POTASSIUM SERPL-SCNC: 3.6 MMOL/L (ref 3.5–5.2)
PROT SERPL-MCNC: 6.6 G/DL (ref 6–8.5)
PROTHROMBIN TIME: 13.7 SECONDS (ref 11.9–14.6)
RBC # BLD AUTO: 3.67 10*6/MM3 (ref 4.14–5.8)
SODIUM SERPL-SCNC: 140 MMOL/L (ref 136–145)
WBC # BLD AUTO: 11.02 10*3/MM3 (ref 3.4–10.8)
WHOLE BLOOD HOLD SPECIMEN: NORMAL
WHOLE BLOOD HOLD SPECIMEN: NORMAL

## 2020-09-16 PROCEDURE — 36415 COLL VENOUS BLD VENIPUNCTURE: CPT

## 2020-09-16 PROCEDURE — 85610 PROTHROMBIN TIME: CPT | Performed by: EMERGENCY MEDICINE

## 2020-09-16 PROCEDURE — 70498 CT ANGIOGRAPHY NECK: CPT

## 2020-09-16 PROCEDURE — 80053 COMPREHEN METABOLIC PANEL: CPT | Performed by: EMERGENCY MEDICINE

## 2020-09-16 PROCEDURE — 0 IOPAMIDOL PER 1 ML: Performed by: EMERGENCY MEDICINE

## 2020-09-16 PROCEDURE — 99282 EMERGENCY DEPT VISIT SF MDM: CPT

## 2020-09-16 PROCEDURE — 85025 COMPLETE CBC W/AUTO DIFF WBC: CPT | Performed by: EMERGENCY MEDICINE

## 2020-09-16 PROCEDURE — 85730 THROMBOPLASTIN TIME PARTIAL: CPT | Performed by: EMERGENCY MEDICINE

## 2020-09-16 RX ORDER — SODIUM CHLORIDE 0.9 % (FLUSH) 0.9 %
10 SYRINGE (ML) INJECTION AS NEEDED
Status: DISCONTINUED | OUTPATIENT
Start: 2020-09-16 | End: 2020-09-16 | Stop reason: HOSPADM

## 2020-09-16 RX ADMIN — IOPAMIDOL 100 ML: 755 INJECTION, SOLUTION INTRAVENOUS at 07:21

## 2020-09-16 NOTE — OUTREACH NOTE
Prep Survey      Responses   [facility] patient discharged from?  [place]   Is LACE score < 7 ?  No   Eligibility  Readm Mgmt   Discharge diagnosis  RIGHT CAROTID ENDARTERECTOMY WITH EEG   COVID-19 Test Status  Negative   Does the patient have one of the following disease processes/diagnoses(primary or secondary)?  General Surgery   Does the patient have Home health ordered?  No   Is there a DME ordered?  No   Comments regarding appointments  see AVS   Medication alerts for this patient  see AVS   Prep survey completed?  Yes          Sarah Nunez RN
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
## 2020-09-16 NOTE — TELEPHONE ENCOUNTER
"    Reason for Disposition  • [1] Bleeding from incision AND [2] won't stop after 10 minutes of direct pressure    Additional Information  • Negative: [1] Major abdominal surgical incision AND [2] wound gaping open AND [3] visible internal organs  • Negative: Sounds like a life-threatening emergency to the triager  • Negative: [1] Widespread rash AND [2] bright red, sunburn-like  • Negative: Severe pain in the incision  • Negative: [1] Incision gaping open AND [2] < 48 hours since wound re-opened  • Negative: [1] Incision gaping open AND [2] length of opening > 2 inches (5 cm)  • Negative: Patient sounds very sick or weak to the triager  • Negative: Sounds like a serious complication to the triager  • Negative: Fever > 100.4 F (38.0 C)  • Negative: [1] Incision looks infected (spreading redness, pain) AND [2] fever > 99.5 F (37.5 C)  • Negative: [1] Incision looks infected (spreading redness, pain) AND [2] large red area (> 2 in. or 5 cm)  • Negative: [1] Incision looks infected (spreading redness, pain) AND [2] face wound  • Negative: [1] Red streak runs from the incision AND [2] longer than 1 inch (2.5 cm)  • Negative: [1] Pus or bad-smelling fluid draining from incision AND [2] no fever    Answer Assessment - Initial Assessment Questions  1. SYMPTOM: \"What's the main symptom you're concerned about?\" (e.g., redness, pain, drainage)      Swelling drainage  2. ONSET: \"When did  bleeding  start?\"       Last night swelling is worse and bloody drainage  3. SURGERY: \"What surgery was performed?\"      Had arteries n my neck cleaned out\"  4. DATE of SURGERY: \"When was surgery performed?\"       monday  5. INCISION SITE: \"Where is the incision located?\"       neck  6. REDNESS: \"Is there any redness at the incision site?\" If yes, ask: \"How wide across is the redness?\" (Inches, centimeters)        Yes red and swollen  7. PAIN: \"Is there any pain?\" If so, ask: \"How bad is it?\"  (Scale 1-10; or mild, moderate, severe)      " "yes  8. BLEEDING: \"Is there any bleeding?\" If so, ask: \"How much?\" and \"Where?\"      On bandage spots of blood  9. DRAINAGE: \"Is there any drainage from the incision site?\" If yes, ask: \"What color and how much?\" (e.g., red, cloudy, pus; drops, teaspoon)   bloody  10. FEVER: \"Do you have a fever?\" If so, ask: \"What is your temperature, how was it measured, and when did it start?\"       afebrile  11. OTHER SYMPTOMS: \"Do you have any other symptoms?\" (e.g., shaking chills, weakness, rash elsewhere on body)       none    Protocols used: POST-OP INCISION SYMPTOMS AND QUESTIONS-ADULT-AH      "

## 2020-09-22 ENCOUNTER — READMISSION MANAGEMENT (OUTPATIENT)
Dept: CALL CENTER | Facility: HOSPITAL | Age: 60
End: 2020-09-22

## 2020-09-22 NOTE — OUTREACH NOTE
General Surgery Week 1 Survey      Responses   [facility] patient discharged from?  [place]   Does the patient have one of the following disease processes/diagnoses(primary or secondary)?  General Surgery   Is there a successful TCM telephone encounter documented?  No   Week 1 attempt successful?  No   Unsuccessful attempts  Attempt 1          Mannie Lechuga RN

## 2020-10-01 ENCOUNTER — TELEPHONE (OUTPATIENT)
Dept: VASCULAR SURGERY | Facility: CLINIC | Age: 60
End: 2020-10-01

## 2020-10-01 NOTE — TELEPHONE ENCOUNTER
Spoke with Mr Rodriguez reminding him of his appointment for Friday, October 2nd, 2020 at 130 pm with Kina LEDEZMA. Mr Rodriguez confirmed he would be here.

## 2020-10-02 ENCOUNTER — OFFICE VISIT (OUTPATIENT)
Dept: VASCULAR SURGERY | Facility: CLINIC | Age: 60
End: 2020-10-02

## 2020-10-02 VITALS
HEIGHT: 67 IN | SYSTOLIC BLOOD PRESSURE: 118 MMHG | WEIGHT: 152 LBS | BODY MASS INDEX: 23.86 KG/M2 | DIASTOLIC BLOOD PRESSURE: 78 MMHG

## 2020-10-02 DIAGNOSIS — I10 ESSENTIAL HYPERTENSION: ICD-10-CM

## 2020-10-02 DIAGNOSIS — I73.9 PAD (PERIPHERAL ARTERY DISEASE) (HCC): ICD-10-CM

## 2020-10-02 DIAGNOSIS — E78.2 MIXED HYPERLIPIDEMIA: ICD-10-CM

## 2020-10-02 DIAGNOSIS — I65.23 BILATERAL CAROTID ARTERY STENOSIS: Primary | ICD-10-CM

## 2020-10-02 PROCEDURE — 99024 POSTOP FOLLOW-UP VISIT: CPT | Performed by: NURSE PRACTITIONER

## 2020-10-05 ENCOUNTER — READMISSION MANAGEMENT (OUTPATIENT)
Dept: CALL CENTER | Facility: HOSPITAL | Age: 60
End: 2020-10-05

## 2020-10-05 NOTE — OUTREACH NOTE
General Surgery Week 3 Survey      Responses   [facility] patient discharged from?  [place]   Does the patient have one of the following disease processes/diagnoses(primary or secondary)?  General Surgery   Week 3 attempt successful?  Yes   Call start time  1123   Call end time  1127   Discharge diagnosis  RIGHT CAROTID ENDARTERECTOMY WITH EEG   Is patient permission given to speak with other caregiver?  Yes   Person spoke with today (if not patient) and relationship  wife   Meds reviewed with patient/caregiver?  Yes   Is the patient having any side effects they believe may be caused by any medication additions or changes?  No   Does the patient have all medications related to this admission filled (includes all antibiotics, pain medications, etc.)  Yes   Is the patient taking all medications as directed (includes completed medication regime)?  Yes   Does the patient have a follow up appointment scheduled with their surgeon?  Yes   Has the patient kept scheduled appointments due by today?  Yes   Has home health visited the patient within 72 hours of discharge?  N/A   Psychosocial issues?  No   Did the patient receive a copy of their discharge instructions?  Yes   Nursing interventions  Reviewed instructions with patient   What is the patient's perception of their health status since discharge?  Improving   Nursing interventions  Nurse provided patient education   Is the patient /caregiver able to teach back basic post-op care?  Continue use of incentive spirometry at least 1 week post discharge, Practice 'cough and deep breath', Lifting as instructed by MD in discharge instructions, Do not remove steri-strips, No tub bath, swimming, or hot tub until instructed by MD, Drive as instructed by MD in discharge instructions   Is the patient/caregiver able to teach back signs and symptoms of incisional infection?  Increased drainage or bleeding, Increased redness, swelling or pain at the incisonal site, Incisional  warmth, Pus or odor from incision, Fever   Is the patient/caregiver able to teach back steps to recovery at home?  Set small, achievable goals for return to baseline health, Rest and rebuild strength, gradually increase activity, Eat a well-balance diet, Make a list of questions for surgeon's appointment   Is the patient/caregiver able to teach back the hierarchy of who to call/visit for symptoms/problems? 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  Incision looks good.   Week 3 call completed?  Yes   Revoked  No further contact(revokes)-requires comment   Graduated/Revoked comments  Improving, no issues at this time.          Chelo Khalil RN

## 2020-10-14 ENCOUNTER — OFFICE VISIT (OUTPATIENT)
Dept: NEUROLOGY | Facility: CLINIC | Age: 60
End: 2020-10-14

## 2020-10-14 VITALS
BODY MASS INDEX: 23.54 KG/M2 | HEART RATE: 90 BPM | SYSTOLIC BLOOD PRESSURE: 110 MMHG | DIASTOLIC BLOOD PRESSURE: 62 MMHG | RESPIRATION RATE: 18 BRPM | WEIGHT: 150 LBS | HEIGHT: 67 IN

## 2020-10-14 DIAGNOSIS — I63.9 CEREBROVASCULAR ACCIDENT (CVA), UNSPECIFIED MECHANISM (HCC): Primary | ICD-10-CM

## 2020-10-14 PROCEDURE — 99214 OFFICE O/P EST MOD 30 MIN: CPT | Performed by: PSYCHIATRY & NEUROLOGY

## 2020-10-14 NOTE — PATIENT INSTRUCTIONS
Patient to get to emergency room immediately if stroke symptoms occur.  Patient to have driving precautions and safety precautions as previously discussed.  Patient not be climbing or using sharp cutting tools / have caution working around hot fire/stove/water/grill.  Patient to check with PCP and other physicians as to when to add aspirin to the Eliquis as and if indicated when patient's because of the anemia is found then resolved.
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
## 2020-10-20 NOTE — PROGRESS NOTES
10/02/2020       Thanh Weeks MD  [address]   [place] [address]        Milton Rodriguez  1960    Chief Complaint   Patient presents with   • Follow-up     2 Week Post-Op Follow Up For RIGHT CAROTID ENDARTERECTOMY WITH EEG. Patient denies any stroke like symptoms.    • other     Patient states the incision on the Right Side feels a little numb, sore, and hard.    • Smoker/Non-Smoker     Patient is Current Everyday Smoker - Working on Quiting using patch   • Med Management     Verbally verified medications with patient. Mr Rodriguez verbalized all medications are correct and up to date        Dear Thanh Weeks MD:    HPI     I had the pleasure of seeing you patient in the office today for follow up.  As you recall, the patient is a 59 y.o. male who we are currently following for lower extremity PAD.  Initially he was seen with complaints of significant short distance right lower extremity claudication.  He did undergo revascularization of his right lower extremity and is now back for follow up.  He denies any claudication to his lower extremities.    He is maintained on Eliquis and Lipitor.  Unfortunately, he does continue to smoke however is wearing a nicotine patch and trying to quit.  He was recently hospitalized with complaints of clumsiness and weakness of his left upper extremity.  He denied any slurred speech facial drooping, or left leg involvement.  He did undergo a right carotid endarterectomy on 9/14/2020 and is now back for follow-up.  His right neck incision has healed.  He was seen in the emergency room 2 days after surgery with complaints of swelling to his right neck.  He did have a CTA of the neck performed and Dr. Valera was called at that time.    Review of Systems   Constitutional: Negative.    HENT: Negative.    Eyes: Negative.    Respiratory: Negative.  Negative for shortness of breath.    Cardiovascular: Positive for leg swelling.   Gastrointestinal:  "Negative.    Endocrine: Negative.    Genitourinary: Negative.    Musculoskeletal: Negative.    Skin: Negative.    Allergic/Immunologic: Negative.    Neurological: Negative.    Hematological: Negative.    Psychiatric/Behavioral: Negative.    All other systems reviewed and are negative.      /78 (BP Location: Right arm, Patient Position: Sitting, Cuff Size: Adult)   Ht 170.2 cm (67\")   Wt 68.9 kg (152 lb)   BMI 23.81 kg/m²     Physical Exam  Vitals signs and nursing note reviewed.   Constitutional:       General: He is not in acute distress.     Appearance: Normal appearance. He is well-developed. He is not diaphoretic.   HENT:      Head: Normocephalic and atraumatic.   Eyes:      Pupils: Pupils are equal, round, and reactive to light.   Neck:      Vascular: No carotid bruit or JVD.      Comments: Right neck incision healed  Cardiovascular:      Rate and Rhythm: Normal rate and regular rhythm.      Pulses:           Femoral pulses are 2+ on the right side and 2+ on the left side.       Popliteal pulses are 2+ on the right side and 2+ on the left side.        Dorsalis pedis pulses are 2+ on the right side and 2+ on the left side.        Posterior tibial pulses are 0 on the right side and 2+ on the left side.      Heart sounds: Normal heart sounds, S1 normal and S2 normal. No murmur. No friction rub. No gallop.       Comments: Right: doppler PT, peroneal  Swelling to bilateral lower extremities     Pulmonary:      Effort: Pulmonary effort is normal.      Breath sounds: Normal breath sounds.   Abdominal:      General: Bowel sounds are normal. There is no abdominal bruit.      Palpations: Abdomen is soft.      Tenderness: There is no abdominal tenderness.   Musculoskeletal: Normal range of motion.   Skin:     General: Skin is warm and dry.   Neurological:      General: No focal deficit present.      Mental Status: He is alert and oriented to person, place, and time.      Cranial Nerves: No cranial nerve deficit. "   Psychiatric:         Mood and Affect: Mood normal.         Behavior: Behavior normal. Behavior is cooperative.         Thought Content: Thought content normal.         Judgment: Judgment normal.         Diagnostic Data:   No results found.     Patient Active Problem List   Diagnosis   • Tobacco abuse   • Hypertension   • PAD (peripheral artery disease) (CMS/[facility])   • Hyperlipidemia   • Type 2 diabetes mellitus with autonomic neuropathy (CMS/[facility])   • Atrial flutter (CMS/[facility])   • Lower extremity edema   • Chronic anticoagulation   • Hypokalemia   • Heme positive stool   • Congestive heart failure (CMS/[facility])   • Lymphedema   • Cerebrovascular accident (CVA) (CMS/[facility])   • CKD (chronic kidney disease) stage 3, GFR 30-59 ml/min   • CVA (cerebral vascular accident) (CMS/[facility])   • Transient ischemic attack (TIA)   • Chronic diastolic CHF (congestive heart failure) (CMS/[facility])   • Alcohol abuse   • PFO (patent foramen ovale)   • Bilateral carotid artery stenosis   • Stenosis of right internal carotid artery   • Preop testing         ICD-10-CM ICD-9-CM   1. Bilateral carotid artery stenosis  I65.23 433.10     433.30   2. PAD (peripheral artery disease) (CMS/[facility])  I73.9 443.9   3. Essential hypertension  I10 401.9   4. Mixed hyperlipidemia  E78.2 272.2       PLAN: After thoroughly evaluating Milton Rodriguez, I am pleased to report overall he is doing well status post right carotid endarterectomy.  His right neck incision has healed.  He did have a hematoma postoperatively and was seen in the ER.  This is improving.  At this point, he is free to resume normal activity.  We will see him back in 6 months with repeat noninvasive testing for continued surveillance, including a carotid duplex and ABIs.   I did discuss vascular risk factors as they pertain to the progression of vascular disease including controlling controlling his hypertension, diabetes mellitus,  hyperlipidemia, and smoking cessation.  I advised Milton of  the risks of continuing to use tobacco, and I provided him with tobacco cessation educational materials in the After Visit Summary. During this visit, I spent 5 minutes counseling the patient regarding tobacco cessation.  He is currently wearing a nicotine patch and trying to quit.  The patient is to continue taking their medications as previously discussed.   This was all discussed in full with complete understanding.    Thank you for allowing me to participate in the care of your patient.  Please do not hesitate to call with any questions or concerns.  We will keep you aware of any further encounters with Milton Rodriguez.      Sincerely Yours,        DARIEN Sepulveda

## 2020-10-30 ENCOUNTER — TELEPHONE (OUTPATIENT)
Dept: GASTROENTEROLOGY | Facility: CLINIC | Age: 60
End: 2020-10-30

## 2020-11-05 ENCOUNTER — TRANSCRIBE ORDERS (OUTPATIENT)
Dept: LAB | Facility: HOSPITAL | Age: 60
End: 2020-11-05

## 2020-11-05 DIAGNOSIS — Z01.818 PRE-OP TESTING: Primary | ICD-10-CM

## 2020-11-10 ENCOUNTER — LAB (OUTPATIENT)
Dept: LAB | Facility: HOSPITAL | Age: 60
End: 2020-11-10

## 2020-11-10 PROCEDURE — U0003 INFECTIOUS AGENT DETECTION BY NUCLEIC ACID (DNA OR RNA); SEVERE ACUTE RESPIRATORY SYNDROME CORONAVIRUS 2 (SARS-COV-2) (CORONAVIRUS DISEASE [COVID-19]), AMPLIFIED PROBE TECHNIQUE, MAKING USE OF HIGH THROUGHPUT TECHNOLOGIES AS DESCRIBED BY CMS-2020-01-R: HCPCS | Performed by: INTERNAL MEDICINE

## 2020-11-10 PROCEDURE — C9803 HOPD COVID-19 SPEC COLLECT: HCPCS | Performed by: INTERNAL MEDICINE

## 2020-11-11 ENCOUNTER — TELEPHONE (OUTPATIENT)
Dept: GASTROENTEROLOGY | Facility: CLINIC | Age: 60
End: 2020-11-11

## 2020-11-12 LAB
COVID LABCORP PRIORITY: NORMAL
SARS-COV-2 RNA RESP QL NAA+PROBE: NOT DETECTED

## 2020-11-13 ENCOUNTER — HOSPITAL ENCOUNTER (OUTPATIENT)
Facility: HOSPITAL | Age: 60
Setting detail: HOSPITAL OUTPATIENT SURGERY
Discharge: HOME | End: 2020-11-13
Attending: INTERNAL MEDICINE | Admitting: INTERNAL MEDICINE

## 2020-11-13 ENCOUNTER — ANESTHESIA EVENT (OUTPATIENT)
Dept: GASTROENTEROLOGY | Facility: HOSPITAL | Age: 60
End: 2020-11-13

## 2020-11-13 ENCOUNTER — ANESTHESIA (OUTPATIENT)
Dept: GASTROENTEROLOGY | Facility: HOSPITAL | Age: 60
End: 2020-11-13

## 2020-11-13 VITALS
HEIGHT: 67 IN | TEMPERATURE: 96.7 F | BODY MASS INDEX: 23.54 KG/M2 | RESPIRATION RATE: 24 BRPM | HEART RATE: 116 BPM | WEIGHT: 150 LBS | OXYGEN SATURATION: 99 % | DIASTOLIC BLOOD PRESSURE: 76 MMHG | SYSTOLIC BLOOD PRESSURE: 120 MMHG

## 2020-11-13 DIAGNOSIS — R19.5 HEME POSITIVE STOOL: Primary | ICD-10-CM

## 2020-11-13 LAB — GLUCOSE BLDC GLUCOMTR-MCNC: 94 MG/DL (ref 70–130)

## 2020-11-13 PROCEDURE — 82962 GLUCOSE BLOOD TEST: CPT

## 2020-11-13 PROCEDURE — 43235 EGD DIAGNOSTIC BRUSH WASH: CPT | Performed by: INTERNAL MEDICINE

## 2020-11-13 PROCEDURE — 25010000002 PROPOFOL 10 MG/ML EMULSION: Performed by: NURSE ANESTHETIST, CERTIFIED REGISTERED

## 2020-11-13 RX ORDER — METOPROLOL TARTRATE 5 MG/5ML
2.5 INJECTION INTRAVENOUS
Status: COMPLETED | OUTPATIENT
Start: 2020-11-13 | End: 2020-11-13

## 2020-11-13 RX ORDER — METOPROLOL TARTRATE 5 MG/5ML
2.5 INJECTION INTRAVENOUS
Status: DISCONTINUED | OUTPATIENT
Start: 2020-11-13 | End: 2020-11-13 | Stop reason: HOSPADM

## 2020-11-13 RX ORDER — PROPOFOL 10 MG/ML
VIAL (ML) INTRAVENOUS AS NEEDED
Status: DISCONTINUED | OUTPATIENT
Start: 2020-11-13 | End: 2020-11-13 | Stop reason: SURG

## 2020-11-13 RX ORDER — SODIUM CHLORIDE 0.9 % (FLUSH) 0.9 %
10 SYRINGE (ML) INJECTION EVERY 12 HOURS SCHEDULED
Status: CANCELLED | OUTPATIENT
Start: 2020-11-13

## 2020-11-13 RX ORDER — SODIUM CHLORIDE 9 MG/ML
100 INJECTION, SOLUTION INTRAVENOUS CONTINUOUS
Status: CANCELLED | OUTPATIENT
Start: 2020-11-13

## 2020-11-13 RX ORDER — SODIUM CHLORIDE 9 MG/ML
500 INJECTION, SOLUTION INTRAVENOUS CONTINUOUS PRN
Status: DISCONTINUED | OUTPATIENT
Start: 2020-11-13 | End: 2020-11-13 | Stop reason: HOSPADM

## 2020-11-13 RX ORDER — LIDOCAINE HYDROCHLORIDE 10 MG/ML
0.5 INJECTION, SOLUTION EPIDURAL; INFILTRATION; INTRACAUDAL; PERINEURAL ONCE AS NEEDED
Status: DISCONTINUED | OUTPATIENT
Start: 2020-11-13 | End: 2020-11-13 | Stop reason: HOSPADM

## 2020-11-13 RX ORDER — SODIUM CHLORIDE 0.9 % (FLUSH) 0.9 %
10 SYRINGE (ML) INJECTION AS NEEDED
Status: CANCELLED | OUTPATIENT
Start: 2020-11-13

## 2020-11-13 RX ORDER — SODIUM CHLORIDE 0.9 % (FLUSH) 0.9 %
10 SYRINGE (ML) INJECTION AS NEEDED
Status: DISCONTINUED | OUTPATIENT
Start: 2020-11-13 | End: 2020-11-13 | Stop reason: HOSPADM

## 2020-11-13 RX ADMIN — PROPOFOL 50 MG: 10 INJECTION, EMULSION INTRAVENOUS at 08:52

## 2020-11-13 RX ADMIN — METOPROLOL TARTRATE 2.5 MG: 5 INJECTION INTRAVENOUS at 07:50

## 2020-11-13 RX ADMIN — PROPOFOL 100 MG: 10 INJECTION, EMULSION INTRAVENOUS at 08:46

## 2020-11-13 RX ADMIN — SODIUM CHLORIDE 500 ML: 9 INJECTION, SOLUTION INTRAVENOUS at 07:36

## 2020-11-13 RX ADMIN — METOPROLOL TARTRATE 2.5 MG: 5 INJECTION INTRAVENOUS at 08:11

## 2020-11-13 RX ADMIN — LIDOCAINE HYDROCHLORIDE 100 MG: 20 INJECTION, SOLUTION INTRAVENOUS at 08:46
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
## 2020-11-13 NOTE — ANESTHESIA PREPROCEDURE EVALUATION
Anesthesia Evaluation     Patient summary reviewed and Nursing notes reviewed   no history of anesthetic complications:  NPO Solid Status: > 8 hours  NPO Liquid Status: > 8 hours           Airway   Mallampati: I  TM distance: >3 FB  Neck ROM: full  Dental    (+) partials        Pulmonary    (+) a smoker Current,   (-) asthma, sleep apnea  Cardiovascular   Exercise tolerance: good (4-7 METS)    ECG reviewed    (+) hypertension, dysrhythmias Atrial Fib, Atrial Flutter, CHF Diastolic >=55%, hyperlipidemia,  carotid artery disease (s/p right cea) carotid bilateral    ROS comment: Echo:  ·Left ventricular wall thickness is consistent with mild-to-moderate concentric hypertrophy.  ·Left ventricular systolic function is normal. Estimated ejection fraction is 66 to 70%.  ·Left ventricular diastolic dysfunction (grade I) consistent with impaired relaxation.  ·Left atrial cavity size is mild-to-moderately dilated.  ·Normal right ventricular cavity size and systolic function noted.  ·There is no significant (greater than mild) valvular dysfunction.  ·There is no evidence of intracardiac mass or thrombus.  ·There is a right-to-left shunt seen on bubble study consistent with a PFO.    Neuro/Psych  (+) TIA,     (-) seizures  GI/Hepatic/Renal/Endo    (+)  GERD,  renal disease CRI, diabetes mellitus,     Musculoskeletal     Abdominal    Substance History      OB/GYN          Other   blood dyscrasia anemia,                       Anesthesia Plan    ASA 3     MAC   (Pt with . Known h/o afib/flutter. He takes diltiazem and multaq which he took yesterday. He repots he is stressed out due t personal reasons. Otherwise feeling ok. BP stable. I discussed risks of proceeding versus waiting for better HR control. He has been of [employer] for procedure. Will give iv metoprolol in preoop and procede. )  intravenous induction     Anesthetic plan, all risks, benefits, and alternatives have been provided, discussed and informed consent has  been obtained with: patient.  Use of blood products discussed with patient  Consented to blood products.

## 2020-11-13 NOTE — DISCHARGE INSTRUCTIONS
PATIENT HAS AN APPOINTMENT WITH DR BAKER ON November 23 WITH ARRIVAL TIME OF 1:15 P.M.   PATIENT NEEDS TO HAVE LAB (CBC) DRAWN PRIOR TO THAT APPOINTMENT

## 2020-11-13 NOTE — NURSING NOTE
0846 Dr Robertson notified of heart rate continue at 120 - 121 after 2 doses of metoprolol with total of 5 mg.

## 2020-11-13 NOTE — ANESTHESIA POSTPROCEDURE EVALUATION
Patient: Milton Rodriguez    Procedure Summary     Date: 11/13/20 Room / Location: [facility] ENDOSCOPY 4 / [facility] ENDOSCOPY    Anesthesia Start: 0845 Anesthesia Stop: 0900    Procedure: ESOPHAGOGASTRODUODENOSCOPY WITH ANESTHESIA (N/A ) Diagnosis:       Heme positive stool      (Heme positive stool [R19.5])    Surgeon: Adan Gomez DO Provider: Delbert Lema CRNA    Anesthesia Type: MAC ASA Status: 3          Anesthesia Type: MAC    Vitals  Vitals Value Taken Time   /72 11/13/20 0905   Temp     Pulse 113 11/13/20 0908   Resp 26 11/13/20 0905   SpO2 99 % 11/13/20 0908   Vitals shown include unvalidated device data.        Post Anesthesia Care and Evaluation    Patient location during evaluation: PHASE II  Level of consciousness: awake and alert  Pain management: adequate  Airway patency: patent  Anesthetic complications: No anesthetic complications    Cardiovascular status: acceptable  Respiratory status: acceptable  Hydration status: acceptable

## 2020-11-23 ENCOUNTER — OFFICE VISIT (OUTPATIENT)
Dept: GASTROENTEROLOGY | Facility: CLINIC | Age: 60
End: 2020-11-23

## 2020-11-23 ENCOUNTER — LAB (OUTPATIENT)
Dept: LAB | Facility: HOSPITAL | Age: 60
End: 2020-11-23

## 2020-11-23 VITALS
TEMPERATURE: 98 F | BODY MASS INDEX: 25.43 KG/M2 | OXYGEN SATURATION: 96 % | HEART RATE: 120 BPM | WEIGHT: 162 LBS | DIASTOLIC BLOOD PRESSURE: 68 MMHG | HEIGHT: 67 IN | SYSTOLIC BLOOD PRESSURE: 112 MMHG

## 2020-11-23 DIAGNOSIS — D64.9 ANEMIA, UNSPECIFIED TYPE: ICD-10-CM

## 2020-11-23 DIAGNOSIS — R19.5 HEME POSITIVE STOOL: Primary | ICD-10-CM

## 2020-11-23 DIAGNOSIS — Q27.30 AVM (ARTERIOVENOUS MALFORMATION): ICD-10-CM

## 2020-11-23 LAB
DEPRECATED RDW RBC AUTO: 51.4 FL (ref 37–54)
ERYTHROCYTE [DISTWIDTH] IN BLOOD BY AUTOMATED COUNT: 15.3 % (ref 12.3–15.4)
HCT VFR BLD AUTO: 32.7 % (ref 37.5–51)
HGB BLD-MCNC: 10.6 G/DL (ref 13–17.7)
MCH RBC QN AUTO: 30.1 PG (ref 26.6–33)
MCHC RBC AUTO-ENTMCNC: 32.4 G/DL (ref 31.5–35.7)
MCV RBC AUTO: 92.9 FL (ref 79–97)
PLATELET # BLD AUTO: 232 10*3/MM3 (ref 140–450)
PMV BLD AUTO: 10.5 FL (ref 6–12)
RBC # BLD AUTO: 3.52 10*6/MM3 (ref 4.14–5.8)
WBC # BLD AUTO: 5.57 10*3/MM3 (ref 3.4–10.8)

## 2020-11-23 PROCEDURE — 99214 OFFICE O/P EST MOD 30 MIN: CPT | Performed by: INTERNAL MEDICINE

## 2020-11-23 PROCEDURE — 36415 COLL VENOUS BLD VENIPUNCTURE: CPT

## 2020-11-23 PROCEDURE — 85027 COMPLETE CBC AUTOMATED: CPT

## 2020-11-23 NOTE — PROGRESS NOTES
Chief Complaint   Patient presents with   • Anemia     last endo 11/13/2020       PCP: Thanh Weeks MD    Subjective   HPI         Milton Rodriguez is a 59 y.o. male who underwent endoscopy on 11/13/2020 for further evaluation of anemia  Pt tolerated procedure well without any complications.   Endoscopically He  was found to have multiple angiodysplastic lesions in stomach.  Biopsies were not taken during procedure.  Histologically biopsies taken during procedure were found to be no biopsy taken for patholgy.  H Pylori biopsy Not done   He currently denies difficulty with abdominal pain, changes in bowels.   Denies melena, bright red blood per rectum.  Blood thinner has been held since EGD.  No abdominal pain.  No nausea/vomiting.       Past Medical History:   Diagnosis Date   • Abnormal PFT    • Acute diastolic heart failure (CMS/HCC)    • Arrhythmia     a-fib/a-flutter   • Atrial fibrillation (CMS/HCC)    • Atrial flutter (CMS/HCC)    • Atrial flutter by electrocardiogram (CMS/[facility])     11/2014- ECHO: EF 55-60%, mild PHTN, left atrium mild-moderately dilated, mild-moderate TR.   • Cerebrovascular accident (CVA) (CMS/HCC) 7/29/2020   • CHF (congestive heart failure) (CMS/HCC)    • Diabetes mellitus (CMS/HCC)    • Edema    • Essential hypertension    • Hyperlipidemia    • Hypertension    • Iron deficiency anemia    • Loose bowel movement    • PAD (peripheral artery disease) (CMS/HCC)    • Pulmonary HTN (CMS/HCC)    • Tobacco abuse    • Type 2 diabetes mellitus with autonomic neuropathy (CMS/HCC)      Outpatient Medications Marked as Taking for the 11/23/20 encounter (Office Visit) with Adan Gomez, DO   Medication Sig Dispense Refill   • allopurinol (ZYLOPRIM) 100 MG tablet Take 100 mg by mouth Daily.     • atorvastatin (Lipitor) 80 MG tablet Take 1 tablet by mouth Every Night. 30 tablet 0   • dilTIAZem CD (CARDIZEM CD) 360 MG 24 hr capsule Take 1 capsule by mouth Daily. 30 capsule 11   •  dronedarone (Multaq) 400 MG tablet Take 1 tablet by mouth 2 (Two) Times a Day With Meals. 60 tablet 11   • Empagliflozin (Jardiance) 25 MG tablet Take 1 tablet by mouth Daily.     • ferrous sulfate 325 (65 FE) MG tablet Take 1 tablet by mouth 2 (two) times a day.     • furosemide (LASIX) 40 MG tablet Take 40 mg by mouth Daily.     • magnesium oxide (MAGOX) 400 (241.3 Mg) MG tablet tablet Take 400 mg by mouth Daily.     • metFORMIN ER (GLUCOPHAGE-XR) 750 MG 24 hr tablet Take 750 mg by mouth 2 (Two) Times a Day.     • nicotine (NICODERM CQ) 14 MG/24HR patch Place 1 patch on the skin as directed by provider Daily. 30 patch 2   • nicotine (NICODERM CQ) 21 MG/24HR patch      • pantoprazole (PROTONIX) 40 MG EC tablet Take 1 tablet by mouth Daily. 30 tablet 0   • thiamine (VITAMIN B1) 100 MG tablet Take 1 tablet by mouth Daily. 30 tablet 0   • [DISCONTINUED] apixaban (ELIQUIS) 5 MG tablet tablet Take 1 tablet by mouth Every 12 (Twelve) Hours. 60 tablet 11     No Known Allergies  Social History     Socioeconomic History   • Marital status:      Spouse name: Not on file   • Number of children: Not on file   • Years of education: Not on file   • Highest education level: Not on file   Tobacco Use   • Smoking status: Current Some Day Smoker     Packs/day: 0.50     Years: 30.00     Pack years: 15.00     Types: Cigarettes   • Smokeless tobacco: Never Used   • Tobacco comment: trying to quit (wearing nicotine patch)   Substance and Sexual Activity   • Alcohol use: Yes     Alcohol/week: 2.0 - 3.0 standard drinks     Types: 2 - 3 Cans of beer per week     Comment: WEEKENDS   • Drug use: No   • Sexual activity: Defer     Family History   Problem Relation Age of Onset   • Heart disease Other    • Hypertension Other    • Kidney disease Other    • Diabetes Other    • Kidney disease Mother    • Kidney failure Mother    • Diabetes Mother    • Diabetes Father    • Heart failure Brother         chf   • Diabetes Brother    • Diabetes  Brother    • Kidney disease Brother    • No Known Problems Brother    • Heart disease Brother    • Heart disease Brother    • Heart disease Brother    • Liver disease Brother    • Colon cancer Neg Hx    • Esophageal cancer Neg Hx    • Colon polyps Neg Hx      Review of Systems   Constitutional: Negative for fatigue, fever and unexpected weight change.   HENT: Negative for hearing loss, sore throat and voice change.    Eyes: Negative for visual disturbance.   Respiratory: Negative for cough, shortness of breath and wheezing.    Cardiovascular: Negative for chest pain and palpitations.   Gastrointestinal: Negative for abdominal pain, blood in stool and vomiting.   Endocrine: Negative for polydipsia and polyuria.   Genitourinary: Negative for difficulty urinating, dysuria, hematuria and urgency.   Musculoskeletal: Negative for joint swelling and myalgias.   Skin: Negative for color change, rash and wound.   Neurological: Negative for dizziness, tremors, seizures and syncope.   Hematological: Does not bruise/bleed easily.   Psychiatric/Behavioral: Negative for agitation and confusion. The patient is not nervous/anxious.      Objective   Vitals:    11/23/20 1336   BP: 112/68   Pulse: 120   Temp: 98 °F (36.7 °C)   SpO2: 96%     Physical Exam  Constitutional:       Appearance: He is well-developed.   HENT:      Head: Normocephalic and atraumatic.   Eyes:      Comments: Pink, Nonicteric   Neck:      Comments: Global Assessment- supple. No JVD or lymphadenopathy  Cardiovascular:      Rate and Rhythm: Normal rate and regular rhythm.      Heart sounds: Normal heart sounds. No murmur. No friction rub. No gallop.    Pulmonary:      Effort: Pulmonary effort is normal. No respiratory distress.      Breath sounds: Normal breath sounds. No wheezing or rales.   Abdominal:      General: Bowel sounds are normal. There is no distension.      Palpations: Abdomen is soft. There is no mass.      Tenderness: There is no abdominal  tenderness. There is no guarding or rebound.   Neurological:      Mental Status: He is alert and oriented to person, place, and time.      Comments: General Exam-Deemed a reliable historian, able to converse without difficulty and Able to move all extremities without difficulty       Imaging Results (Most Recent)     None        Body mass index is 25.37 kg/m².  Assessment/Plan   Diagnoses and all orders for this visit:    1. Heme positive stool (Primary)    2. Anemia, unspecified type  -     CBC (No Diff); Future    3. AVM (arteriovenous malformation)      * Surgery not found *    CBC today  Repeat EGD pending result of today lab  Will update Thanh Weeks MD Russell W. Carter, DO  11/23/20

## 2020-11-24 ENCOUNTER — TELEPHONE (OUTPATIENT)
Dept: GASTROENTEROLOGY | Facility: CLINIC | Age: 60
End: 2020-11-24

## 2020-11-24 NOTE — TELEPHONE ENCOUNTER
"----- Message from Adan Gomez DO sent at 11/24/2020  7:47 AM CST -----  Can u tell him his h/h has improved  Ask him to continue off the \"blood thinners\"   ----- Message -----  From: Lab, Background User  Sent: 11/23/2020   3:07 PM CST  To: Adan Gomez DO      "

## 2021-02-13 ENCOUNTER — APPOINTMENT (OUTPATIENT)
Dept: CT IMAGING | Facility: HOSPITAL | Age: 61
End: 2021-02-13

## 2021-02-13 ENCOUNTER — HOSPITAL ENCOUNTER (EMERGENCY)
Facility: HOSPITAL | Age: 61
Discharge: HOME | End: 2021-02-13
Attending: EMERGENCY MEDICINE | Admitting: EMERGENCY MEDICINE

## 2021-02-13 VITALS
BODY MASS INDEX: 24.33 KG/M2 | SYSTOLIC BLOOD PRESSURE: 130 MMHG | WEIGHT: 155 LBS | HEIGHT: 67 IN | HEART RATE: 110 BPM | RESPIRATION RATE: 18 BRPM | OXYGEN SATURATION: 99 % | DIASTOLIC BLOOD PRESSURE: 70 MMHG | TEMPERATURE: 98.2 F

## 2021-02-13 DIAGNOSIS — L03.211 CELLULITIS OF RIGHT EXTERNAL CHEEK: Primary | ICD-10-CM

## 2021-02-13 LAB
ALBUMIN SERPL-MCNC: 3.4 G/DL (ref 3.5–5.2)
ALBUMIN/GLOB SERPL: 1.2 G/DL
ALP SERPL-CCNC: 255 U/L (ref 39–117)
ALT SERPL W P-5'-P-CCNC: 89 U/L (ref 1–41)
ANION GAP SERPL CALCULATED.3IONS-SCNC: 10 MMOL/L (ref 5–15)
AST SERPL-CCNC: 55 U/L (ref 1–40)
BASOPHILS # BLD AUTO: 0.05 10*3/MM3 (ref 0–0.2)
BASOPHILS NFR BLD AUTO: 0.5 % (ref 0–1.5)
BILIRUB SERPL-MCNC: 0.4 MG/DL (ref 0–1.2)
BUN SERPL-MCNC: 21 MG/DL (ref 8–23)
BUN/CREAT SERPL: 18.8 (ref 7–25)
CALCIUM SPEC-SCNC: 8.7 MG/DL (ref 8.6–10.5)
CHLORIDE SERPL-SCNC: 105 MMOL/L (ref 98–107)
CO2 SERPL-SCNC: 25 MMOL/L (ref 22–29)
CREAT SERPL-MCNC: 1.12 MG/DL (ref 0.76–1.27)
D-LACTATE SERPL-SCNC: 1.3 MMOL/L (ref 0.5–2)
DEPRECATED RDW RBC AUTO: 49.9 FL (ref 37–54)
EOSINOPHIL # BLD AUTO: 0.35 10*3/MM3 (ref 0–0.4)
EOSINOPHIL NFR BLD AUTO: 3.8 % (ref 0.3–6.2)
ERYTHROCYTE [DISTWIDTH] IN BLOOD BY AUTOMATED COUNT: 14.8 % (ref 12.3–15.4)
GFR SERPL CREATININE-BSD FRML MDRD: 81 ML/MIN/1.73
GLOBULIN UR ELPH-MCNC: 2.9 GM/DL
GLUCOSE SERPL-MCNC: 222 MG/DL (ref 65–99)
HCT VFR BLD AUTO: 39.1 % (ref 37.5–51)
HGB BLD-MCNC: 13.3 G/DL (ref 13–17.7)
IMM GRANULOCYTES # BLD AUTO: 0.03 10*3/MM3 (ref 0–0.05)
IMM GRANULOCYTES NFR BLD AUTO: 0.3 % (ref 0–0.5)
LYMPHOCYTES # BLD AUTO: 0.36 10*3/MM3 (ref 0.7–3.1)
LYMPHOCYTES NFR BLD AUTO: 3.9 % (ref 19.6–45.3)
MCH RBC QN AUTO: 31.3 PG (ref 26.6–33)
MCHC RBC AUTO-ENTMCNC: 34 G/DL (ref 31.5–35.7)
MCV RBC AUTO: 92 FL (ref 79–97)
MONOCYTES # BLD AUTO: 0.9 10*3/MM3 (ref 0.1–0.9)
MONOCYTES NFR BLD AUTO: 9.7 % (ref 5–12)
NEUTROPHILS NFR BLD AUTO: 7.55 10*3/MM3 (ref 1.7–7)
NEUTROPHILS NFR BLD AUTO: 81.8 % (ref 42.7–76)
NRBC BLD AUTO-RTO: 0 /100 WBC (ref 0–0.2)
PLATELET # BLD AUTO: 246 10*3/MM3 (ref 140–450)
PMV BLD AUTO: 10.7 FL (ref 6–12)
POTASSIUM SERPL-SCNC: 4.2 MMOL/L (ref 3.5–5.2)
PROT SERPL-MCNC: 6.3 G/DL (ref 6–8.5)
RBC # BLD AUTO: 4.25 10*6/MM3 (ref 4.14–5.8)
SODIUM SERPL-SCNC: 140 MMOL/L (ref 136–145)
WBC # BLD AUTO: 9.24 10*3/MM3 (ref 3.4–10.8)

## 2021-02-13 PROCEDURE — 70487 CT MAXILLOFACIAL W/DYE: CPT

## 2021-02-13 PROCEDURE — 99283 EMERGENCY DEPT VISIT LOW MDM: CPT

## 2021-02-13 PROCEDURE — 25010000002 IOPAMIDOL 61 % SOLUTION: Performed by: EMERGENCY MEDICINE

## 2021-02-13 PROCEDURE — 85025 COMPLETE CBC W/AUTO DIFF WBC: CPT | Performed by: EMERGENCY MEDICINE

## 2021-02-13 PROCEDURE — 96365 THER/PROPH/DIAG IV INF INIT: CPT

## 2021-02-13 PROCEDURE — 80053 COMPREHEN METABOLIC PANEL: CPT | Performed by: EMERGENCY MEDICINE

## 2021-02-13 PROCEDURE — 25010000002 CEFTRIAXONE PER 250 MG: Performed by: EMERGENCY MEDICINE

## 2021-02-13 PROCEDURE — 83605 ASSAY OF LACTIC ACID: CPT | Performed by: EMERGENCY MEDICINE

## 2021-02-13 PROCEDURE — 87040 BLOOD CULTURE FOR BACTERIA: CPT | Performed by: EMERGENCY MEDICINE

## 2021-02-13 RX ORDER — SODIUM CHLORIDE 0.9 % (FLUSH) 0.9 %
10 SYRINGE (ML) INJECTION AS NEEDED
Status: DISCONTINUED | OUTPATIENT
Start: 2021-02-13 | End: 2021-02-13 | Stop reason: HOSPADM

## 2021-02-13 RX ORDER — CLINDAMYCIN HYDROCHLORIDE 300 MG/1
300 CAPSULE ORAL 3 TIMES DAILY
Qty: 30 CAPSULE | Refills: 0 | Status: ON HOLD | OUTPATIENT
Start: 2021-02-13 | End: 2021-06-08

## 2021-02-13 RX ORDER — HYDROCODONE BITARTRATE AND ACETAMINOPHEN 7.5; 325 MG/1; MG/1
1 TABLET ORAL EVERY 4 HOURS PRN
Qty: 15 TABLET | Refills: 0 | Status: SHIPPED | OUTPATIENT
Start: 2021-02-13 | End: 2021-05-28

## 2021-02-13 RX ADMIN — IOPAMIDOL 100 ML: 612 INJECTION, SOLUTION INTRAVENOUS at 15:40

## 2021-02-13 RX ADMIN — CEFTRIAXONE SODIUM 1 G: 1 INJECTION, POWDER, FOR SOLUTION INTRAMUSCULAR; INTRAVENOUS at 14:09
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
## 2021-02-18 LAB
BACTERIA SPEC AEROBE CULT: NORMAL
BACTERIA SPEC AEROBE CULT: NORMAL

## 2021-04-02 ENCOUNTER — APPOINTMENT (OUTPATIENT)
Dept: ULTRASOUND IMAGING | Facility: HOSPITAL | Age: 61
End: 2021-04-02

## 2021-05-27 ENCOUNTER — TELEPHONE (OUTPATIENT)
Dept: VASCULAR SURGERY | Facility: CLINIC | Age: 61
End: 2021-05-27

## 2021-05-27 NOTE — TELEPHONE ENCOUNTER
Spoke with Mr Rodriguez reminding him of his appointment for Friday, May 28th, 2021. Reminded [facility]30 am for 9 o'clock testing and follow up afterwards at 1130 am with Kina LEDEZMA. Mr Rodriguez confirmed he would be here.

## 2021-05-28 ENCOUNTER — HOSPITAL ENCOUNTER (OUTPATIENT)
Dept: ULTRASOUND IMAGING | Facility: HOSPITAL | Age: 61
Discharge: HOME | End: 2021-05-28

## 2021-05-28 ENCOUNTER — OFFICE VISIT (OUTPATIENT)
Dept: VASCULAR SURGERY | Facility: CLINIC | Age: 61
End: 2021-05-28

## 2021-05-28 VITALS
BODY MASS INDEX: 25.49 KG/M2 | SYSTOLIC BLOOD PRESSURE: 150 MMHG | WEIGHT: 162.4 LBS | HEIGHT: 67 IN | DIASTOLIC BLOOD PRESSURE: 60 MMHG

## 2021-05-28 DIAGNOSIS — Z72.0 TOBACCO ABUSE: ICD-10-CM

## 2021-05-28 DIAGNOSIS — I73.9 PAD (PERIPHERAL ARTERY DISEASE) (HCC): ICD-10-CM

## 2021-05-28 DIAGNOSIS — E78.2 MIXED HYPERLIPIDEMIA: ICD-10-CM

## 2021-05-28 DIAGNOSIS — I10 ESSENTIAL HYPERTENSION: ICD-10-CM

## 2021-05-28 DIAGNOSIS — I89.0 LYMPHEDEMA: ICD-10-CM

## 2021-05-28 DIAGNOSIS — I65.23 BILATERAL CAROTID ARTERY STENOSIS: Primary | ICD-10-CM

## 2021-05-28 DIAGNOSIS — I65.23 BILATERAL CAROTID ARTERY STENOSIS: ICD-10-CM

## 2021-05-28 PROCEDURE — 93923 UPR/LXTR ART STDY 3+ LVLS: CPT | Performed by: SURGERY

## 2021-05-28 PROCEDURE — 93880 EXTRACRANIAL BILAT STUDY: CPT | Performed by: SURGERY

## 2021-05-28 PROCEDURE — 93923 UPR/LXTR ART STDY 3+ LVLS: CPT

## 2021-05-28 PROCEDURE — 93880 EXTRACRANIAL BILAT STUDY: CPT

## 2021-05-28 PROCEDURE — 99214 OFFICE O/P EST MOD 30 MIN: CPT | Performed by: NURSE PRACTITIONER

## 2021-05-28 RX ORDER — DILTIAZEM HYDROCHLORIDE 360 MG/1
360 CAPSULE, EXTENDED RELEASE ORAL DAILY
COMMUNITY
End: 2021-06-11 | Stop reason: HOSPADM

## 2021-05-28 RX ORDER — ERGOCALCIFEROL 1.25 MG/1
50000 CAPSULE ORAL
COMMUNITY
Start: 2021-03-29 | End: 2022-08-18

## 2021-05-28 RX ORDER — MAGNESIUM OXIDE 400 MG/1
400 TABLET ORAL
COMMUNITY
End: 2021-09-02

## 2021-05-28 NOTE — PROGRESS NOTES
5/28/2021       Thanh Weeks MD  [address] [place]  [place] [address]        Milton Rodriguez  1960    Chief Complaint   Patient presents with   • Follow-up     6 Month Follow Up- bilateral carotid stenosis- pt denies stroke symptoms- pt states testing done 05/28/2021. Leg swelling back and fourth, wearing compression when he can.    • smoking status     current smoker   • Med Management     verbally reviewed meds with pt    • other     could not get pulse ox to register pulse and O2       Dear Thanh Weeks MD:    HPI     I had the pleasure of seeing you patient in the office today for follow up.  As you recall, the patient is a 60 y.o. male who we are currently following for lower extremity PAD.  Initially he was seen with complaints of significant short distance right lower extremity claudication.  He did undergo revascularization of his right lower extremity.  Unfortunately, he does continue to smoke but reports he is trying to quit.  He did have episode of clumsiness and weakness of his left upper extremity and was found to have significant carotid disease.  He did undergo a right carotid endarterectomy 9/14/2020.  He denies any change in his lower extremities.  He does report swelling and states he usually wears compression stockings.  He also has home lymphedema pumps but does not use them often.  Dr. Gomez has had him off of blood thinners since November according to the last telephone note.    Review of Systems   Constitutional: Negative.    HENT: Negative.    Eyes: Negative.    Respiratory: Negative.  Negative for shortness of breath.    Cardiovascular: Positive for leg swelling.   Gastrointestinal: Negative.    Endocrine: Negative.    Genitourinary: Negative.    Musculoskeletal: Negative.    Skin: Negative.    Allergic/Immunologic: Negative.    Neurological: Negative.    Hematological: Negative.    Psychiatric/Behavioral: Negative.    All other systems reviewed and are  "negative.      /60 (BP Location: Left arm, Patient Position: Sitting, Cuff Size: Adult)   Ht 170.2 cm (67\")   Wt 73.7 kg (162 lb 6.4 oz)   BMI 25.44 kg/m²     Physical Exam  Vitals and nursing note reviewed.   Constitutional:       General: He is not in acute distress.     Appearance: Normal appearance. He is well-developed and normal weight. He is not diaphoretic.   HENT:      Head: Normocephalic and atraumatic.   Eyes:      Pupils: Pupils are equal, round, and reactive to light.   Neck:      Vascular: No carotid bruit or JVD.      Comments: Right neck incision healed  Cardiovascular:      Rate and Rhythm: Normal rate and regular rhythm.      Pulses:           Femoral pulses are 2+ on the right side and 2+ on the left side.       Popliteal pulses are 2+ on the right side and 2+ on the left side.        Dorsalis pedis pulses are 2+ on the right side and 2+ on the left side.        Posterior tibial pulses are 0 on the right side and 2+ on the left side.      Heart sounds: Normal heart sounds, S1 normal and S2 normal. No murmur heard.   No friction rub. No gallop.       Comments: Right: doppler PT, peroneal  Swelling to bilateral lower extremities     Pulmonary:      Effort: Pulmonary effort is normal.      Breath sounds: Normal breath sounds.   Abdominal:      General: Bowel sounds are normal. There is no abdominal bruit.      Palpations: Abdomen is soft.      Tenderness: There is no abdominal tenderness.   Musculoskeletal:         General: Normal range of motion.      Right lower leg: Edema present.      Left lower leg: Edema present.   Skin:     General: Skin is warm and dry.   Neurological:      General: No focal deficit present.      Mental Status: He is alert and oriented to person, place, and time.      Cranial Nerves: No cranial nerve deficit.   Psychiatric:         Mood and Affect: Mood normal.         Behavior: Behavior normal. Behavior is cooperative.         Thought Content: Thought content " normal.         Judgment: Judgment normal.         Diagnostic Data:   US Carotid Bilateral    Result Date: 5/28/2021  Narrative: History: Carotid occlusive disease      Impression: Impression: 1. There is 50-69% stenosis of the right internal carotid artery. 2. There is 50-69% stenosis of the left internal carotid artery. 3. Antegrade flow is demonstrated in bilateral vertebral arteries.  Comments: Bilateral carotid vertebral arterial duplex scan was performed.  Grayscale imaging shows intimal thickening and calcified elements at the carotid bifurcation. The right internal carotid artery peak systolic velocity is 271.9 cm/sec. The end-diastolic velocity is 82.5 cm/sec. The right ICA/CCA ratio is approximately 3.1 . These findings correlate with 50-69% stenosis of the right internal carotid artery.  Grayscale imaging shows intimal thickening and calcified elements at the carotid bifurcation. The left internal carotid artery peak systolic velocity is 133.2 cm/sec. The end-diastolic velocity is 51.2 cm/sec. The left ICA/CCA ratio is approximately 1.5 . These findings correlate with 50-69% stenosis of the left internal carotid artery.  Antegrade flow is demonstrated in bilateral vertebral arteries. There is greater than 50% stenosis in bilateral external carotid arteries. This report was finalized on 05/28/2021 13:12 by Dr. Victorino Valera MD.    US Ankle / Brachial Indices Extremity Complete    Result Date: 5/28/2021  Narrative:  History: PAD  Comments: Bilateral lower extremity arterial with multi-level pulse volume recordings and segmental pressures were performed at rest and stress.  The right ankle/brachial index is 0.55. The waveforms are biphasic with mild dampening.These findings are consistent with moderate arterial insufficiency of the right lower extremity at rest.  There is underlying disease in the proximal SFA  The left ankle/brachial index is 0.83. The waveforms are biphasic without dampening. These  findings are consistent with mild arterial insufficiency of the left lower extremity at rest.      Impression: Impression: 1. Moderate arterial insufficiency of the right lower extremity at rest. 2. Mild arterial insufficiency of the left lower extremity at rest.   This report was finalized on 05/28/2021 12:08 by Dr. Victorino Valera MD.       Patient Active Problem List   Diagnosis   • Tobacco abuse   • Hypertension   • PAD (peripheral artery disease) (CMS/[facility])   • Hyperlipidemia   • Type 2 diabetes mellitus with autonomic neuropathy (CMS/[facility])   • Atrial flutter (CMS/[facility])   • Lower extremity edema   • Chronic anticoagulation   • Hypokalemia   • Heme positive stool   • Congestive heart failure (CMS/[facility])   • Lymphedema   • Cerebrovascular accident (CVA) (CMS/[facility])   • CKD (chronic kidney disease) stage 3, GFR 30-59 ml/min (CMS/HCC)   • CVA (cerebral vascular accident) (CMS/HCC)   • Transient ischemic attack (TIA)   • Chronic diastolic CHF (congestive heart failure) (CMS/HCC)   • Alcohol abuse   • PFO (patent foramen ovale)   • Bilateral carotid artery stenosis   • Stenosis of right internal carotid artery   • Preop testing         ICD-10-CM ICD-9-CM   1. Bilateral carotid artery stenosis  I65.23 433.10     433.30   2. PAD (peripheral artery disease) (CMS/[facility])  I73.9 443.9   3. Essential hypertension  I10 401.9   4. Mixed hyperlipidemia  E78.2 272.2   5. Tobacco abuse  Z72.0 305.1   6. Lymphedema  I89.0 457.1       PLAN: After thoroughly evaluating Milton Rodriguez, I believe the best course of action is to remain conservative from vascular surgery standpoint.  Currently he is doing well denies any strokelike symptoms or change in claudication to his lower extremities.  I did review his testing which shows 50 to 69% carotid stenosis bilaterally.  His ABIs show moderate arterial insufficiency on the right and mild arterial insufficiency of the left lower extremity.  He has remained off the of any blood thinners  per Dr. Gomez's notes.  We will see him back in 6 months with repeat noninvasive testing for continued surveillance, including a carotid duplex and ABIs.  If anything changes regarding his legs, I asked him to contact us sooner.  I did encourage him to continue wearing compression stockings and using his home lymphedema pumps.  I did discuss vascular risk factors as they pertain to the progression of vascular disease including controlling controlling his hypertension, diabetes mellitus,  hyperlipidemia, and smoking cessation.  His blood pressure is slightly elevated at 150/60.  I advised Milton of the risks of continuing to use tobacco, and I provided him with tobacco cessation educational materials in the After Visit Summary. During this visit, I spent 5 minutes counseling the patient regarding tobacco cessation.  He is trying to quit smoking.  The patient is to continue taking their medications as previously discussed.   This was all discussed in full with complete understanding.    Thank you for allowing me to participate in the care of your patient.  Please do not hesitate to call with any questions or concerns.  We will keep you aware of any further encounters with Milton Rodriguez.      Sincerely Yours,        DARIEN Sepulveda

## 2021-06-05 ENCOUNTER — APPOINTMENT (OUTPATIENT)
Dept: GENERAL RADIOLOGY | Facility: HOSPITAL | Age: 61
End: 2021-06-05

## 2021-06-05 ENCOUNTER — HOSPITAL ENCOUNTER (INPATIENT)
Facility: HOSPITAL | Age: 61
LOS: 6 days | Discharge: HOME HEALTH | End: 2021-06-11
Attending: FAMILY MEDICINE | Admitting: FAMILY MEDICINE

## 2021-06-05 ENCOUNTER — APPOINTMENT (OUTPATIENT)
Dept: CT IMAGING | Facility: HOSPITAL | Age: 61
End: 2021-06-05

## 2021-06-05 DIAGNOSIS — R19.5 HEME POSITIVE STOOL: ICD-10-CM

## 2021-06-05 DIAGNOSIS — N18.9 ACUTE KIDNEY INJURY SUPERIMPOSED ON CHRONIC KIDNEY DISEASE (HCC): ICD-10-CM

## 2021-06-05 DIAGNOSIS — I65.23 BILATERAL CAROTID ARTERY STENOSIS: ICD-10-CM

## 2021-06-05 DIAGNOSIS — Z74.09 IMPAIRED MOBILITY: ICD-10-CM

## 2021-06-05 DIAGNOSIS — D64.9 ANEMIA, UNSPECIFIED TYPE: ICD-10-CM

## 2021-06-05 DIAGNOSIS — I48.92 ATRIAL FLUTTER, UNSPECIFIED TYPE (HCC): ICD-10-CM

## 2021-06-05 DIAGNOSIS — I50.43 ACUTE ON CHRONIC COMBINED SYSTOLIC AND DIASTOLIC CONGESTIVE HEART FAILURE (HCC): ICD-10-CM

## 2021-06-05 DIAGNOSIS — I10 ESSENTIAL HYPERTENSION: ICD-10-CM

## 2021-06-05 DIAGNOSIS — N18.31 STAGE 3A CHRONIC KIDNEY DISEASE (HCC): ICD-10-CM

## 2021-06-05 DIAGNOSIS — Z01.818 PREOP TESTING: ICD-10-CM

## 2021-06-05 DIAGNOSIS — I50.32 CHRONIC DIASTOLIC CHF (CONGESTIVE HEART FAILURE) (HCC): ICD-10-CM

## 2021-06-05 DIAGNOSIS — I89.0 LYMPHEDEMA: ICD-10-CM

## 2021-06-05 DIAGNOSIS — Z79.01 CHRONIC ANTICOAGULATION: ICD-10-CM

## 2021-06-05 DIAGNOSIS — Q21.12 PFO (PATENT FORAMEN OVALE): ICD-10-CM

## 2021-06-05 DIAGNOSIS — Z78.9 DECREASED ACTIVITIES OF DAILY LIVING (ADL): ICD-10-CM

## 2021-06-05 DIAGNOSIS — E78.2 MIXED HYPERLIPIDEMIA: ICD-10-CM

## 2021-06-05 DIAGNOSIS — G45.9 TRANSIENT ISCHEMIC ATTACK (TIA): ICD-10-CM

## 2021-06-05 DIAGNOSIS — I63.9 CEREBROVASCULAR ACCIDENT (CVA), UNSPECIFIED MECHANISM (HCC): ICD-10-CM

## 2021-06-05 DIAGNOSIS — R60.0 LOWER EXTREMITY EDEMA: ICD-10-CM

## 2021-06-05 DIAGNOSIS — F10.10 ALCOHOL ABUSE: ICD-10-CM

## 2021-06-05 DIAGNOSIS — I65.21 STENOSIS OF RIGHT INTERNAL CAROTID ARTERY: ICD-10-CM

## 2021-06-05 DIAGNOSIS — J96.01 ACUTE RESPIRATORY FAILURE WITH HYPOXIA (HCC): ICD-10-CM

## 2021-06-05 DIAGNOSIS — J81.0 ACUTE PULMONARY EDEMA (HCC): Primary | ICD-10-CM

## 2021-06-05 DIAGNOSIS — E87.6 HYPOKALEMIA: ICD-10-CM

## 2021-06-05 DIAGNOSIS — E11.43 TYPE 2 DIABETES MELLITUS WITH DIABETIC AUTONOMIC NEUROPATHY, WITHOUT LONG-TERM CURRENT USE OF INSULIN (HCC): ICD-10-CM

## 2021-06-05 DIAGNOSIS — I73.9 PAD (PERIPHERAL ARTERY DISEASE) (HCC): ICD-10-CM

## 2021-06-05 DIAGNOSIS — Z72.0 TOBACCO ABUSE: ICD-10-CM

## 2021-06-05 DIAGNOSIS — N17.9 ACUTE KIDNEY INJURY SUPERIMPOSED ON CHRONIC KIDNEY DISEASE (HCC): ICD-10-CM

## 2021-06-05 LAB
ABO GROUP BLD: NORMAL
ALBUMIN SERPL-MCNC: 3.5 G/DL (ref 3.5–5.2)
ALBUMIN/GLOB SERPL: 1.1 G/DL
ALP SERPL-CCNC: 311 U/L (ref 39–117)
ALT SERPL W P-5'-P-CCNC: 49 U/L (ref 1–41)
AMPHET+METHAMPHET UR QL: NEGATIVE
AMPHETAMINES UR QL: NEGATIVE
ANION GAP SERPL CALCULATED.3IONS-SCNC: 14 MMOL/L (ref 5–15)
APTT PPP: 33.7 SECONDS (ref 24.1–35)
AST SERPL-CCNC: 31 U/L (ref 1–40)
BARBITURATES UR QL SCN: NEGATIVE
BASOPHILS # BLD AUTO: 0.04 10*3/MM3 (ref 0–0.2)
BASOPHILS NFR BLD AUTO: 0.5 % (ref 0–1.5)
BENZODIAZ UR QL SCN: NEGATIVE
BILIRUB SERPL-MCNC: 0.3 MG/DL (ref 0–1.2)
BLD GP AB SCN SERPL QL: NEGATIVE
BUN SERPL-MCNC: 27 MG/DL (ref 8–23)
BUN/CREAT SERPL: 15.3 (ref 7–25)
BUPRENORPHINE SERPL-MCNC: NEGATIVE NG/ML
CALCIUM SPEC-SCNC: 8.3 MG/DL (ref 8.6–10.5)
CANNABINOIDS SERPL QL: NEGATIVE
CHLORIDE SERPL-SCNC: 102 MMOL/L (ref 98–107)
CO2 SERPL-SCNC: 22 MMOL/L (ref 22–29)
COCAINE UR QL: NEGATIVE
CREAT SERPL-MCNC: 1.76 MG/DL (ref 0.76–1.27)
D DIMER PPP FEU-MCNC: 2.19 MG/L (FEU) (ref 0–0.5)
D-LACTATE SERPL-SCNC: 2 MMOL/L (ref 0.5–2)
DEPRECATED RDW RBC AUTO: 46.7 FL (ref 37–54)
DEVELOPER EXPIRATION DATE: NORMAL
DEVELOPER LOT NUMBER: 199
EOSINOPHIL # BLD AUTO: 0.17 10*3/MM3 (ref 0–0.4)
EOSINOPHIL NFR BLD AUTO: 2.3 % (ref 0.3–6.2)
ERYTHROCYTE [DISTWIDTH] IN BLOOD BY AUTOMATED COUNT: 15.5 % (ref 12.3–15.4)
ETHANOL UR QL: <0.01 %
EXPIRATION DATE: NORMAL
FECAL OCCULT BLOOD SCREEN, POC: NEGATIVE
GFR SERPL CREATININE-BSD FRML MDRD: 48 ML/MIN/1.73
GLOBULIN UR ELPH-MCNC: 3.3 GM/DL
GLUCOSE BLDC GLUCOMTR-MCNC: 289 MG/DL (ref 70–130)
GLUCOSE SERPL-MCNC: 363 MG/DL (ref 65–99)
HCT VFR BLD AUTO: 22.1 % (ref 37.5–51)
HCT VFR BLD AUTO: 24.1 % (ref 37.5–51)
HGB BLD-MCNC: 6.8 G/DL (ref 13–17.7)
HGB BLD-MCNC: 7.5 G/DL (ref 13–17.7)
HOLD SPECIMEN: NORMAL
IMM GRANULOCYTES # BLD AUTO: 0.03 10*3/MM3 (ref 0–0.05)
IMM GRANULOCYTES NFR BLD AUTO: 0.4 % (ref 0–0.5)
INR PPP: 1.19 (ref 0.91–1.09)
LYMPHOCYTES # BLD AUTO: 0.39 10*3/MM3 (ref 0.7–3.1)
LYMPHOCYTES NFR BLD AUTO: 5.3 % (ref 19.6–45.3)
Lab: 199
MCH RBC QN AUTO: 26 PG (ref 26.6–33)
MCHC RBC AUTO-ENTMCNC: 31.1 G/DL (ref 31.5–35.7)
MCV RBC AUTO: 83.7 FL (ref 79–97)
METHADONE UR QL SCN: NEGATIVE
MONOCYTES # BLD AUTO: 0.87 10*3/MM3 (ref 0.1–0.9)
MONOCYTES NFR BLD AUTO: 11.8 % (ref 5–12)
NEGATIVE CONTROL: NEGATIVE
NEUTROPHILS NFR BLD AUTO: 5.88 10*3/MM3 (ref 1.7–7)
NEUTROPHILS NFR BLD AUTO: 79.7 % (ref 42.7–76)
NRBC BLD AUTO-RTO: 0 /100 WBC (ref 0–0.2)
NT-PROBNP SERPL-MCNC: ABNORMAL PG/ML (ref 0–900)
OPIATES UR QL: NEGATIVE
OSMOLALITY UR: 286 MOSM/KG (ref 50–1400)
OXYCODONE UR QL SCN: NEGATIVE
PCP UR QL SCN: NEGATIVE
PLATELET # BLD AUTO: 335 10*3/MM3 (ref 140–450)
PMV BLD AUTO: 10 FL (ref 6–12)
POSITIVE CONTROL: POSITIVE
POTASSIUM SERPL-SCNC: 3.6 MMOL/L (ref 3.5–5.2)
PROPOXYPH UR QL: NEGATIVE
PROT SERPL-MCNC: 6.8 G/DL (ref 6–8.5)
PROT UR-MCNC: 19.2 MG/DL
PROTHROMBIN TIME: 14.2 SECONDS (ref 11.5–13.4)
RBC # BLD AUTO: 2.88 10*6/MM3 (ref 4.14–5.8)
RH BLD: POSITIVE
SARS-COV-2 RNA PNL SPEC NAA+PROBE: NOT DETECTED
SODIUM SERPL-SCNC: 138 MMOL/L (ref 136–145)
SODIUM UR-SCNC: 91 MMOL/L
T&S EXPIRATION DATE: NORMAL
TRICYCLICS UR QL SCN: NEGATIVE
TROPONIN T SERPL-MCNC: 0.01 NG/ML (ref 0–0.03)
WBC # BLD AUTO: 7.38 10*3/MM3 (ref 3.4–10.8)

## 2021-06-05 PROCEDURE — 71045 X-RAY EXAM CHEST 1 VIEW: CPT

## 2021-06-05 PROCEDURE — 80053 COMPREHEN METABOLIC PANEL: CPT | Performed by: NURSE PRACTITIONER

## 2021-06-05 PROCEDURE — 84156 ASSAY OF PROTEIN URINE: CPT | Performed by: CLINICAL NURSE SPECIALIST

## 2021-06-05 PROCEDURE — 85018 HEMOGLOBIN: CPT | Performed by: NURSE PRACTITIONER

## 2021-06-05 PROCEDURE — 85610 PROTHROMBIN TIME: CPT | Performed by: NURSE PRACTITIONER

## 2021-06-05 PROCEDURE — 85025 COMPLETE CBC W/AUTO DIFF WBC: CPT | Performed by: NURSE PRACTITIONER

## 2021-06-05 PROCEDURE — 93010 ELECTROCARDIOGRAM REPORT: CPT | Performed by: INTERNAL MEDICINE

## 2021-06-05 PROCEDURE — 80306 DRUG TEST PRSMV INSTRMNT: CPT | Performed by: NURSE PRACTITIONER

## 2021-06-05 PROCEDURE — 93005 ELECTROCARDIOGRAM TRACING: CPT

## 2021-06-05 PROCEDURE — 82306 VITAMIN D 25 HYDROXY: CPT | Performed by: FAMILY MEDICINE

## 2021-06-05 PROCEDURE — 85730 THROMBOPLASTIN TIME PARTIAL: CPT | Performed by: NURSE PRACTITIONER

## 2021-06-05 PROCEDURE — 86900 BLOOD TYPING SEROLOGIC ABO: CPT

## 2021-06-05 PROCEDURE — 86901 BLOOD TYPING SEROLOGIC RH(D): CPT | Performed by: NURSE PRACTITIONER

## 2021-06-05 PROCEDURE — 36430 TRANSFUSION BLD/BLD COMPNT: CPT

## 2021-06-05 PROCEDURE — 63710000001 INSULIN LISPRO (HUMAN) PER 5 UNITS: Performed by: FAMILY MEDICINE

## 2021-06-05 PROCEDURE — 84300 ASSAY OF URINE SODIUM: CPT | Performed by: CLINICAL NURSE SPECIALIST

## 2021-06-05 PROCEDURE — 86923 COMPATIBILITY TEST ELECTRIC: CPT

## 2021-06-05 PROCEDURE — 0 IOPAMIDOL PER 1 ML: Performed by: NURSE PRACTITIONER

## 2021-06-05 PROCEDURE — 82270 OCCULT BLOOD FECES: CPT | Performed by: NURSE PRACTITIONER

## 2021-06-05 PROCEDURE — 82570 ASSAY OF URINE CREATININE: CPT | Performed by: CLINICAL NURSE SPECIALIST

## 2021-06-05 PROCEDURE — 85014 HEMATOCRIT: CPT | Performed by: NURSE PRACTITIONER

## 2021-06-05 PROCEDURE — 86850 RBC ANTIBODY SCREEN: CPT | Performed by: NURSE PRACTITIONER

## 2021-06-05 PROCEDURE — 93005 ELECTROCARDIOGRAM TRACING: CPT | Performed by: NURSE PRACTITIONER

## 2021-06-05 PROCEDURE — 87205 SMEAR GRAM STAIN: CPT | Performed by: CLINICAL NURSE SPECIALIST

## 2021-06-05 PROCEDURE — 83935 ASSAY OF URINE OSMOLALITY: CPT | Performed by: CLINICAL NURSE SPECIALIST

## 2021-06-05 PROCEDURE — 71275 CT ANGIOGRAPHY CHEST: CPT

## 2021-06-05 PROCEDURE — 82077 ASSAY SPEC XCP UR&BREATH IA: CPT | Performed by: NURSE PRACTITIONER

## 2021-06-05 PROCEDURE — 85379 FIBRIN DEGRADATION QUANT: CPT | Performed by: NURSE PRACTITIONER

## 2021-06-05 PROCEDURE — 84484 ASSAY OF TROPONIN QUANT: CPT | Performed by: NURSE PRACTITIONER

## 2021-06-05 PROCEDURE — P9016 RBC LEUKOCYTES REDUCED: HCPCS

## 2021-06-05 PROCEDURE — 87635 SARS-COV-2 COVID-19 AMP PRB: CPT | Performed by: NURSE PRACTITIONER

## 2021-06-05 PROCEDURE — 99285 EMERGENCY DEPT VISIT HI MDM: CPT

## 2021-06-05 PROCEDURE — 86900 BLOOD TYPING SEROLOGIC ABO: CPT | Performed by: NURSE PRACTITIONER

## 2021-06-05 PROCEDURE — 82962 GLUCOSE BLOOD TEST: CPT

## 2021-06-05 PROCEDURE — 83880 ASSAY OF NATRIURETIC PEPTIDE: CPT | Performed by: NURSE PRACTITIONER

## 2021-06-05 PROCEDURE — 93005 ELECTROCARDIOGRAM TRACING: CPT | Performed by: FAMILY MEDICINE

## 2021-06-05 PROCEDURE — 83605 ASSAY OF LACTIC ACID: CPT | Performed by: NURSE PRACTITIONER

## 2021-06-05 RX ORDER — ALLOPURINOL 100 MG/1
100 TABLET ORAL DAILY
Status: DISCONTINUED | OUTPATIENT
Start: 2021-06-05 | End: 2021-06-11 | Stop reason: HOSPADM

## 2021-06-05 RX ORDER — ATORVASTATIN CALCIUM 40 MG/1
80 TABLET, FILM COATED ORAL NIGHTLY
Status: DISCONTINUED | OUTPATIENT
Start: 2021-06-05 | End: 2021-06-11 | Stop reason: HOSPADM

## 2021-06-05 RX ORDER — FERROUS SULFATE 325(65) MG
325 TABLET ORAL 2 TIMES DAILY WITH MEALS
Status: DISCONTINUED | OUTPATIENT
Start: 2021-06-05 | End: 2021-06-06

## 2021-06-05 RX ORDER — DEXTROSE MONOHYDRATE 25 G/50ML
25 INJECTION, SOLUTION INTRAVENOUS
Status: DISCONTINUED | OUTPATIENT
Start: 2021-06-05 | End: 2021-06-11 | Stop reason: HOSPADM

## 2021-06-05 RX ORDER — NICOTINE POLACRILEX 4 MG
15 LOZENGE BUCCAL
Status: DISCONTINUED | OUTPATIENT
Start: 2021-06-05 | End: 2021-06-11 | Stop reason: HOSPADM

## 2021-06-05 RX ORDER — FUROSEMIDE 40 MG/1
40 TABLET ORAL DAILY
Status: DISCONTINUED | OUTPATIENT
Start: 2021-06-05 | End: 2021-06-07

## 2021-06-05 RX ORDER — METFORMIN HYDROCHLORIDE 750 MG/1
750 TABLET, EXTENDED RELEASE ORAL 2 TIMES DAILY
Status: DISCONTINUED | OUTPATIENT
Start: 2021-06-05 | End: 2021-06-06 | Stop reason: SDUPTHER

## 2021-06-05 RX ORDER — NICOTINE 21 MG/24HR
1 PATCH, TRANSDERMAL 24 HOURS TRANSDERMAL EVERY 24 HOURS
Status: DISCONTINUED | OUTPATIENT
Start: 2021-06-05 | End: 2021-06-11 | Stop reason: HOSPADM

## 2021-06-05 RX ORDER — SODIUM CHLORIDE 0.9 % (FLUSH) 0.9 %
10 SYRINGE (ML) INJECTION AS NEEDED
Status: DISCONTINUED | OUTPATIENT
Start: 2021-06-05 | End: 2021-06-11 | Stop reason: HOSPADM

## 2021-06-05 RX ADMIN — DRONEDARONE 400 MG: 400 TABLET, FILM COATED ORAL at 21:17

## 2021-06-05 RX ADMIN — MAGNESIUM GLUCONATE 500 MG ORAL TABLET 400 MG: 500 TABLET ORAL at 21:17

## 2021-06-05 RX ADMIN — FERROUS SULFATE TAB 325 MG (65 MG ELEMENTAL FE) 325 MG: 325 (65 FE) TAB at 21:17

## 2021-06-05 RX ADMIN — IOPAMIDOL 100 ML: 755 INJECTION, SOLUTION INTRAVENOUS at 17:07

## 2021-06-05 RX ADMIN — METFORMIN HYDROCHLORIDE 750 MG: 750 TABLET ORAL at 21:17

## 2021-06-05 RX ADMIN — INSULIN LISPRO 4 UNITS: 100 INJECTION, SOLUTION INTRAVENOUS; SUBCUTANEOUS at 21:30

## 2021-06-05 RX ADMIN — ALLOPURINOL 100 MG: 100 TABLET ORAL at 21:17

## 2021-06-05 RX ADMIN — ATORVASTATIN CALCIUM 80 MG: 40 TABLET, FILM COATED ORAL at 21:18

## 2021-06-05 RX ADMIN — DILTIAZEM HYDROCHLORIDE 360 MG: 240 CAPSULE, EXTENDED RELEASE ORAL at 21:17

## 2021-06-05 RX ADMIN — PANTOPRAZOLE SODIUM 8 MG/HR: 40 INJECTION, POWDER, FOR SOLUTION INTRAVENOUS at 18:41

## 2021-06-05 RX ADMIN — PANTOPRAZOLE SODIUM 8 MG/HR: 40 INJECTION, POWDER, FOR SOLUTION INTRAVENOUS at 22:49

## 2021-06-05 RX ADMIN — FUROSEMIDE 40 MG: 40 TABLET ORAL at 21:17
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
## 2021-06-06 ENCOUNTER — APPOINTMENT (OUTPATIENT)
Dept: ULTRASOUND IMAGING | Facility: HOSPITAL | Age: 61
End: 2021-06-06

## 2021-06-06 ENCOUNTER — APPOINTMENT (OUTPATIENT)
Dept: GENERAL RADIOLOGY | Facility: HOSPITAL | Age: 61
End: 2021-06-06

## 2021-06-06 ENCOUNTER — APPOINTMENT (OUTPATIENT)
Dept: CARDIOLOGY | Facility: HOSPITAL | Age: 61
End: 2021-06-06

## 2021-06-06 LAB
25(OH)D3 SERPL-MCNC: 20.7 NG/ML (ref 30–100)
ALBUMIN SERPL-MCNC: 3.4 G/DL (ref 3.5–5.2)
ALBUMIN/GLOB SERPL: 1 G/DL
ALP SERPL-CCNC: 292 U/L (ref 39–117)
ALT SERPL W P-5'-P-CCNC: 45 U/L (ref 1–41)
ANION GAP SERPL CALCULATED.3IONS-SCNC: 15 MMOL/L (ref 5–15)
ARTERIAL PATENCY WRIST A: ABNORMAL
AST SERPL-CCNC: 28 U/L (ref 1–40)
ATMOSPHERIC PRESS: 750 MMHG
BASE EXCESS BLDA CALC-SCNC: -1.4 MMOL/L (ref 0–2)
BDY SITE: ABNORMAL
BILIRUB SERPL-MCNC: 0.5 MG/DL (ref 0–1.2)
BODY TEMPERATURE: 37 C
BUN SERPL-MCNC: 27 MG/DL (ref 8–23)
BUN/CREAT SERPL: 16.5 (ref 7–25)
CALCIUM SPEC-SCNC: 8.2 MG/DL (ref 8.6–10.5)
CHLORIDE SERPL-SCNC: 105 MMOL/L (ref 98–107)
CO2 SERPL-SCNC: 23 MMOL/L (ref 22–29)
CREAT SERPL-MCNC: 1.64 MG/DL (ref 0.76–1.27)
CREAT UR-MCNC: 9.7 MG/DL
DEPRECATED RDW RBC AUTO: 47 FL (ref 37–54)
EOSINOPHIL SPEC QL MICRO: 0 % EOS/100 CELLS (ref 0–0)
ERYTHROCYTE [DISTWIDTH] IN BLOOD BY AUTOMATED COUNT: 15.5 % (ref 12.3–15.4)
GAS FLOW AIRWAY: 8 LPM
GFR SERPL CREATININE-BSD FRML MDRD: 52 ML/MIN/1.73
GLOBULIN UR ELPH-MCNC: 3.4 GM/DL
GLUCOSE BLDC GLUCOMTR-MCNC: 116 MG/DL (ref 70–130)
GLUCOSE BLDC GLUCOMTR-MCNC: 130 MG/DL (ref 70–130)
GLUCOSE BLDC GLUCOMTR-MCNC: 181 MG/DL (ref 70–130)
GLUCOSE BLDC GLUCOMTR-MCNC: 334 MG/DL (ref 70–130)
GLUCOSE SERPL-MCNC: 74 MG/DL (ref 65–99)
HCO3 BLDA-SCNC: 23.8 MMOL/L (ref 20–26)
HCT VFR BLD AUTO: 26.2 % (ref 37.5–51)
HGB BLD-MCNC: 8.1 G/DL (ref 13–17.7)
IRON 24H UR-MRATE: 88 MCG/DL (ref 59–158)
IRON SATN MFR SERPL: 22 % (ref 20–50)
Lab: ABNORMAL
MAGNESIUM SERPL-MCNC: 2.1 MG/DL (ref 1.6–2.4)
MCH RBC QN AUTO: 26.1 PG (ref 26.6–33)
MCHC RBC AUTO-ENTMCNC: 30.9 G/DL (ref 31.5–35.7)
MCV RBC AUTO: 84.5 FL (ref 79–97)
MODALITY: ABNORMAL
PCO2 BLDA: 41.4 MM HG (ref 35–45)
PCO2 TEMP ADJ BLD: 41.4 MM HG (ref 35–45)
PH BLDA: 7.37 PH UNITS (ref 7.35–7.45)
PH, TEMP CORRECTED: 7.37 PH UNITS (ref 7.35–7.45)
PHOSPHATE SERPL-MCNC: 6.3 MG/DL (ref 2.5–4.5)
PLATELET # BLD AUTO: 338 10*3/MM3 (ref 140–450)
PMV BLD AUTO: 10.4 FL (ref 6–12)
PO2 BLDA: 58.3 MM HG (ref 83–108)
PO2 TEMP ADJ BLD: 58.3 MM HG (ref 83–108)
POTASSIUM SERPL-SCNC: 3.6 MMOL/L (ref 3.5–5.2)
PROT SERPL-MCNC: 6.8 G/DL (ref 6–8.5)
PTH-INTACT SERPL-MCNC: 207.9 PG/ML (ref 15–65)
QT INTERVAL: 344 MS
QTC INTERVAL: 486 MS
RBC # BLD AUTO: 3.1 10*6/MM3 (ref 4.14–5.8)
SAO2 % BLDCOA: 88.7 % (ref 94–99)
SODIUM SERPL-SCNC: 143 MMOL/L (ref 136–145)
TIBC SERPL-MCNC: 407 MCG/DL (ref 298–536)
TRANSFERRIN SERPL-MCNC: 273 MG/DL (ref 200–360)
VENTILATOR MODE: ABNORMAL
WBC # BLD AUTO: 9.5 10*3/MM3 (ref 3.4–10.8)

## 2021-06-06 PROCEDURE — 94799 UNLISTED PULMONARY SVC/PX: CPT

## 2021-06-06 PROCEDURE — 99222 1ST HOSP IP/OBS MODERATE 55: CPT | Performed by: INTERNAL MEDICINE

## 2021-06-06 PROCEDURE — 94640 AIRWAY INHALATION TREATMENT: CPT

## 2021-06-06 PROCEDURE — 71045 X-RAY EXAM CHEST 1 VIEW: CPT

## 2021-06-06 PROCEDURE — 76775 US EXAM ABDO BACK WALL LIM: CPT

## 2021-06-06 PROCEDURE — 63710000001 INSULIN LISPRO (HUMAN) PER 5 UNITS: Performed by: FAMILY MEDICINE

## 2021-06-06 PROCEDURE — 93306 TTE W/DOPPLER COMPLETE: CPT

## 2021-06-06 PROCEDURE — 82803 BLOOD GASES ANY COMBINATION: CPT

## 2021-06-06 PROCEDURE — 25010000002 FUROSEMIDE PER 20 MG: Performed by: INTERNAL MEDICINE

## 2021-06-06 PROCEDURE — 83735 ASSAY OF MAGNESIUM: CPT | Performed by: CLINICAL NURSE SPECIALIST

## 2021-06-06 PROCEDURE — 83540 ASSAY OF IRON: CPT | Performed by: FAMILY MEDICINE

## 2021-06-06 PROCEDURE — 85027 COMPLETE CBC AUTOMATED: CPT | Performed by: INTERNAL MEDICINE

## 2021-06-06 PROCEDURE — 94660 CPAP INITIATION&MGMT: CPT

## 2021-06-06 PROCEDURE — 83970 ASSAY OF PARATHORMONE: CPT | Performed by: INTERNAL MEDICINE

## 2021-06-06 PROCEDURE — 93306 TTE W/DOPPLER COMPLETE: CPT | Performed by: INTERNAL MEDICINE

## 2021-06-06 PROCEDURE — 84466 ASSAY OF TRANSFERRIN: CPT | Performed by: FAMILY MEDICINE

## 2021-06-06 PROCEDURE — 80053 COMPREHEN METABOLIC PANEL: CPT | Performed by: FAMILY MEDICINE

## 2021-06-06 PROCEDURE — 82962 GLUCOSE BLOOD TEST: CPT

## 2021-06-06 PROCEDURE — 84100 ASSAY OF PHOSPHORUS: CPT | Performed by: CLINICAL NURSE SPECIALIST

## 2021-06-06 PROCEDURE — 36600 WITHDRAWAL OF ARTERIAL BLOOD: CPT

## 2021-06-06 PROCEDURE — 25010000002 IRON SUCROSE PER 1 MG: Performed by: INTERNAL MEDICINE

## 2021-06-06 RX ORDER — POTASSIUM CHLORIDE 750 MG/1
20 CAPSULE, EXTENDED RELEASE ORAL ONCE
Status: COMPLETED | OUTPATIENT
Start: 2021-06-06 | End: 2021-06-06

## 2021-06-06 RX ORDER — FUROSEMIDE 10 MG/ML
20 INJECTION INTRAMUSCULAR; INTRAVENOUS ONCE
Status: COMPLETED | OUTPATIENT
Start: 2021-06-06 | End: 2021-06-06

## 2021-06-06 RX ORDER — METFORMIN HYDROCHLORIDE 750 MG/1
750 TABLET, EXTENDED RELEASE ORAL 2 TIMES DAILY WITH MEALS
Status: DISCONTINUED | OUTPATIENT
Start: 2021-06-07 | End: 2021-06-07

## 2021-06-06 RX ORDER — IPRATROPIUM BROMIDE AND ALBUTEROL SULFATE 2.5; .5 MG/3ML; MG/3ML
1.5 SOLUTION RESPIRATORY (INHALATION)
Status: DISCONTINUED | OUTPATIENT
Start: 2021-06-06 | End: 2021-06-07

## 2021-06-06 RX ADMIN — ATORVASTATIN CALCIUM 80 MG: 40 TABLET, FILM COATED ORAL at 20:42

## 2021-06-06 RX ADMIN — MAGNESIUM GLUCONATE 500 MG ORAL TABLET 400 MG: 500 TABLET ORAL at 08:58

## 2021-06-06 RX ADMIN — DRONEDARONE 400 MG: 400 TABLET, FILM COATED ORAL at 08:58

## 2021-06-06 RX ADMIN — IPRATROPIUM BROMIDE AND ALBUTEROL SULFATE 1.5 ML: 2.5; .5 SOLUTION RESPIRATORY (INHALATION) at 14:12

## 2021-06-06 RX ADMIN — FERROUS SULFATE TAB 325 MG (65 MG ELEMENTAL FE) 325 MG: 325 (65 FE) TAB at 08:58

## 2021-06-06 RX ADMIN — FUROSEMIDE 40 MG: 40 TABLET ORAL at 08:58

## 2021-06-06 RX ADMIN — PANTOPRAZOLE SODIUM 8 MG/HR: 40 INJECTION, POWDER, FOR SOLUTION INTRAVENOUS at 15:20

## 2021-06-06 RX ADMIN — DRONEDARONE 400 MG: 400 TABLET, FILM COATED ORAL at 17:44

## 2021-06-06 RX ADMIN — INSULIN LISPRO 2 UNITS: 100 INJECTION, SOLUTION INTRAVENOUS; SUBCUTANEOUS at 12:24

## 2021-06-06 RX ADMIN — PANTOPRAZOLE SODIUM 8 MG/HR: 40 INJECTION, POWDER, FOR SOLUTION INTRAVENOUS at 20:42

## 2021-06-06 RX ADMIN — POTASSIUM CHLORIDE 20 MEQ: 750 CAPSULE, EXTENDED RELEASE ORAL at 02:08

## 2021-06-06 RX ADMIN — DILTIAZEM HYDROCHLORIDE 360 MG: 240 CAPSULE, EXTENDED RELEASE ORAL at 08:58

## 2021-06-06 RX ADMIN — IPRATROPIUM BROMIDE AND ALBUTEROL SULFATE 1.5 ML: 2.5; .5 SOLUTION RESPIRATORY (INHALATION) at 01:13

## 2021-06-06 RX ADMIN — PANTOPRAZOLE SODIUM 8 MG/HR: 40 INJECTION, POWDER, FOR SOLUTION INTRAVENOUS at 04:01

## 2021-06-06 RX ADMIN — IPRATROPIUM BROMIDE AND ALBUTEROL SULFATE 1.5 ML: 2.5; .5 SOLUTION RESPIRATORY (INHALATION) at 19:16

## 2021-06-06 RX ADMIN — PANTOPRAZOLE SODIUM 8 MG/HR: 40 INJECTION, POWDER, FOR SOLUTION INTRAVENOUS at 10:38

## 2021-06-06 RX ADMIN — IRON SUCROSE 200 MG: 20 INJECTION, SOLUTION INTRAVENOUS at 14:14

## 2021-06-06 RX ADMIN — METFORMIN HYDROCHLORIDE 750 MG: 750 TABLET ORAL at 08:58

## 2021-06-06 RX ADMIN — FUROSEMIDE 20 MG: 10 INJECTION, SOLUTION INTRAMUSCULAR; INTRAVENOUS at 02:08

## 2021-06-06 RX ADMIN — ALLOPURINOL 100 MG: 100 TABLET ORAL at 08:58

## 2021-06-06 RX ADMIN — IPRATROPIUM BROMIDE AND ALBUTEROL SULFATE 1.5 ML: 2.5; .5 SOLUTION RESPIRATORY (INHALATION) at 06:53

## 2021-06-06 NOTE — PROGRESS NOTES
[facility]  INPATIENT PROGRESS NOTE    Patient Name: Milton Rodriguez  Date of Admission: 6/5/2021  Today's Date: 06/06/21  Length of Stay: 1  Primary Care Physician: Thanh Weeks MD    Subjective   Chief Complaint: SOA  HPI   Doing ok, feels better, less SOA, legs less swollen  No black or bloody stools.        Review of Systems   Constitutional: Positive for fatigue. Negative for fever.   HENT: Negative for congestion and ear pain.    Eyes: Negative for redness and visual disturbance.   Respiratory: Positive for cough. Negative for shortness of breath and wheezing.    Cardiovascular: Positive for leg swelling. Negative for chest pain and palpitations.   Gastrointestinal: Negative for abdominal pain, diarrhea, nausea and vomiting.   Endocrine: Negative for cold intolerance and heat intolerance.   Genitourinary: Negative for dysuria and frequency.   Musculoskeletal: Negative for arthralgias and back pain.   Skin: Negative for rash and wound.   Neurological: Negative for dizziness and headaches.   Psychiatric/Behavioral: Negative for confusion. The patient is not nervous/anxious.         All pertinent negatives and positives are as above. All other systems have been reviewed and are negative unless otherwise stated.     Objective    Temp:  [97.5 °F (36.4 °C)-98.5 °F (36.9 °C)] 98.5 °F (36.9 °C)  Heart Rate:  [117-123] 117  Resp:  [16-22] 17  BP: ()/(66-86) 103/72  Physical Exam  Vitals reviewed.   Constitutional:       Appearance: He is well-developed.   HENT:      Head: Normocephalic and atraumatic.      Right Ear: External ear normal.      Left Ear: External ear normal.      Nose: Nose normal.   Eyes:      General: No scleral icterus.        Right eye: No discharge.         Left eye: No discharge.      Conjunctiva/sclera: Conjunctivae normal.      Pupils: Pupils are equal, round, and reactive to light.   Neck:      Thyroid: No thyromegaly.       Trachea: No tracheal deviation.   Cardiovascular:      Rate and Rhythm: Normal rate and regular rhythm.      Heart sounds: Normal heart sounds. No murmur heard.   No friction rub. No gallop.    Pulmonary:      Effort: Pulmonary effort is normal. No respiratory distress.      Breath sounds: No stridor. Decreased breath sounds and rales present. No wheezing.   Chest:      Chest wall: No tenderness.   Abdominal:      General: Bowel sounds are normal. There is no distension.      Palpations: Abdomen is soft. There is no mass.      Tenderness: There is no abdominal tenderness. There is no guarding or rebound.      Hernia: No hernia is present.   Musculoskeletal:         General: No deformity. Normal range of motion.      Cervical back: Normal range of motion and neck supple.   Lymphadenopathy:      Cervical: No cervical adenopathy.   Skin:     General: Skin is warm and dry.      Coloration: Skin is not pale.      Findings: No erythema or rash.   Neurological:      Mental Status: He is alert and oriented to person, place, and time.      Cranial Nerves: No cranial nerve deficit.      Motor: No abnormal muscle tone.      Coordination: Coordination normal.      Deep Tendon Reflexes: Reflexes are normal and symmetric. Reflexes normal.   Psychiatric:         Behavior: Behavior normal.         Thought Content: Thought content normal.         Judgment: Judgment normal.             Results Review:  I have reviewed the labs, radiology results, and diagnostic studies.    Laboratory Data:   Results from last 7 days   Lab Units 06/06/21  0220 06/05/21  1641 06/05/21  1606   WBC 10*3/mm3 9.50  --  7.38   HEMOGLOBIN g/dL 8.1* 6.8* 7.5*   HEMATOCRIT % 26.2* 22.1* 24.1*   PLATELETS 10*3/mm3 338  --  335        Results from last 7 days   Lab Units 06/06/21  0220 06/05/21  1606   SODIUM mmol/L 143 138   POTASSIUM mmol/L 3.6 3.6   CHLORIDE mmol/L 105 102   CO2 mmol/L 23.0 22.0   BUN mg/dL 27* 27*   CREATININE mg/dL 1.64* 1.76*   CALCIUM  mg/dL 8.2* 8.3*   BILIRUBIN mg/dL 0.5 0.3   ALK PHOS U/L 292* 311*   ALT (SGPT) U/L 45* 49*   AST (SGOT) U/L 28 31   GLUCOSE mg/dL 74 363*       Culture Data:   No results found for: BLOODCX, URINECX, WOUNDCX, MRSACX, RESPCX, STOOLCX    Radiology Data:   Imaging Results (Last 24 Hours)     Procedure Component Value Units Date/Time    US Renal Bilateral [[id number]] Collected: 06/06/21 0854     Updated: 06/06/21 0859    Narrative:      EXAMINATION: US RENAL BILATERAL- 6/6/2021 8:54 AM CDT     HISTORY: bilat renal US for acute kidney injury; J81.0-Acute pulmonary  edema.     REPORT: Sonographic images of the kidneys were obtained.     COMPARISON: There are no correlative imaging studies for comparison.     The right kidney measures 10.0 x 4.3 x 4.6 cm and has normal morphology  and cortical echogenicity, no mass or hydronephrosis is identified.  Color Doppler images demonstrate vascular flow within the right kidney.  The left kidney measures 11.4 x 5.1 x 5.6 cm and has normal morphology  and renal cortical echogenicity. No mass or hydronephrosis is  identified. Color Doppler images demonstrate vascular flow within the  left kidney. The bladder is distended, the prostate gland measures 3.2 x  2.9 x 3.4 cm.       Impression:      Normal ultrasound of the kidneys. Mild distention of the  bladder.  This report was finalized on 06/06/2021 08:55 by Dr. Delbert Torrez MD.    XR Chest 1 View [[id number]] Collected: 06/06/21 0733     Updated: 06/06/21 0737    Narrative:      EXAMINATION: XR CHEST 1 VW- 6/6/2021 7:33 AM CDT     HISTORY: increasing SOA, pulmonary edema; J81.0-Acute pulmonary edema.     REPORT: A frontal view of the chest was obtained.     COMPARISON: Chest x-ray 6/20/2021 1603 hours.     Lungs are hypoaerated as before, there is an increase in interstitial  infiltrates, essentially codominant and likely related to pulmonary  edema. The heart is mildly enlarged. Small bilateral pleural effusions  are present. No  pneumothorax is identified. There is no lobar  consolidation.       Impression:      Congestive heart failure with mild worsening of pulmonary  edema.  This report was finalized on 06/06/2021 07:34 by Dr. Delbert Torrez MD.    CT Angiogram Chest [[id number]] Collected: 06/05/21 1718     Updated: 06/05/21 1728    Narrative:      EXAMINATION:  CT ANGIOGRAM CHEST-  6/5/2021 5:01 PM CDT     HISTORY: Tachycardia, hypotension, shortness of breath.     COMPARISON : No comparison study.     DLP: 184 mGy-cm. Automated dosage control was utilized.     TECHNIQUE: CT angio was performed of the chest with contrast. Coronal,  sagittal and 3-D reconstruction were performed.     MEDIASTINUM, HEART AND VASCULAR STRUCTURES: There is atheromatous  disease of the thoracic aorta and coronary arteries. There is moderate  to severe cardiomegaly. The pulmonary arteries are dilated. The main  pulmonary artery segment measures 3.9 cm. There is no CT evidence of  pulmonary embolus. There is mediastinal and bilateral hilar  lymphadenopathy.     LUNGS: There are small bilateral pleural effusions. There is  centrilobular emphysema. There are interstitial infiltrates bilaterally.  There is a 5.3 cm right upper lobe groundglass nodule on image 33 of  series 8. There is a 5 mm right upper lobe groundglass nodule on image  58 of series 8. There is a 5 mm left upper lobe groundglass nodule image  55 of series 8. There is a 3 mm left upper lobe subpleural nodule on  image 48 of series 8. There is passive atelectasis in both lower lobes.     UPPER ABDOMEN: There is thickening of the gastric wall.     BONES: There are degenerative changes of the spine.       Impression:      1. Cardiomegaly, bilateral pleural effusions and bilateral interstitial  infiltrates likely due to pulmonary edema. The findings are most likely  due to congestive heart failure.  2. No CT evidence of pulmonary embolus. Dilated pulmonary arteries.  Atheromatous disease of the  "thoracic aorta and coronary arteries.  3. Small pulmonary nodules bilaterally. Follow-up chest CT recommended  in 12 months by Fleischner Society guidelines. This exam was marked as  \"new lung findings\" in PACS for follow-up.  4. Thickening of the gastric wall. This is not very well evaluated on  this study. Only a portion of the stomach is visualized.  5. Mediastinal and bilateral hilar lymphadenopathy. This can be seen  with congestive heart failure with engorgement of lymph nodes. Other  etiologies are not excluded. Short-term follow-up recommended in 3-6  months.     The full report of this exam was immediately signed and available to the  emergency room. The patient is currently in the emergency room.  This report was finalized on 06/05/2021 17:24 by Dr. Pietro Hernandez MD.    XR Chest 1 View [[id number]] Collected: 06/05/21 1616     Updated: 06/05/21 1621    Narrative:      EXAMINATION: XR CHEST 1 VW- 6/5/2021 4:16 PM CDT     HISTORY: shortness of breath.     REPORT: A frontal view of the chest was obtained.     COMPARISON: Chest x-rays 9/11/2020.     The heart is mildly enlarged and there are new small bilateral pleural  effusions, as well as interstitial infiltrates which are centrally  predominant. Interstitial edema is favored with volume overload and CHF.  No lung consolidation is identified. There is no pneumothorax. No acute  osseous abnormality. There is a stable metallic foreign body projecting  over the left upper abdomen.       Impression:      Congestive heart failure with mild pulmonary edema and  bilateral small pleural effusions.  This report was finalized on 06/05/2021 16:18 by Dr. Delbert Torrez MD.          I have reviewed the patient's current medications.     Assessment/Plan     Active Hospital Problems    Diagnosis    • Acute pulmonary edema (CMS/HCC)      Labs reviewed: renal failure improved, anemia bettere    Imaging reviewed:Renal u/s    Imaging independently interpreted by me:renal " u/s:  No hydronephrosis    Telemetry reviewed    Telemetry independently interpreted by me: NSR       1.  Acute on chronic diastolic CHF  -IV diuretics  -Strict I's and O's  -Fluid restrictions  -Daily weights  -Echo     2.  Suspect GI bleed  -Consult GI  -IV protonix     3.  Anemia--likely due to #2  -Transfuse 1 unit prbc's  -Consult GI  -IV protonix  -Check Iron Panel  -iron supplementation     4.  Pulmonary nodules  -needs outpatient follow up     5.  ANTHONY  -will diurese  -consult nephrology     6.  HTN  -Cardizem     7.  HLD  -Statin     8.  PVD  -Statin     9.  T2DM  -SSI     10.  A-flutter  -Cardizem  -Multaq  -Telemetry                  Discharge Planning: I expect the patient to be discharged to home in 2-3 days    Electronically signed by Jose Alfredo Norman MD, 06/06/21, 09:27 CDT.
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
## 2021-06-07 LAB
ALBUMIN SERPL-MCNC: 3.2 G/DL (ref 3.5–5.2)
ALBUMIN/GLOB SERPL: 1 G/DL
ALP SERPL-CCNC: 260 U/L (ref 39–117)
ALT SERPL W P-5'-P-CCNC: 33 U/L (ref 1–41)
ANION GAP SERPL CALCULATED.3IONS-SCNC: 10 MMOL/L (ref 5–15)
AST SERPL-CCNC: 15 U/L (ref 1–40)
BASOPHILS # BLD AUTO: 0.04 10*3/MM3 (ref 0–0.2)
BASOPHILS NFR BLD AUTO: 0.5 % (ref 0–1.5)
BH BB BLOOD EXPIRATION DATE: NORMAL
BH BB BLOOD TYPE BARCODE: 5100
BH BB DISPENSE STATUS: NORMAL
BH BB PRODUCT CODE: NORMAL
BH BB UNIT NUMBER: NORMAL
BH CV ECHO MEAS - AO MAX PG (FULL): 1.2 MMHG
BH CV ECHO MEAS - AO MAX PG: 5.1 MMHG
BH CV ECHO MEAS - AO MEAN PG (FULL): 1 MMHG
BH CV ECHO MEAS - AO MEAN PG: 3 MMHG
BH CV ECHO MEAS - AO ROOT AREA (BSA CORRECTED): 1.7
BH CV ECHO MEAS - AO ROOT AREA: 7.5 CM^2
BH CV ECHO MEAS - AO ROOT DIAM: 3.1 CM
BH CV ECHO MEAS - AO V2 MAX: 113 CM/SEC
BH CV ECHO MEAS - AO V2 MEAN: 81.2 CM/SEC
BH CV ECHO MEAS - AO V2 VTI: 16.3 CM
BH CV ECHO MEAS - AVA(I,A): 3.8 CM^2
BH CV ECHO MEAS - AVA(I,D): 3.8 CM^2
BH CV ECHO MEAS - AVA(V,A): 3.6 CM^2
BH CV ECHO MEAS - AVA(V,D): 3.6 CM^2
BH CV ECHO MEAS - BSA(HAYCOCK): 1.9 M^2
BH CV ECHO MEAS - BSA: 1.9 M^2
BH CV ECHO MEAS - BZI_BMI: 25.5 KILOGRAMS/M^2
BH CV ECHO MEAS - BZI_METRIC_HEIGHT: 170.2 CM
BH CV ECHO MEAS - BZI_METRIC_WEIGHT: 73.9 KG
BH CV ECHO MEAS - EDV(CUBED): 154 ML
BH CV ECHO MEAS - EDV(MOD-SP4): 96.4 ML
BH CV ECHO MEAS - EDV(TEICH): 138.9 ML
BH CV ECHO MEAS - EF(CUBED): 39.6 %
BH CV ECHO MEAS - EF(MOD-SP4): 38.9 %
BH CV ECHO MEAS - EF(TEICH): 32.4 %
BH CV ECHO MEAS - ESV(CUBED): 93 ML
BH CV ECHO MEAS - ESV(MOD-SP4): 58.9 ML
BH CV ECHO MEAS - ESV(TEICH): 93.9 ML
BH CV ECHO MEAS - FS: 15.5 %
BH CV ECHO MEAS - IVS/LVPW: 1.2
BH CV ECHO MEAS - IVSD: 1.1 CM
BH CV ECHO MEAS - LA DIMENSION: 5.2 CM
BH CV ECHO MEAS - LA/AO: 1.7
BH CV ECHO MEAS - LAT PEAK E' VEL: 11.1 CM/SEC
BH CV ECHO MEAS - LV DIASTOLIC VOL/BSA (35-75): 52 ML/M^2
BH CV ECHO MEAS - LV MASS(C)D: 201.5 GRAMS
BH CV ECHO MEAS - LV MASS(C)DI: 108.7 GRAMS/M^2
BH CV ECHO MEAS - LV MAX PG: 3.9 MMHG
BH CV ECHO MEAS - LV MEAN PG: 2 MMHG
BH CV ECHO MEAS - LV SYSTOLIC VOL/BSA (12-30): 31.8 ML/M^2
BH CV ECHO MEAS - LV V1 MAX: 99 CM/SEC
BH CV ECHO MEAS - LV V1 MEAN: 70.9 CM/SEC
BH CV ECHO MEAS - LV V1 VTI: 14.9 CM
BH CV ECHO MEAS - LVIDD: 5.4 CM
BH CV ECHO MEAS - LVIDS: 4.5 CM
BH CV ECHO MEAS - LVLD AP4: 8.3 CM
BH CV ECHO MEAS - LVLS AP4: 7.7 CM
BH CV ECHO MEAS - LVOT AREA (M): 4.2 CM^2
BH CV ECHO MEAS - LVOT AREA: 4.2 CM^2
BH CV ECHO MEAS - LVOT DIAM: 2.3 CM
BH CV ECHO MEAS - LVPWD: 0.92 CM
BH CV ECHO MEAS - MED PEAK E' VEL: 8.49 CM/SEC
BH CV ECHO MEAS - MR MAX PG: 93.9 MMHG
BH CV ECHO MEAS - MR MAX VEL: 483 CM/SEC
BH CV ECHO MEAS - MR MEAN PG: 54 MMHG
BH CV ECHO MEAS - MR MEAN VEL: 337 CM/SEC
BH CV ECHO MEAS - MR VTI: 128.5 CM
BH CV ECHO MEAS - MV A MAX VEL: 65 CM/SEC
BH CV ECHO MEAS - MV DEC TIME: 0.11 SEC
BH CV ECHO MEAS - MV E MAX VEL: 110 CM/SEC
BH CV ECHO MEAS - MV E/A: 1.7
BH CV ECHO MEAS - PA MAX PG: 1.5 MMHG
BH CV ECHO MEAS - PA V2 MAX: 61.1 CM/SEC
BH CV ECHO MEAS - RAP SYSTOLE: 10 MMHG
BH CV ECHO MEAS - RVSP: 42.7 MMHG
BH CV ECHO MEAS - SI(AO): 66.4 ML/M^2
BH CV ECHO MEAS - SI(CUBED): 32.9 ML/M^2
BH CV ECHO MEAS - SI(LVOT): 33.4 ML/M^2
BH CV ECHO MEAS - SI(MOD-SP4): 20.2 ML/M^2
BH CV ECHO MEAS - SI(TEICH): 24.3 ML/M^2
BH CV ECHO MEAS - SV(AO): 123 ML
BH CV ECHO MEAS - SV(CUBED): 61 ML
BH CV ECHO MEAS - SV(LVOT): 61.9 ML
BH CV ECHO MEAS - SV(MOD-SP4): 37.5 ML
BH CV ECHO MEAS - SV(TEICH): 45 ML
BH CV ECHO MEAS - TR MAX VEL: 286 CM/SEC
BH CV ECHO MEASUREMENTS AVERAGE E/E' RATIO: 11.23
BILIRUB SERPL-MCNC: 0.3 MG/DL (ref 0–1.2)
BUN SERPL-MCNC: 25 MG/DL (ref 8–23)
BUN/CREAT SERPL: 13.2 (ref 7–25)
CALCIUM SPEC-SCNC: 8.1 MG/DL (ref 8.6–10.5)
CHLORIDE SERPL-SCNC: 106 MMOL/L (ref 98–107)
CO2 SERPL-SCNC: 25 MMOL/L (ref 22–29)
CREAT SERPL-MCNC: 1.9 MG/DL (ref 0.76–1.27)
CROSSMATCH INTERPRETATION: NORMAL
DEPRECATED RDW RBC AUTO: 48.4 FL (ref 37–54)
EOSINOPHIL # BLD AUTO: 0.25 10*3/MM3 (ref 0–0.4)
EOSINOPHIL NFR BLD AUTO: 3 % (ref 0.3–6.2)
ERYTHROCYTE [DISTWIDTH] IN BLOOD BY AUTOMATED COUNT: 15.7 % (ref 12.3–15.4)
GFR SERPL CREATININE-BSD FRML MDRD: 44 ML/MIN/1.73
GLOBULIN UR ELPH-MCNC: 3.1 GM/DL
GLUCOSE BLDC GLUCOMTR-MCNC: 183 MG/DL (ref 70–130)
GLUCOSE BLDC GLUCOMTR-MCNC: 208 MG/DL (ref 70–130)
GLUCOSE BLDC GLUCOMTR-MCNC: 233 MG/DL (ref 70–130)
GLUCOSE BLDC GLUCOMTR-MCNC: 246 MG/DL (ref 70–130)
GLUCOSE SERPL-MCNC: 214 MG/DL (ref 65–99)
HCT VFR BLD AUTO: 26.2 % (ref 37.5–51)
HGB BLD-MCNC: 8 G/DL (ref 13–17.7)
IMM GRANULOCYTES # BLD AUTO: 0.04 10*3/MM3 (ref 0–0.05)
IMM GRANULOCYTES NFR BLD AUTO: 0.5 % (ref 0–0.5)
LEFT ATRIUM VOLUME INDEX: 88.6 ML/M2
LEFT ATRIUM VOLUME: 164 CM3
LYMPHOCYTES # BLD AUTO: 0.28 10*3/MM3 (ref 0.7–3.1)
LYMPHOCYTES NFR BLD AUTO: 3.3 % (ref 19.6–45.3)
MAXIMAL PREDICTED HEART RATE: 159 BPM
MCH RBC QN AUTO: 26.1 PG (ref 26.6–33)
MCHC RBC AUTO-ENTMCNC: 30.5 G/DL (ref 31.5–35.7)
MCV RBC AUTO: 85.3 FL (ref 79–97)
MONOCYTES # BLD AUTO: 1.06 10*3/MM3 (ref 0.1–0.9)
MONOCYTES NFR BLD AUTO: 12.5 % (ref 5–12)
NEUTROPHILS NFR BLD AUTO: 6.78 10*3/MM3 (ref 1.7–7)
NEUTROPHILS NFR BLD AUTO: 80.2 % (ref 42.7–76)
NRBC BLD AUTO-RTO: 0.2 /100 WBC (ref 0–0.2)
PLATELET # BLD AUTO: 341 10*3/MM3 (ref 140–450)
PMV BLD AUTO: 9.8 FL (ref 6–12)
POTASSIUM SERPL-SCNC: 3.7 MMOL/L (ref 3.5–5.2)
PROT SERPL-MCNC: 6.3 G/DL (ref 6–8.5)
RBC # BLD AUTO: 3.07 10*6/MM3 (ref 4.14–5.8)
SODIUM SERPL-SCNC: 141 MMOL/L (ref 136–145)
STRESS TARGET HR: 135 BPM
UNIT  ABO: NORMAL
UNIT  RH: NORMAL
WBC # BLD AUTO: 8.45 10*3/MM3 (ref 3.4–10.8)

## 2021-06-07 PROCEDURE — 94799 UNLISTED PULMONARY SVC/PX: CPT

## 2021-06-07 PROCEDURE — 85025 COMPLETE CBC W/AUTO DIFF WBC: CPT | Performed by: INTERNAL MEDICINE

## 2021-06-07 PROCEDURE — 80053 COMPREHEN METABOLIC PANEL: CPT | Performed by: INTERNAL MEDICINE

## 2021-06-07 PROCEDURE — 99223 1ST HOSP IP/OBS HIGH 75: CPT | Performed by: INTERNAL MEDICINE

## 2021-06-07 PROCEDURE — 25010000002 FUROSEMIDE PER 20 MG

## 2021-06-07 PROCEDURE — 82962 GLUCOSE BLOOD TEST: CPT

## 2021-06-07 PROCEDURE — 63710000001 INSULIN DETEMIR PER 5 UNITS: Performed by: FAMILY MEDICINE

## 2021-06-07 PROCEDURE — 25010000002 FUROSEMIDE PER 20 MG: Performed by: FAMILY MEDICINE

## 2021-06-07 PROCEDURE — 94660 CPAP INITIATION&MGMT: CPT

## 2021-06-07 PROCEDURE — 25010000002 IRON SUCROSE PER 1 MG: Performed by: FAMILY MEDICINE

## 2021-06-07 PROCEDURE — 63710000001 INSULIN LISPRO (HUMAN) PER 5 UNITS: Performed by: FAMILY MEDICINE

## 2021-06-07 RX ORDER — METFORMIN HYDROCHLORIDE 750 MG/1
750 TABLET, EXTENDED RELEASE ORAL 2 TIMES DAILY WITH MEALS
Status: DISCONTINUED | OUTPATIENT
Start: 2021-06-09 | End: 2021-06-07

## 2021-06-07 RX ORDER — FUROSEMIDE 10 MG/ML
INJECTION INTRAMUSCULAR; INTRAVENOUS
Status: COMPLETED
Start: 2021-06-07 | End: 2021-06-07

## 2021-06-07 RX ORDER — BENZONATATE 100 MG/1
100 CAPSULE ORAL 3 TIMES DAILY PRN
Status: DISCONTINUED | OUTPATIENT
Start: 2021-06-07 | End: 2021-06-11 | Stop reason: HOSPADM

## 2021-06-07 RX ORDER — BUMETANIDE 0.25 MG/ML
1 INJECTION INTRAMUSCULAR; INTRAVENOUS EVERY 12 HOURS
Status: DISCONTINUED | OUTPATIENT
Start: 2021-06-07 | End: 2021-06-08

## 2021-06-07 RX ORDER — PANTOPRAZOLE SODIUM 40 MG/10ML
40 INJECTION, POWDER, LYOPHILIZED, FOR SOLUTION INTRAVENOUS EVERY 12 HOURS SCHEDULED
Status: DISCONTINUED | OUTPATIENT
Start: 2021-06-07 | End: 2021-06-09 | Stop reason: CLARIF

## 2021-06-07 RX ORDER — FUROSEMIDE 10 MG/ML
40 INJECTION INTRAMUSCULAR; INTRAVENOUS ONCE
Status: COMPLETED | OUTPATIENT
Start: 2021-06-07 | End: 2021-06-07

## 2021-06-07 RX ORDER — BUDESONIDE 0.5 MG/2ML
0.5 INHALANT ORAL
Status: DISCONTINUED | OUTPATIENT
Start: 2021-06-07 | End: 2021-06-11 | Stop reason: HOSPADM

## 2021-06-07 RX ADMIN — FUROSEMIDE 40 MG: 40 TABLET ORAL at 08:08

## 2021-06-07 RX ADMIN — IPRATROPIUM BROMIDE 0.5 MG: 0.5 SOLUTION RESPIRATORY (INHALATION) at 18:32

## 2021-06-07 RX ADMIN — IPRATROPIUM BROMIDE AND ALBUTEROL SULFATE 1.5 ML: 2.5; .5 SOLUTION RESPIRATORY (INHALATION) at 06:22

## 2021-06-07 RX ADMIN — BUDESONIDE 0.5 MG: 0.5 INHALANT RESPIRATORY (INHALATION) at 18:32

## 2021-06-07 RX ADMIN — ATORVASTATIN CALCIUM 80 MG: 40 TABLET, FILM COATED ORAL at 20:44

## 2021-06-07 RX ADMIN — FUROSEMIDE 40 MG: 10 INJECTION, SOLUTION INTRAVENOUS at 15:48

## 2021-06-07 RX ADMIN — IRON SUCROSE 200 MG: 20 INJECTION, SOLUTION INTRAVENOUS at 16:25

## 2021-06-07 RX ADMIN — PANTOPRAZOLE SODIUM 40 MG: 40 INJECTION, POWDER, FOR SOLUTION INTRAVENOUS at 17:02

## 2021-06-07 RX ADMIN — ALLOPURINOL 100 MG: 100 TABLET ORAL at 08:08

## 2021-06-07 RX ADMIN — DRONEDARONE 400 MG: 400 TABLET, FILM COATED ORAL at 08:08

## 2021-06-07 RX ADMIN — IPRATROPIUM BROMIDE AND ALBUTEROL SULFATE 1.5 ML: 2.5; .5 SOLUTION RESPIRATORY (INHALATION) at 00:25

## 2021-06-07 RX ADMIN — INSULIN LISPRO 2 UNITS: 100 INJECTION, SOLUTION INTRAVENOUS; SUBCUTANEOUS at 17:02

## 2021-06-07 RX ADMIN — INSULIN DETEMIR 5 UNITS: 100 INJECTION, SOLUTION SUBCUTANEOUS at 20:51

## 2021-06-07 RX ADMIN — BENZONATATE 100 MG: 100 CAPSULE ORAL at 22:30

## 2021-06-07 RX ADMIN — NICOTINE 1 PATCH: 14 PATCH, EXTENDED RELEASE TRANSDERMAL at 20:46

## 2021-06-07 RX ADMIN — PANTOPRAZOLE SODIUM 8 MG/HR: 40 INJECTION, POWDER, FOR SOLUTION INTRAVENOUS at 07:17

## 2021-06-07 RX ADMIN — INSULIN LISPRO 3 UNITS: 100 INJECTION, SOLUTION INTRAVENOUS; SUBCUTANEOUS at 08:07

## 2021-06-07 RX ADMIN — BUMETANIDE 1 MG: 0.25 INJECTION INTRAMUSCULAR; INTRAVENOUS at 16:24

## 2021-06-07 RX ADMIN — INSULIN LISPRO 3 UNITS: 100 INJECTION, SOLUTION INTRAVENOUS; SUBCUTANEOUS at 11:58

## 2021-06-07 RX ADMIN — DILTIAZEM HYDROCHLORIDE 360 MG: 240 CAPSULE, EXTENDED RELEASE ORAL at 08:07

## 2021-06-07 RX ADMIN — IPRATROPIUM BROMIDE AND ALBUTEROL SULFATE 1.5 ML: 2.5; .5 SOLUTION RESPIRATORY (INHALATION) at 12:01

## 2021-06-07 RX ADMIN — LINAGLIPTIN 5 MG: 5 TABLET, FILM COATED ORAL at 16:24

## 2021-06-07 RX ADMIN — DRONEDARONE 400 MG: 400 TABLET, FILM COATED ORAL at 17:02

## 2021-06-07 RX ADMIN — MAGNESIUM GLUCONATE 500 MG ORAL TABLET 400 MG: 500 TABLET ORAL at 08:07

## 2021-06-07 RX ADMIN — PANTOPRAZOLE SODIUM 8 MG/HR: 40 INJECTION, POWDER, FOR SOLUTION INTRAVENOUS at 01:41

## 2021-06-07 RX ADMIN — FUROSEMIDE 40 MG: 10 INJECTION, SOLUTION INTRAVENOUS at 14:52

## 2021-06-07 NOTE — CONSULTS
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 chronic condition.       Results may be falsely decreased if patient taking Biotin.      Comprehensive Metabolic Panel [[id number]]  (Abnormal) Collected: 06/05/21 1606    Specimen: Blood Updated: 06/05/21 1643     Glucose 363 mg/dL      BUN 27 mg/dL      Creatinine 1.76 mg/dL      Sodium 138 mmol/L      Potassium 3.6 mmol/L      Chloride 102 mmol/L      CO2 22.0 mmol/L      Calcium 8.3 mg/dL      Total Protein 6.8 g/dL      Albumin 3.50 g/dL      ALT (SGPT) 49 U/L      AST (SGOT) 31 U/L      Alkaline Phosphatase 311 U/L      Total Bilirubin 0.3 mg/dL      eGFR  African Amer 48 mL/min/1.73      Globulin 3.3 gm/dL      A/G Ratio 1.1 g/dL      BUN/Creatinine Ratio 15.3     Anion Gap 14.0 mmol/L     Narrative:      GFR Normal >60  Chronic Kidney Disease <60  Kidney Failure <15      BNP [[id number]]  (Abnormal) Collected: 06/05/21 1606    Specimen: Blood Updated: 06/05/21 1638     proBNP 14,485.0 pg/mL     Narrative:      Among patients with dyspnea, NT-proBNP is highly sensitive for the detection of acute congestive heart failure. In addition NT-proBNP of <300 pg/ml effectively rules out acute congestive heart failure with 99% negative predictive value.    Results may be falsely decreased if  patient taking Biotin.      Lactic Acid, Plasma [[id number]]  (Normal) Collected: 06/05/21 1606    Specimen: Blood Updated: 06/05/21 1637     Lactate 2.0 mmol/L     D-dimer, Quantitative [[id number]]  (Abnormal) Collected: 06/05/21 1606    Specimen: Blood Updated: 06/05/21 1632     D-Dimer, Quantitative 2.19 mg/L (FEU)     Narrative:      Reference Range is 0-0.50 mg/L FEU. However, results <0.50 mg/L FEU tends to rule out DVT or PE. Results >0.50 mg/L FEU are not useful in predicting absence or presence of DVT or PE.      Protime-INR [[id number]]  (Abnormal) Collected: 06/05/21 1606    Specimen: Blood Updated: 06/05/21 1632     Protime 14.2 Seconds      INR 1.19    aPTT [[id number]]  (Normal) Collected: 06/05/21 1606    Specimen: Blood Updated: 06/05/21 1632     PTT 33.7 seconds     POC Occult Blood Stool [[id number]]  (Normal) Collected: 06/05/21 1631    Specimen: Stool Updated: 06/05/21 1632     Fecal Occult Blood Negative     Lot Number \199\     Expiration Date \2/28/22\     DEVELOPER LOT NUMBER \199\     DEVELOPER EXPIRATION DATE \2/28/22\     Positive Control Positive     Negative Control Negative    CBC & Differential [[id number]]  (Abnormal) Collected: 06/05/21 1606    Specimen: Blood Updated: 06/05/21 1618    Narrative:      The following orders were created for panel order CBC & Differential.  Procedure                               Abnormality         Status                     ---------                               -----------         ------                     CBC Auto Differential[[id number]]        Abnormal            Final result                 Please view results for these tests on the individual orders.    CBC Auto Differential [[id number]]  (Abnormal) Collected: 06/05/21 1606    Specimen: Blood Updated: 06/05/21 1618     WBC 7.38 10*3/mm3      RBC 2.88 10*6/mm3      Hemoglobin 7.5 g/dL      Hematocrit 24.1 %      MCV 83.7 fL      MCH 26.0 pg      MCHC 31.1 g/dL      RDW 15.5 %      RDW-SD 46.7 fl       MPV 10.0 fL      Platelets 335 10*3/mm3      Neutrophil % 79.7 %      Lymphocyte % 5.3 %      Monocyte % 11.8 %      Eosinophil % 2.3 %      Basophil % 0.5 %      Immature Grans % 0.4 %      Neutrophils, Absolute 5.88 10*3/mm3      Lymphocytes, Absolute 0.39 10*3/mm3      Monocytes, Absolute 0.87 10*3/mm3      Eosinophils, Absolute 0.17 10*3/mm3      Basophils, Absolute 0.04 10*3/mm3      Immature Grans, Absolute 0.03 10*3/mm3      nRBC 0.0 /100 WBC           Lab Results   Component Value Date    ECHOEFEST 60 04/21/2017       Imaging Results (Last 72 Hours)     Procedure Component Value Units Date/Time    US Renal Bilateral [[id number]] Collected: 06/06/21 0854     Updated: 06/06/21 0859    Narrative:      EXAMINATION: US RENAL BILATERAL- 6/6/2021 8:54 AM CDT     HISTORY: bilat renal US for acute kidney injury; J81.0-Acute pulmonary  edema.     REPORT: Sonographic images of the kidneys were obtained.     COMPARISON: There are no correlative imaging studies for comparison.     The right kidney measures 10.0 x 4.3 x 4.6 cm and has normal morphology  and cortical echogenicity, no mass or hydronephrosis is identified.  Color Doppler images demonstrate vascular flow within the right kidney.  The left kidney measures 11.4 x 5.1 x 5.6 cm and has normal morphology  and renal cortical echogenicity. No mass or hydronephrosis is  identified. Color Doppler images demonstrate vascular flow within the  left kidney. The bladder is distended, the prostate gland measures 3.2 x  2.9 x 3.4 cm.       Impression:      Normal ultrasound of the kidneys. Mild distention of the  bladder.  This report was finalized on 06/06/2021 08:55 by Dr. Delbert Torrez MD.    XR Chest 1 View [[id number]] Collected: 06/06/21 0733     Updated: 06/06/21 0737    Narrative:      EXAMINATION: XR CHEST 1 VW- 6/6/2021 7:33 AM CDT     HISTORY: increasing SOA, pulmonary edema; J81.0-Acute pulmonary edema.     REPORT: A frontal view of the chest was obtained.      COMPARISON: Chest x-ray 6/20/2021 1603 hours.     Lungs are hypoaerated as before, there is an increase in interstitial  infiltrates, essentially codominant and likely related to pulmonary  edema. The heart is mildly enlarged. Small bilateral pleural effusions  are present. No pneumothorax is identified. There is no lobar  consolidation.       Impression:      Congestive heart failure with mild worsening of pulmonary  edema.  This report was finalized on 06/06/2021 07:34 by Dr. Delbert Torrez MD.    CT Angiogram Chest [[id number]] Collected: 06/05/21 1718     Updated: 06/05/21 1728    Narrative:      EXAMINATION:  CT ANGIOGRAM CHEST-  6/5/2021 5:01 PM CDT     HISTORY: Tachycardia, hypotension, shortness of breath.     COMPARISON : No comparison study.     DLP: 184 mGy-cm. Automated dosage control was utilized.     TECHNIQUE: CT angio was performed of the chest with contrast. Coronal,  sagittal and 3-D reconstruction were performed.     MEDIASTINUM, HEART AND VASCULAR STRUCTURES: There is atheromatous  disease of the thoracic aorta and coronary arteries. There is moderate  to severe cardiomegaly. The pulmonary arteries are dilated. The main  pulmonary artery segment measures 3.9 cm. There is no CT evidence of  pulmonary embolus. There is mediastinal and bilateral hilar  lymphadenopathy.     LUNGS: There are small bilateral pleural effusions. There is  centrilobular emphysema. There are interstitial infiltrates bilaterally.  There is a 5.3 cm right upper lobe groundglass nodule on image 33 of  series 8. There is a 5 mm right upper lobe groundglass nodule on image  58 of series 8. There is a 5 mm left upper lobe groundglass nodule image  55 of series 8. There is a 3 mm left upper lobe subpleural nodule on  image 48 of series 8. There is passive atelectasis in both lower lobes.     UPPER ABDOMEN: There is thickening of the gastric wall.     BONES: There are degenerative changes of the spine.       Impression:      1.  "Cardiomegaly, bilateral pleural effusions and bilateral interstitial  infiltrates likely due to pulmonary edema. The findings are most likely  due to congestive heart failure.  2. No CT evidence of pulmonary embolus. Dilated pulmonary arteries.  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  3. Small pulmonary nodules bilaterally. Follow-up chest CT recommended  in 12 months by Fleischner Society guidelines. This exam was marked as  \"new lung findings\" in PACS for follow-up.  4. Thickening of the gastric wall. This is not very well evaluated on  this study. Only a portion of the stomach is visualized.  5. Mediastinal and bilateral hilar lymphadenopathy. This can be seen  with congestive heart failure with engorgement of lymph nodes. Other  etiologies are not excluded. Short-term follow-up recommended in 3-6  months.     The full report of this exam was immediately signed and available to the  emergency room. The patient is currently in the emergency room.  This report was finalized on 06/05/2021 17:24 by Dr. Pietro Hernandez MD.    XR Chest 1 View [[id number]] Collected: 06/05/21 1616     Updated: 06/05/21 1621    Narrative:      EXAMINATION: XR CHEST 1 VW- 6/5/2021 4:16 PM CDT     HISTORY: shortness of breath.     REPORT: A frontal view of the chest was obtained.     COMPARISON: Chest x-rays 9/11/2020.     The heart is mildly enlarged and there are new small bilateral pleural  effusions, as well as interstitial infiltrates which are centrally  predominant. Interstitial edema is favored with volume overload and CHF.  No lung consolidation is identified. There is no pneumothorax. No acute  osseous abnormality. There is a stable metallic foreign body projecting  over the left upper abdomen.       Impression:      Congestive heart failure with mild pulmonary edema and  bilateral small pleural effusions.  This report was finalized on 06/05/2021 16:18 by Dr. Delbert Torrez MD.          Assessment/Plan "     -Acute on Chronic Diastolic congestive heart failure/volume overload: BNP 14,485,  CXR showed mild pulmonary edema and bilateral pleural effusions. CTA Chest showed bilateral pleural effusions, no evidence of PE and pulmonary nodules. Echo was done that showed LVEF to be 36-40%, moderate mitral valve and tricupsid valve regurgitation present, and RVSP mildly elevated 35-45 mmHg. EF is decreased from 66-70% in 7/2020.   Plan to optimize medical management of heart failure with resolution of volume overload with beta blocker, ACEI/ARB or possibly Entresto depending on kidney function. Ischemic workup to be done when acute issues resolve as well.  Agree with Lasix 40 mg IV now  Strict I and O, daily weights, fluid restriction 1500 ml  Continue to monitor kidney function and electrolytes    -Chronic kidney disease stage III A (baseline Cr 1.5): Cr today 1.9. Continue to monitor kidney function and electrolytes. Nephrology following patient     -Anemia: hgb today is 8.0. Continue to monitor H and H. Transfuse as needed    -Paroxysmal Atrial Fibrillation: CHADS-VASc score of 5. Patient was taken off Eliquis after a colonoscopy last year per patient. He has continued Cardizem, and Multaq.     -Hypertension: Controlled; blood pressure has been softer today 90's systolic    -Hyperlipidemia: Continue atorvastatin    -Type 2 Diabetes mellitus    Further orders per Dr Aparicio    Thank you for asking us to follow this patient with you.     Please note this cardiology consultation note is the result of a face to face consultation with the patient, in addition to reviewing medical records at length by myself, DARIEN Clayton.    Electronically signed by DARIEN Clayton, 06/07/21, 2:38 PM CDT.

## 2021-06-07 NOTE — PROGRESS NOTES
[facility]  INPATIENT PROGRESS NOTE    Length of Stay: 2  Date of Admission: 6/5/2021  Primary Care Physician: Thanh Weeks MD    Subjective   Chief Complaint: CHF/fluid overload/renal failure/    HPI   Patient is requiring 4 L of oxygen.  Patient breathing better.  Patient remains slightly tacky, sinus.  Patient denies any chest pain.  Discussed with patient cutting back and stopping.  Discussed with patient normal lung nodule/enlarged lymph node.  Patient need to follow-up primary care doctor and pulse repeating the CT scan in the future, patient understood.  No sign of bleeding per patient.    Review of Systems   Constitutional: Positive for activity change, appetite change and fatigue. Negative for chills and fever.   HENT: Negative for hearing loss, nosebleeds, tinnitus and trouble swallowing.    Eyes: Negative for visual disturbance.   Respiratory: Positive for cough, shortness of breath and wheezing. Negative for chest tightness.    Cardiovascular: Negative for chest pain, palpitations and leg swelling.   Gastrointestinal: Positive for nausea. Negative for abdominal distention, abdominal pain, blood in stool, constipation, diarrhea and vomiting.   Endocrine: Negative for cold intolerance, heat intolerance, polydipsia, polyphagia and polyuria.   Genitourinary: Negative for decreased urine volume, difficulty urinating, dysuria, flank pain, frequency and hematuria.   Musculoskeletal: Positive for arthralgias, gait problem and myalgias. Negative for joint swelling.   Skin: Negative for rash.   Allergic/Immunologic: Negative for immunocompromised state.   Neurological: Positive for weakness. Negative for dizziness, syncope, light-headedness and headaches.   Hematological: Negative for adenopathy. Does not bruise/bleed easily.   Psychiatric/Behavioral: Negative for confusion and sleep disturbance. The patient is not nervous/anxious.           All pertinent  negatives and positives are as above. All other systems have been reviewed and are negative unless otherwise stated.     Objective    Temp:  [97.8 °F (36.6 °C)-98.7 °F (37.1 °C)] 98.7 °F (37.1 °C)  Heart Rate:  [114-120] 114  Resp:  [16-21] 16  BP: ()/(65-86) 121/75    Intake/Output Summary (Last 24 hours) at 6/7/2021 1011  Last data filed at 6/7/2021 0800  Gross per 24 hour   Intake 1466.29 ml   Output 1525 ml   Net -58.71 ml     Physical Exam  Vitals and nursing note reviewed.   Constitutional:       Appearance: He is well-developed.   HENT:      Head: Normocephalic.   Eyes:      Conjunctiva/sclera: Conjunctivae normal.      Pupils: Pupils are equal, round, and reactive to light.   Neck:      Vascular: No JVD.   Cardiovascular:      Rate and Rhythm: Regular rhythm. Tachycardia present.      Heart sounds: Normal heart sounds. No murmur heard.   No friction rub. No gallop.    Pulmonary:      Effort: No respiratory distress.      Breath sounds: Wheezing present. No rales.      Comments: Patient currently on 4 L of oxygen.  Chest:      Chest wall: No tenderness.   Abdominal:      General: Bowel sounds are normal. There is no distension.      Palpations: Abdomen is soft.      Tenderness: There is no abdominal tenderness. There is no guarding or rebound.   Musculoskeletal:         General: No tenderness or deformity. Normal range of motion.      Cervical back: Neck supple.   Skin:     General: Skin is warm and dry.      Capillary Refill: Capillary refill takes 2 to 3 seconds.      Findings: No rash.   Neurological:      Mental Status: He is alert and oriented to person, place, and time.      Cranial Nerves: No cranial nerve deficit.      Motor: Weakness present. No abnormal muscle tone.      Coordination: Coordination abnormal.      Gait: Gait abnormal.      Deep Tendon Reflexes: Reflexes normal.   Psychiatric:         Behavior: Behavior normal.         Thought Content: Thought content normal.         Results  Review:  Lab Results (last 24 hours)     Procedure Component Value Units Date/Time    POC Glucose Once [[id number]]  (Abnormal) Collected: 06/07/21 0805    Specimen: Blood Updated: 06/07/21 0816     Glucose 208 mg/dL      Comment: : [id number] Aviles ID: [id number]       Comprehensive Metabolic Panel [[id number]]  (Abnormal) Collected: 06/07/21 0320    Specimen: Blood Updated: 06/07/21 0412     Glucose 214 mg/dL      BUN 25 mg/dL      Creatinine 1.90 mg/dL      Sodium 141 mmol/L      Potassium 3.7 mmol/L      Chloride 106 mmol/L      CO2 25.0 mmol/L      Calcium 8.1 mg/dL      Total Protein 6.3 g/dL      Albumin 3.20 g/dL      ALT (SGPT) 33 U/L      AST (SGOT) 15 U/L      Alkaline Phosphatase 260 U/L      Total Bilirubin 0.3 mg/dL      eGFR  African Amer 44 mL/min/1.73      Globulin 3.1 gm/dL      A/G Ratio 1.0 g/dL      BUN/Creatinine Ratio 13.2     Anion Gap 10.0 mmol/L     Narrative:      GFR Normal >60  Chronic Kidney Disease <60  Kidney Failure <15      CBC & Differential [[id number]]  (Abnormal) Collected: 06/07/21 0320    Specimen: Blood Updated: 06/07/21 0344    Narrative:      The following orders were created for panel order CBC & Differential.  Procedure                               Abnormality         Status                     ---------                               -----------         ------                     CBC Auto Differential[[id number]]        Abnormal            Final result                 Please view results for these tests on the individual orders.    CBC Auto Differential [[id number]]  (Abnormal) Collected: 06/07/21 0320    Specimen: Blood Updated: 06/07/21 0344     WBC 8.45 10*3/mm3      RBC 3.07 10*6/mm3      Hemoglobin 8.0 g/dL      Hematocrit 26.2 %      MCV 85.3 fL      MCH 26.1 pg      MCHC 30.5 g/dL      RDW 15.7 %      RDW-SD 48.4 fl      MPV 9.8 fL      Platelets 341 10*3/mm3      Neutrophil % 80.2 %      Lymphocyte % 3.3 %      Monocyte % 12.5 %      Eosinophil % 3.0  %      Basophil % 0.5 %      Immature Grans % 0.5 %      Neutrophils, Absolute 6.78 10*3/mm3      Lymphocytes, Absolute 0.28 10*3/mm3      Monocytes, Absolute 1.06 10*3/mm3      Eosinophils, Absolute 0.25 10*3/mm3      Basophils, Absolute 0.04 10*3/mm3      Immature Grans, Absolute 0.04 10*3/mm3      nRBC 0.2 /100 WBC     POC Glucose Once [[id number]]  (Abnormal) Collected: 06/06/21 2053    Specimen: Blood Updated: 06/06/21 2104     Glucose 334 mg/dL      Comment: : [id number] Lorraine ChandMeter ID: [id number]       POC Glucose Once [[id number]]  (Normal) Collected: 06/06/21 1657    Specimen: Blood Updated: 06/06/21 1708     Glucose 130 mg/dL      Comment: : [id number] Tejinder PopecyMeter ID: [id number]              Cultures:  No results found for: BLOODCX, URINECX, WOUNDCX, MRSACX, RESPCX, STOOLCX    Radiology Data:    Imaging Results (Last 24 Hours)     ** No results found for the last 24 hours. **          No Known Allergies    Scheduled meds:   allopurinol, 100 mg, Oral, Daily  atorvastatin, 80 mg, Oral, Nightly  dilTIAZem CD, 360 mg, Oral, Q24H  dronedarone, 400 mg, Oral, BID With Meals  furosemide, 40 mg, Oral, Daily  insulin detemir, 5 Units, Subcutaneous, Q12H  insulin lispro, 2-7 Units, Subcutaneous, TID AC  ipratropium-albuterol, 1.5 mL, Nebulization, Q6H - RT  iron sucrose (VENOFER) IVPB, 200 mg, Intravenous, Q24H  magnesium oxide, 400 mg, Oral, Daily  [START ON 6/9/2021] metFORMIN ER, 750 mg, Oral, BID With Meals  nicotine, 1 patch, Transdermal, Q24H  pantoprazole, 40 mg, Intravenous, Q12H        PRN meds:  benzonatate  •  dextrose  •  dextrose  •  glucagon (human recombinant)  •  [COMPLETED] Insert peripheral IV **AND** sodium chloride    Assessment/Plan       Tobacco abuse    Hypertension    Type 2 diabetes mellitus with autonomic neuropathy (CMS/HCC)    Congestive heart failure (CMS/HCC)    CKD (chronic kidney disease) stage 3, GFR 30-59 ml/min (CMS/HCC)    Acute pulmonary edema  (CMS/[facility])      Plan:  Acute pulmonary edema/atrial flutter/CHF/hypertension/hyperlipidemia.  Patient has been out of Lasix for last 2 weeks.  IV diuretic.  Strict in and out.  Daily weight.  Cardizem.  Lipitor.  Multaq.  Lasix.  Telemetry.   Echocardiogram-ejection fraction 36 to 40%, moderate concentric hypertrophy, diastolic dysfunction, right ventricle cavity mildly dilated, biatrial enlargement, moderate mitral valve regurgitation, moderate tricuspid valve regurgitation.    GI bleed/anemia.  Status post 1 units of blood transfusion.  Hemoccult-negative.  Consult GI.  Protonix IV.  Venofer x4 days 6/6/21.  Protonix drip.  No sign of bleeding since admission discussed with nursing staff.  Plan for EGD by GI.    Pulmonary nodules bilateral/lymphadenopathy.  Outpatient follow-up with his primary care physician.  Follow-up CT scan recommended in 12 months.  This has been discussed with the patient.  Patient understood.  Recommended short-term follow-up 3 to 6 months for engorged lymph node.  CTA of the chest-Cardiomegaly- bilateral pleural effusions and bilateral interstitial infiltrates likely due to pulmonary edema- likely due to congestive heart failure, no CT evidence of pulmonary embolus, dialated pulmonary arteries, atheromatous disease of the thoracic aorta and coronary arteries, small pulmonary nodules bilaterally, thickening of the gastric wall, mediastinal and bilateral hilar lymphadenopathy-  Seen with congestive heart failure with engorgement of lymph nodes-  Short-term follow-up recommended in 3-6 months.    Shortness of breath/COPD.  Duo nebs.    Acute kidney injury.  Chronic renal failure stage III.  Nephrology consult.      Gout.  Allopurinol.    Diabetes.  Sliding scale.  Start back on Glucophage by nephrology.  Add Levemir.    Chronic smoker.  Nicotine patch.  Discussed with patient cutting back and stopping.    Nutrition.  Magnesium oxide.  Regular/consistent carb diet.    Deconditioning.  PT and  OT consult    Discharge Planning: Plan to transfer to the floor today.    Electronically signed by Stuart Martin MD, 06/07/21, 9:45 AM CDT.
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
## 2021-06-07 NOTE — NURSING NOTE
Patient arrived to the floor and had to be put on bipap immediately, work of breathing was increased, patient was anxious, tachy, and diaphoretic. Wheezing in all lung fields and abdominal breathing.  Called Dr Martin immediately to inform of this, orders were 40 mg IV lasix, xfer to unit.  Gave 40 mg Iv lasix and transferred to BENJAMIN Lopez in CCU and gave report.

## 2021-06-08 PROBLEM — D64.9 ANEMIA: Status: ACTIVE | Noted: 2021-06-08

## 2021-06-08 PROBLEM — J96.01 ACUTE RESPIRATORY FAILURE WITH HYPOXIA: Status: ACTIVE | Noted: 2021-06-08

## 2021-06-08 PROBLEM — N17.9 ACUTE KIDNEY INJURY SUPERIMPOSED ON CHRONIC KIDNEY DISEASE: Status: ACTIVE | Noted: 2021-06-08

## 2021-06-08 PROBLEM — N18.9 ACUTE KIDNEY INJURY SUPERIMPOSED ON CHRONIC KIDNEY DISEASE (HCC): Status: ACTIVE | Noted: 2021-06-08

## 2021-06-08 PROBLEM — I50.43 ACUTE ON CHRONIC COMBINED SYSTOLIC AND DIASTOLIC CONGESTIVE HEART FAILURE (HCC): Status: ACTIVE | Noted: 2019-01-14

## 2021-06-08 LAB
ALBUMIN SERPL-MCNC: 3.1 G/DL (ref 3.5–5.2)
ALBUMIN/GLOB SERPL: 1 G/DL
ALP SERPL-CCNC: 230 U/L (ref 39–117)
ALT SERPL W P-5'-P-CCNC: 25 U/L (ref 1–41)
ANION GAP SERPL CALCULATED.3IONS-SCNC: 10 MMOL/L (ref 5–15)
AST SERPL-CCNC: 12 U/L (ref 1–40)
BASOPHILS # BLD AUTO: 0.03 10*3/MM3 (ref 0–0.2)
BASOPHILS NFR BLD AUTO: 0.3 % (ref 0–1.5)
BILIRUB SERPL-MCNC: 0.2 MG/DL (ref 0–1.2)
BUN SERPL-MCNC: 27 MG/DL (ref 8–23)
BUN/CREAT SERPL: 12.6 (ref 7–25)
CALCIUM SPEC-SCNC: 8.4 MG/DL (ref 8.6–10.5)
CHLORIDE SERPL-SCNC: 109 MMOL/L (ref 98–107)
CHOLEST SERPL-MCNC: 113 MG/DL (ref 0–200)
CO2 SERPL-SCNC: 27 MMOL/L (ref 22–29)
CREAT SERPL-MCNC: 2.14 MG/DL (ref 0.76–1.27)
DEPRECATED RDW RBC AUTO: 49.6 FL (ref 37–54)
EOSINOPHIL # BLD AUTO: 0.15 10*3/MM3 (ref 0–0.4)
EOSINOPHIL NFR BLD AUTO: 1.7 % (ref 0.3–6.2)
ERYTHROCYTE [DISTWIDTH] IN BLOOD BY AUTOMATED COUNT: 15.9 % (ref 12.3–15.4)
GFR SERPL CREATININE-BSD FRML MDRD: 38 ML/MIN/1.73
GLOBULIN UR ELPH-MCNC: 3 GM/DL
GLUCOSE BLDC GLUCOMTR-MCNC: 239 MG/DL (ref 70–130)
GLUCOSE BLDC GLUCOMTR-MCNC: 282 MG/DL (ref 70–130)
GLUCOSE BLDC GLUCOMTR-MCNC: 60 MG/DL (ref 70–130)
GLUCOSE BLDC GLUCOMTR-MCNC: 65 MG/DL (ref 70–130)
GLUCOSE BLDC GLUCOMTR-MCNC: 85 MG/DL (ref 70–130)
GLUCOSE SERPL-MCNC: 89 MG/DL (ref 65–99)
HBA1C MFR BLD: 8 % (ref 4.8–5.6)
HCT VFR BLD AUTO: 26.1 % (ref 37.5–51)
HDLC SERPL-MCNC: 66 MG/DL (ref 40–60)
HGB BLD-MCNC: 7.8 G/DL (ref 13–17.7)
IMM GRANULOCYTES # BLD AUTO: 0.03 10*3/MM3 (ref 0–0.05)
IMM GRANULOCYTES NFR BLD AUTO: 0.3 % (ref 0–0.5)
LDLC SERPL CALC-MCNC: 33 MG/DL (ref 0–100)
LDLC/HDLC SERPL: 0.52 {RATIO}
LYMPHOCYTES # BLD AUTO: 0.22 10*3/MM3 (ref 0.7–3.1)
LYMPHOCYTES NFR BLD AUTO: 2.4 % (ref 19.6–45.3)
MCH RBC QN AUTO: 25.7 PG (ref 26.6–33)
MCHC RBC AUTO-ENTMCNC: 29.9 G/DL (ref 31.5–35.7)
MCV RBC AUTO: 85.9 FL (ref 79–97)
MONOCYTES # BLD AUTO: 1.12 10*3/MM3 (ref 0.1–0.9)
MONOCYTES NFR BLD AUTO: 12.4 % (ref 5–12)
NEUTROPHILS NFR BLD AUTO: 7.45 10*3/MM3 (ref 1.7–7)
NEUTROPHILS NFR BLD AUTO: 82.9 % (ref 42.7–76)
NRBC BLD AUTO-RTO: 0.2 /100 WBC (ref 0–0.2)
PLATELET # BLD AUTO: 310 10*3/MM3 (ref 140–450)
PMV BLD AUTO: 10.2 FL (ref 6–12)
POTASSIUM SERPL-SCNC: 3.5 MMOL/L (ref 3.5–5.2)
PROT SERPL-MCNC: 6.1 G/DL (ref 6–8.5)
RBC # BLD AUTO: 3.04 10*6/MM3 (ref 4.14–5.8)
SODIUM SERPL-SCNC: 146 MMOL/L (ref 136–145)
TRIGL SERPL-MCNC: 62 MG/DL (ref 0–150)
TSH SERPL DL<=0.05 MIU/L-ACNC: 1.51 UIU/ML (ref 0.27–4.2)
VLDLC SERPL-MCNC: 14 MG/DL (ref 5–40)
WBC # BLD AUTO: 9 10*3/MM3 (ref 3.4–10.8)

## 2021-06-08 PROCEDURE — 94799 UNLISTED PULMONARY SVC/PX: CPT

## 2021-06-08 PROCEDURE — 99232 SBSQ HOSP IP/OBS MODERATE 35: CPT | Performed by: CLINICAL NURSE SPECIALIST

## 2021-06-08 PROCEDURE — 97162 PT EVAL MOD COMPLEX 30 MIN: CPT

## 2021-06-08 PROCEDURE — 82962 GLUCOSE BLOOD TEST: CPT

## 2021-06-08 PROCEDURE — 94660 CPAP INITIATION&MGMT: CPT

## 2021-06-08 PROCEDURE — 84443 ASSAY THYROID STIM HORMONE: CPT | Performed by: FAMILY MEDICINE

## 2021-06-08 PROCEDURE — 80061 LIPID PANEL: CPT | Performed by: FAMILY MEDICINE

## 2021-06-08 PROCEDURE — 85025 COMPLETE CBC W/AUTO DIFF WBC: CPT | Performed by: FAMILY MEDICINE

## 2021-06-08 PROCEDURE — 83036 HEMOGLOBIN GLYCOSYLATED A1C: CPT | Performed by: FAMILY MEDICINE

## 2021-06-08 PROCEDURE — 63710000001 INSULIN LISPRO (HUMAN) PER 5 UNITS: Performed by: FAMILY MEDICINE

## 2021-06-08 PROCEDURE — 97165 OT EVAL LOW COMPLEX 30 MIN: CPT | Performed by: OCCUPATIONAL THERAPIST

## 2021-06-08 PROCEDURE — 97116 GAIT TRAINING THERAPY: CPT

## 2021-06-08 PROCEDURE — 25010000002 IRON SUCROSE PER 1 MG: Performed by: FAMILY MEDICINE

## 2021-06-08 PROCEDURE — 99232 SBSQ HOSP IP/OBS MODERATE 35: CPT | Performed by: NURSE PRACTITIONER

## 2021-06-08 PROCEDURE — 80053 COMPREHEN METABOLIC PANEL: CPT | Performed by: FAMILY MEDICINE

## 2021-06-08 RX ORDER — BUMETANIDE 0.25 MG/ML
1 INJECTION INTRAMUSCULAR; INTRAVENOUS DAILY
Status: DISCONTINUED | OUTPATIENT
Start: 2021-06-09 | End: 2021-06-09

## 2021-06-08 RX ADMIN — PANTOPRAZOLE SODIUM 40 MG: 40 INJECTION, POWDER, FOR SOLUTION INTRAVENOUS at 08:31

## 2021-06-08 RX ADMIN — DRONEDARONE 400 MG: 400 TABLET, FILM COATED ORAL at 08:31

## 2021-06-08 RX ADMIN — IPRATROPIUM BROMIDE 0.5 MG: 0.5 SOLUTION RESPIRATORY (INHALATION) at 21:01

## 2021-06-08 RX ADMIN — BUDESONIDE 0.5 MG: 0.5 INHALANT RESPIRATORY (INHALATION) at 06:19

## 2021-06-08 RX ADMIN — ATORVASTATIN CALCIUM 80 MG: 40 TABLET, FILM COATED ORAL at 20:34

## 2021-06-08 RX ADMIN — MAGNESIUM GLUCONATE 500 MG ORAL TABLET 400 MG: 500 TABLET ORAL at 08:39

## 2021-06-08 RX ADMIN — ALLOPURINOL 100 MG: 100 TABLET ORAL at 08:31

## 2021-06-08 RX ADMIN — DEXTROSE 15 G: 15 GEL ORAL at 07:08

## 2021-06-08 RX ADMIN — DRONEDARONE 400 MG: 400 TABLET, FILM COATED ORAL at 16:40

## 2021-06-08 RX ADMIN — PANTOPRAZOLE SODIUM 40 MG: 40 INJECTION, POWDER, FOR SOLUTION INTRAVENOUS at 20:41

## 2021-06-08 RX ADMIN — BUDESONIDE 0.5 MG: 0.5 INHALANT RESPIRATORY (INHALATION) at 21:01

## 2021-06-08 RX ADMIN — IPRATROPIUM BROMIDE 0.5 MG: 0.5 SOLUTION RESPIRATORY (INHALATION) at 10:12

## 2021-06-08 RX ADMIN — IRON SUCROSE 200 MG: 20 INJECTION, SOLUTION INTRAVENOUS at 13:04

## 2021-06-08 RX ADMIN — DILTIAZEM HYDROCHLORIDE 360 MG: 240 CAPSULE, EXTENDED RELEASE ORAL at 08:31

## 2021-06-08 RX ADMIN — IPRATROPIUM BROMIDE 0.5 MG: 0.5 SOLUTION RESPIRATORY (INHALATION) at 13:58

## 2021-06-08 RX ADMIN — IPRATROPIUM BROMIDE 0.5 MG: 0.5 SOLUTION RESPIRATORY (INHALATION) at 06:19

## 2021-06-08 RX ADMIN — BUMETANIDE 1 MG: 0.25 INJECTION INTRAMUSCULAR; INTRAVENOUS at 04:57

## 2021-06-08 RX ADMIN — INSULIN LISPRO 3 UNITS: 100 INJECTION, SOLUTION INTRAVENOUS; SUBCUTANEOUS at 16:39

## 2021-06-08 NOTE — PROGRESS NOTES
[facility] Gastroenterology  Inpatient Progress Note  Milton Rodriguez  1960 6/8/2021  Reason for Follow Up:  anemia    Subjective     Subjective:   Pt is currently in the CCU stable. No current signs for active bleeding. No nausea or vomiting. No pain. Hemoccult negative. He is awake alert and oriented. He does tell me that he has not been taking his oral iron bc he thought that stopping the Eliquis would help his anemia.     He was moved back to the unit yesterday due to some respiratory distress and placed on Bipap. He improved and it is currently off.   Recent Endsocopy 11/2020 by Dr Gomez reviewed - Normal esophagus.  - Erythematous duodenopathy.  - Multiple recently bleeding angiodysplastic lesions in the stomach.  - No specimens collected.    Current Facility-Administered Medications:   •  allopurinol (ZYLOPRIM) tablet 100 mg, 100 mg, Oral, Daily, Stuart Martin MD, 100 mg at 06/08/21 0831  •  atorvastatin (LIPITOR) tablet 80 mg, 80 mg, Oral, Nightly, Stuart Martin MD, 80 mg at 06/07/21 2044  •  benzonatate (TESSALON) capsule 100 mg, 100 mg, Oral, TID PRN, Stuart Martin MD, 100 mg at 06/07/21 2230  •  budesonide (PULMICORT) nebulizer solution 0.5 mg, 0.5 mg, Nebulization, BID - RT, Stuart Martin MD, 0.5 mg at 06/08/21 0619  •  [START ON 6/9/2021] bumetanide (BUMEX) injection 1 mg, 1 mg, Intravenous, Daily, Rosendo Gatica, APRN  •  dextrose (D50W) 25 g/ 50mL Intravenous Solution 25 g, 25 g, Intravenous, Q15 Min PRN, Stuart Martin MD  •  dextrose (GLUTOSE) oral gel 15 g, 15 g, Oral, Q15 Min PRN, Staurt Martin MD, 15 g at 06/08/21 0708  •  dilTIAZem CD (CARDIZEM CD) 24 hr capsule 360 mg, 360 mg, Oral, Q24H, Stuart Martin MD, 360 mg at 06/08/21 0831  •  dronedarone (MULTAQ) tablet 400 mg, 400 mg, Oral, BID With Meals, Stuart Martin MD, 400 mg at 06/08/21 0831  •  glucagon (human recombinant) (GLUCAGEN DIAGNOSTIC) injection 1 mg, 1 mg, Subcutaneous, Q15 Min PRN, Mario,  Stuart BRITT MD  •  insulin detemir (LEVEMIR) injection 5 Units, 5 Units, Subcutaneous, Q12H, Stuart Martin MD, 5 Units at 06/07/21 2051  •  insulin lispro (humaLOG) injection 2-7 Units, 2-7 Units, Subcutaneous, TID AC, Stuart Martin MD, 2 Units at 06/07/21 1702  •  ipratropium (ATROVENT) nebulizer solution 0.5 mg, 0.5 mg, Nebulization, 4x Daily - RT, Stuart Martin MD, 0.5 mg at 06/08/21 1012  •  iron sucrose (VENOFER) 200 mg in sodium chloride 0.9 % 100 mL IVPB, 200 mg, Intravenous, Q24H, Stuart Martin MD, 200 mg at 06/07/21 1625  •  linagliptin (TRADJENTA) tablet 5 mg, 5 mg, Oral, Daily, Stuart Martin MD, 5 mg at 06/07/21 1624  •  magnesium oxide (MAG-OX) tablet 400 mg, 400 mg, Oral, Daily, Stuart Martin MD, 400 mg at 06/08/21 0839  •  nicotine (NICODERM CQ) 14 MG/24HR patch 1 patch, 1 patch, Transdermal, Q24H, Stuart Martin MD, 1 patch at 06/07/21 2046  •  pantoprazole (PROTONIX) injection 40 mg, 40 mg, Intravenous, Q12H, Stuart Martin MD, 40 mg at 06/08/21 0831  •  [COMPLETED] Insert peripheral IV, , , Once **AND** sodium chloride 0.9 % flush 10 mL, 10 mL, Intravenous, PRN, Stuart Martin MD    Review of Systems:    Review of Systems   Constitutional: Positive for fatigue. Negative for activity change, appetite change, chills, diaphoresis, fever and unexpected weight change.   HENT: Negative for ear pain, hearing loss, mouth sores, sore throat, trouble swallowing and voice change.    Eyes: Negative.    Respiratory: Positive for cough, shortness of breath and wheezing. Negative for choking.    Cardiovascular: Negative for chest pain and palpitations.   Gastrointestinal: Negative for abdominal pain, blood in stool, constipation, diarrhea, nausea and vomiting.   Endocrine: Negative for cold intolerance and heat intolerance.   Genitourinary: Negative for decreased urine volume, dysuria, frequency, hematuria and urgency.   Musculoskeletal: Negative for back pain, gait problem and myalgias.   Skin:  Negative for color change, pallor and rash.   Allergic/Immunologic: Negative for food allergies and immunocompromised state.   Neurological: Positive for weakness. Negative for dizziness, tremors, seizures, syncope, light-headedness, numbness and headaches.   Hematological: Negative for adenopathy. Does not bruise/bleed easily.   Psychiatric/Behavioral: Negative for agitation and confusion. The patient is not nervous/anxious.    All other systems reviewed and are negative.       Objective     Vital Signs  Temp:  [96.8 °F (36 °C)-97.6 °F (36.4 °C)] 96.8 °F (36 °C)  Heart Rate:  [114-122] 120  Resp:  [13-22] 13  BP: ()/(63-86) 123/84  Body mass index is 24.9 kg/m².    Intake/Output Summary (Last 24 hours) at 6/8/2021 1124  Last data filed at 6/8/2021 0900  Gross per 24 hour   Intake --   Output 2200 ml   Net -2200 ml     I/O this shift:  In: -   Out: 550 [Urine:550]       Physical Exam:   General: patient awake, alert and cooperative, no acute distress   Eyes: Normal lids and lashes, no scleral icterus   Neck: supple, no JVD   Skin: warm and dry   Cardiovascular: regular rhythm and rate, no murmurs auscultated   Pulm: wheezes bilaterally, regular and unlabored, cough noted nonproductive   Abdomen: soft, nontender, nondistended; normal bowel sounds   Psychiatric: Normal mood and behavior; converses appropriately     Results Review:    I have reviewed all of the patients current test results    Results from last 7 days   Lab Units 06/08/21  0354 06/07/21 0320 06/06/21  0220   WBC 10*3/mm3 9.00 8.45 9.50   HEMOGLOBIN g/dL 7.8* 8.0* 8.1*   HEMATOCRIT % 26.1* 26.2* 26.2*   PLATELETS 10*3/mm3 310 341 338       Results from last 7 days   Lab Units 06/08/21  0354 06/07/21  0320 06/06/21  0220   SODIUM mmol/L 146* 141 143   POTASSIUM mmol/L 3.5 3.7 3.6   CHLORIDE mmol/L 109* 106 105   CO2 mmol/L 27.0 25.0 23.0   BUN mg/dL 27* 25* 27*   CREATININE mg/dL 2.14* 1.90* 1.64*   CALCIUM mg/dL 8.4* 8.1* 8.2*   BILIRUBIN mg/dL  0.2 0.3 0.5   ALK PHOS U/L 230* 260* 292*   ALT (SGPT) U/L 25 33 45*   AST (SGOT) U/L 12 15 28   GLUCOSE mg/dL 89 214* 74       Results from last 7 days   Lab Units 06/05/21  1606   INR  1.19*       No results found for: LIPASE    Radiology:    Imaging Results (Last 24 Hours)     ** No results found for the last 24 hours. **            Assessment/Plan     Patient Active Problem List   Diagnosis Code   • Tobacco abuse Z72.0   • Hypertension I10   • PAD (peripheral artery disease) (CMS/[facility]) I73.9   • Hyperlipidemia E78.5   • Type 2 diabetes mellitus with autonomic neuropathy (CMS/[facility]) E11.43   • Atrial flutter (CMS/[facility]) I48.92   • Lower extremity edema R60.0   • Chronic anticoagulation Z79.01   • Hypokalemia E87.6   • Heme positive stool R19.5   • Congestive heart failure (CMS/[facility]) I50.9   • Lymphedema I89.0   • Cerebrovascular accident (CVA) (CMS/[facility]) I63.9   • CKD (chronic kidney disease) stage 3, GFR 30-59 ml/min (CMS/[facility]) N18.30   • CVA (cerebral vascular accident) (CMS/[facility]) I63.9   • Transient ischemic attack (TIA) G45.9   • Chronic diastolic CHF (congestive heart failure) (CMS/[facility]) I50.32   • Alcohol abuse F10.10   • PFO (patent foramen ovale) Q21.1   • Bilateral carotid artery stenosis I65.23   • Stenosis of right internal carotid artery I65.21   • Preop testing Z01.818   • Acute pulmonary edema (CMS/[facility]) J81.0       1. Anemia, iron def chronic  2. CHF  3. COPD/pulmonary HTN with resp distress yesterday requiring BiPap  4. Multiple gastric AVMs with recent Endoscopy noted.     Will defer Endoscopy at this time due to respiratory status. Pt likely has anemia from known AVMS. Follow H/H and transfuse as needed. Patient agrees to hold off on repeat Endoscopy.   Continue PPI  Follow H/H and transfuse if needed.         EMR Dragon/transcription disclaimer: Much of this encounter note is electronic transcription/translation of spoken language to printed text. The electronic translation of spoken language may be  erroneous, or at times, nonsensical words or phrases may be inadvertently transcribed. Although I have reviewed the note for such errors, some may still exist.    DARIEN Morgan  06/08/21  11:24 CDT

## 2021-06-08 NOTE — PLAN OF CARE
Goal Outcome Evaluation:   Pt remained on BiPAP most of the night while sleeping. Placed on NC @ 6 lpm for medications and tolerated for approx 1.5 hr tolerated well with sats >90%. Placed back on BiPAP after pt ambulated to commode and was slightly SOA and ready for bed. Hgb 7.8 this am down from 8.0, had one BM but was not black or bloody. C/o cough and some wheezing. No c/o pain. Alert and oriented x4, UOP adequate per urinal.

## 2021-06-08 NOTE — PLAN OF CARE
Goal Outcome Evaluation:  Plan of Care Reviewed With: patient           Outcome Summary: PT eval complete. He is alert and oriented x 4. He was supervision to complete supine <> sit bed mobility and CGA to ambulate around the unit. Some slight SOB but not labored. O2 sat WNL on 6 L/NC. PT will cont to progress gait, endurance and stair training in preparation for d.c home.

## 2021-06-08 NOTE — PROGRESS NOTES
"[facility] -  Progress Note     LOS: 3 days   Patient Care Team:  Thanh Weeks MD as PCP - General  MickyThanh rivera MD as PCP - Family Medicine  Thanh Weeks MD as Referring Physician (Family Medicine)  Alex Coleman MD as Consulting Physician (Urology)  Adan Gomez DO as Consulting Physician (Gastroenterology)    Chief Complaint: Shortness of Breath     Subjective     Interval History:   Patient does note that he misses medications at home- \"I just forget\". He notes shortness of breath is significantly improved. Reports making good urine. Asymptomatic to rates. Blood pressure low- denies symptoms.     Review of Systems:     Review of Systems   Constitutional: Positive for fatigue.   Respiratory: Positive for shortness of breath.      Objective     Vital Sign Min/Max for last 24 hours  Temp  Min: 96.8 °F (36 °C)  Max: 97.4 °F (36.3 °C)   BP  Min: 89/63  Max: 123/84   Pulse  Min: 114  Max: 123   Resp  Min: 13  Max: 22   SpO2  Min: 84 %  Max: 100 %   No data recorded   Weight  Min: 72.1 kg (158 lb 15.2 oz)  Max: 72.1 kg (158 lb 15.2 oz)         06/08/21  0626   Weight: 72.1 kg (158 lb 15.2 oz)       Telemetry: A-flutter rates 120       Physical Exam:    Constitutional:       Appearance: Well-developed. Chronically ill-appearing.      Interventions: Nasal cannula in place.   Eyes:      Pupils: Pupils are equal, round, and reactive to light.   HENT:      Head: Normocephalic and atraumatic.   Neck:      Vascular: No carotid bruit or JVD.   Pulmonary:      Effort: Pulmonary effort is normal.      Breath sounds: Rales present.   Cardiovascular:      Tachycardia present. Irregular rhythm.   Pulses:     Intact distal pulses.   Edema:     Ankle: bilateral 1+ edema of the ankle.  Abdominal:      General: Bowel sounds are normal.      Palpations: Abdomen is soft.   Musculoskeletal: Normal range of motion.      Cervical back: Normal range of motion and neck supple. Skin:     " General: Skin is warm and dry.   Neurological:      Mental Status: Alert and oriented to person, place, and time.      Deep Tendon Reflexes: Reflexes are normal and symmetric.   Psychiatric:         Behavior: Behavior normal.         Thought Content: Thought content normal.         Judgment: Judgment normal.       Results Review:   Results from last 7 days   Lab Units 06/08/21  0354 06/07/21  0320 06/06/21  0220   SODIUM mmol/L 146* 141 143   POTASSIUM mmol/L 3.5 3.7 3.6   CHLORIDE mmol/L 109* 106 105   CO2 mmol/L 27.0 25.0 23.0   BUN mg/dL 27* 25* 27*   CREATININE mg/dL 2.14* 1.90* 1.64*   GLUCOSE mg/dL 89 214* 74   CALCIUM mg/dL 8.4* 8.1* 8.2*     Lab Results (last 72 hours)     Procedure Component Value Units Date/Time    POC Glucose Once [[id number]]  (Abnormal) Collected: 06/08/21 1127    Specimen: Blood Updated: 06/08/21 1148     Glucose 65 mg/dL      Comment: : [id number] Jaylen Chan ID: [id number]       POC Glucose Once [[id number]]  (Normal) Collected: 06/08/21 0724    Specimen: Blood Updated: 06/08/21 0739     Glucose 85 mg/dL      Comment: : [id number] Jaylen Nixs  KGeter ID: [id number]       POC Glucose Once [[id number]]  (Abnormal) Collected: 06/08/21 0702    Specimen: Blood Updated: 06/08/21 0712     Glucose 60 mg/dL      Comment: : [id number] Gabriella Reveles ID: [id number]       Comprehensive Metabolic Panel [[id number]]  (Abnormal) Collected: 06/08/21 0354    Specimen: Blood Updated: 06/08/21 0444     Glucose 89 mg/dL      BUN 27 mg/dL      Creatinine 2.14 mg/dL      Sodium 146 mmol/L      Potassium 3.5 mmol/L      Chloride 109 mmol/L      CO2 27.0 mmol/L      Calcium 8.4 mg/dL      Total Protein 6.1 g/dL      Albumin 3.10 g/dL      ALT (SGPT) 25 U/L      AST (SGOT) 12 U/L      Alkaline Phosphatase 230 U/L      Total Bilirubin 0.2 mg/dL      eGFR  African Amer 38 mL/min/1.73      Globulin 3.0 gm/dL      A/G Ratio 1.0 g/dL      BUN/Creatinine Ratio 12.6     Anion Gap 10.0 mmol/L      Narrative:      GFR Normal >60  Chronic Kidney Disease <60  Kidney Failure <15      Lipid Panel [[id number]]  (Abnormal) Collected: 06/08/21 0354    Specimen: Blood Updated: 06/08/21 0444     Total Cholesterol 113 mg/dL      Triglycerides 62 mg/dL      HDL Cholesterol 66 mg/dL      LDL Cholesterol  33 mg/dL      VLDL Cholesterol 14 mg/dL      LDL/HDL Ratio 0.52    Narrative:      Cholesterol Reference Ranges  (U.S. Department of Health and Human Services ATP III Classifications)    Desirable          <200 mg/dL  Borderline High    200-239 mg/dL  High Risk          >240 mg/dL      Triglyceride Reference Ranges  (U.S. Department of Health and Human Services ATP III Classifications)    Normal           <150 mg/dL  Borderline High  150-199 mg/dL  High             200-499 mg/dL  Very High        >500 mg/dL    HDL Reference Ranges  (U.S. Department of Health and Human Services ATP III Classifcations)    Low     <40 mg/dl (major risk factor for CHD)  High    >60 mg/dl ('negative' risk factor for CHD)        LDL Reference Ranges  (U.S. Department of Health and Human Services ATP III Classifcations)    Optimal          <100 mg/dL  Near Optimal     100-129 mg/dL  Borderline High  130-159 mg/dL  High             160-189 mg/dL  Very High        >189 mg/dL    TSH [[id number]]  (Normal) Collected: 06/08/21 0354    Specimen: Blood Updated: 06/08/21 0444     TSH 1.510 uIU/mL     Hemoglobin A1c [[id number]]  (Abnormal) Collected: 06/08/21 0354    Specimen: Blood Updated: 06/08/21 0443     Hemoglobin A1C 8.00 %     Narrative:      Hemoglobin A1C Ranges:    Increased Risk for Diabetes  5.7% to 6.4%  Diabetes                     >= 6.5%  Diabetic Goal                < 7.0%    CBC & Differential [[id number]]  (Abnormal) Collected: 06/08/21 0354    Specimen: Blood Updated: 06/08/21 7875    Narrative:      The following orders were created for panel order CBC & Differential.  Procedure                               Abnormality          Status                     ---------                               -----------         ------                     CBC Auto Differential[[id number]]        Abnormal            Final result                 Please view results for these tests on the individual orders.    CBC Auto Differential [[id number]]  (Abnormal) Collected: 06/08/21 0354    Specimen: Blood Updated: 06/08/21 0419     WBC 9.00 10*3/mm3      RBC 3.04 10*6/mm3      Hemoglobin 7.8 g/dL      Hematocrit 26.1 %      MCV 85.9 fL      MCH 25.7 pg      MCHC 29.9 g/dL      RDW 15.9 %      RDW-SD 49.6 fl      MPV 10.2 fL      Platelets 310 10*3/mm3      Neutrophil % 82.9 %      Lymphocyte % 2.4 %      Monocyte % 12.4 %      Eosinophil % 1.7 %      Basophil % 0.3 %      Immature Grans % 0.3 %      Neutrophils, Absolute 7.45 10*3/mm3      Lymphocytes, Absolute 0.22 10*3/mm3      Monocytes, Absolute 1.12 10*3/mm3      Eosinophils, Absolute 0.15 10*3/mm3      Basophils, Absolute 0.03 10*3/mm3      Immature Grans, Absolute 0.03 10*3/mm3      nRBC 0.2 /100 WBC     POC Glucose Once [[id number]]  (Abnormal) Collected: 06/07/21 2048    Specimen: Blood Updated: 06/07/21 2059     Glucose 233 mg/dL      Comment: : [id number] Gabriella Reveles ID: [id number]       POC Glucose Once [[id number]]  (Abnormal) Collected: 06/07/21 1610    Specimen: Blood Updated: 06/07/21 1625     Glucose 183 mg/dL      Comment: : [id number] Jaylen SAULeter ID: [id number]       POC Glucose Once [[id number]]  (Abnormal) Collected: 06/07/21 1153    Specimen: Blood Updated: 06/07/21 1206     Glucose 246 mg/dL      Comment: : [id number] Jaylen SAULeter ID: [id number]       POC Glucose Once [[id number]]  (Abnormal) Collected: 06/07/21 0805    Specimen: Blood Updated: 06/07/21 0816     Glucose 208 mg/dL      Comment: : [id number] Frantz PennycaMeter ID: [id number]       Comprehensive Metabolic Panel [[id number]]  (Abnormal) Collected: 06/07/21 0320    Specimen: Blood Updated:  06/07/21 0412     Glucose 214 mg/dL      BUN 25 mg/dL      Creatinine 1.90 mg/dL      Sodium 141 mmol/L      Potassium 3.7 mmol/L      Chloride 106 mmol/L      CO2 25.0 mmol/L      Calcium 8.1 mg/dL      Total Protein 6.3 g/dL      Albumin 3.20 g/dL      ALT (SGPT) 33 U/L      AST (SGOT) 15 U/L      Alkaline Phosphatase 260 U/L      Total Bilirubin 0.3 mg/dL      eGFR  African Amer 44 mL/min/1.73      Globulin 3.1 gm/dL      A/G Ratio 1.0 g/dL      BUN/Creatinine Ratio 13.2     Anion Gap 10.0 mmol/L     Narrative:      GFR Normal >60  Chronic Kidney Disease <60  Kidney Failure <15      CBC & Differential [[id number]]  (Abnormal) Collected: 06/07/21 0320    Specimen: Blood Updated: 06/07/21 0344    Narrative:      The following orders were created for panel order CBC & Differential.  Procedure                               Abnormality         Status                     ---------                               -----------         ------                     CBC Auto Differential[[id number]]        Abnormal            Final result                 Please view results for these tests on the individual orders.    CBC Auto Differential [[id number]]  (Abnormal) Collected: 06/07/21 0320    Specimen: Blood Updated: 06/07/21 0344     WBC 8.45 10*3/mm3      RBC 3.07 10*6/mm3      Hemoglobin 8.0 g/dL      Hematocrit 26.2 %      MCV 85.3 fL      MCH 26.1 pg      MCHC 30.5 g/dL      RDW 15.7 %      RDW-SD 48.4 fl      MPV 9.8 fL      Platelets 341 10*3/mm3      Neutrophil % 80.2 %      Lymphocyte % 3.3 %      Monocyte % 12.5 %      Eosinophil % 3.0 %      Basophil % 0.5 %      Immature Grans % 0.5 %      Neutrophils, Absolute 6.78 10*3/mm3      Lymphocytes, Absolute 0.28 10*3/mm3      Monocytes, Absolute 1.06 10*3/mm3      Eosinophils, Absolute 0.25 10*3/mm3      Basophils, Absolute 0.04 10*3/mm3      Immature Grans, Absolute 0.04 10*3/mm3      nRBC 0.2 /100 WBC     POC Glucose Once [[id number]]  (Abnormal) Collected: 06/06/21 2053     Specimen: Blood Updated: 06/06/21 2104     Glucose 334 mg/dL      Comment: : [id number] Lorraine ChandMeter ID: [id number]       POC Glucose Once [[id number]]  (Normal) Collected: 06/06/21 1657    Specimen: Blood Updated: 06/06/21 1708     Glucose 130 mg/dL      Comment: : [id number] Tejinder StacyMeter ID: [id number]       Eosinophil Smear - Urine, Urine, Clean Catch [[id number]]  (Normal) Collected: 06/05/21 2139    Specimen: Urine, Clean Catch Updated: 06/06/21 1422     Eosinophil Smear 0 % EOS/100 Cells     PTH, Intact [[id number]]  (Abnormal) Collected: 06/06/21 1324    Specimen: Blood Updated: 06/06/21 1358     PTH, Intact 207.9 pg/mL     Narrative:      Results may be falsely decreased if patient taking Biotin.      Vitamin D 25 Hydroxy [[id number]]  (Abnormal) Collected: 06/05/21 1606    Specimen: Blood Updated: 06/06/21 1331     25 Hydroxy, Vitamin D 20.7 ng/ml     Narrative:      Reference Range for Total Vitamin D 25(OH)     Deficiency <20.0 ng/mL   Insufficiency 21-29 ng/mL   Sufficiency  ng/mL  Toxicity >100 ng/ml    Results may be falsely increased if patient taking Biotin.      Creatinine, Urine, Random - Urine, Clean Catch [[id number]] Collected: 06/05/21 1649    Specimen: Urine, Clean Catch Updated: 06/06/21 1324     Creatinine, Urine 9.7 mg/dL     Narrative:      Reference intervals for random urine have not been established.  Clinical usage is dependent upon physician's interpretation in combination with other laboratory tests.       POC Glucose Once [[id number]]  (Abnormal) Collected: 06/06/21 1201    Specimen: Blood Updated: 06/06/21 1212     Glucose 181 mg/dL      Comment: : [id number] Tejinder StacyMeter ID: [id number]       Comprehensive Metabolic Panel [[id number]]  (Abnormal) Collected: 06/06/21 0220    Specimen: Blood Updated: 06/06/21 0849     Glucose 74 mg/dL      BUN 27 mg/dL      Creatinine 1.64 mg/dL      Sodium 143 mmol/L      Potassium 3.6 mmol/L      Chloride 105 mmol/L      CO2  23.0 mmol/L      Calcium 8.2 mg/dL      Total Protein 6.8 g/dL      Albumin 3.40 g/dL      ALT (SGPT) 45 U/L      AST (SGOT) 28 U/L      Alkaline Phosphatase 292 U/L      Total Bilirubin 0.5 mg/dL      eGFR  African Amer 52 mL/min/1.73      Globulin 3.4 gm/dL      A/G Ratio 1.0 g/dL      BUN/Creatinine Ratio 16.5     Anion Gap 15.0 mmol/L     Narrative:      GFR Normal >60  Chronic Kidney Disease <60  Kidney Failure <15      POC Glucose Once [[id number]]  (Normal) Collected: 06/06/21 0753    Specimen: Blood Updated: 06/06/21 0804     Glucose 116 mg/dL      Comment: : [id number] Tejinder StacyMeter ID: [id number]       Iron Profile [[id number]]  (Normal) Collected: 06/06/21 0220    Specimen: Blood Updated: 06/06/21 0417     Iron 88 mcg/dL      Iron Saturation 22 %      Transferrin 273 mg/dL      TIBC 407 mcg/dL     Phosphorus [[id number]]  (Abnormal) Collected: 06/06/21 0220    Specimen: Blood Updated: 06/06/21 0417     Phosphorus 6.3 mg/dL     Magnesium [[id number]]  (Normal) Collected: 06/06/21 0220    Specimen: Blood Updated: 06/06/21 0417     Magnesium 2.1 mg/dL     CBC (No Diff) [[id number]]  (Abnormal) Collected: 06/06/21 0220    Specimen: Blood Updated: 06/06/21 0355     WBC 9.50 10*3/mm3      RBC 3.10 10*6/mm3      Hemoglobin 8.1 g/dL      Hematocrit 26.2 %      MCV 84.5 fL      MCH 26.1 pg      MCHC 30.9 g/dL      RDW 15.5 %      RDW-SD 47.0 fl      MPV 10.4 fL      Platelets 338 10*3/mm3     Blood Gas, Arterial - [[id number]]  (Abnormal) Collected: 06/06/21 0330    Specimen: Arterial Blood Updated: 06/06/21 0334     Site Right Radial     Pedrito's Test N/A     pH, Arterial 7.368 pH units      pCO2, Arterial 41.4 mm Hg      pO2, Arterial 58.3 mm Hg      Comment: 84 Value below reference range        HCO3, Arterial 23.8 mmol/L      Base Excess, Arterial -1.4 mmol/L      Comment: 84 Value below reference range        O2 Saturation, Arterial 88.7 %      Comment: 84 Value below reference range        Temperature 37.0 C       Barometric Pressure for Blood Gas 750 mmHg      Modality HFNC     Flow Rate 8.0 lpm      Ventilator Mode NA     Collected by [id number]     Comment: Meter: [id number]     :  [id number]        pCO2, Temperature Corrected 41.4 mm Hg      pH, Temp Corrected 7.368 pH Units      pO2, Temperature Corrected 58.3 mm Hg     POC Glucose Once [[id number]]  (Abnormal) Collected: 06/05/21 2101    Specimen: Blood Updated: 06/05/21 2112     Glucose 289 mg/dL      Comment: : [id number] Lorraine Benito ID: [id number]       Osmolality, Urine - Urine, Clean Catch [[id number]]  (Normal) Collected: 06/05/21 1649    Specimen: Urine, Clean Catch Updated: 06/05/21 2029     Osmolality, Urine 286 mOsm/kg     Sodium, Urine, Random - Urine, Clean Catch [[id number]] Collected: 06/05/21 1649    Specimen: Urine, Clean Catch Updated: 06/05/21 2015     Sodium, Urine 91 mmol/L     Narrative:      Reference intervals for random urine have not been established.  Clinical usage is dependent upon physician's interpretation in combination with other laboratory tests.       Protein, Urine, Random - Urine, Clean Catch [[id number]] Collected: 06/05/21 1649    Specimen: Urine, Clean Catch Updated: 06/05/21 2015     Total Protein, Urine 19.2 mg/dL     Narrative:      Reference intervals for random urine have not been established.  Clinical usage is dependent upon physician's interpretation in combination with other laboratory tests.           Results for orders placed during the hospital encounter of 06/05/21    Adult Transthoracic Echo Complete W/ Cont if Necessary Per Protocol    Interpretation Summary  · Left ventricular wall thickness is consistent with mild to moderate concentric hypertrophy.  · Left ventricular ejection fraction appears to be 36 - 40%. Left ventricular systolic function is moderately decreased.  · Left ventricular diastolic dysfunction is noted.  · The right ventricular cavity is mildly dilated. Systolic function is  normal.  · There is biatrial enlargement.  · Moderate mitral valve regurgitation is present.  · Moderate tricuspid valve regurgitation is present.  · Estimated right ventricular systolic pressure from tricuspid regurgitation is mildly elevated (35-45 mmHg).    Medication Review: yes  Current Facility-Administered Medications   Medication Dose Route Frequency Provider Last Rate Last Admin   • allopurinol (ZYLOPRIM) tablet 100 mg  100 mg Oral Daily Stuart Martin MD   100 mg at 06/08/21 0831   • atorvastatin (LIPITOR) tablet 80 mg  80 mg Oral Nightly Stuart Martin MD   80 mg at 06/07/21 2044   • benzonatate (TESSALON) capsule 100 mg  100 mg Oral TID PRN Stuart Martin MD   100 mg at 06/07/21 2230   • budesonide (PULMICORT) nebulizer solution 0.5 mg  0.5 mg Nebulization BID - RT Stuart Martin MD   0.5 mg at 06/08/21 0619   • [START ON 6/9/2021] bumetanide (BUMEX) injection 1 mg  1 mg Intravenous Daily Rosendo Gatica, APRN       • dextrose (D50W) 25 g/ 50mL Intravenous Solution 25 g  25 g Intravenous Q15 Min PRN Stuart Martin MD       • dextrose (GLUTOSE) oral gel 15 g  15 g Oral Q15 Min PRN Stuart Martin MD   15 g at 06/08/21 0708   • dilTIAZem CD (CARDIZEM CD) 24 hr capsule 360 mg  360 mg Oral Q24H Stuart Martin MD   360 mg at 06/08/21 0831   • dronedarone (MULTAQ) tablet 400 mg  400 mg Oral BID With Meals Stuart Martin MD   400 mg at 06/08/21 0831   • glucagon (human recombinant) (GLUCAGEN DIAGNOSTIC) injection 1 mg  1 mg Subcutaneous Q15 Min PRN Stuart Martin MD       • insulin detemir (LEVEMIR) injection 5 Units  5 Units Subcutaneous Q12H Stuart Martin MD   5 Units at 06/07/21 2051   • insulin lispro (humaLOG) injection 2-7 Units  2-7 Units Subcutaneous TID AC Stuart Martin MD   2 Units at 06/07/21 1702   • ipratropium (ATROVENT) nebulizer solution 0.5 mg  0.5 mg Nebulization 4x Daily - RT Stuart Martin MD   0.5 mg at 06/08/21 1358   • iron sucrose (VENOFER) 200 mg in sodium chloride 0.9  "% 100 mL IVPB  200 mg Intravenous Q24H Stuart Martin MD   200 mg at 06/08/21 1304   • linagliptin (TRADJENTA) tablet 5 mg  5 mg Oral Daily Stuart Martin MD   5 mg at 06/07/21 1624   • magnesium oxide (MAG-OX) tablet 400 mg  400 mg Oral Daily Stuart Martin MD   400 mg at 06/08/21 0839   • nicotine (NICODERM CQ) 14 MG/24HR patch 1 patch  1 patch Transdermal Q24H Stuart Martin MD   1 patch at 06/07/21 2046   • pantoprazole (PROTONIX) injection 40 mg  40 mg Intravenous Q12H Stuart Martin MD   40 mg at 06/08/21 0831   • sodium chloride 0.9 % flush 10 mL  10 mL Intravenous PRN Stuart Martin MD         Assessment/Plan       Tobacco abuse    Hypertension    Type 2 diabetes mellitus with autonomic neuropathy (CMS/HCC)    Atrial flutter (CMS/HCC)    Acute on chronic combined systolic and diastolic congestive heart failure (CMS/HCC)    Acute pulmonary edema (CMS/HCC)    Acute kidney injury superimposed on chronic kidney disease (CMS/HCC)    Anemia    Acute respiratory failure with hypoxia (CMS/HCC)    Plan:  Acute on Chronic Congestive Heart Failure- overall breathing is improving. Nephrology following with IV Bumex. Strict I&O. Daily weights. Low Salt. Will need ischemic evaluation at some point in future once acute issues are improved. Difficult to say but also could be A-flutter with poorly controlled rates contributing- unclear how long has been in A-flutter. He does note that he misses his medications \"sometimes.\" Also ran out of Lasix. Would benefit from BB and ACE/ARB- in future when more euvolemic and renal function improved    Atrial Flutter- had maintained NSR on Multaq for years. Last cardioversion in 2017- appears to have maintained NSR since then. He does note he has missed doses of Multaq. Anticoagulation on hold since EGD last year. Now with ongoing anemia. Possible watchman candidate in future. Overall he seems asymptomatic to rates. I am concerned that A-flutter could have contributed to reduction " "in LVEF. On Cardizem 360 and Multaq. Hopeful rates will improve with improvement in acute issues.     Anemia- anticoagulation on hold. GI following. Known AVMs.     Acute on Chronic Kidney Disease - Nephrology following. Reports good urine output    Acute respiratory failure with hypoxia- improving. On nasal cannula. He reports \"significant\" improvement in breathing       Electronically signed by DARIEN Jones, 06/08/21, 2:50 PM CDT.   "
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
## 2021-06-08 NOTE — PROGRESS NOTES
[facility] Medicine Services  INPATIENT PROGRESS NOTE    Length of Stay: 3  Date of Admission: 6/5/2021  Primary Care Physician: Thanh Weeks MD    Subjective   Chief Complaint: CHF/fluid overload/renal failure    HPI   Patient was on BiPAP all night long.  Patient is fully on 6 L.  Patient stated he is breathing better.  Net urine output -1600.  Patient still remain tacky.  Hemoglobin slightly worse.  No sign of acute bleed overnight.    Review of Systems   Constitutional: Positive for activity change, appetite change and fatigue. Negative for chills and fever.   HENT: Negative for hearing loss, nosebleeds, tinnitus and trouble swallowing.    Eyes: Negative for visual disturbance.   Respiratory: Positive for cough, shortness of breath and wheezing. Negative for chest tightness.    Cardiovascular: Negative for chest pain, palpitations and leg swelling.   Gastrointestinal: Positive for nausea. Negative for abdominal distention, abdominal pain, blood in stool, constipation, diarrhea and vomiting.   Endocrine: Negative for cold intolerance, heat intolerance, polydipsia, polyphagia and polyuria.   Genitourinary: Negative for decreased urine volume, difficulty urinating, dysuria, flank pain, frequency and hematuria.   Musculoskeletal: Positive for arthralgias, gait problem and myalgias. Negative for joint swelling.   Skin: Negative for rash.   Allergic/Immunologic: Negative for immunocompromised state.   Neurological: Positive for weakness. Negative for dizziness, syncope, light-headedness and headaches.   Hematological: Negative for adenopathy. Does not bruise/bleed easily.   Psychiatric/Behavioral: Negative for confusion and sleep disturbance. The patient is not nervous/anxious.      All pertinent negatives and positives are as above. All other systems have been reviewed and are negative unless otherwise stated.     Objective    Temp:  [96.8 °F (36 °C)-97.6 °F (36.4 °C)] 96.8  °F (36 °C)  Heart Rate:  [114-122] 119  Resp:  [16-22] 17  BP: ()/(63-86) 111/74    Intake/Output Summary (Last 24 hours) at 6/8/2021 0838  Last data filed at 6/8/2021 0700  Gross per 24 hour   Intake --   Output 1650 ml   Net -1650 ml     Physical Exam  Vitals and nursing note reviewed.   Constitutional:       Appearance: He is well-developed.   HENT:      Head: Normocephalic.   Eyes:      Conjunctiva/sclera: Conjunctivae normal.      Pupils: Pupils are equal, round, and reactive to light.   Neck:      Vascular: No JVD.   Cardiovascular:      Rate and Rhythm: Regular rhythm. Tachycardia present.      Heart sounds: Normal heart sounds. No murmur heard.   No friction rub. No gallop.    Pulmonary:      Effort: No respiratory distress.      Breath sounds: Wheezing present. No rales.      Comments: Patient currently on 6 L of oxygen.  Chest:      Chest wall: No tenderness.   Abdominal:      General: Bowel sounds are normal. There is no distension.      Palpations: Abdomen is soft.      Tenderness: There is no abdominal tenderness. There is no guarding or rebound.   Musculoskeletal:         General: No tenderness or deformity. Normal range of motion.      Cervical back: Neck supple.   Skin:     General: Skin is warm and dry.      Capillary Refill: Capillary refill takes 2 to 3 seconds.      Findings: No rash.   Neurological:      Mental Status: He is alert and oriented to person, place, and time.      Cranial Nerves: No cranial nerve deficit.      Motor: Weakness present. No abnormal muscle tone.      Coordination: Coordination abnormal.      Gait: Gait abnormal.      Deep Tendon Reflexes: Reflexes normal.   Psychiatric:         Behavior: Behavior normal.         Thought Content: Thought content normal.   Results Review:  Lab Results (last 24 hours)     Procedure Component Value Units Date/Time    POC Glucose Once [[id number]]  (Normal) Collected: 06/08/21 0724    Specimen: Blood Updated: 06/08/21 0739     Glucose  85 mg/dL      Comment: : [id number] Jaylen Chan ID: [id number]       POC Glucose Once [[id number]]  (Abnormal) Collected: 06/08/21 0702    Specimen: Blood Updated: 06/08/21 0712     Glucose 60 mg/dL      Comment: : [id number] Gabriella Reveles ID: [id number]       Comprehensive Metabolic Panel [[id number]]  (Abnormal) Collected: 06/08/21 0354    Specimen: Blood Updated: 06/08/21 0444     Glucose 89 mg/dL      BUN 27 mg/dL      Creatinine 2.14 mg/dL      Sodium 146 mmol/L      Potassium 3.5 mmol/L      Chloride 109 mmol/L      CO2 27.0 mmol/L      Calcium 8.4 mg/dL      Total Protein 6.1 g/dL      Albumin 3.10 g/dL      ALT (SGPT) 25 U/L      AST (SGOT) 12 U/L      Alkaline Phosphatase 230 U/L      Total Bilirubin 0.2 mg/dL      eGFR  African Amer 38 mL/min/1.73      Globulin 3.0 gm/dL      A/G Ratio 1.0 g/dL      BUN/Creatinine Ratio 12.6     Anion Gap 10.0 mmol/L     Narrative:      GFR Normal >60  Chronic Kidney Disease <60  Kidney Failure <15      Lipid Panel [[id number]]  (Abnormal) Collected: 06/08/21 0354    Specimen: Blood Updated: 06/08/21 0444     Total Cholesterol 113 mg/dL      Triglycerides 62 mg/dL      HDL Cholesterol 66 mg/dL      LDL Cholesterol  33 mg/dL      VLDL Cholesterol 14 mg/dL      LDL/HDL Ratio 0.52    Narrative:      Cholesterol Reference Ranges  ([place] Department of Health and Human Services ATP III Classifications)    Desirable          <200 mg/dL  Borderline High    200-239 mg/dL  High Risk          >240 mg/dL      Triglyceride Reference Ranges  (U.S. Department of Health and Human Services ATP III Classifications)    Normal           <150 mg/dL  Borderline High  150-199 mg/dL  High             200-499 mg/dL  Very High        >500 mg/dL    HDL Reference Ranges  (U.S. Department of Health and Human Services ATP III Classifcations)    Low     <40 mg/dl (major risk factor for CHD)  High    >60 mg/dl ('negative' risk factor for CHD)        LDL Reference Ranges  (U.S. Department  of Health and Human Services ATP III Classifcations)    Optimal          <100 mg/dL  Near Optimal     100-129 mg/dL  Borderline High  130-159 mg/dL  High             160-189 mg/dL  Very High        >189 mg/dL    TSH [[id number]]  (Normal) Collected: 06/08/21 0354    Specimen: Blood Updated: 06/08/21 0444     TSH 1.510 uIU/mL     Hemoglobin A1c [[id number]]  (Abnormal) Collected: 06/08/21 0354    Specimen: Blood Updated: 06/08/21 0443     Hemoglobin A1C 8.00 %     Narrative:      Hemoglobin A1C Ranges:    Increased Risk for Diabetes  5.7% to 6.4%  Diabetes                     >= 6.5%  Diabetic Goal                < 7.0%    CBC & Differential [[id number]]  (Abnormal) Collected: 06/08/21 0354    Specimen: Blood Updated: 06/08/21 0419    Narrative:      The following orders were created for panel order CBC & Differential.  Procedure                               Abnormality         Status                     ---------                               -----------         ------                     CBC Auto Differential[[id number]]        Abnormal            Final result                 Please view results for these tests on the individual orders.    CBC Auto Differential [[id number]]  (Abnormal) Collected: 06/08/21 0354    Specimen: Blood Updated: 06/08/21 0419     WBC 9.00 10*3/mm3      RBC 3.04 10*6/mm3      Hemoglobin 7.8 g/dL      Hematocrit 26.1 %      MCV 85.9 fL      MCH 25.7 pg      MCHC 29.9 g/dL      RDW 15.9 %      RDW-SD 49.6 fl      MPV 10.2 fL      Platelets 310 10*3/mm3      Neutrophil % 82.9 %      Lymphocyte % 2.4 %      Monocyte % 12.4 %      Eosinophil % 1.7 %      Basophil % 0.3 %      Immature Grans % 0.3 %      Neutrophils, Absolute 7.45 10*3/mm3      Lymphocytes, Absolute 0.22 10*3/mm3      Monocytes, Absolute 1.12 10*3/mm3      Eosinophils, Absolute 0.15 10*3/mm3      Basophils, Absolute 0.03 10*3/mm3      Immature Grans, Absolute 0.03 10*3/mm3      nRBC 0.2 /100 WBC     POC Glucose Once [[id number]]   (Abnormal) Collected: 06/07/21 2048    Specimen: Blood Updated: 06/07/21 2059     Glucose 233 mg/dL      Comment: : [id number] Gabriella Reveles ID: [id number]       POC Glucose Once [[id number]]  (Abnormal) Collected: 06/07/21 1610    Specimen: Blood Updated: 06/07/21 1625     Glucose 183 mg/dL      Comment: : [id number] Jaylen Chan ID: [id number]              Cultures:  No results found for: BLOODCX, URINECX, WOUNDCX, MRSACX, RESPCX, STOOLCX    Radiology Data:    Imaging Results (Last 24 Hours)     ** No results found for the last 24 hours. **          No Known Allergies    Scheduled meds:   allopurinol, 100 mg, Oral, Daily  atorvastatin, 80 mg, Oral, Nightly  budesonide, 0.5 mg, Nebulization, BID - RT  bumetanide, 1 mg, Intravenous, Q12H  dilTIAZem CD, 360 mg, Oral, Q24H  dronedarone, 400 mg, Oral, BID With Meals  insulin detemir, 5 Units, Subcutaneous, Q12H  insulin lispro, 2-7 Units, Subcutaneous, TID AC  ipratropium, 0.5 mg, Nebulization, 4x Daily - RT  iron sucrose (VENOFER) IVPB, 200 mg, Intravenous, Q24H  linagliptin, 5 mg, Oral, Daily  magnesium oxide, 400 mg, Oral, Daily  nicotine, 1 patch, Transdermal, Q24H  pantoprazole, 40 mg, Intravenous, Q12H        PRN meds:  benzonatate  •  dextrose  •  dextrose  •  glucagon (human recombinant)  •  [COMPLETED] Insert peripheral IV **AND** sodium chloride    Assessment/Plan       Heme positive stool    Tobacco abuse    Hypertension    Type 2 diabetes mellitus with autonomic neuropathy (CMS/HCC)    Congestive heart failure (CMS/HCC)    CKD (chronic kidney disease) stage 3, GFR 30-59 ml/min (CMS/HCC)    Acute pulmonary edema (CMS/HCC)      Plan:  Acute pulmonary edema/atrial flutter/CHF/hypertension/hyperlipidemia.  Patient has been out of Lasix for last 2 weeks.  Strict in and out.  Daily weight.  Metoprolol.  Lipitor.  Multaq.   Bumex IV.  Telemetry.   Echocardiogram-ejection fraction 36 to 40%, moderate concentric hypertrophy, diastolic dysfunction,  right ventricle cavity mildly dilated, biatrial enlargement, moderate mitral valve regurgitation, moderate tricuspid valve regurgitation.     GI bleed/anemia.  Status post 1 units of blood transfusion.  Hemoccult-negative.  Consult GI.  Protonix IV.  Venofer x4 days 6/6/21.  Protonix drip.  No sign of bleeding since admission discussed with nursing staff.  Plan for EGD by GI.     Pulmonary nodules bilateral/lymphadenopathy.  Outpatient follow-up with his primary care physician.  Follow-up CT scan recommended in 12 months.  This has been discussed with the patient.  Patient understood.  Recommended short-term follow-up 3 to 6 months for engorged lymph node.  CTA of the chest-Cardiomegaly- bilateral pleural effusions and bilateral interstitial infiltrates likely due to pulmonary edema- likely due to congestive heart failure, no CT evidence of pulmonary embolus, dialated pulmonary arteries, atheromatous disease of the thoracic aorta and coronary arteries, small pulmonary nodules bilaterally, thickening of the gastric wall, mediastinal and bilateral hilar lymphadenopathy-  Seen with congestive heart failure with engorgement of lymph nodes-  Short-term follow-up recommended in 3-6 months.     Shortness of breath/COPD.  Duo nebs.     Acute kidney injury.  Chronic renal failure stage III.    Creatinine slightly worse.  Nephrology consult.        Gout.  Allopurinol.     Diabetes.  Sliding scale.  Start back on Glucophage by nephrology.  Add Levemir.  Hemoglobin A1c 8.0     Chronic smoker.  Nicotine patch.  Discussed with patient cutting back and stopping.     Nutrition.  Magnesium oxide.  Regular/consistent carb diet.     Deconditioning.  PT and OT consult     Discharge Planning:  3 to 5 days.                                Electronically signed by Stuart Martin MD, 06/08/21, 8:38 AM CDT.
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
## 2021-06-08 NOTE — PLAN OF CARE
"Goal Outcome Evaluation:  Plan of Care Reviewed With: patient        Progress: improving  Outcome Summary: PT treatment complete. He demos an improved gait speed/pace this PM. No SOB with activity on 2L/NC and he did not desat with activity performed. He reports feeling overall \"improved\" from yesterday. PT will cont with POC.  "

## 2021-06-08 NOTE — CASE MANAGEMENT/SOCIAL WORK
Discharge Planning Assessment   [facility]     Patient Name: Milton Rodriguez  MRN: [id number]  Today's Date: 6/8/2021    Admit Date: 6/5/2021    Discharge Needs Assessment     Row Name 06/08/21 1110       Living Environment    Lives With  spouse    Name(s) of Who Lives With Patient  Spouse - Jodi Rodriguez    Current Living Arrangements  home/apartment/condo    Primary Care Provided by  self    Provides Primary Care For  no one    Family Caregiver if Needed  spouse    Quality of Family Relationships  unable to assess    Able to Return to Prior Arrangements  yes       Resource/Environmental Concerns    Resource/Environmental Concerns  none       Transition Planning    Patient/Family Anticipates Transition to  home with family    Patient/Family Anticipated Services at Transition  durable medical equipment    Transportation Anticipated  family or friend will provide       Discharge Needs Assessment    Readmission Within the Last 30 Days  no previous admission in last 30 days    Concerns to be Addressed  discharge planning    Equipment Needed After Discharge  oxygen    Outpatient/Agency/Support Group Needs  public health nursing    Current Discharge Risk  chronically ill    Discharge Coordination/Progress  Attempted to screen patient and he was sleeping.  He did not wake to voice.  Chart reviewed.  Patient is doing well with therapy.  Patient resides at home with his spouse.  Nursing advised patient may need home oxygen upon discharge.  Will need to follow up with patient when he is available regarding needs, offer referral to [facility] and confirm if patient has scales at home for daily weights.        Discharge Plan    No documentation.       Continued Care and Services - Admitted Since 6/5/2021    Coordination has not been started for this encounter.         Demographic Summary    No documentation.       Functional Status    No documentation.       Psychosocial    No documentation.       Abuse/Neglect    No  documentation.       Legal    No documentation.       Substance Abuse    No documentation.       Patient Forms    No documentation.           JORGE A Del RosarioW
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
## 2021-06-09 LAB
ALBUMIN SERPL-MCNC: 3 G/DL (ref 3.5–5.2)
ALBUMIN/GLOB SERPL: 0.9 G/DL
ALP SERPL-CCNC: 236 U/L (ref 39–117)
ALT SERPL W P-5'-P-CCNC: 24 U/L (ref 1–41)
ANION GAP SERPL CALCULATED.3IONS-SCNC: 9 MMOL/L (ref 5–15)
AST SERPL-CCNC: 20 U/L (ref 1–40)
BASOPHILS # BLD AUTO: 0.05 10*3/MM3 (ref 0–0.2)
BASOPHILS NFR BLD AUTO: 0.7 % (ref 0–1.5)
BILIRUB SERPL-MCNC: 0.3 MG/DL (ref 0–1.2)
BUN SERPL-MCNC: 26 MG/DL (ref 8–23)
BUN/CREAT SERPL: 13.3 (ref 7–25)
CALCIUM SPEC-SCNC: 8.9 MG/DL (ref 8.6–10.5)
CHLORIDE SERPL-SCNC: 107 MMOL/L (ref 98–107)
CO2 SERPL-SCNC: 28 MMOL/L (ref 22–29)
CREAT SERPL-MCNC: 1.96 MG/DL (ref 0.76–1.27)
DEPRECATED RDW RBC AUTO: 50.9 FL (ref 37–54)
EOSINOPHIL # BLD AUTO: 0.52 10*3/MM3 (ref 0–0.4)
EOSINOPHIL NFR BLD AUTO: 6.9 % (ref 0.3–6.2)
ERYTHROCYTE [DISTWIDTH] IN BLOOD BY AUTOMATED COUNT: 16.1 % (ref 12.3–15.4)
GFR SERPL CREATININE-BSD FRML MDRD: 42 ML/MIN/1.73
GLOBULIN UR ELPH-MCNC: 3.2 GM/DL
GLUCOSE BLDC GLUCOMTR-MCNC: 243 MG/DL (ref 70–130)
GLUCOSE BLDC GLUCOMTR-MCNC: 255 MG/DL (ref 70–130)
GLUCOSE BLDC GLUCOMTR-MCNC: 275 MG/DL (ref 70–130)
GLUCOSE BLDC GLUCOMTR-MCNC: 58 MG/DL (ref 70–130)
GLUCOSE BLDC GLUCOMTR-MCNC: 76 MG/DL (ref 70–130)
GLUCOSE SERPL-MCNC: 76 MG/DL (ref 65–99)
HCT VFR BLD AUTO: 28.5 % (ref 37.5–51)
HGB BLD-MCNC: 8.7 G/DL (ref 13–17.7)
IMM GRANULOCYTES # BLD AUTO: 0.04 10*3/MM3 (ref 0–0.05)
IMM GRANULOCYTES NFR BLD AUTO: 0.5 % (ref 0–0.5)
LYMPHOCYTES # BLD AUTO: 0.38 10*3/MM3 (ref 0.7–3.1)
LYMPHOCYTES NFR BLD AUTO: 5 % (ref 19.6–45.3)
MCH RBC QN AUTO: 26.6 PG (ref 26.6–33)
MCHC RBC AUTO-ENTMCNC: 30.5 G/DL (ref 31.5–35.7)
MCV RBC AUTO: 87.2 FL (ref 79–97)
MONOCYTES # BLD AUTO: 0.99 10*3/MM3 (ref 0.1–0.9)
MONOCYTES NFR BLD AUTO: 13.1 % (ref 5–12)
NEUTROPHILS NFR BLD AUTO: 5.57 10*3/MM3 (ref 1.7–7)
NEUTROPHILS NFR BLD AUTO: 73.8 % (ref 42.7–76)
NRBC BLD AUTO-RTO: 0.5 /100 WBC (ref 0–0.2)
PLATELET # BLD AUTO: 321 10*3/MM3 (ref 140–450)
PMV BLD AUTO: 10.4 FL (ref 6–12)
POTASSIUM SERPL-SCNC: 3.8 MMOL/L (ref 3.5–5.2)
PROT SERPL-MCNC: 6.2 G/DL (ref 6–8.5)
RBC # BLD AUTO: 3.27 10*6/MM3 (ref 4.14–5.8)
SODIUM SERPL-SCNC: 144 MMOL/L (ref 136–145)
WBC # BLD AUTO: 7.55 10*3/MM3 (ref 3.4–10.8)

## 2021-06-09 PROCEDURE — 94799 UNLISTED PULMONARY SVC/PX: CPT

## 2021-06-09 PROCEDURE — 80053 COMPREHEN METABOLIC PANEL: CPT | Performed by: FAMILY MEDICINE

## 2021-06-09 PROCEDURE — 25010000002 IRON SUCROSE PER 1 MG: Performed by: FAMILY MEDICINE

## 2021-06-09 PROCEDURE — 94660 CPAP INITIATION&MGMT: CPT

## 2021-06-09 PROCEDURE — 82962 GLUCOSE BLOOD TEST: CPT

## 2021-06-09 PROCEDURE — 99232 SBSQ HOSP IP/OBS MODERATE 35: CPT | Performed by: NURSE PRACTITIONER

## 2021-06-09 PROCEDURE — 63710000001 INSULIN DETEMIR PER 5 UNITS: Performed by: FAMILY MEDICINE

## 2021-06-09 PROCEDURE — 85025 COMPLETE CBC W/AUTO DIFF WBC: CPT | Performed by: FAMILY MEDICINE

## 2021-06-09 PROCEDURE — 97116 GAIT TRAINING THERAPY: CPT

## 2021-06-09 PROCEDURE — 63710000001 INSULIN LISPRO (HUMAN) PER 5 UNITS: Performed by: FAMILY MEDICINE

## 2021-06-09 RX ORDER — BUMETANIDE 1 MG/1
1 TABLET ORAL ONCE
Status: COMPLETED | OUTPATIENT
Start: 2021-06-09 | End: 2021-06-09

## 2021-06-09 RX ORDER — BUMETANIDE 1 MG/1
1 TABLET ORAL DAILY
Status: DISCONTINUED | OUTPATIENT
Start: 2021-06-10 | End: 2021-06-11 | Stop reason: HOSPADM

## 2021-06-09 RX ORDER — PANTOPRAZOLE SODIUM 40 MG/1
40 TABLET, DELAYED RELEASE ORAL
Status: DISCONTINUED | OUTPATIENT
Start: 2021-06-09 | End: 2021-06-11 | Stop reason: HOSPADM

## 2021-06-09 RX ORDER — METOPROLOL SUCCINATE 100 MG/1
100 TABLET, EXTENDED RELEASE ORAL
Status: DISCONTINUED | OUTPATIENT
Start: 2021-06-10 | End: 2021-06-11 | Stop reason: HOSPADM

## 2021-06-09 RX ADMIN — IPRATROPIUM BROMIDE 0.5 MG: 0.5 SOLUTION RESPIRATORY (INHALATION) at 14:22

## 2021-06-09 RX ADMIN — ATORVASTATIN CALCIUM 80 MG: 40 TABLET, FILM COATED ORAL at 21:11

## 2021-06-09 RX ADMIN — IRON SUCROSE 200 MG: 20 INJECTION, SOLUTION INTRAVENOUS at 13:57

## 2021-06-09 RX ADMIN — IPRATROPIUM BROMIDE 0.5 MG: 0.5 SOLUTION RESPIRATORY (INHALATION) at 19:59

## 2021-06-09 RX ADMIN — PANTOPRAZOLE SODIUM 40 MG: 40 INJECTION, POWDER, FOR SOLUTION INTRAVENOUS at 08:24

## 2021-06-09 RX ADMIN — BUDESONIDE 0.5 MG: 0.5 INHALANT RESPIRATORY (INHALATION) at 07:27

## 2021-06-09 RX ADMIN — MAGNESIUM GLUCONATE 500 MG ORAL TABLET 400 MG: 500 TABLET ORAL at 08:23

## 2021-06-09 RX ADMIN — BUDESONIDE 0.5 MG: 0.5 INHALANT RESPIRATORY (INHALATION) at 19:59

## 2021-06-09 RX ADMIN — DILTIAZEM HYDROCHLORIDE 360 MG: 240 CAPSULE, EXTENDED RELEASE ORAL at 08:24

## 2021-06-09 RX ADMIN — PANTOPRAZOLE SODIUM 40 MG: 40 TABLET, DELAYED RELEASE ORAL at 17:09

## 2021-06-09 RX ADMIN — BUMETANIDE 1 MG: 1 TABLET ORAL at 13:57

## 2021-06-09 RX ADMIN — INSULIN LISPRO 4 UNITS: 100 INJECTION, SOLUTION INTRAVENOUS; SUBCUTANEOUS at 17:09

## 2021-06-09 RX ADMIN — DRONEDARONE 400 MG: 400 TABLET, FILM COATED ORAL at 17:09

## 2021-06-09 RX ADMIN — IPRATROPIUM BROMIDE 0.5 MG: 0.5 SOLUTION RESPIRATORY (INHALATION) at 10:37

## 2021-06-09 RX ADMIN — DRONEDARONE 400 MG: 400 TABLET, FILM COATED ORAL at 08:24

## 2021-06-09 RX ADMIN — BENZONATATE 100 MG: 100 CAPSULE ORAL at 04:18

## 2021-06-09 RX ADMIN — INSULIN LISPRO 3 UNITS: 100 INJECTION, SOLUTION INTRAVENOUS; SUBCUTANEOUS at 12:29

## 2021-06-09 RX ADMIN — IPRATROPIUM BROMIDE 0.5 MG: 0.5 SOLUTION RESPIRATORY (INHALATION) at 07:27

## 2021-06-09 RX ADMIN — ALLOPURINOL 100 MG: 100 TABLET ORAL at 08:24

## 2021-06-09 RX ADMIN — INSULIN DETEMIR 5 UNITS: 100 INJECTION, SOLUTION SUBCUTANEOUS at 21:13

## 2021-06-09 RX ADMIN — LINAGLIPTIN 5 MG: 5 TABLET, FILM COATED ORAL at 08:24

## 2021-06-09 NOTE — PLAN OF CARE
Goal Outcome Evaluation:  Plan of Care Reviewed With: (P) patient        Progress: (P) improving  Outcome Summary: (P) Pt had no complaints this AM. Pt is on 4L O2 upon arriving in room, starting O2 is 95%. Pt supine to sit EOB with SBA. Pt sit to stand with SBA. Pt ambulated 200' with SBA on 4L of supplemental O2. Pt would have ambulated farther if not for complaining about wanting to take the mask off. Pt would benefit from continued therapy.

## 2021-06-09 NOTE — THERAPY TREATMENT NOTE
Acute Care - Physical Therapy Treatment Note  [facility]     Patient Name: Milton Rodriguez  : 1960  MRN: [id number]  Today's Date: 2021           PT Assessment (last 12 hours)      PT Evaluation and Treatment     Row Name 21          Physical Therapy Time and Intention    Subjective Information  no complaints  (Pended)   -TB     Document Type  therapy note (daily note)  (Pended)   -TB     Mode of Treatment  physical therapy  (Pended)   -     Row Name 21          General Information    Existing Precautions/Restrictions  fall  (Pended)   -     Row Name 21          Bed Mobility    Supine-Sit [place] (Bed Mobility)  standby assist  (Pended)   -     Row Name 21          Transfers    Sit-Stand [place] (Transfers)  standby assist  (Pended)   -[facility] Name 21          Gait/Stairs (Locomotion)    [place] Level (Gait)  standby assist  (Pended)   -TB     Distance in Feet (Gait)  200ft  (Pended)   -[facility] Name 21          Vital Signs    Pre SpO2 (%)  95  (Pended)   -TB     O2 Delivery Pre Treatment  supplemental O2  (Pended)  4L  -TB     O2 Delivery Intra Treatment  supplemental O2  (Pended)  4L  -TB     Post SpO2 (%)  86  (Pended)   -TB     O2 Delivery Post Treatment  supplemental O2  (Pended)  4L  -TB     [facility] Name 21          Positioning and Restraints    Pre-Treatment Position  in bed  (Pended)   -TB     Post Treatment Position  chair  (Pended)   -TB     In Chair  reclined;call light within reach;encouraged to call for assist  (Pended)   -TB       User Key  (r) = Recorded By, (t) = Taken By, (c) = Cosigned By    Initials Name Provider Type    TB Marisabel Kirby PTA Student PTA Student        Physical Therapy Education                 Title: PT OT SLP Therapies (In Progress)     Topic: Physical Therapy (In Progress)     Point: Mobility training (Done)     Learning Progress Summary           Patient  Acceptance, E, SOBIA,DU by BRITTANY at 6/8/2021 1357    Comment: benefits of activity, gait safety.    Acceptance, E, SOBIA,DU by BRITTANY at 6/8/2021 0828    Comment: Progression of PT POC, benefits of activity                   Point: Home exercise program (Not Started)     Learner Progress:  Not documented in this visit.          Point: Body mechanics (Not Started)     Learner Progress:  Not documented in this visit.          Point: Precautions (Not Started)     Learner Progress:  Not documented in this visit.                      User Key     Initials Effective Dates Name Provider Type Discipline    BRITTANY 08/02/16 -  Albetro Portillo, PT DPT Physical Therapist PT              PT Recommendation and Plan     Plan of Care Reviewed With: (P) patient  Progress: (P) improving  Outcome Summary: (P) Pt had no complaints this AM. Pt is on 4L O2 upon arriving in room, starting O2 is 95%. Pt supine to sit EOB with SBA. Pt sit to stand with SBA. Pt ambulated 200' with SBA on 4L of supplemental O2. Pt would have ambulated farther if not for complaining about wanting to take the mask off. Pt would benefit from continued therapy.       Time Calculation:   PT Charges     Row Name 06/09/21 0915             Time Calculation    Start Time  0805  (Pended)   -TB      Stop Time  0819  (Pended)   -TB      Time Calculation (min)  14 min  (Pended)   -TB      PT Received On  06/09/21  (Pended)   -TB      PT Goal Re-Cert Due Date  06/18/21  (Pended)   -TB         Time Calculation- PT    Total Timed Code Minutes- PT  14 minute(s)  (Pended)   -TB         Timed Charges    64903 - Gait Training Minutes   14  (Pended)   -TB         Total Minutes    Timed Charges Total Minutes  14  (Pended)   -TB       Total Minutes  14  (Pended)   -TB        User Key  (r) = Recorded By, (t) = Taken By, (c) = Cosigned By    Initials Name Provider Type    TB Marisabel Kirby PTA Student PTA Student            PT G-Codes  Outcome Measure Options: AM-PAC 6 Clicks Daily Activity  (OT)  AM-PAC 6 Clicks Score (PT): 20  AM-PAC 6 Clicks Score (OT): 24    Marisabel Kirby PTA Student  6/9/2021

## 2021-06-09 NOTE — CASE MANAGEMENT/SOCIAL WORK
Continued Stay Note  [facility]KIS[facility] Srivast[facility]     Patient Name: Milton Rodriguez  MRN: [id number]  Today's Date: 6/9/2021    Admit Date: 6/5/2021    Discharge Plan     Row Name 06/09/21 1207       Plan    Plan  Home    Patient/Family in Agreement with Plan  yes    Plan Comments  SW spoke with patient this am.  He was up in chair.  Patient states he does have scales at home for daily weights.  Patient provided with flyer for [facility] program and he would like us to follow up with him prior to discharge regarding referral after he has had time to review program.  Patient advised he plans to travel by air to [place] next week.  SW advised patient would need to speak to physicians regarding his travel plans.  Patient may require oxygen upon discharge.        Discharge Codes    No documentation.             YOBANY Del Rosario

## 2021-06-09 NOTE — PROGRESS NOTES
[facility] -  Progress Note     LOS: 4 days   Patient Care Team:  Thanh Weeks MD as PCP - General  MickyThanh rivera MD as PCP - Family Medicine  Thanh Weeks MD as Referring Physician (Family Medicine)  Alex Coleman MD as Consulting Physician (Urology)  Adan Gomez DO as Consulting Physician (Gastroenterology)    Chief Complaint: Shortness of Breath     Subjective     Interval History:   Continue to improve. He notes that he walked the CCU halls before transitioning down and notes he did well with minimal shortness of breath on exertion. He notes he is still short of breath- though it has improved since discharge. He is still requiring Oxygen via nasal cannula- which he does not wear at home. Heart rates remain elevated but he is asymptomatic.     Review of Systems:     Review of Systems   Constitutional: Positive for fatigue.   Respiratory: Positive for shortness of breath.      Objective     Vital Sign Min/Max for last 24 hours  Temp  Min: 97.1 °F (36.2 °C)  Max: 97.9 °F (36.6 °C)   BP  Min: 77/64  Max: 118/87   Pulse  Min: 117  Max: 124   Resp  Min: 12  Max: 22   SpO2  Min: 90 %  Max: 100 %   No data recorded   Weight  Min: 72.6 kg (160 lb 0.9 oz)  Max: 74.2 kg (163 lb 9.6 oz)         06/09/21  1131   Weight: 74.2 kg (163 lb 9.6 oz)       Telemetry: A-flutter 118      Physical Exam:    Constitutional:       Appearance: Well-developed. Chronically ill-appearing.      Interventions: Nasal cannula in place.   Eyes:      Pupils: Pupils are equal, round, and reactive to light.   HENT:      Head: Normocephalic and atraumatic.   Neck:      Vascular: No carotid bruit or JVD.   Pulmonary:      Effort: Pulmonary effort is normal.      Breath sounds: Decreased breath sounds present. Wheezes: coarse breath sounds R>L - improved from yesterday..   Cardiovascular:      Tachycardia present. Irregular rhythm.   Pulses:     Intact distal pulses.   Edema:     Edema: improving.      Ankle: bilateral 1+ edema of the ankle.  Abdominal:      General: Bowel sounds are normal.      Palpations: Abdomen is soft.   Musculoskeletal: Normal range of motion.      Cervical back: Normal range of motion and neck supple. Skin:     General: Skin is warm and dry.   Neurological:      Mental Status: Alert and oriented to person, place, and time.      Deep Tendon Reflexes: Reflexes are normal and symmetric.   Psychiatric:         Behavior: Behavior normal.         Thought Content: Thought content normal.         Judgment: Judgment normal.       Results Review:   Results from last 7 days   Lab Units 06/09/21  0327 06/08/21  0354 06/07/21  0320   SODIUM mmol/L 144 146* 141   POTASSIUM mmol/L 3.8 3.5 3.7   CHLORIDE mmol/L 107 109* 106   CO2 mmol/L 28.0 27.0 25.0   BUN mg/dL 26* 27* 25*   CREATININE mg/dL 1.96* 2.14* 1.90*   GLUCOSE mg/dL 76 89 214*   CALCIUM mg/dL 8.9 8.4* 8.1*     Lab Results (last 24 hours)     Procedure Component Value Units Date/Time    POC Glucose Once [[id number]]  (Normal) Collected: 06/09/21 0727    Specimen: Blood Updated: 06/09/21 0738     Glucose 76 mg/dL      Comment: : [id number] Demetris AcostaOsman ID: [id number]       POC Glucose Once [[id number]]  (Abnormal) Collected: 06/09/21 0711    Specimen: Blood Updated: 06/09/21 0722     Glucose 58 mg/dL      Comment: : [id number] Gabriellaerma CowartDavid ID: [id number]       Comprehensive Metabolic Panel [[id number]]  (Abnormal) Collected: 06/09/21 0327    Specimen: Blood Updated: 06/09/21 0410     Glucose 76 mg/dL      BUN 26 mg/dL      Creatinine 1.96 mg/dL      Sodium 144 mmol/L      Potassium 3.8 mmol/L      Chloride 107 mmol/L      CO2 28.0 mmol/L      Calcium 8.9 mg/dL      Total Protein 6.2 g/dL      Albumin 3.00 g/dL      ALT (SGPT) 24 U/L      AST (SGOT) 20 U/L      Alkaline Phosphatase 236 U/L      Total Bilirubin 0.3 mg/dL      eGFR  African Amer 42 mL/min/1.73      Globulin 3.2 gm/dL      A/G Ratio 0.9 g/dL      BUN/Creatinine Ratio  13.3     Anion Gap 9.0 mmol/L     Narrative:      GFR Normal >60  Chronic Kidney Disease <60  Kidney Failure <15      CBC & Differential [[id number]]  (Abnormal) Collected: 06/09/21 0327    Specimen: Blood Updated: 06/09/21 0350    Narrative:      The following orders were created for panel order CBC & Differential.  Procedure                               Abnormality         Status                     ---------                               -----------         ------                     CBC Auto Differential[[id number]]        Abnormal            Final result                 Please view results for these tests on the individual orders.    CBC Auto Differential [[id number]]  (Abnormal) Collected: 06/09/21 0327    Specimen: Blood Updated: 06/09/21 0350     WBC 7.55 10*3/mm3      RBC 3.27 10*6/mm3      Hemoglobin 8.7 g/dL      Hematocrit 28.5 %      MCV 87.2 fL      MCH 26.6 pg      MCHC 30.5 g/dL      RDW 16.1 %      RDW-SD 50.9 fl      MPV 10.4 fL      Platelets 321 10*3/mm3      Neutrophil % 73.8 %      Lymphocyte % 5.0 %      Monocyte % 13.1 %      Eosinophil % 6.9 %      Basophil % 0.7 %      Immature Grans % 0.5 %      Neutrophils, Absolute 5.57 10*3/mm3      Lymphocytes, Absolute 0.38 10*3/mm3      Monocytes, Absolute 0.99 10*3/mm3      Eosinophils, Absolute 0.52 10*3/mm3      Basophils, Absolute 0.05 10*3/mm3      Immature Grans, Absolute 0.04 10*3/mm3      nRBC 0.5 /100 WBC     POC Glucose Once [[id number]]  (Abnormal) Collected: 06/08/21 2046    Specimen: Blood Updated: 06/08/21 2057     Glucose 282 mg/dL      Comment: : [id number] Gabriella Reveles ID: [id number]       POC Glucose Once [[id number]]  (Abnormal) Collected: 06/08/21 1631    Specimen: Blood Updated: 06/08/21 1651     Glucose 239 mg/dL      Comment: : [id number] Jaylen Chan ID: [id number]             Results for orders placed during the hospital encounter of 06/05/21    Adult Transthoracic Echo Complete W/ Cont if Necessary Per  Protocol    Interpretation Summary  · Left ventricular wall thickness is consistent with mild to moderate concentric hypertrophy.  · Left ventricular ejection fraction appears to be 36 - 40%. Left ventricular systolic function is moderately decreased.  · Left ventricular diastolic dysfunction is noted.  · The right ventricular cavity is mildly dilated. Systolic function is normal.  · There is biatrial enlargement.  · Moderate mitral valve regurgitation is present.  · Moderate tricuspid valve regurgitation is present.  · Estimated right ventricular systolic pressure from tricuspid regurgitation is mildly elevated (35-45 mmHg).    Medication Review: yes  Current Facility-Administered Medications   Medication Dose Route Frequency Provider Last Rate Last Admin   • allopurinol (ZYLOPRIM) tablet 100 mg  100 mg Oral Daily Stuart Martin MD   100 mg at 06/09/21 0824   • atorvastatin (LIPITOR) tablet 80 mg  80 mg Oral Nightly Stuart Martin MD   80 mg at 06/08/21 2034   • benzonatate (TESSALON) capsule 100 mg  100 mg Oral TID PRN Stuart Martin MD   100 mg at 06/09/21 0418   • budesonide (PULMICORT) nebulizer solution 0.5 mg  0.5 mg Nebulization BID - RT Stuart Martin MD   0.5 mg at 06/09/21 0727   • [START ON 6/10/2021] bumetanide (BUMEX) tablet 1 mg  1 mg Oral Daily Rosendo Gatica, DARIEN       • bumetanide (BUMEX) tablet 1 mg  1 mg Oral Once Rosendo Gatica, APRN       • dextrose (D50W) 25 g/ 50mL Intravenous Solution 25 g  25 g Intravenous Q15 Min PRN Stuart Martin MD       • dextrose (GLUTOSE) oral gel 15 g  15 g Oral Q15 Min PRN Stuart Martin MD   15 g at 06/08/21 0708   • dilTIAZem CD (CARDIZEM CD) 24 hr capsule 360 mg  360 mg Oral Q24H Stuart Martin MD   360 mg at 06/09/21 0824   • dronedarone (MULTAQ) tablet 400 mg  400 mg Oral BID With Meals Stuart Martin MD   400 mg at 06/09/21 0824   • glucagon (human recombinant) (GLUCAGEN DIAGNOSTIC) injection 1 mg  1 mg Subcutaneous Q15 Min PRN Stuart Martin  "MD LORENZA       • insulin detemir (LEVEMIR) injection 5 Units  5 Units Subcutaneous Q12H Stuart Martin MD   5 Units at 06/07/21 2051   • insulin lispro (humaLOG) injection 2-7 Units  2-7 Units Subcutaneous TID AC Stuart Martin MD   3 Units at 06/08/21 1639   • ipratropium (ATROVENT) nebulizer solution 0.5 mg  0.5 mg Nebulization 4x Daily - RT Stuart Martin MD   0.5 mg at 06/09/21 1037   • iron sucrose (VENOFER) 200 mg in sodium chloride 0.9 % 100 mL IVPB  200 mg Intravenous Q24H Stuart Martin MD   200 mg at 06/08/21 1304   • linagliptin (TRADJENTA) tablet 5 mg  5 mg Oral Daily Stuart Martin MD   5 mg at 06/09/21 0824   • magnesium oxide (MAG-OX) tablet 400 mg  400 mg Oral Daily Stuart Martin MD   400 mg at 06/09/21 0823   • nicotine (NICODERM CQ) 14 MG/24HR patch 1 patch  1 patch Transdermal Q24H Stuart Martin MD   1 patch at 06/07/21 2046   • pantoprazole (PROTONIX) injection 40 mg  40 mg Intravenous Q12H Stuart Martin MD   40 mg at 06/09/21 0824   • sodium chloride 0.9 % flush 10 mL  10 mL Intravenous PRN Stuart Martin MD         Assessment/Plan       Tobacco abuse    Hypertension    Type 2 diabetes mellitus with autonomic neuropathy (CMS/HCC)    Atrial flutter (CMS/HCC)    Acute on chronic combined systolic and diastolic congestive heart failure (CMS/HCC)    Acute pulmonary edema (CMS/HCC)    Acute kidney injury superimposed on chronic kidney disease (CMS/HCC)    Anemia    Acute respiratory failure with hypoxia (CMS/HCC)    Plan:  Acute on Chronic Congestive Heart Failure- overall breathing is improving. Nephrology following with transition to PO Bumex. Strict I&O. Daily weights. Low Salt. Will need ischemic evaluation at some point in future once acute issues are improved. Difficult to say but also could be A-flutter with poorly controlled rates contributing- unclear how long has been in A-flutter. He does note that he misses his medications \"sometimes.\" Also ran out of Lasix. Stop Cardizem 360 CD. " "Start Toprol XL. Also consider ACE/ARB if BP and renal function will allow.    Atrial Flutter- had maintained NSR on Multaq for years. Last cardioversion in 2017- appears to have maintained NSR since then. He does note he has missed doses of Multaq. Anticoagulation on hold since EGD last year. Now with ongoing anemia. Possible watchman candidate in future. Overall he seems asymptomatic to rates. I am concerned that A-flutter could have contributed to reduction in LVEF. On Cardizem 360 and Multaq. Hopeful rates will improve with improvement in acute issues. Transition Cardizem 360 to Toprol 100 in the am.     Anemia- anticoagulation on hold. GI following. Known AVMs. Labs stable. S/P 1unit PRBC    Acute on Chronic Kidney Disease - Nephrology following. Reports good urine output    Acute respiratory failure with hypoxia- improving. On nasal cannula. He reports \"significant\" improvement in breathing       Electronically signed by DARIEN Jones, 06/09/21, 11:50 AM CDT.   "

## 2021-06-09 NOTE — PROGRESS NOTES
[facility] Medicine Services  INPATIENT PROGRESS NOTE    Length of Stay: 4  Date of Admission: 6/5/2021  Primary Care Physician: Thanh Weeks MD    Subjective   Chief Complaint: CHF/fluid overload/renal failure/GI bleed    HPI   Patient is breathing better.  Patient still requiring 4.5 L of oxygen.  Patient remained tachycardic.  Patient denies any chest pain.    Review of Systems   Constitutional: Positive for activity change, appetite change and fatigue. Negative for chills and fever.   HENT: Negative for hearing loss, nosebleeds, tinnitus and trouble swallowing.    Eyes: Negative for visual disturbance.   Respiratory: Positive for cough, shortness of breath and wheezing. Negative for chest tightness.    Cardiovascular: Negative for chest pain, palpitations and leg swelling.   Gastrointestinal: Positive for nausea. Negative for abdominal distention, abdominal pain, blood in stool, constipation, diarrhea and vomiting.   Endocrine: Negative for cold intolerance, heat intolerance, polydipsia, polyphagia and polyuria.   Genitourinary: Negative for decreased urine volume, difficulty urinating, dysuria, flank pain, frequency and hematuria.   Musculoskeletal: Positive for arthralgias, gait problem and myalgias. Negative for joint swelling.   Skin: Negative for rash.   Allergic/Immunologic: Negative for immunocompromised state.   Neurological: Positive for weakness. Negative for dizziness, syncope, light-headedness and headaches.   Hematological: Negative for adenopathy. Does not bruise/bleed easily.   Psychiatric/Behavioral: Negative for confusion and sleep disturbance. The patient is not nervous/anxious.      All pertinent negatives and positives are as above. All other systems have been reviewed and are negative unless otherwise stated.     Objective    Temp:  [97.1 °F (36.2 °C)-97.9 °F (36.6 °C)] 97.9 °F (36.6 °C)  Heart Rate:  [117-124] 121  Resp:  [12-20] 12  BP:  ()/(62-87) 108/75    Intake/Output Summary (Last 24 hours) at 6/9/2021 0974  Last data filed at 6/9/2021 0549  Gross per 24 hour   Intake 580 ml   Output 700 ml   Net -120 ml     Physical Exam  Vitals and nursing note reviewed.   Constitutional:       Appearance: He is well-developed.   HENT:      Head: Normocephalic.   Eyes:      Conjunctiva/sclera: Conjunctivae normal.      Pupils: Pupils are equal, round, and reactive to light.   Neck:      Vascular: No JVD.   Cardiovascular:      Rate and Rhythm: Regular rhythm. Tachycardia present.      Heart sounds: Normal heart sounds. No murmur heard.   No friction rub. No gallop.    Pulmonary:      Effort: No respiratory distress.      Breath sounds: Wheezing present. No rales.      Comments: Patient currently on 4.5 L of oxygen.  Chest:      Chest wall: No tenderness.   Abdominal:      General: Bowel sounds are normal. There is no distension.      Palpations: Abdomen is soft.      Tenderness: There is no abdominal tenderness. There is no guarding or rebound.   Musculoskeletal:         General: No tenderness or deformity. Normal range of motion.      Cervical back: Neck supple.   Skin:     General: Skin is warm and dry.      Capillary Refill: Capillary refill takes 2 to 3 seconds.      Findings: No rash.   Neurological:      Mental Status: He is alert and oriented to person, place, and time.      Cranial Nerves: No cranial nerve deficit.      Motor: Weakness present. No abnormal muscle tone.      Coordination: Coordination abnormal.      Gait: Gait abnormal.      Deep Tendon Reflexes: Reflexes normal.   Psychiatric:         Behavior: Behavior normal.         Thought Content: Thought content normal.   Results Review:  Lab Results (last 24 hours)     Procedure Component Value Units Date/Time    POC Glucose Once [[id number]]  (Normal) Collected: 06/09/21 0727    Specimen: Blood Updated: 06/09/21 0738     Glucose 76 mg/dL      Comment: : [id number] Demetris Monroe  ID: [id number]       POC Glucose Once [[id number]]  (Abnormal) Collected: 06/09/21 0711    Specimen: Blood Updated: 06/09/21 0722     Glucose 58 mg/dL      Comment: : [id number] Gabriella Reveles ID: [id number]       Comprehensive Metabolic Panel [[id number]]  (Abnormal) Collected: 06/09/21 0327    Specimen: Blood Updated: 06/09/21 0410     Glucose 76 mg/dL      BUN 26 mg/dL      Creatinine 1.96 mg/dL      Sodium 144 mmol/L      Potassium 3.8 mmol/L      Chloride 107 mmol/L      CO2 28.0 mmol/L      Calcium 8.9 mg/dL      Total Protein 6.2 g/dL      Albumin 3.00 g/dL      ALT (SGPT) 24 U/L      AST (SGOT) 20 U/L      Alkaline Phosphatase 236 U/L      Total Bilirubin 0.3 mg/dL      eGFR  African Amer 42 mL/min/1.73      Globulin 3.2 gm/dL      A/G Ratio 0.9 g/dL      BUN/Creatinine Ratio 13.3     Anion Gap 9.0 mmol/L     Narrative:      GFR Normal >60  Chronic Kidney Disease <60  Kidney Failure <15      CBC & Differential [[id number]]  (Abnormal) Collected: 06/09/21 0327    Specimen: Blood Updated: 06/09/21 0350    Narrative:      The following orders were created for panel order CBC & Differential.  Procedure                               Abnormality         Status                     ---------                               -----------         ------                     CBC Auto Differential[[id number]]        Abnormal            Final result                 Please view results for these tests on the individual orders.    CBC Auto Differential [[id number]]  (Abnormal) Collected: 06/09/21 0327    Specimen: Blood Updated: 06/09/21 0350     WBC 7.55 10*3/mm3      RBC 3.27 10*6/mm3      Hemoglobin 8.7 g/dL      Hematocrit 28.5 %      MCV 87.2 fL      MCH 26.6 pg      MCHC 30.5 g/dL      RDW 16.1 %      RDW-SD 50.9 fl      MPV 10.4 fL      Platelets 321 10*3/mm3      Neutrophil % 73.8 %      Lymphocyte % 5.0 %      Monocyte % 13.1 %      Eosinophil % 6.9 %      Basophil % 0.7 %      Immature Grans % 0.5 %      Neutrophils,  Absolute 5.57 10*3/mm3      Lymphocytes, Absolute 0.38 10*3/mm3      Monocytes, Absolute 0.99 10*3/mm3      Eosinophils, Absolute 0.52 10*3/mm3      Basophils, Absolute 0.05 10*3/mm3      Immature Grans, Absolute 0.04 10*3/mm3      nRBC 0.5 /100 WBC     POC Glucose Once [[id number]]  (Abnormal) Collected: 06/08/21 2046    Specimen: Blood Updated: 06/08/21 2057     Glucose 282 mg/dL      Comment: : [id number] Gabriella Reveles ID: [id number]       POC Glucose Once [[id number]]  (Abnormal) Collected: 06/08/21 1631    Specimen: Blood Updated: 06/08/21 1651     Glucose 239 mg/dL      Comment: : [id number] Jaylen Chan ID: [id number]              Cultures:  No results found for: BLOODCX, URINECX, WOUNDCX, MRSACX, RESPCX, STOOLCX    Radiology Data:    Imaging Results (Last 24 Hours)     ** No results found for the last 24 hours. **          No Known Allergies    Scheduled meds:   allopurinol, 100 mg, Oral, Daily  atorvastatin, 80 mg, Oral, Nightly  budesonide, 0.5 mg, Nebulization, BID - RT  [START ON 6/10/2021] bumetanide, 1 mg, Oral, Daily  bumetanide, 1 mg, Oral, Once  dilTIAZem CD, 360 mg, Oral, Q24H  dronedarone, 400 mg, Oral, BID With Meals  insulin detemir, 5 Units, Subcutaneous, Q12H  insulin lispro, 2-7 Units, Subcutaneous, TID AC  ipratropium, 0.5 mg, Nebulization, 4x Daily - RT  iron sucrose (VENOFER) IVPB, 200 mg, Intravenous, Q24H  linagliptin, 5 mg, Oral, Daily  magnesium oxide, 400 mg, Oral, Daily  nicotine, 1 patch, Transdermal, Q24H  pantoprazole, 40 mg, Intravenous, Q12H        PRN meds:  benzonatate  •  dextrose  •  dextrose  •  glucagon (human recombinant)  •  [COMPLETED] Insert peripheral IV **AND** sodium chloride    Assessment/Plan       Tobacco abuse    Hypertension    Type 2 diabetes mellitus with autonomic neuropathy (CMS/HCC)    Atrial flutter (CMS/HCC)    Acute on chronic combined systolic and diastolic congestive heart failure (CMS/HCC)    Acute pulmonary edema (CMS/HCC)    Acute  kidney injury superimposed on chronic kidney disease (CMS/HCC)    Anemia    Acute respiratory failure with hypoxia (CMS/HCC)      Plan:  Acute pulmonary edema/atrial flutter/CHF/hypertension/hyperlipidemia.  Patient has been out of Lasix for last 2 weeks.  Strict in and out.  Daily weight.    Cardizem. Lipitor.  Multaq.   Bumex p.o.  Telemetry.   Echocardiogram-ejection fraction 36 to 40%, moderate concentric hypertrophy, diastolic dysfunction, right ventricle cavity mildly dilated, biatrial enlargement, moderate mitral valve regurgitation, moderate tricuspid valve regurgitation.  Patient is currently requiring 4.5 L of oxygen.     GI bleed/anemia.  History of known AVM.  Hemoglobin is improving.  Status post 1 units of blood transfusion.  Hemoccult-negative.  Consult GI.  Protonix IV.  Venofer x4 days 6/6/21.  Protonix IV.  No sign of bleeding since admission discussed with nursing staff.    Defer EGD per GI due to respiratory status.     Pulmonary nodules bilateral/lymphadenopathy.  Outpatient follow-up with his primary care physician.  Follow-up CT scan recommended in 12 months.  This has been discussed with the patient.  Patient understood.  Recommended short-term follow-up 3 to 6 months for engorged lymph node.  CTA of the chest-Cardiomegaly- bilateral pleural effusions and bilateral interstitial infiltrates likely due to pulmonary edema- likely due to congestive heart failure, no CT evidence of pulmonary embolus, dialated pulmonary arteries, atheromatous disease of the thoracic aorta and coronary arteries, small pulmonary nodules bilaterally, thickening of the gastric wall, mediastinal and bilateral hilar lymphadenopathy-  Seen with congestive heart failure with engorgement of lymph nodes-  Short-term follow-up recommended in 3-6 months.     Shortness of breath/COPD.  Duo nebs.     Acute kidney injury.  Chronic renal failure stage III.    Creatinine slightly improvement.  Nephrology consult.        Gout.   Allopurinol.     Diabetes.  Sliding scale.  Start back on Glucophage by nephrology.  Cont Levemir.  Hemoglobin A1c 8.0     Chronic smoker.  Nicotine patch.  Discussed with patient cutting back and stopping.     Nutrition.  Magnesium oxide.  Regular/consistent carb diet.     Deconditioning.  PT and OT consult     Discharge Planning:  3 to 5 days.    Electronically signed by Stuart Martin MD, 06/09/21, 9:31 AM CDT.

## 2021-06-09 NOTE — PROGRESS NOTES
Pharmacy Dosing Service  Automatic IV to PO Conversion  Protonix    Assessment/Action/Plan:  Patient meets criteria listed below. Protonix 40 mg IV every 12 hours has been changed to Protonix 40 mg PO BID AC.       Subjective:  Milton Rodriguez is a 60 y.o. male who meets the following criteria for IV to PO therapy conversion     Policy Criteria:  Tolerating oral fluids or 40ml/hour of enteral nutrition and oral route not otherwise compromised  Receiving other oral medications on a scheduled basis    Additional Factors Considered:  Anti-emetic usage  Patient disposition per documentation  Disease states or conditions contraindicating oral conversion    Objective:  Diet Order   Procedures    Diet Regular; Cardiac, Consistent Carbohydrate       Active Medications    Current Facility-Administered Medications:     allopurinol (ZYLOPRIM) tablet 100 mg, 100 mg, Oral, Daily, Stuart Martin MD, 100 mg at 06/09/21 0824    atorvastatin (LIPITOR) tablet 80 mg, 80 mg, Oral, Nightly, Stuart Martin MD, 80 mg at 06/08/21 2034    benzonatate (TESSALON) capsule 100 mg, 100 mg, Oral, TID PRN, Stuart aMrtin MD, 100 mg at 06/09/21 0418    budesonide (PULMICORT) nebulizer solution 0.5 mg, 0.5 mg, Nebulization, BID - RT, Stuart Martin MD, 0.5 mg at 06/09/21 0727    [START ON 6/10/2021] bumetanide (BUMEX) tablet 1 mg, 1 mg, Oral, Daily, Stuart Martin MD    bumetanide (BUMEX) tablet 1 mg, 1 mg, Oral, Once, Stuart Martin MD    dextrose (D50W) 25 g/ 50mL Intravenous Solution 25 g, 25 g, Intravenous, Q15 Min PRN, Stuart Martin MD    dextrose (GLUTOSE) oral gel 15 g, 15 g, Oral, Q15 Min PRN, Stuart Maritn MD, 15 g at 06/08/21 0708    dronedarone (MULTAQ) tablet 400 mg, 400 mg, Oral, BID With Meals, Stuart Martin MD, 400 mg at 06/09/21 0824    glucagon (human recombinant) (GLUCAGEN DIAGNOSTIC) injection 1 mg, 1 mg, Subcutaneous, Q15 Min PRN, Stuart Martin MD    insulin detemir (LEVEMIR) injection 5 Units, 5 Units,  Subcutaneous, Q12H, Stuart Martin MD, 5 Units at 06/07/21 2051    insulin lispro (humaLOG) injection 2-7 Units, 2-7 Units, Subcutaneous, TID Mario MCLEOD Khai C, MD, 3 Units at 06/09/21 1229    ipratropium (ATROVENT) nebulizer solution 0.5 mg, 0.5 mg, Nebulization, 4x Daily - RT, Stuart Martin MD, 0.5 mg at 06/09/21 1037    iron sucrose (VENOFER) 200 mg in sodium chloride 0.9 % 100 mL IVPB, 200 mg, Intravenous, Q24H, Stuart Martin MD, 200 mg at 06/08/21 1304    linagliptin (TRADJENTA) tablet 5 mg, 5 mg, Oral, Daily, Stuart Martin MD, 5 mg at 06/09/21 0824    magnesium oxide (MAG-OX) tablet 400 mg, 400 mg, Oral, Daily, Stuart Martin MD, 400 mg at 06/09/21 0823    [START ON 6/10/2021] metoprolol succinate XL (TOPROL-XL) 24 hr tablet 100 mg, 100 mg, Oral, Q24H, Titus Valladares APRN    nicotine (NICODERM CQ) 14 MG/24HR patch 1 patch, 1 patch, Transdermal, Q24H, Stuart Martin MD, 1 patch at 06/07/21 2046    pantoprazole (PROTONIX) EC tablet 40 mg, 40 mg, Oral, BID Mario MCLEOD Khai C, MD    [COMPLETED] Insert peripheral IV, , , Once **AND** sodium chloride 0.9 % flush 10 mL, 10 mL, Intravenous, PRN, Stuart Martin MD J Von Bokel, PharmD  06/09/2113:53 CDT
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
## 2021-06-09 NOTE — PAYOR COMM NOTE
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       Eosinophil Smear - Urine, Urine, Clean Catch [[id number]]  (Normal) Collected: 06/05/21 2139    Specimen: Urine, Clean Catch Updated: 06/06/21 1422     Eosinophil Smear 0 % EOS/100 Cells     PTH, Intact [[id number]]  (Abnormal) Collected: 06/06/21 1324    Specimen: Blood Updated: 06/06/21 1358     PTH, Intact 207.9 pg/mL     Narrative:      Results may be falsely decreased if patient taking Biotin.      Vitamin D 25 Hydroxy [[id number]]  (Abnormal) Collected: 06/05/21 1606    Specimen: Blood Updated: 06/06/21 1331     25 Hydroxy, Vitamin D 20.7 ng/ml     Narrative:      Reference Range for Total Vitamin D 25(OH)     Deficiency <20.0 ng/mL   Insufficiency 21-29 ng/mL   Sufficiency  ng/mL  Toxicity >100 ng/ml    Results may be falsely increased if patient taking Biotin.      Creatinine, Urine, Random - Urine, Clean Catch [[id number]] Collected: 06/05/21 1649    Specimen: Urine, Clean Catch Updated: 06/06/21 1324     Creatinine, Urine 9.7 mg/dL     Narrative:      Reference intervals for random urine have  not been established.  Clinical usage is dependent upon physician's interpretation in combination with other laboratory tests.       POC Glucose Once [[id number]]  (Abnormal) Collected: 06/06/21 1201    Specimen: Blood Updated: 06/06/21 1212     Glucose 181 mg/dL      Comment: : [id number] Tejinder StacyMeter ID: [id number]       Comprehensive Metabolic Panel [[id number]]  (Abnormal) Collected: 06/06/21 0220    Specimen: Blood Updated: 06/06/21 0849     Glucose 74 mg/dL      BUN 27 mg/dL      Creatinine 1.64 mg/dL      Sodium 143 mmol/L      Potassium 3.6 mmol/L      Chloride 105 mmol/L      CO2 23.0 mmol/L      Calcium 8.2 mg/dL      Total Protein 6.8 g/dL      Albumin 3.40 g/dL      ALT (SGPT) 45 U/L      AST (SGOT) 28 U/L      Alkaline Phosphatase 292 U/L      Total Bilirubin 0.5 mg/dL      eGFR  African Amer 52 mL/min/1.73      Globulin 3.4 gm/dL      A/G Ratio 1.0 g/dL      BUN/Creatinine Ratio 16.5     Anion Gap 15.0 mmol/L     Narrative:      GFR Normal >60  Chronic Kidney Disease <60  Kidney Failure <15      POC Glucose Once [[id number]]  (Normal) Collected: 06/06/21 0753    Specimen: Blood Updated: 06/06/21 0804     Glucose 116 mg/dL      Comment: : [id number] Tejinder StacyMeter ID: [id number]       Iron Profile [[id number]]  (Normal) Collected: 06/06/21 0220    Specimen: Blood Updated: 06/06/21 0417     Iron 88 mcg/dL      Iron Saturation 22 %      Transferrin 273 mg/dL      TIBC 407 mcg/dL     Phosphorus [[id number]]  (Abnormal) Collected: 06/06/21 0220    Specimen: Blood Updated: 06/06/21 0417     Phosphorus 6.3 mg/dL     Magnesium [[id number]]  (Normal) Collected: 06/06/21 0220    Specimen: Blood Updated: 06/06/21 0417     Magnesium 2.1 mg/dL     CBC (No Diff) [[id number]]  (Abnormal) Collected: 06/06/21 0220    Specimen: Blood Updated: 06/06/21 0355     WBC 9.50 10*3/mm3      RBC 3.10 10*6/mm3      Hemoglobin 8.1 g/dL      Hematocrit 26.2 %      MCV 84.5 fL      MCH 26.1 pg      MCHC 30.9 g/dL      RDW 15.5 %       RDW-SD 47.0 fl      MPV 10.4 fL      Platelets 338 10*3/mm3     Blood Gas, Arterial - [[id number]]  (Abnormal) Collected: 06/06/21 0330    Specimen: Arterial Blood Updated: 06/06/21 0334     Site Right Radial     Pedrito's Test N/A     pH, Arterial 7.368 pH units      pCO2, Arterial 41.4 mm Hg      pO2, Arterial 58.3 mm Hg      Comment: 84 Value below reference range        HCO3, Arterial 23.8 mmol/L      Base Excess, Arterial -1.4 mmol/L      Comment: 84 Value below reference range        O2 Saturation, Arterial 88.7 %      Comment: 84 Value below reference range        Temperature 37.0 C      Barometric Pressure for Blood Gas 750 mmHg      Modality HFNC     Flow Rate 8.0 lpm      Ventilator Mode NA     Collected by [id number]     Comment: Meter: [id number]     :  [id number]        pCO2, Temperature Corrected 41.4 mm Hg      pH, Temp Corrected 7.368 pH Units      pO2, Temperature Corrected 58.3 mm Hg     POC Glucose Once [[id number]]  (Abnormal) Collected: 06/05/21 2101    Specimen: Blood Updated: 06/05/21 2112     Glucose 289 mg/dL      Comment: : [id number] Lorraine ChandMeter ID: [id number]       Osmolality, Urine - Urine, Clean Catch [[id number]]  (Normal) Collected: 06/05/21 1649    Specimen: Urine, Clean Catch Updated: 06/05/21 2029     Osmolality, Urine 286 mOsm/kg     Sodium, Urine, Random - Urine, Clean Catch [[id number]] Collected: 06/05/21 1649    Specimen: Urine, Clean Catch Updated: 06/05/21 2015     Sodium, Urine 91 mmol/L     Narrative:      Reference intervals for random urine have not been established.  Clinical usage is dependent upon physician's interpretation in combination with other laboratory tests.       Protein, Urine, Random - Urine, Clean Catch [[id number]] Collected: 06/05/21 1649    Specimen: Urine, Clean Catch Updated: 06/05/21 2015     Total Protein, Urine 19.2 mg/dL     Narrative:      Reference intervals for random urine have not been established.  Clinical usage is dependent  upon physician's interpretation in combination with other laboratory tests.           Results for orders placed during the hospital encounter of 06/05/21    Adult Transthoracic Echo Complete W/ Cont if Necessary Per Protocol    Interpretation Summary  · Left ventricular wall thickness is consistent with mild to moderate concentric hypertrophy.  · Left ventricular ejection fraction appears to be 36 - 40%. Left ventricular systolic function is moderately decreased.  · Left ventricular diastolic dysfunction is noted.  · The right ventricular cavity is mildly dilated. Systolic function is normal.  · There is biatrial enlargement.  · Moderate mitral valve regurgitation is present.  · Moderate tricuspid valve regurgitation is present.  · Estimated right ventricular systolic pressure from tricuspid regurgitation is mildly elevated (35-45 mmHg).    Medication Review: yes  Current Facility-Administered Medications   Medication Dose Route Frequency Provider Last Rate Last Admin   • allopurinol (ZYLOPRIM) tablet 100 mg  100 mg Oral Daily Stuart Martin MD   100 mg at 06/08/21 0831   • atorvastatin (LIPITOR) tablet 80 mg  80 mg Oral Nightly Stuart Martin MD   80 mg at 06/07/21 2044   • benzonatate (TESSALON) capsule 100 mg  100 mg Oral TID PRN Stuart Martin MD   100 mg at 06/07/21 2230   • budesonide (PULMICORT) nebulizer solution 0.5 mg  0.5 mg Nebulization BID - RT Staurt Martin MD   0.5 mg at 06/08/21 0619   • [START ON 6/9/2021] bumetanide (BUMEX) injection 1 mg  1 mg Intravenous Daily Rosendo Gatica, APRN       • dextrose (D50W) 25 g/ 50mL Intravenous Solution 25 g  25 g Intravenous Q15 Min PRN Stuart Martin MD       • dextrose (GLUTOSE) oral gel 15 g  15 g Oral Q15 Min PRN Stuart Martin MD   15 g at 06/08/21 0708   • dilTIAZem CD (CARDIZEM CD) 24 hr capsule 360 mg  360 mg Oral Q24H Stuart Martin MD   360 mg at 06/08/21 0831   • dronedarone (MULTAQ) tablet 400 mg  400 mg Oral BID With Meals Stuart Martin  MD LORENZA   400 mg at 06/08/21 0831   • glucagon (human recombinant) (GLUCAGEN DIAGNOSTIC) injection 1 mg  1 mg Subcutaneous Q15 Min PRN Stuart Martin MD       • insulin detemir (LEVEMIR) injection 5 Units  5 Units Subcutaneous Q12H Stuart Martin MD   5 Units at 06/07/21 2051   • insulin lispro (humaLOG) injection 2-7 Units  2-7 Units Subcutaneous TID AC Stuart Martin MD   2 Units at 06/07/21 1702   • ipratropium (ATROVENT) nebulizer solution 0.5 mg  0.5 mg Nebulization 4x Daily - RT Stuart Martin MD   0.5 mg at 06/08/21 1358   • iron sucrose (VENOFER) 200 mg in sodium chloride 0.9 % 100 mL IVPB  200 mg Intravenous Q24H Stuart Martin MD   200 mg at 06/08/21 1304   • linagliptin (TRADJENTA) tablet 5 mg  5 mg Oral Daily Stuart Martin MD   5 mg at 06/07/21 1624   • magnesium oxide (MAG-OX) tablet 400 mg  400 mg Oral Daily Stuart Martin MD   400 mg at 06/08/21 0839   • nicotine (NICODERM CQ) 14 MG/24HR patch 1 patch  1 patch Transdermal Q24H Stuart Martin MD   1 patch at 06/07/21 2046   • pantoprazole (PROTONIX) injection 40 mg  40 mg Intravenous Q12H Stuart Martin MD   40 mg at 06/08/21 0831   • sodium chloride 0.9 % flush 10 mL  10 mL Intravenous PRN Stuart Martin MD         Assessment/Plan       Tobacco abuse    Hypertension    Type 2 diabetes mellitus with autonomic neuropathy (CMS/HCC)    Atrial flutter (CMS/HCC)    Acute on chronic combined systolic and diastolic congestive heart failure (CMS/HCC)    Acute pulmonary edema (CMS/HCC)    Acute kidney injury superimposed on chronic kidney disease (CMS/HCC)    Anemia    Acute respiratory failure with hypoxia (CMS/HCC)    Plan:  Acute on Chronic Congestive Heart Failure- overall breathing is improving. Nephrology following with IV Bumex. Strict I&O. Daily weights. Low Salt. Will need ischemic evaluation at some point in future once acute issues are improved. Difficult to say but also could be A-flutter with poorly controlled rates contributing- unclear  "how long has been in A-flutter. He does note that he misses his medications \"sometimes.\" Also ran out of Lasix. Would benefit from BB and ACE/ARB- in future when more euvolemic and renal function improved    Atrial Flutter- had maintained NSR on Multaq for years. Last cardioversion in 2017- appears to have maintained NSR since then. He does note he has missed doses of Multaq. Anticoagulation on hold since EGD last year. Now with ongoing anemia. Possible watchman candidate in future. Overall he seems asymptomatic to rates. I am concerned that A-flutter could have contributed to reduction in LVEF. On Cardizem 360 and Multaq. Hopeful rates will improve with improvement in acute issues.     Anemia- anticoagulation on hold. GI following. Known AVMs.     Acute on Chronic Kidney Disease - Nephrology following. Reports good urine output    Acute respiratory failure with hypoxia- improving. On nasal cannula. He reports \"significant\" improvement in breathing       Electronically signed by DARIEN Jones, 06/08/21, 2:50 PM CDT.     Electronically signed by Blas Aparicio MD at 06/08/21 1559     Cate Dyson APRN at 06/08/21 1124     Attestation signed by Deepak Fisher MD at 06/08/21 1632    The patient has been seen and the chart reviewed in detail with the APN.  I have reviewed this documentation and agree.    Thank you,    SHRUTI Fisher MD, [facility]                      [facility]  Inpatient Progress Note  Milton Oconnor Michael  1960 6/8/2021  Reason for Follow Up:  anemia    Subjective     Subjective:   Pt is currently in the CCU stable. No current signs for active bleeding. No nausea or vomiting. No pain. Hemoccult negative. He is awake alert and oriented. He does tell me that he has not been taking his oral iron bc he thought that stopping the Eliquis would help his anemia.     He was moved back to the unit yesterday due to some respiratory distress and placed on Bipap. He " improved and it is currently off.   Recent Endsocopy 11/2020 by Dr Gomez reviewed - Normal esophagus.  - Erythematous duodenopathy.  - Multiple recently bleeding angiodysplastic lesions in the stomach.  - No specimens collected.    Current Facility-Administered Medications:   •  allopurinol (ZYLOPRIM) tablet 100 mg, 100 mg, Oral, Daily, Stuart Martin MD, 100 mg at 06/08/21 0831  •  atorvastatin (LIPITOR) tablet 80 mg, 80 mg, Oral, Nightly, Stuart Martin MD, 80 mg at 06/07/21 2044  •  benzonatate (TESSALON) capsule 100 mg, 100 mg, Oral, TID PRN, Stuart Martin MD, 100 mg at 06/07/21 2230  •  budesonide (PULMICORT) nebulizer solution 0.5 mg, 0.5 mg, Nebulization, BID - RT, Stuart Martin MD, 0.5 mg at 06/08/21 0619  •  [START ON 6/9/2021] bumetanide (BUMEX) injection 1 mg, 1 mg, Intravenous, Daily, Rosendo Gatica, APRN  •  dextrose (D50W) 25 g/ 50mL Intravenous Solution 25 g, 25 g, Intravenous, Q15 Min PRN, Stuart Martin MD  •  dextrose (GLUTOSE) oral gel 15 g, 15 g, Oral, Q15 Min PRN, Stuart Martin MD, 15 g at 06/08/21 0708  •  dilTIAZem CD (CARDIZEM CD) 24 hr capsule 360 mg, 360 mg, Oral, Q24H, Stuart Martin MD, 360 mg at 06/08/21 0831  •  dronedarone (MULTAQ) tablet 400 mg, 400 mg, Oral, BID With Meals, Stuart Martin MD, 400 mg at 06/08/21 0831  •  glucagon (human recombinant) (GLUCAGEN DIAGNOSTIC) injection 1 mg, 1 mg, Subcutaneous, Q15 Min PRN, Stuart Martin MD  •  insulin detemir (LEVEMIR) injection 5 Units, 5 Units, Subcutaneous, Q12H, Stuart Martin MD, 5 Units at 06/07/21 2051  •  insulin lispro (humaLOG) injection 2-7 Units, 2-7 Units, Subcutaneous, TID AC, Stuart Martin MD, 2 Units at 06/07/21 1702  •  ipratropium (ATROVENT) nebulizer solution 0.5 mg, 0.5 mg, Nebulization, 4x Daily - RT, Stuart Martin MD, 0.5 mg at 06/08/21 1012  •  iron sucrose (VENOFER) 200 mg in sodium chloride 0.9 % 100 mL IVPB, 200 mg, Intravenous, Q24H, Stuart Martin MD, 200 mg at 06/07/21 1625  •   linagliptin (TRADJENTA) tablet 5 mg, 5 mg, Oral, Daily, Stuart Martin MD, 5 mg at 06/07/21 1624  •  magnesium oxide (MAG-OX) tablet 400 mg, 400 mg, Oral, Daily, Stuart Martin MD, 400 mg at 06/08/21 0839  •  nicotine (NICODERM CQ) 14 MG/24HR patch 1 patch, 1 patch, Transdermal, Q24H, Stuart Martin MD, 1 patch at 06/07/21 2046  •  pantoprazole (PROTONIX) injection 40 mg, 40 mg, Intravenous, Q12H, Stuart Martin MD, 40 mg at 06/08/21 0831  •  [COMPLETED] Insert peripheral IV, , , Once **AND** sodium chloride 0.9 % flush 10 mL, 10 mL, Intravenous, PRN, Stuart Martin MD    Review of Systems:    Review of Systems   Constitutional: Positive for fatigue. Negative for activity change, appetite change, chills, diaphoresis, fever and unexpected weight change.   HENT: Negative for ear pain, hearing loss, mouth sores, sore throat, trouble swallowing and voice change.    Eyes: Negative.    Respiratory: Positive for cough, shortness of breath and wheezing. Negative for choking.    Cardiovascular: Negative for chest pain and palpitations.   Gastrointestinal: Negative for abdominal pain, blood in stool, constipation, diarrhea, nausea and vomiting.   Endocrine: Negative for cold intolerance and heat intolerance.   Genitourinary: Negative for decreased urine volume, dysuria, frequency, hematuria and urgency.   Musculoskeletal: Negative for back pain, gait problem and myalgias.   Skin: Negative for color change, pallor and rash.   Allergic/Immunologic: Negative for food allergies and immunocompromised state.   Neurological: Positive for weakness. Negative for dizziness, tremors, seizures, syncope, light-headedness, numbness and headaches.   Hematological: Negative for adenopathy. Does not bruise/bleed easily.   Psychiatric/Behavioral: Negative for agitation and confusion. The patient is not nervous/anxious.    All other systems reviewed and are negative.       Objective     Vital Signs  Temp:  [96.8 °F (36 °C)-97.6 °F (36.4  °C)] 96.8 °F (36 °C)  Heart Rate:  [114-122] 120  Resp:  [13-22] 13  BP: ()/(63-86) 123/84  Body mass index is 24.9 kg/m².    Intake/Output Summary (Last 24 hours) at 6/8/2021 1124  Last data filed at 6/8/2021 0900  Gross per 24 hour   Intake --   Output 2200 ml   Net -2200 ml     I/O this shift:  In: -   Out: 550 [Urine:550]       Physical Exam:   General: patient awake, alert and cooperative, no acute distress   Eyes: Normal lids and lashes, no scleral icterus   Neck: supple, no JVD   Skin: warm and dry   Cardiovascular: regular rhythm and rate, no murmurs auscultated   Pulm: wheezes bilaterally, regular and unlabored, cough noted nonproductive   Abdomen: soft, nontender, nondistended; normal bowel sounds   Psychiatric: Normal mood and behavior; converses appropriately     Results Review:    I have reviewed all of the patients current test results    Results from last 7 days   Lab Units 06/08/21  0354 06/07/21  0320 06/06/21  0220   WBC 10*3/mm3 9.00 8.45 9.50   HEMOGLOBIN g/dL 7.8* 8.0* 8.1*   HEMATOCRIT % 26.1* 26.2* 26.2*   PLATELETS 10*3/mm3 310 341 338       Results from last 7 days   Lab Units 06/08/21  0354 06/07/21  0320 06/06/21  0220   SODIUM mmol/L 146* 141 143   POTASSIUM mmol/L 3.5 3.7 3.6   CHLORIDE mmol/L 109* 106 105   CO2 mmol/L 27.0 25.0 23.0   BUN mg/dL 27* 25* 27*   CREATININE mg/dL 2.14* 1.90* 1.64*   CALCIUM mg/dL 8.4* 8.1* 8.2*   BILIRUBIN mg/dL 0.2 0.3 0.5   ALK PHOS U/L 230* 260* 292*   ALT (SGPT) U/L 25 33 45*   AST (SGOT) U/L 12 15 28   GLUCOSE mg/dL 89 214* 74       Results from last 7 days   Lab Units 06/05/21  1606   INR  1.19*       No results found for: LIPASE    Radiology:    Imaging Results (Last 24 Hours)     ** No results found for the last 24 hours. **            Assessment/Plan     Patient Active Problem List   Diagnosis Code   • Tobacco abuse Z72.0   • Hypertension I10   • PAD (peripheral artery disease) (CMS/[facility]) I73.9   • Hyperlipidemia E78.5   • Type 2 diabetes  mellitus with autonomic neuropathy (CMS/HCC) E11.43   • Atrial flutter (CMS/HCC) I48.92   • Lower extremity edema R60.0   • Chronic anticoagulation Z79.01   • Hypokalemia E87.6   • Heme positive stool R19.5   • Congestive heart failure (CMS/HCC) I50.9   • Lymphedema I89.0   • Cerebrovascular accident (CVA) (CMS/HCC) I63.9   • CKD (chronic kidney disease) stage 3, GFR 30-59 ml/min (CMS/HCC) N18.30   • CVA (cerebral vascular accident) (CMS/HCC) I63.9   • Transient ischemic attack (TIA) G45.9   • Chronic diastolic CHF (congestive heart failure) (CMS/HCC) I50.32   • Alcohol abuse F10.10   • PFO (patent foramen ovale) Q21.1   • Bilateral carotid artery stenosis I65.23   • Stenosis of right internal carotid artery I65.21   • Preop testing Z01.818   • Acute pulmonary edema (CMS/HCC) J81.0       1. Anemia, iron def chronic  2. CHF  3. COPD/pulmonary HTN with resp distress yesterday requiring BiPap  4. Multiple gastric AVMs with recent Endoscopy noted.     Will defer Endoscopy at this time due to respiratory status. Pt likely has anemia from known AVMS. Follow H/H and transfuse as needed. Patient agrees to hold off on repeat Endoscopy.   Continue PPI  Follow H/H and transfuse if needed.         EMR Dragon/transcription disclaimer: Much of this encounter note is electronic transcription/translation of spoken language to printed text. The electronic translation of spoken language may be erroneous, or at times, nonsensical words or phrases may be inadvertently transcribed. Although I have reviewed the note for such errors, some may still exist.    Cate Dyson, DARIEN  06/08/21  11:24 CDT                Electronically signed by Deepak Fisher MD at 06/08/21 0629

## 2021-06-10 LAB
ALBUMIN SERPL-MCNC: 3.3 G/DL (ref 3.5–5.2)
ALBUMIN/GLOB SERPL: 1.1 G/DL
ALP SERPL-CCNC: 231 U/L (ref 39–117)
ALT SERPL W P-5'-P-CCNC: 22 U/L (ref 1–41)
ANION GAP SERPL CALCULATED.3IONS-SCNC: 8 MMOL/L (ref 5–15)
AST SERPL-CCNC: 17 U/L (ref 1–40)
BASOPHILS # BLD AUTO: 0.05 10*3/MM3 (ref 0–0.2)
BASOPHILS NFR BLD AUTO: 0.7 % (ref 0–1.5)
BILIRUB SERPL-MCNC: 0.3 MG/DL (ref 0–1.2)
BUN SERPL-MCNC: 29 MG/DL (ref 8–23)
BUN/CREAT SERPL: 15.9 (ref 7–25)
CALCIUM SPEC-SCNC: 8.8 MG/DL (ref 8.6–10.5)
CHLORIDE SERPL-SCNC: 103 MMOL/L (ref 98–107)
CO2 SERPL-SCNC: 27 MMOL/L (ref 22–29)
CREAT SERPL-MCNC: 1.82 MG/DL (ref 0.76–1.27)
DEPRECATED RDW RBC AUTO: 51.2 FL (ref 37–54)
EOSINOPHIL # BLD AUTO: 0.59 10*3/MM3 (ref 0–0.4)
EOSINOPHIL NFR BLD AUTO: 7.9 % (ref 0.3–6.2)
ERYTHROCYTE [DISTWIDTH] IN BLOOD BY AUTOMATED COUNT: 17.2 % (ref 12.3–15.4)
GFR SERPL CREATININE-BSD FRML MDRD: 46 ML/MIN/1.73
GLOBULIN UR ELPH-MCNC: 3 GM/DL
GLUCOSE BLDC GLUCOMTR-MCNC: 221 MG/DL (ref 70–130)
GLUCOSE BLDC GLUCOMTR-MCNC: 226 MG/DL (ref 70–130)
GLUCOSE BLDC GLUCOMTR-MCNC: 256 MG/DL (ref 70–130)
GLUCOSE BLDC GLUCOMTR-MCNC: 303 MG/DL (ref 70–130)
GLUCOSE SERPL-MCNC: 196 MG/DL (ref 65–99)
HCT VFR BLD AUTO: 28.3 % (ref 37.5–51)
HGB BLD-MCNC: 8.5 G/DL (ref 13–17.7)
IMM GRANULOCYTES # BLD AUTO: 0.04 10*3/MM3 (ref 0–0.05)
IMM GRANULOCYTES NFR BLD AUTO: 0.5 % (ref 0–0.5)
LYMPHOCYTES # BLD AUTO: 0.36 10*3/MM3 (ref 0.7–3.1)
LYMPHOCYTES NFR BLD AUTO: 4.8 % (ref 19.6–45.3)
MCH RBC QN AUTO: 25.9 PG (ref 26.6–33)
MCHC RBC AUTO-ENTMCNC: 30 G/DL (ref 31.5–35.7)
MCV RBC AUTO: 86.3 FL (ref 79–97)
MONOCYTES # BLD AUTO: 1.02 10*3/MM3 (ref 0.1–0.9)
MONOCYTES NFR BLD AUTO: 13.7 % (ref 5–12)
NEUTROPHILS NFR BLD AUTO: 5.4 10*3/MM3 (ref 1.7–7)
NEUTROPHILS NFR BLD AUTO: 72.4 % (ref 42.7–76)
NRBC BLD AUTO-RTO: 0.5 /100 WBC (ref 0–0.2)
PLATELET # BLD AUTO: 303 10*3/MM3 (ref 140–450)
PMV BLD AUTO: 10.5 FL (ref 6–12)
POTASSIUM SERPL-SCNC: 4.2 MMOL/L (ref 3.5–5.2)
PROT SERPL-MCNC: 6.3 G/DL (ref 6–8.5)
RBC # BLD AUTO: 3.28 10*6/MM3 (ref 4.14–5.8)
SODIUM SERPL-SCNC: 138 MMOL/L (ref 136–145)
WBC # BLD AUTO: 7.46 10*3/MM3 (ref 3.4–10.8)

## 2021-06-10 PROCEDURE — 85025 COMPLETE CBC W/AUTO DIFF WBC: CPT | Performed by: FAMILY MEDICINE

## 2021-06-10 PROCEDURE — 94660 CPAP INITIATION&MGMT: CPT

## 2021-06-10 PROCEDURE — 94799 UNLISTED PULMONARY SVC/PX: CPT

## 2021-06-10 PROCEDURE — 63710000001 INSULIN LISPRO (HUMAN) PER 5 UNITS: Performed by: FAMILY MEDICINE

## 2021-06-10 PROCEDURE — 82962 GLUCOSE BLOOD TEST: CPT

## 2021-06-10 PROCEDURE — 99232 SBSQ HOSP IP/OBS MODERATE 35: CPT | Performed by: NURSE PRACTITIONER

## 2021-06-10 PROCEDURE — 97116 GAIT TRAINING THERAPY: CPT

## 2021-06-10 PROCEDURE — 63710000001 INSULIN DETEMIR PER 5 UNITS: Performed by: FAMILY MEDICINE

## 2021-06-10 PROCEDURE — 80053 COMPREHEN METABOLIC PANEL: CPT | Performed by: FAMILY MEDICINE

## 2021-06-10 RX ADMIN — METOPROLOL SUCCINATE 100 MG: 100 TABLET, EXTENDED RELEASE ORAL at 09:08

## 2021-06-10 RX ADMIN — MAGNESIUM GLUCONATE 500 MG ORAL TABLET 400 MG: 500 TABLET ORAL at 09:07

## 2021-06-10 RX ADMIN — ALLOPURINOL 100 MG: 100 TABLET ORAL at 09:08

## 2021-06-10 RX ADMIN — ATORVASTATIN CALCIUM 80 MG: 40 TABLET, FILM COATED ORAL at 22:35

## 2021-06-10 RX ADMIN — IPRATROPIUM BROMIDE 0.5 MG: 0.5 SOLUTION RESPIRATORY (INHALATION) at 07:14

## 2021-06-10 RX ADMIN — BUMETANIDE 1 MG: 1 TABLET ORAL at 09:08

## 2021-06-10 RX ADMIN — INSULIN LISPRO 3 UNITS: 100 INJECTION, SOLUTION INTRAVENOUS; SUBCUTANEOUS at 09:08

## 2021-06-10 RX ADMIN — BUDESONIDE 0.5 MG: 0.5 INHALANT RESPIRATORY (INHALATION) at 19:44

## 2021-06-10 RX ADMIN — PANTOPRAZOLE SODIUM 40 MG: 40 TABLET, DELAYED RELEASE ORAL at 09:08

## 2021-06-10 RX ADMIN — DRONEDARONE 400 MG: 400 TABLET, FILM COATED ORAL at 18:31

## 2021-06-10 RX ADMIN — IPRATROPIUM BROMIDE 0.5 MG: 0.5 SOLUTION RESPIRATORY (INHALATION) at 19:44

## 2021-06-10 RX ADMIN — IPRATROPIUM BROMIDE 0.5 MG: 0.5 SOLUTION RESPIRATORY (INHALATION) at 15:27

## 2021-06-10 RX ADMIN — INSULIN LISPRO 3 UNITS: 100 INJECTION, SOLUTION INTRAVENOUS; SUBCUTANEOUS at 18:31

## 2021-06-10 RX ADMIN — BUDESONIDE 0.5 MG: 0.5 INHALANT RESPIRATORY (INHALATION) at 07:15

## 2021-06-10 RX ADMIN — INSULIN LISPRO 5 UNITS: 100 INJECTION, SOLUTION INTRAVENOUS; SUBCUTANEOUS at 12:44

## 2021-06-10 RX ADMIN — IPRATROPIUM BROMIDE 0.5 MG: 0.5 SOLUTION RESPIRATORY (INHALATION) at 11:01

## 2021-06-10 RX ADMIN — LINAGLIPTIN 5 MG: 5 TABLET, FILM COATED ORAL at 09:08

## 2021-06-10 RX ADMIN — PANTOPRAZOLE SODIUM 40 MG: 40 TABLET, DELAYED RELEASE ORAL at 18:31

## 2021-06-10 RX ADMIN — INSULIN DETEMIR 5 UNITS: 100 INJECTION, SOLUTION SUBCUTANEOUS at 22:35

## 2021-06-10 RX ADMIN — INSULIN DETEMIR 5 UNITS: 100 INJECTION, SOLUTION SUBCUTANEOUS at 09:08

## 2021-06-10 RX ADMIN — DRONEDARONE 400 MG: 400 TABLET, FILM COATED ORAL at 09:08

## 2021-06-10 NOTE — THERAPY TREATMENT NOTE
Acute Care - Physical Therapy Treatment Note  [facility]     Patient Name: Milton Rodriguez  : 1960  MRN: [id number]  Today's Date: 6/10/2021           PT Assessment (last 12 hours)      PT Evaluation and Treatment     [facility] Name 06/10/21 1135          Physical Therapy Time and Intention    Subjective Information  no complaints  (Pended)   -TB     Document Type  therapy note (daily note)  (Pended)   -TB     Mode of Treatment  physical therapy  (Pended)   -[facility] Name 06/10/21 113          General Information    Existing Precautions/Restrictions  fall  (Pended)   -[facility] Name 06/10/21 113          Pain Scale: Numbers Pre/Post-Treatment    Pretreatment Pain Rating  0/10 - no pain  (Pended)   -[facility] Name 06/10/21 113          Bed Mobility    Sit-Supine Ness (Bed Mobility)  supervision  (Pended)   -[facility] Name 06/10/21 113          Gait/Stairs (Locomotion)    Ness Level (Gait)  supervision  (Pended)   -     Distance in Feet (Gait)  400  (Pended)   -[facility] Name 06/10/21 113          Hip (Therapeutic Exercise)    Hip (Therapeutic Exercise)  AROM (active range of motion)  (Pended)   -TB     Hip AROM (Therapeutic Exercise)  bilateral;flexion;10 repetitions  (Pended)   -[facility] Name 06/10/21 1135          Knee (Therapeutic Exercise)    Knee (Therapeutic Exercise)  AROM (active range of motion)  (Pended)   -TB     Knee AROM (Therapeutic Exercise)  bilateral;LAQ (long arc quad);10 repetitions;2 sets  (Pended)   -[facility] Name 06/10/21 1135          Ankle (Therapeutic Exercise)    Ankle (Therapeutic Exercise)  AROM (active range of motion)  (Pended)   -TB     Ankle AROM (Therapeutic Exercise)  bilateral;dorsiflexion;plantarflexion;10 repetitions;2 sets  (Pended)   -[facility] Name 06/10/21 1135          Vital Signs    Pre SpO2 (%)  93  (Pended)   -TB     O2 Delivery Pre Treatment  supplemental O2  (Pended)  4L  -TB     Intra SpO2 (%)  95  (Pended)   -TB     O2  Delivery Intra Treatment  supplemental O2  (Pended)  4L  -TB     Post SpO2 (%)  91  (Pended)   -TB     O2 Delivery Post Treatment  supplemental O2  (Pended)  4L  -TB     Row Name 06/10/21 1135          Positioning and Restraints    Pre-Treatment Position  standing in room  (Pended)   -TB     Post Treatment Position  chair  (Pended)   -TB     In Chair  sitting;call light within reach;encouraged to call for assist  (Pended)   -TB       User Key  (r) = Recorded By, (t) = Taken By, (c) = Cosigned By    Initials Name Provider Type    TB Marisabel Kirby, PTA Student PTA Student        Physical Therapy Education                 Title: PT OT SLP Therapies (In Progress)     Topic: Physical Therapy (In Progress)     Point: Mobility training (Done)     Learning Progress Summary           Patient Acceptance, E, VU,DU by BRITTANY at 6/8/2021 1357    Comment: benefits of activity, gait safety.    Acceptance, E, VU,DU by BRITTANY at 6/8/2021 0828    Comment: Progression of PT POC, benefits of activity                   Point: Home exercise program (Not Started)     Learner Progress:  Not documented in this visit.          Point: Body mechanics (Not Started)     Learner Progress:  Not documented in this visit.          Point: Precautions (Not Started)     Learner Progress:  Not documented in this visit.                      User Key     Initials Effective Dates Name Provider Type Discipline    BRITTANY 08/02/16 -  Alberto Portillo, PT DPT Physical Therapist PT              PT Recommendation and Plan     Plan of Care Reviewed With: (P) patient  Progress: (P) improving  Outcome Summary: (P) Pt agreed to therapy. Pt was standing in room upon entering. Pt ambulated 400' with supervision. Pt performed exercises x20 reps. Pt would benefit from continued therapy while in hospital.       Time Calculation:   PT Charges     Row Name 06/10/21 1158             Time Calculation    Start Time  1135  (Pended)   -TB      Stop Time  1145  (Pended)   -TB      Time  Calculation (min)  10 min  (Pended)   -TB      PT Received On  06/10/21  (Pended)   -TB      PT Goal Re-Cert Due Date  06/18/21  (Pended)   -TB         Time Calculation- PT    Total Timed Code Minutes- PT  10 minute(s)  (Pended)   -TB         Timed Charges    63842 - Gait Training Minutes   10  (Pended)   -TB         Total Minutes    Timed Charges Total Minutes  10  (Pended)   -TB       Total Minutes  10  (Pended)   -TB        User Key  (r) = Recorded By, (t) = Taken By, (c) = Cosigned By    Initials Name Provider Type    TB Marisabel Kirby PTA Student PTA Student            PT G-Codes  Outcome Measure Options: AM-PAC 6 Clicks Daily Activity (OT)  AM-PAC 6 Clicks Score (PT): 20  AM-PAC 6 Clicks Score (OT): 24    aMrisabel Kirby PTA Student  6/10/2021

## 2021-06-10 NOTE — PROGRESS NOTES
[facility] -  Progress Note     LOS: 5 days   Patient Care Team:  Thanh Weeks MD as PCP - General  MickyThanh rivera MD as PCP - Family Medicine  Thanh Weeks MD as Referring Physician (Family Medicine)  Alex Coleman MD as Consulting Physician (Urology)  Adan Gomez DO as Consulting Physician (Gastroenterology)    Chief Complaint: Shortness of Breath     Subjective     Interval History:   Continues to improve. Oxygen down to 3L NC. Asking about possible discharge. Denies heart racing. Rates are variable have improved some.     Review of Systems:     Review of Systems   Constitutional: Positive for fatigue.   Respiratory: Positive for shortness of breath.      Objective     Vital Sign Min/Max for last 24 hours  Temp  Min: 97.8 °F (36.6 °C)  Max: 98.6 °F (37 °C)   BP  Min: 89/68  Max: 117/77   Pulse  Min: 114  Max: 119   Resp  Min: 14  Max: 22   SpO2  Min: 92 %  Max: 97 %   No data recorded   No data recorded         06/09/21  1131   Weight: 74.2 kg (163 lb 9.6 oz)       Telemetry: A-flutter 108      Physical Exam:    Constitutional:       Appearance: Well-developed. Chronically ill-appearing.      Interventions: Nasal cannula in place.   Eyes:      Pupils: Pupils are equal, round, and reactive to light.   HENT:      Head: Normocephalic and atraumatic.   Neck:      Vascular: No carotid bruit or JVD.   Pulmonary:      Effort: Pulmonary effort is normal.      Breath sounds: Decreased breath sounds present. No wheezing.   Cardiovascular:      Tachycardia present. Irregular rhythm.   Pulses:     Intact distal pulses.   Edema:     Edema: improving.     Ankle: bilateral 1+ edema of the ankle.  Abdominal:      General: Bowel sounds are normal.      Palpations: Abdomen is soft.   Musculoskeletal: Normal range of motion.      Cervical back: Normal range of motion and neck supple. Skin:     General: Skin is warm and dry.   Neurological:      Mental Status: Alert and oriented to  person, place, and time.      Deep Tendon Reflexes: Reflexes are normal and symmetric.   Psychiatric:         Behavior: Behavior normal.         Thought Content: Thought content normal.         Judgment: Judgment normal.       Results Review:   Results from last 7 days   Lab Units 06/10/21  0548 06/09/21  0327 06/08/21  0354   SODIUM mmol/L 138 144 146*   POTASSIUM mmol/L 4.2 3.8 3.5   CHLORIDE mmol/L 103 107 109*   CO2 mmol/L 27.0 28.0 27.0   BUN mg/dL 29* 26* 27*   CREATININE mg/dL 1.82* 1.96* 2.14*   GLUCOSE mg/dL 196* 76 89   CALCIUM mg/dL 8.8 8.9 8.4*     Lab Results (last 24 hours)     Procedure Component Value Units Date/Time    POC Glucose Once [[id number]]  (Abnormal) Collected: 06/10/21 1111    Specimen: Blood Updated: 06/10/21 1122     Glucose 303 mg/dL      Comment: : [id number] MiniTimeter ID: [id number]       POC Glucose Once [[id number]]  (Abnormal) Collected: 06/10/21 0756    Specimen: Blood Updated: 06/10/21 0808     Glucose 221 mg/dL      Comment: : [id number] MiniTimeter ID: [id number]       Comprehensive Metabolic Panel [[id number]]  (Abnormal) Collected: 06/10/21 0548    Specimen: Blood Updated: 06/10/21 0650     Glucose 196 mg/dL      BUN 29 mg/dL      Creatinine 1.82 mg/dL      Sodium 138 mmol/L      Potassium 4.2 mmol/L      Chloride 103 mmol/L      CO2 27.0 mmol/L      Calcium 8.8 mg/dL      Total Protein 6.3 g/dL      Albumin 3.30 g/dL      ALT (SGPT) 22 U/L      AST (SGOT) 17 U/L      Alkaline Phosphatase 231 U/L      Total Bilirubin 0.3 mg/dL      eGFR  African Amer 46 mL/min/1.73      Globulin 3.0 gm/dL      A/G Ratio 1.1 g/dL      BUN/Creatinine Ratio 15.9     Anion Gap 8.0 mmol/L     Narrative:      GFR Normal >60  Chronic Kidney Disease <60  Kidney Failure <15      CBC & Differential [[id number]]  (Abnormal) Collected: 06/10/21 0548    Specimen: Blood Updated: 06/10/21 0627    Narrative:      The following orders were created for panel order CBC &  Differential.  Procedure                               Abnormality         Status                     ---------                               -----------         ------                     CBC Auto Differential[[id number]]        Abnormal            Final result                 Please view results for these tests on the individual orders.    CBC Auto Differential [[id number]]  (Abnormal) Collected: 06/10/21 0548    Specimen: Blood Updated: 06/10/21 0627     WBC 7.46 10*3/mm3      RBC 3.28 10*6/mm3      Hemoglobin 8.5 g/dL      Hematocrit 28.3 %      MCV 86.3 fL      MCH 25.9 pg      MCHC 30.0 g/dL      RDW 17.2 %      RDW-SD 51.2 fl      MPV 10.5 fL      Platelets 303 10*3/mm3      Neutrophil % 72.4 %      Lymphocyte % 4.8 %      Monocyte % 13.7 %      Eosinophil % 7.9 %      Basophil % 0.7 %      Immature Grans % 0.5 %      Neutrophils, Absolute 5.40 10*3/mm3      Lymphocytes, Absolute 0.36 10*3/mm3      Monocytes, Absolute 1.02 10*3/mm3      Eosinophils, Absolute 0.59 10*3/mm3      Basophils, Absolute 0.05 10*3/mm3      Immature Grans, Absolute 0.04 10*3/mm3      nRBC 0.5 /100 WBC     POC Glucose Once [[id number]]  (Abnormal) Collected: 06/09/21 2111    Specimen: Blood Updated: 06/09/21 2121     Glucose 255 mg/dL      Comment: : [id number] David Mccallum ID: [id number]             Results for orders placed during the hospital encounter of 06/05/21    Adult Transthoracic Echo Complete W/ Cont if Necessary Per Protocol    Interpretation Summary  · Left ventricular wall thickness is consistent with mild to moderate concentric hypertrophy.  · Left ventricular ejection fraction appears to be 36 - 40%. Left ventricular systolic function is moderately decreased.  · Left ventricular diastolic dysfunction is noted.  · The right ventricular cavity is mildly dilated. Systolic function is normal.  · There is biatrial enlargement.  · Moderate mitral valve regurgitation is present.  · Moderate tricuspid valve regurgitation  is present.  · Estimated right ventricular systolic pressure from tricuspid regurgitation is mildly elevated (35-45 mmHg).    Medication Review: yes  Current Facility-Administered Medications   Medication Dose Route Frequency Provider Last Rate Last Admin   • allopurinol (ZYLOPRIM) tablet 100 mg  100 mg Oral Daily Stuart Martin MD   100 mg at 06/10/21 0908   • atorvastatin (LIPITOR) tablet 80 mg  80 mg Oral Nightly Stuart Martin MD   80 mg at 06/09/21 2111   • benzonatate (TESSALON) capsule 100 mg  100 mg Oral TID PRN Stuart Martin MD   100 mg at 06/09/21 0418   • budesonide (PULMICORT) nebulizer solution 0.5 mg  0.5 mg Nebulization BID - RT Stuart Martin MD   0.5 mg at 06/10/21 0715   • bumetanide (BUMEX) tablet 1 mg  1 mg Oral Daily Stuart Martin MD   1 mg at 06/10/21 0908   • dextrose (D50W) 25 g/ 50mL Intravenous Solution 25 g  25 g Intravenous Q15 Min PRN Stuart Martin MD       • dextrose (GLUTOSE) oral gel 15 g  15 g Oral Q15 Min PRN Stuart Martin MD   15 g at 06/08/21 0708   • dronedarone (MULTAQ) tablet 400 mg  400 mg Oral BID With Meals Stuart Martin MD   400 mg at 06/10/21 0908   • glucagon (human recombinant) (GLUCAGEN DIAGNOSTIC) injection 1 mg  1 mg Subcutaneous Q15 Min PRN Stuart Martin MD       • insulin detemir (LEVEMIR) injection 5 Units  5 Units Subcutaneous Q12H Stuart Martin MD   5 Units at 06/10/21 0908   • insulin lispro (humaLOG) injection 2-7 Units  2-7 Units Subcutaneous TID AC Stuart Martin MD   5 Units at 06/10/21 1244   • ipratropium (ATROVENT) nebulizer solution 0.5 mg  0.5 mg Nebulization 4x Daily - RT Stuart Martin MD   0.5 mg at 06/10/21 1101   • linagliptin (TRADJENTA) tablet 5 mg  5 mg Oral Daily Stuart Martin MD   5 mg at 06/10/21 0908   • magnesium oxide (MAG-OX) tablet 400 mg  400 mg Oral Daily Stuart Martin MD   400 mg at 06/10/21 0907   • metoprolol succinate XL (TOPROL-XL) 24 hr tablet 100 mg  100 mg Oral Q24H Titus Valladares APRN   100 mg at  "06/10/21 0908   • nicotine (NICODERM CQ) 14 MG/24HR patch 1 patch  1 patch Transdermal Q24H Stuart Martin MD   1 patch at 06/07/21 2046   • pantoprazole (PROTONIX) EC tablet 40 mg  40 mg Oral BID AC Stuart Martin MD   40 mg at 06/10/21 0908   • sodium chloride 0.9 % flush 10 mL  10 mL Intravenous PRN Stuart Martin MD         Assessment/Plan       Tobacco abuse    Hypertension    Type 2 diabetes mellitus with autonomic neuropathy (CMS/HCC)    Atrial flutter (CMS/HCC)    Acute on chronic combined systolic and diastolic congestive heart failure (CMS/HCC)    Acute pulmonary edema (CMS/HCC)    Acute kidney injury superimposed on chronic kidney disease (CMS/HCC)    Anemia    Acute respiratory failure with hypoxia (CMS/HCC)    Plan:  Acute on Chronic Congestive Heart Failure- overall breathing is improving. Nephrology following with transition to PO Bumex today. Strict I&O. Daily weights. Low Salt. Will need ischemic evaluation at some point in future once acute issues are improved- outpatient. Difficult to say but also could be A-flutter with poorly controlled rates contributing- unclear how long has been in A-flutter. He does note that he misses his medications \"sometimes.\" Also ran out of Lasix. Stop Cardizem 360 CD. Start Toprol XL. Also consider ACE/ARB if BP and renal function will allow.    Atrial Flutter- had maintained NSR on Multaq for years. Last cardioversion in 2017- appears to have maintained NSR since then. He does note he has missed doses of Multaq. Anticoagulation on hold since EGD last year. Now with ongoing anemia. Possible watchman candidate in future. Overall he seems asymptomatic to rates. I am concerned that A-flutter could have contributed to reduction in LVEF. On Cardizem 360 and Multaq. Hopeful rates will improve with improvement in acute issues. Transition Cardizem 360 to Toprol 100 in the am. Rates 100-115- asymptomatic     Anemia- anticoagulation on hold. GI following. Known AVMs. Labs " "stable. S/P 1unit PRBC    Acute on Chronic Kidney Disease - Nephrology following. Reports good urine output. Continues to improve.     Acute respiratory failure with hypoxia- improving. On nasal cannula. He reports \"significant\" improvement in breathing. Does note wear oxygen at home      Electronically signed by DARIEN Jones, 06/10/21, 2:50 AM CDT.   "
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
## 2021-06-10 NOTE — PROGRESS NOTES
[facility] Medicine Services  INPATIENT PROGRESS NOTE    Length of Stay: 5  Date of Admission: 6/5/2021  Primary Care Physician: Thanh Weeks MD    Subjective   Chief Complaint: CHF/fluid overload/renal failure/GI bleed    HPI   Patient breathing much better.  Patient is fully on 3 L oxygen.  Patient main tachypneic.  Cardiology plan to switch Cardizem to Toprol tomorrow.    Review of Systems   Constitutional: Positive for activity change, appetite change and fatigue. Negative for chills and fever.   HENT: Negative for hearing loss, nosebleeds, tinnitus and trouble swallowing.    Eyes: Negative for visual disturbance.   Respiratory: Positive for cough, shortness of breath and wheezing. Negative for chest tightness.    Cardiovascular: Negative for chest pain, palpitations and leg swelling.   Gastrointestinal: Nausea resolved.  Negative for abdominal distention, abdominal pain, blood in stool, constipation, diarrhea and vomiting.   Endocrine: Negative for cold intolerance, heat intolerance, polydipsia, polyphagia and polyuria.   Genitourinary: Negative for decreased urine volume, difficulty urinating, dysuria, flank pain, frequency and hematuria.   Musculoskeletal: Positive for arthralgias, gait problem and myalgias. Negative for joint swelling.   Skin: Negative for rash.   Allergic/Immunologic: Negative for immunocompromised state.   Neurological: Positive for weakness. Negative for dizziness, syncope, light-headedness and headaches.   Hematological: Negative for adenopathy. Does not bruise/bleed easily.   Psychiatric/Behavioral: Negative for confusion and sleep disturbance. The patient is not nervous/anxious.         All pertinent negatives and positives are as above. All other systems have been reviewed and are negative unless otherwise stated.     Objective    Temp:  [97.8 °F (36.6 °C)-98.6 °F (37 °C)] 98.1 °F (36.7 °C)  Heart Rate:  [114-119] 114  Resp:  [14-22] 16  BP:  ()/(60-77) 100/65    Intake/Output Summary (Last 24 hours) at 6/10/2021 1502  Last data filed at 6/10/2021 1459  Gross per 24 hour   Intake 960 ml   Output 1525 ml   Net -565 ml     Physical Exam  Vitals and nursing note reviewed.   Constitutional:       Appearance: He is well-developed.   HENT:      Head: Normocephalic.   Eyes:      Conjunctiva/sclera: Conjunctivae normal.      Pupils: Pupils are equal, round, and reactive to light.   Neck:      Vascular: No JVD.   Cardiovascular:      Rate and Rhythm: Regular rhythm. Tachycardia present.      Heart sounds: Normal heart sounds. No murmur heard.   No friction rub. No gallop.    Pulmonary:      Effort: No respiratory distress.      Breath sounds: Wheezing present. No rales.      Comments: Patient currently on 3 L of oxygen.  Chest:      Chest wall: No tenderness.   Abdominal:      General: Bowel sounds are normal. There is no distension.      Palpations: Abdomen is soft.      Tenderness: There is no abdominal tenderness. There is no guarding or rebound.   Musculoskeletal:         General: No tenderness or deformity. Normal range of motion.      Cervical back: Neck supple.   Skin:     General: Skin is warm and dry.      Capillary Refill: Capillary refill takes 2 to 3 seconds.      Findings: No rash.   Neurological:      Mental Status: He is alert and oriented to person, place, and time.      Cranial Nerves: No cranial nerve deficit.      Motor: Weakness present. No abnormal muscle tone.      Coordination: Coordination abnormal.      Gait: Gait abnormal.      Deep Tendon Reflexes: Reflexes normal.   Psychiatric:         Behavior: Behavior normal.         Thought Content: Thought content normal.   Results Review:  Results Review:  Lab Results (last 24 hours)     Procedure Component Value Units Date/Time    POC Glucose Once [[id number]]  (Abnormal) Collected: 06/10/21 1111    Specimen: Blood Updated: 06/10/21 1122     Glucose 303 mg/dL      Comment: : [id number]  Ten Corcoran ID: [id number]       POC Glucose Once [[id number]]  (Abnormal) Collected: 06/10/21 0756    Specimen: Blood Updated: 06/10/21 0808     Glucose 221 mg/dL      Comment: : [id number] Ten Corcoran ID: [id number]       Comprehensive Metabolic Panel [[id number]]  (Abnormal) Collected: 06/10/21 0548    Specimen: Blood Updated: 06/10/21 0650     Glucose 196 mg/dL      BUN 29 mg/dL      Creatinine 1.82 mg/dL      Sodium 138 mmol/L      Potassium 4.2 mmol/L      Chloride 103 mmol/L      CO2 27.0 mmol/L      Calcium 8.8 mg/dL      Total Protein 6.3 g/dL      Albumin 3.30 g/dL      ALT (SGPT) 22 U/L      AST (SGOT) 17 U/L      Alkaline Phosphatase 231 U/L      Total Bilirubin 0.3 mg/dL      eGFR  African Amer 46 mL/min/1.73      Globulin 3.0 gm/dL      A/G Ratio 1.1 g/dL      BUN/Creatinine Ratio 15.9     Anion Gap 8.0 mmol/L     Narrative:      GFR Normal >60  Chronic Kidney Disease <60  Kidney Failure <15      CBC & Differential [[id number]]  (Abnormal) Collected: 06/10/21 0548    Specimen: Blood Updated: 06/10/21 0627    Narrative:      The following orders were created for panel order CBC & Differential.  Procedure                               Abnormality         Status                     ---------                               -----------         ------                     CBC Auto Differential[[id number]]        Abnormal            Final result                 Please view results for these tests on the individual orders.    CBC Auto Differential [[id number]]  (Abnormal) Collected: 06/10/21 0548    Specimen: Blood Updated: 06/10/21 0627     WBC 7.46 10*3/mm3      RBC 3.28 10*6/mm3      Hemoglobin 8.5 g/dL      Hematocrit 28.3 %      MCV 86.3 fL      MCH 25.9 pg      MCHC 30.0 g/dL      RDW 17.2 %      RDW-SD 51.2 fl      MPV 10.5 fL      Platelets 303 10*3/mm3      Neutrophil % 72.4 %      Lymphocyte % 4.8 %      Monocyte % 13.7 %      Eosinophil % 7.9 %      Basophil % 0.7 %      Immature Grans %  0.5 %      Neutrophils, Absolute 5.40 10*3/mm3      Lymphocytes, Absolute 0.36 10*3/mm3      Monocytes, Absolute 1.02 10*3/mm3      Eosinophils, Absolute 0.59 10*3/mm3      Basophils, Absolute 0.05 10*3/mm3      Immature Grans, Absolute 0.04 10*3/mm3      nRBC 0.5 /100 WBC     POC Glucose Once [[id number]]  (Abnormal) Collected: 06/09/21 2111    Specimen: Blood Updated: 06/09/21 2121     Glucose 255 mg/dL      Comment: : [id number] David Mccallum ID: [id number]              Cultures:  No results found for: BLOODCX, URINECX, WOUNDCX, MRSACX, RESPCX, STOOLCX    Radiology Data:    Imaging Results (Last 24 Hours)     ** No results found for the last 24 hours. **          No Known Allergies    Scheduled meds:   allopurinol, 100 mg, Oral, Daily  atorvastatin, 80 mg, Oral, Nightly  budesonide, 0.5 mg, Nebulization, BID - RT  bumetanide, 1 mg, Oral, Daily  dronedarone, 400 mg, Oral, BID With Meals  insulin detemir, 5 Units, Subcutaneous, Q12H  insulin lispro, 2-7 Units, Subcutaneous, TID AC  ipratropium, 0.5 mg, Nebulization, 4x Daily - RT  linagliptin, 5 mg, Oral, Daily  magnesium oxide, 400 mg, Oral, Daily  metoprolol succinate XL, 100 mg, Oral, Q24H  nicotine, 1 patch, Transdermal, Q24H  pantoprazole, 40 mg, Oral, BID AC        PRN meds:  benzonatate  •  dextrose  •  dextrose  •  glucagon (human recombinant)  •  [COMPLETED] Insert peripheral IV **AND** sodium chloride    Assessment/Plan       Tobacco abuse    Hypertension    Type 2 diabetes mellitus with autonomic neuropathy (CMS/HCC)    Atrial flutter (CMS/HCC)    Acute on chronic combined systolic and diastolic congestive heart failure (CMS/HCC)    Acute pulmonary edema (CMS/HCC)    Acute kidney injury superimposed on chronic kidney disease (CMS/HCC)    Anemia    Acute respiratory failure with hypoxia (CMS/HCC)      Plan:  Acute pulmonary edema/atrial flutter/CHF/hypertension/hyperlipidemia.  Patient has been out of Lasix for last 2 weeks.  Strict in and out.   Daily weight.    Cardizem. Lipitor.  Multaq.   Bumex p.o.  Telemetry.   Echocardiogram-ejection fraction 36 to 40%, moderate concentric hypertrophy, diastolic dysfunction, right ventricle cavity mildly dilated, biatrial enlargement, moderate mitral valve regurgitation, moderate tricuspid valve regurgitation.  Patient is currently requiring 3 L of oxygen.     GI bleed/anemia.  History of known AVM.  Hemoglobin is improving.  Status post 1 units of blood transfusion.  Hemoccult-negative.  Consult GI.  Protonix IV.  Venofer x4 days 6/6/21.  Protonix IV.  No sign of bleeding since admission discussed with nursing staff.    Defer EGD per GI due to respiratory status.     Pulmonary nodules bilateral/lymphadenopathy.  Outpatient follow-up with his primary care physician.  Follow-up CT scan recommended in 12 months.  This has been discussed with the patient.  Patient understood.  Recommended short-term follow-up 3 to 6 months for engorged lymph node.  CTA of the chest-Cardiomegaly- bilateral pleural effusions and bilateral interstitial infiltrates likely due to pulmonary edema- likely due to congestive heart failure, no CT evidence of pulmonary embolus, dialated pulmonary arteries, atheromatous disease of the thoracic aorta and coronary arteries, small pulmonary nodules bilaterally, thickening of the gastric wall, mediastinal and bilateral hilar lymphadenopathy-  Seen with congestive heart failure with engorgement of lymph nodes-  Short-term follow-up recommended in 3-6 months.     Shortness of breath/COPD.  Duo nebs.     Acute kidney injury.  Chronic renal failure stage III.    Creatinine slightly improvement.  Nephrology consult.        Gout.  Allopurinol.     Diabetes.  Sliding scale.  Start back on Glucophage by nephrology.  Cont Levemir.  Hemoglobin A1c 8.0     Chronic smoker.  Nicotine patch.  Discussed with patient cutting back and stopping.     Nutrition.  Magnesium oxide.  Regular/consistent carb diet.       Deconditioning.  PT and OT consult     Discharge Planning:  3 to 5 days.    Electronically signed by Stuart Martin MD, 06/10/21, 3:02 PM CDT.

## 2021-06-10 NOTE — PLAN OF CARE
Goal Outcome Evaluation:  Plan of Care Reviewed With: (P) patient        Progress: (P) improving  Outcome Summary: (P) Pt agreed to therapy. Pt was standing in room upon entering. Pre-O2 was 93%Pt ambulated 400' with supervision O2 during ambulation was 95%, Pt would ambulate farther if not for having to wear a mask. Post-O2 was 91%. Pt performed exercises x20 reps. Pt would benefit from continued therapy while in hospital.

## 2021-06-10 NOTE — PLAN OF CARE
Problem: Adult Inpatient Plan of Care  Goal: Plan of Care Review  Outcome: Ongoing, Progressing  Flowsheets (Taken 6/10/2021 0334)  Plan of Care Reviewed With: patient  Outcome Summary: Pt c/o SOA, increased O2 to 6L humidified. P reported still feeling SOA, requested Bipap, RT set up pt on Bipap. No c/o pain. Up ad mary jo. Safety maintained.

## 2021-06-10 NOTE — PLAN OF CARE
Goal Outcome Evaluation:           Progress: improving  Outcome Summary: Patient up in chair with therapy today, oxygen weaned to 2 LPM nasal cannula.  Home tomorrow with home health hopefully.  Patient concerned about upcoming trip to [place] to visit his sister next week.  I informed him that a trip this soon after discharge might not be the best given his current weakness and new oxygen requirements.  I instructed him to follow up with his PCP before going.  VSS, will continue to monitor.Ximena Bryson RN

## 2021-06-11 ENCOUNTER — HOME HEALTH ADMISSION (OUTPATIENT)
Dept: HOME HEALTH SERVICES | Facility: HOME HEALTHCARE | Age: 61
End: 2021-06-11

## 2021-06-11 VITALS
BODY MASS INDEX: 25.68 KG/M2 | DIASTOLIC BLOOD PRESSURE: 73 MMHG | SYSTOLIC BLOOD PRESSURE: 110 MMHG | HEART RATE: 118 BPM | HEIGHT: 67 IN | RESPIRATION RATE: 16 BRPM | WEIGHT: 163.6 LBS | TEMPERATURE: 97.7 F | OXYGEN SATURATION: 93 %

## 2021-06-11 LAB
ALBUMIN SERPL-MCNC: 3 G/DL (ref 3.5–5.2)
ALBUMIN/GLOB SERPL: 0.9 G/DL
ALP SERPL-CCNC: 246 U/L (ref 39–117)
ALT SERPL W P-5'-P-CCNC: 41 U/L (ref 1–41)
ANION GAP SERPL CALCULATED.3IONS-SCNC: 9 MMOL/L (ref 5–15)
AST SERPL-CCNC: 53 U/L (ref 1–40)
BASOPHILS # BLD AUTO: 0.04 10*3/MM3 (ref 0–0.2)
BASOPHILS NFR BLD AUTO: 0.6 % (ref 0–1.5)
BILIRUB SERPL-MCNC: 0.3 MG/DL (ref 0–1.2)
BUN SERPL-MCNC: 38 MG/DL (ref 8–23)
BUN/CREAT SERPL: 22.4 (ref 7–25)
CALCIUM SPEC-SCNC: 8.6 MG/DL (ref 8.6–10.5)
CHLORIDE SERPL-SCNC: 103 MMOL/L (ref 98–107)
CO2 SERPL-SCNC: 26 MMOL/L (ref 22–29)
CREAT SERPL-MCNC: 1.7 MG/DL (ref 0.76–1.27)
DEPRECATED RDW RBC AUTO: 52.4 FL (ref 37–54)
EOSINOPHIL # BLD AUTO: 0.52 10*3/MM3 (ref 0–0.4)
EOSINOPHIL NFR BLD AUTO: 7.4 % (ref 0.3–6.2)
ERYTHROCYTE [DISTWIDTH] IN BLOOD BY AUTOMATED COUNT: 17.9 % (ref 12.3–15.4)
GFR SERPL CREATININE-BSD FRML MDRD: 50 ML/MIN/1.73
GLOBULIN UR ELPH-MCNC: 3.2 GM/DL
GLUCOSE BLDC GLUCOMTR-MCNC: 244 MG/DL (ref 70–130)
GLUCOSE BLDC GLUCOMTR-MCNC: 54 MG/DL (ref 70–130)
GLUCOSE BLDC GLUCOMTR-MCNC: 64 MG/DL (ref 70–130)
GLUCOSE SERPL-MCNC: 48 MG/DL (ref 65–99)
HCT VFR BLD AUTO: 27 % (ref 37.5–51)
HGB BLD-MCNC: 8.2 G/DL (ref 13–17.7)
IMM GRANULOCYTES # BLD AUTO: 0.03 10*3/MM3 (ref 0–0.05)
IMM GRANULOCYTES NFR BLD AUTO: 0.4 % (ref 0–0.5)
LYMPHOCYTES # BLD AUTO: 0.42 10*3/MM3 (ref 0.7–3.1)
LYMPHOCYTES NFR BLD AUTO: 6 % (ref 19.6–45.3)
MCH RBC QN AUTO: 26.2 PG (ref 26.6–33)
MCHC RBC AUTO-ENTMCNC: 30.4 G/DL (ref 31.5–35.7)
MCV RBC AUTO: 86.3 FL (ref 79–97)
MONOCYTES # BLD AUTO: 1.13 10*3/MM3 (ref 0.1–0.9)
MONOCYTES NFR BLD AUTO: 16.2 % (ref 5–12)
NEUTROPHILS NFR BLD AUTO: 4.85 10*3/MM3 (ref 1.7–7)
NEUTROPHILS NFR BLD AUTO: 69.4 % (ref 42.7–76)
NRBC BLD AUTO-RTO: 0.3 /100 WBC (ref 0–0.2)
PLATELET # BLD AUTO: 266 10*3/MM3 (ref 140–450)
PMV BLD AUTO: 10.2 FL (ref 6–12)
POTASSIUM SERPL-SCNC: 4 MMOL/L (ref 3.5–5.2)
PROT SERPL-MCNC: 6.2 G/DL (ref 6–8.5)
RBC # BLD AUTO: 3.13 10*6/MM3 (ref 4.14–5.8)
SODIUM SERPL-SCNC: 138 MMOL/L (ref 136–145)
WBC # BLD AUTO: 6.99 10*3/MM3 (ref 3.4–10.8)

## 2021-06-11 PROCEDURE — 80053 COMPREHEN METABOLIC PANEL: CPT | Performed by: FAMILY MEDICINE

## 2021-06-11 PROCEDURE — 82962 GLUCOSE BLOOD TEST: CPT

## 2021-06-11 PROCEDURE — 63710000001 INSULIN DETEMIR PER 5 UNITS: Performed by: FAMILY MEDICINE

## 2021-06-11 PROCEDURE — 97116 GAIT TRAINING THERAPY: CPT

## 2021-06-11 PROCEDURE — 85025 COMPLETE CBC W/AUTO DIFF WBC: CPT | Performed by: FAMILY MEDICINE

## 2021-06-11 PROCEDURE — 63710000001 INSULIN LISPRO (HUMAN) PER 5 UNITS: Performed by: FAMILY MEDICINE

## 2021-06-11 PROCEDURE — 94799 UNLISTED PULMONARY SVC/PX: CPT

## 2021-06-11 RX ORDER — BUMETANIDE 1 MG/1
1 TABLET ORAL DAILY
Qty: 90 TABLET | Refills: 0 | Status: SHIPPED | OUTPATIENT
Start: 2021-06-12 | End: 2021-09-02

## 2021-06-11 RX ORDER — FERROUS SULFATE 325(65) MG
1 TABLET ORAL
Qty: 90 TABLET | Refills: 1 | Status: ON HOLD | OUTPATIENT
Start: 2021-06-11 | End: 2023-01-11

## 2021-06-11 RX ORDER — BENZONATATE 100 MG/1
200 CAPSULE ORAL 3 TIMES DAILY PRN
Qty: 60 CAPSULE | Refills: 0 | Status: SHIPPED | OUTPATIENT
Start: 2021-06-11 | End: 2021-06-14

## 2021-06-11 RX ORDER — METFORMIN HYDROCHLORIDE 750 MG/1
750 TABLET, EXTENDED RELEASE ORAL
Qty: 90 TABLET | Refills: 0 | Status: ON HOLD | OUTPATIENT
Start: 2021-06-11 | End: 2021-08-25

## 2021-06-11 RX ORDER — BUDESONIDE AND FORMOTEROL FUMARATE DIHYDRATE 160; 4.5 UG/1; UG/1
2 AEROSOL RESPIRATORY (INHALATION)
Qty: 1 EACH | Refills: 12 | Status: SHIPPED | OUTPATIENT
Start: 2021-06-11

## 2021-06-11 RX ORDER — METOPROLOL SUCCINATE 100 MG/1
100 TABLET, EXTENDED RELEASE ORAL
Qty: 90 TABLET | Refills: 1 | Status: SHIPPED | OUTPATIENT
Start: 2021-06-12

## 2021-06-11 RX ADMIN — ALLOPURINOL 100 MG: 100 TABLET ORAL at 08:48

## 2021-06-11 RX ADMIN — INSULIN DETEMIR 5 UNITS: 100 INJECTION, SOLUTION SUBCUTANEOUS at 08:47

## 2021-06-11 RX ADMIN — MAGNESIUM GLUCONATE 500 MG ORAL TABLET 400 MG: 500 TABLET ORAL at 08:49

## 2021-06-11 RX ADMIN — INSULIN LISPRO 3 UNITS: 100 INJECTION, SOLUTION INTRAVENOUS; SUBCUTANEOUS at 12:33

## 2021-06-11 RX ADMIN — BUDESONIDE 0.5 MG: 0.5 INHALANT RESPIRATORY (INHALATION) at 06:52

## 2021-06-11 RX ADMIN — BUMETANIDE 1 MG: 1 TABLET ORAL at 08:48

## 2021-06-11 RX ADMIN — PANTOPRAZOLE SODIUM 40 MG: 40 TABLET, DELAYED RELEASE ORAL at 08:49

## 2021-06-11 RX ADMIN — METOPROLOL SUCCINATE 100 MG: 100 TABLET, EXTENDED RELEASE ORAL at 08:39

## 2021-06-11 RX ADMIN — DRONEDARONE 400 MG: 400 TABLET, FILM COATED ORAL at 08:48

## 2021-06-11 RX ADMIN — IPRATROPIUM BROMIDE 0.5 MG: 0.5 SOLUTION RESPIRATORY (INHALATION) at 15:17

## 2021-06-11 RX ADMIN — IPRATROPIUM BROMIDE 0.5 MG: 0.5 SOLUTION RESPIRATORY (INHALATION) at 06:52

## 2021-06-11 RX ADMIN — LINAGLIPTIN 5 MG: 5 TABLET, FILM COATED ORAL at 08:39

## 2021-06-11 RX ADMIN — IPRATROPIUM BROMIDE 0.5 MG: 0.5 SOLUTION RESPIRATORY (INHALATION) at 10:50

## 2021-06-11 NOTE — PLAN OF CARE
Goal Outcome Evaluation:  Plan of Care Reviewed With: patient        Progress: improving  Outcome Summary: Pt. doing will with therapy. Pt. is Independent with transfers and gait. He walked 400'  in hallway without difficulty. He removed his O2 to walk to and from BR and his O2 sat dropped to 84%. He wore 3L of O2 during his walk and he came back at 94%. Pt. is sitting at 2.5L of O2 at rest in room. Pt states he is anxious to return home today.

## 2021-06-11 NOTE — DISCHARGE SUMMARY
[facility] Medicine Services  DISCHARGE SUMMARY       Date of Admission: 6/5/2021  Date of Discharge:  6/11/2021  Primary Care Physician: Thanh Weeks MD    Presenting Problem/History of Present Illness:  Acute pulmonary edema (CMS/HCC) [J81.0]     Final Discharge Diagnoses:  Active Hospital Problems    Diagnosis    • Acute kidney injury superimposed on chronic kidney disease (CMS/HCC)    • Anemia    • Acute respiratory failure with hypoxia (CMS/HCC)    • Acute pulmonary edema (CMS/HCC)    • Acute on chronic combined systolic and diastolic congestive heart failure (CMS/HCC)    • Atrial flutter (CMS/HCC)    • Tobacco abuse    • Hypertension    • Type 2 diabetes mellitus with autonomic neuropathy (CMS/HCC)        Consults: Cardiology.  GI.  Nephrology.    Pertinent Test Results:   Results for orders placed during the hospital encounter of 06/05/21    Adult Transthoracic Echo Complete W/ Cont if Necessary Per Protocol    Interpretation Summary  · Left ventricular wall thickness is consistent with mild to moderate concentric hypertrophy.  · Left ventricular ejection fraction appears to be 36 - 40%. Left ventricular systolic function is moderately decreased.  · Left ventricular diastolic dysfunction is noted.  · The right ventricular cavity is mildly dilated. Systolic function is normal.  · There is biatrial enlargement.  · Moderate mitral valve regurgitation is present.  · Moderate tricuspid valve regurgitation is present.  · Estimated right ventricular systolic pressure from tricuspid regurgitation is mildly elevated (35-45 mmHg).    IMPRESSION: Ultrasound the kidneys.  Normal ultrasound of the kidneys. Mild distention of the  Bladder.    IMPRESSION: Chest x-ray  Congestive heart failure with mild worsening of pulmonary  Edema.    IMPRESSION: CTA of the chest  1. Cardiomegaly, bilateral pleural effusions and bilateral interstitial  infiltrates likely due to pulmonary edema. The  "findings are most likely  due to congestive heart failure.  2. No CT evidence of pulmonary embolus. Dilated pulmonary arteries.  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  3. Small pulmonary nodules bilaterally. Follow-up chest CT recommended  in 12 months by Fleischner Society guidelines. This exam was marked as  \"new lung findings\" in PACS for follow-up.  4. Thickening of the gastric wall. This is not very well evaluated on  this study. Only a portion of the stomach is visualized.  5. Mediastinal and bilateral hilar lymphadenopathy. This can be seen  with congestive heart failure with engorgement of lymph nodes. Other  etiologies are not excluded. Short-term follow-up recommended in 3-6  Months.    Chief Complaint on Day of Discharge: none    History of Present Illness on Day of Discharge:   Mr. Rodriguez is a 60 year old gentleman with a history of HTN, HLD and T2DM.  These chronic conditions are stable.  He also has a history of atrial flutter and CHF.  He follows with Dr. Thanh Weeks for primary care.  He follows with Dr. Patel for Cardiology support.       He presents with SOA.  He has been out of his lasix for nearly 2 weeks now.  He is swollen in his legs and feet.       Of note, his hemoglobin has dropped from 13 to 7.5 since it was last checked her in February.       Hospital Course:  The patient is a 60 y.o. male who presented to [facility] with pulmonary edema/atrial flutter/CHF.      Acute pulmonary edema/atrial flutter/CHF/hypertension/hyperlipidemia.  Patient has been out of Lasix for last 2 weeks.  Strict in and out.  Daily weight.    Cardizem. Lipitor.  Multaq.   Bumex p.o.  Telemetry.   Echocardiogram-ejection fraction 36 to 40%, moderate concentric hypertrophy, diastolic dysfunction, right ventricle cavity mildly dilated, biatrial enlargement, moderate mitral valve regurgitation, moderate tricuspid valve regurgitation.  Patient is currently requiring 3 L of " oxygen.     GI bleed/anemia.  History of known AVM.  Hemoglobin is improving.  Status post 1 units of blood transfusion.  Hemoccult-negative.  Consult GI.  Protonix IV.  Venofer x4 days 6/6/21.  Protonix IV.  No sign of bleeding since admission discussed with nursing staff.    Defer EGD per GI due to respiratory status.     Pulmonary nodules bilateral/lymphadenopathy.  Outpatient follow-up with his primary care physician.  Follow-up CT scan recommended in 12 months.  This has been discussed with the patient.  Patient understood.  Recommended short-term follow-up 3 to 6 months for engorged lymph node.  CTA of the chest-Cardiomegaly- bilateral pleural effusions and bilateral interstitial infiltrates likely due to pulmonary edema- likely due to congestive heart failure, no CT evidence of pulmonary embolus, dialated pulmonary arteries, atheromatous disease of the thoracic aorta and coronary arteries, small pulmonary nodules bilaterally, thickening of the gastric wall, mediastinal and bilateral hilar lymphadenopathy-  Seen with congestive heart failure with engorgement of lymph nodes-  Short-term follow-up recommended in 3-6 months.     Shortness of breath/COPD.  Duo nebs.     Acute kidney injury.  Chronic renal failure stage III.    Creatinine slightly improvement.  Nephrology consult.        Gout.  Allopurinol.     Diabetes.  Sliding scale.  Start back on Glucophage by nephrology.  Cont Levemir.  Hemoglobin A1c 8.0     Chronic smoker.  Nicotine patch.  Discussed with patient cutting back and stopping.     Nutrition.  Magnesium oxide.  Regular/consistent carb diet.      Deconditioning.  PT and OT consult     Vital signs stable, afebrile.  Plan to discharge patient home today.  Patient will discharge home with 1.5 L of oxygen.  Patient be discharged with home health and a nebulizer machine.  Patient follow with cardiology, Dr. Patel outpatient.  Patient to follow-up with GI, Dr. Gomez outpatient.  Patient follow-up PCP 1  "week.     Condition on Discharge: Stable.    Physical Exam on Discharge:  /73 (BP Location: Left arm, Patient Position: Sitting)   Pulse 116   Temp 97.7 °F (36.5 °C) (Oral)   Resp 16   Ht 170.2 cm (67.01\")   Wt 74.2 kg (163 lb 9.6 oz)   SpO2 94%   BMI 25.62 kg/m²   Physical Exam  Vitals and nursing note reviewed.   Constitutional:       Appearance: He is well-developed.   HENT:      Head: Normocephalic.   Eyes:      Conjunctiva/sclera: Conjunctivae normal.      Pupils: Pupils are equal, round, and reactive to light.   Neck:      Vascular: No JVD.   Cardiovascular:      Rate and Rhythm: Regular rhythm. Tachycardia present.      Heart sounds: Normal heart sounds. No murmur heard.   No friction rub. No gallop.    Pulmonary:      Effort: No respiratory distress.      Breath sounds: Wheezing is much improved. No rales.      Comments: Patient currently on 1.5 L of oxygen.  Chest:      Chest wall: No tenderness.   Abdominal:      General: Bowel sounds are normal. There is no distension.      Palpations: Abdomen is soft.      Tenderness: There is no abdominal tenderness. There is no guarding or rebound.   Musculoskeletal:         General: No tenderness or deformity. Normal range of motion.      Cervical back: Neck supple.   Skin:     General: Skin is warm and dry.      Capillary Refill: Capillary refill takes 2 to 3 seconds.      Findings: No rash.   Neurological:      Mental Status: He is alert and oriented to person, place, and time.      Cranial Nerves: No cranial nerve deficit.      Motor: Weakness present. No abnormal muscle tone.      Coordination: Coordination abnormal.      Gait: Gait abnormal.      Deep Tendon Reflexes: Reflexes normal.   Psychiatric:         Behavior: Behavior normal.         Thought Content: Thought content normal.     Discharge Disposition:  Home or Self Care    Discharge Medications:     Discharge Medications      New Medications      Instructions Start Date   benzonatate 100 MG " capsule  Commonly known as: TESSALON   200 mg, Oral, 3 Times Daily PRN      budesonide-formoterol 160-4.5 MCG/ACT inhaler  Commonly known as: Symbicort   2 puffs, Inhalation, 2 Times Daily - RT      bumetanide 1 MG tablet  Commonly known as: BUMEX   1 mg, Oral, Daily   Start Date: June 12, 2021     metoprolol succinate  MG 24 hr tablet  Commonly known as: TOPROL-XL   100 mg, Oral, Every 24 Hours Scheduled   Start Date: June 12, 2021     SITagliptin 100 MG tablet  Commonly known as: Januvia   100 mg, Oral, Daily         Changes to Medications      Instructions Start Date   ferrous sulfate 325 (65 FE) MG tablet  What changed: when to take this   325 mg, Oral, Daily With Breakfast      metFORMIN  MG 24 hr tablet  Commonly known as: GLUCOPHAGE-XR  What changed: when to take this   750 mg, Oral, Daily With Breakfast      nicotine 21 MG/24HR patch  Commonly known as: NICODERM CQ  What changed: Another medication with the same name was removed. Continue taking this medication, and follow the directions you see here.   No dose, route, or frequency recorded.         Continue These Medications      Instructions Start Date   allopurinol 100 MG tablet  Commonly known as: ZYLOPRIM   100 mg, Oral, Daily      atorvastatin 80 MG tablet  Commonly known as: Lipitor   80 mg, Oral, Nightly      dronedarone 400 MG tablet  Commonly known as: Multaq   400 mg, Oral, 2 Times Daily With Meals      magnesium oxide 400 (241.3 Mg) MG tablet tablet  Commonly known as: MAGOX   400 mg, Oral, Daily      magnesium oxide 400 MG tablet  Commonly known as: MAG-OX   400 mg, Oral      pantoprazole 40 MG EC tablet  Commonly known as: PROTONIX   40 mg, Oral, Daily      vitamin D 1.25 MG (76776 UT) capsule capsule  Commonly known as: ERGOCALCIFEROL   50,000 Units, Oral, Every 7 Days,           Stop These Medications    dilTIAZem 360 MG 24 hr capsule  Commonly known as: TIAZAC     dilTIAZem  MG 24 hr capsule  Commonly known as: CARDIZEM  CD     furosemide 40 MG tablet  Commonly known as: LASIX     Jardiance 25 MG tablet  Generic drug: Empagliflozin            Discharge Diet:   Diet Instructions     Advance Diet As Tolerated            Activity at Discharge:   Activity Instructions     Activity as Tolerated            Discharge Care Plan/Instructions: Discharge home with family.    Follow-up Appointments:   Future Appointments   Date Time Provider Department Center   8/30/2021 10:15 AM Kiki Desir APRN [facility] CD PAD PAD   11/26/2021 10:00 AM PAD US [facility] PAD US PAD   11/26/2021 11:00 AM PAD US [facility] PAD US PAD   11/26/2021  1:30 PM Kina Leon APRN [facility] PAD PAD     Follow with PCP 1 week.  Follow-up with cardiology, Dr. Patel outpatient.  Follow with GI, Dr. Gomez outpatient.    Electronically signed by Stuart Martin MD, 06/11/21, 14:00 CDT.    Time: Greater than 30 minutes.

## 2021-06-11 NOTE — DISCHARGE PLACEMENT REQUEST
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 tablet 400 mg  400 mg Oral Daily Stuart Martin MD   400 mg at 06/07/21 0807   • nicotine (NICODERM CQ) 14 MG/24HR patch 1 patch  1 patch Transdermal Q24H Stuart Martin MD       • pantoprazole (PROTONIX)  injection 40 mg  40 mg Intravenous Q12H Stuart Martin MD       • sodium chloride 0.9 % flush 10 mL  10 mL Intravenous PRN Stuart Martin MD           Allergies:  Patient has no known allergies.    Review of Systems  Review of Systems   Constitutional: Positive for malaise/fatigue.   Cardiovascular: Positive for dyspnea on exertion and leg swelling. Negative for chest pain, orthopnea, palpitations and syncope.   Respiratory: Positive for shortness of breath. Negative for cough.        Objective     Physical Exam:  Patient Vitals for the past 24 hrs:   BP Temp Temp src Pulse Resp SpO2 Weight   06/07/21 1300 92/73 -- -- 119 -- 90 % --   06/07/21 1206 -- -- -- 118 20 -- --   06/07/21 1201 -- -- -- 118 22 93 % --   06/07/21 1200 92/70 -- -- 119 -- 93 % --   06/07/21 1100 90/74 -- -- 119 -- 91 % --   06/07/21 1024 -- -- -- 118 18 90 % --   06/07/21 1000 121/75 -- -- 114 16 94 % --   06/07/21 0900 112/70 -- -- 117 -- 95 % --   06/07/21 0800 124/73 98.7 °F (37.1 °C) Oral 118 16 93 % --   06/07/21 0700 138/74 -- -- 118 18 93 % --   06/07/21 0630 118/79 -- -- 118 -- 94 % --   06/07/21 0628 -- -- -- 118 16 -- --   06/07/21 0622 -- -- -- 117 17 93 % --   06/07/21 0600 139/81 -- -- 117 -- 94 % 73.9 kg (163 lb 0.4 oz)   06/07/21 0530 134/77 -- -- 115 -- 93 % --   06/07/21 0500 117/74 -- -- 118 -- 93 % --   06/07/21 0430 126/82 -- -- 118 -- 90 % --   06/07/21 0400 129/74 98.1 °F (36.7 °C) Axillary 118 18 93 % --   06/07/21 0330 112/86 -- -- 117 -- 91 % --   06/07/21 0300 129/68 -- -- 117 20 91 % --   06/07/21 0230 115/80 -- -- 118 -- 91 % --   06/07/21 0200 118/78 -- -- 114 20 90 % --   06/07/21 0130 108/79 -- -- 117 -- 93 % --   06/07/21 0117 -- -- -- -- -- 93 % --   06/07/21 0100 106/71 -- -- 119 20 (!) 89 % --   06/07/21 0030 125/75 -- -- 117 -- 97 % --   06/07/21 0025 -- -- -- 119 21 96 % --   06/07/21 0000 109/73 97.8 °F (36.6 °C) Axillary 119 20 (!) 89 % --   06/06/21 2330 111/80 -- -- 118 -- 92 % --   06/06/21 2300  102/70 -- -- 118 18 90 % --   06/06/21 2100 109/73 -- -- 120 -- 92 % --   06/06/21 2030 102/67 -- -- 119 -- 91 % --   06/06/21 2000 102/69 98.4 °F (36.9 °C) Oral 119 18 93 % --   06/06/21 1930 106/68 -- -- 120 -- 93 % --   06/06/21 1916 -- -- -- 120 18 93 % --   06/06/21 1800 99/66 -- -- 119 -- 97 % --   06/06/21 1700 111/65 -- -- 119 -- 94 % --   06/06/21 1600 111/75 98.2 °F (36.8 °C) Oral 119 -- 95 % --   06/06/21 1500 -- -- -- 119 -- 94 % --       Intake/Output Summary (Last 24 hours) at 6/7/2021 1517  Last data filed at 6/7/2021 0800  Gross per 24 hour   Intake 1466.29 ml   Output 1225 ml   Net 241.29 ml       Telemetry:  with PACs 108-118    Vitals reviewed.   Constitutional:       General: Not in acute distress.     Appearance: Normal appearance. Well-developed.   Eyes:      Pupils: Pupils are equal, round, and reactive to light.   HENT:      Head: Normocephalic and atraumatic.      Nose: Nose normal.   Neck:      Vascular: No carotid bruit.   Pulmonary:      Effort: Tachypnea present. No respiratory distress.      Breath sounds: No wheezing. Rales (bilaterally ) present.      Comments: bipap  Cardiovascular:      Tachycardia present. Regular rhythm.   Edema:     Peripheral edema present.     Pretibial: bilateral 2+ edema of the pretibial area.     Ankle: bilateral 2+ edema of the ankle.  Abdominal:      General: There is no distension.      Palpations: Abdomen is soft.   Musculoskeletal: Normal range of motion.      Cervical back: Normal range of motion and neck supple. Skin:     General: Skin is warm.      Findings: No erythema or rash.   Neurological:      Mental Status: Alert and oriented to person, place, and time.   Psychiatric:         Speech: Speech normal.         Behavior: Behavior normal.         Thought Content: Thought content normal.         Judgment: Judgment normal.         Results Review:   I reviewed the patient's new clinical results.    Lab Results (last 72 hours)     Procedure Component  Value Units Date/Time    POC Glucose Once [[id number]]  (Abnormal) Collected: 06/07/21 1153    Specimen: Blood Updated: 06/07/21 1206     Glucose 246 mg/dL      Comment: : [id number] Jaylen Chan ID: [id number]       POC Glucose Once [[id number]]  (Abnormal) Collected: 06/07/21 0805    Specimen: Blood Updated: 06/07/21 0816     Glucose 208 mg/dL      Comment: : [id number] Frantz Sagastumeeter ID: [id number]       Comprehensive Metabolic Panel [[id number]]  (Abnormal) Collected: 06/07/21 0320    Specimen: Blood Updated: 06/07/21 0412     Glucose 214 mg/dL      BUN 25 mg/dL      Creatinine 1.90 mg/dL      Sodium 141 mmol/L      Potassium 3.7 mmol/L      Chloride 106 mmol/L      CO2 25.0 mmol/L      Calcium 8.1 mg/dL      Total Protein 6.3 g/dL      Albumin 3.20 g/dL      ALT (SGPT) 33 U/L      AST (SGOT) 15 U/L      Alkaline Phosphatase 260 U/L      Total Bilirubin 0.3 mg/dL      eGFR  African Amer 44 mL/min/1.73      Globulin 3.1 gm/dL      A/G Ratio 1.0 g/dL      BUN/Creatinine Ratio 13.2     Anion Gap 10.0 mmol/L     Narrative:      GFR Normal >60  Chronic Kidney Disease <60  Kidney Failure <15      CBC & Differential [[id number]]  (Abnormal) Collected: 06/07/21 0320    Specimen: Blood Updated: 06/07/21 0344    Narrative:      The following orders were created for panel order CBC & Differential.  Procedure                               Abnormality         Status                     ---------                               -----------         ------                     CBC Auto Differential[[id number]]        Abnormal            Final result                 Please view results for these tests on the individual orders.    CBC Auto Differential [[id number]]  (Abnormal) Collected: 06/07/21 0320    Specimen: Blood Updated: 06/07/21 0344     WBC 8.45 10*3/mm3      RBC 3.07 10*6/mm3      Hemoglobin 8.0 g/dL      Hematocrit 26.2 %      MCV 85.3 fL      MCH 26.1 pg      MCHC 30.5 g/dL      RDW 15.7 %      RDW-SD  48.4 fl      MPV 9.8 fL      Platelets 341 10*3/mm3      Neutrophil % 80.2 %      Lymphocyte % 3.3 %      Monocyte % 12.5 %      Eosinophil % 3.0 %      Basophil % 0.5 %      Immature Grans % 0.5 %      Neutrophils, Absolute 6.78 10*3/mm3      Lymphocytes, Absolute 0.28 10*3/mm3      Monocytes, Absolute 1.06 10*3/mm3      Eosinophils, Absolute 0.25 10*3/mm3      Basophils, Absolute 0.04 10*3/mm3      Immature Grans, Absolute 0.04 10*3/mm3      nRBC 0.2 /100 WBC     POC Glucose Once [[id number]]  (Abnormal) Collected: 06/06/21 2053    Specimen: Blood Updated: 06/06/21 2104     Glucose 334 mg/dL      Comment: : [id number] Lorraine DavidMeter ID: [id number]       POC Glucose Once [[id number]]  (Normal) Collected: 06/06/21 1657    Specimen: Blood Updated: 06/06/21 1708     Glucose 130 mg/dL      Comment: : [id number] Tejinder StacyMeter ID: [id number]       Eosinophil Smear - Urine, Urine, Clean Catch [[id number]]  (Normal) Collected: 06/05/21 2139    Specimen: Urine, Clean Catch Updated: 06/06/21 1422     Eosinophil Smear 0 % EOS/100 Cells     PTH, Intact [[id number]]  (Abnormal) Collected: 06/06/21 1324    Specimen: Blood Updated: 06/06/21 1358     PTH, Intact 207.9 pg/mL     Narrative:      Results may be falsely decreased if patient taking Biotin.      Vitamin D 25 Hydroxy [[id number]]  (Abnormal) Collected: 06/05/21 1606    Specimen: Blood Updated: 06/06/21 1331     25 Hydroxy, Vitamin D 20.7 ng/ml     Narrative:      Reference Range for Total Vitamin D 25(OH)     Deficiency <20.0 ng/mL   Insufficiency 21-29 ng/mL   Sufficiency  ng/mL  Toxicity >100 ng/ml    Results may be falsely increased if patient taking Biotin.      Creatinine, Urine, Random - Urine, Clean Catch [[id number]] Collected: 06/05/21 1649    Specimen: Urine, Clean Catch Updated: 06/06/21 1324     Creatinine, Urine 9.7 mg/dL     Narrative:      Reference intervals for random urine have not been established.  Clinical usage is dependent upon  physician's interpretation in combination with other laboratory tests.       POC Glucose Once [[id number]]  (Abnormal) Collected: 06/06/21 1201    Specimen: Blood Updated: 06/06/21 1212     Glucose 181 mg/dL      Comment: : [id number] Tejinder StacyMeter ID: [id number]       Comprehensive Metabolic Panel [[id number]]  (Abnormal) Collected: 06/06/21 0220    Specimen: Blood Updated: 06/06/21 0849     Glucose 74 mg/dL      BUN 27 mg/dL      Creatinine 1.64 mg/dL      Sodium 143 mmol/L      Potassium 3.6 mmol/L      Chloride 105 mmol/L      CO2 23.0 mmol/L      Calcium 8.2 mg/dL      Total Protein 6.8 g/dL      Albumin 3.40 g/dL      ALT (SGPT) 45 U/L      AST (SGOT) 28 U/L      Alkaline Phosphatase 292 U/L      Total Bilirubin 0.5 mg/dL      eGFR  African Amer 52 mL/min/1.73      Globulin 3.4 gm/dL      A/G Ratio 1.0 g/dL      BUN/Creatinine Ratio 16.5     Anion Gap 15.0 mmol/L     Narrative:      GFR Normal >60  Chronic Kidney Disease <60  Kidney Failure <15      POC Glucose Once [[id number]]  (Normal) Collected: 06/06/21 0753    Specimen: Blood Updated: 06/06/21 0804     Glucose 116 mg/dL      Comment: : [id number] Tejinder StacyMeter ID: [id number]       Iron Profile [[id number]]  (Normal) Collected: 06/06/21 0220    Specimen: Blood Updated: 06/06/21 0417     Iron 88 mcg/dL      Iron Saturation 22 %      Transferrin 273 mg/dL      TIBC 407 mcg/dL     Phosphorus [[id number]]  (Abnormal) Collected: 06/06/21 0220    Specimen: Blood Updated: 06/06/21 0417     Phosphorus 6.3 mg/dL     Magnesium [[id number]]  (Normal) Collected: 06/06/21 0220    Specimen: Blood Updated: 06/06/21 0417     Magnesium 2.1 mg/dL     CBC (No Diff) [[id number]]  (Abnormal) Collected: 06/06/21 0220    Specimen: Blood Updated: 06/06/21 0355     WBC 9.50 10*3/mm3      RBC 3.10 10*6/mm3      Hemoglobin 8.1 g/dL      Hematocrit 26.2 %      MCV 84.5 fL      MCH 26.1 pg      MCHC 30.9 g/dL      RDW 15.5 %      RDW-SD 47.0 fl      MPV 10.4 fL      Platelets 338  10*3/mm3     Blood Gas, Arterial - [[id number]]  (Abnormal) Collected: 06/06/21 0330    Specimen: Arterial Blood Updated: 06/06/21 0334     Site Right Radial     Pedrito's Test N/A     pH, Arterial 7.368 pH units      pCO2, Arterial 41.4 mm Hg      pO2, Arterial 58.3 mm Hg      Comment: 84 Value below reference range        HCO3, Arterial 23.8 mmol/L      Base Excess, Arterial -1.4 mmol/L      Comment: 84 Value below reference range        O2 Saturation, Arterial 88.7 %      Comment: 84 Value below reference range        Temperature 37.0 C      Barometric Pressure for Blood Gas 750 mmHg      Modality HFNC     Flow Rate 8.0 lpm      Ventilator Mode NA     Collected by [id number]     Comment: Meter: [id number]     :  [id number]        pCO2, Temperature Corrected 41.4 mm Hg      pH, Temp Corrected 7.368 pH Units      pO2, Temperature Corrected 58.3 mm Hg     POC Glucose Once [[id number]]  (Abnormal) Collected: 06/05/21 2101    Specimen: Blood Updated: 06/05/21 2112     Glucose 289 mg/dL      Comment: : [id number] Lorraine ChandDixie ID: [id number]       Osmolality, Urine - Urine, Clean Catch [[id number]]  (Normal) Collected: 06/05/21 1649    Specimen: Urine, Clean Catch Updated: 06/05/21 2029     Osmolality, Urine 286 mOsm/kg     Sodium, Urine, Random - Urine, Clean Catch [[id number]] Collected: 06/05/21 1649    Specimen: Urine, Clean Catch Updated: 06/05/21 2015     Sodium, Urine 91 mmol/L     Narrative:      Reference intervals for random urine have not been established.  Clinical usage is dependent upon physician's interpretation in combination with other laboratory tests.       Protein, Urine, Random - Urine, Clean Catch [[id number]] Collected: 06/05/21 1649    Specimen: Urine, Clean Catch Updated: 06/05/21 2015     Total Protein, Urine 19.2 mg/dL     Narrative:      Reference intervals for random urine have not been established.  Clinical usage is dependent upon physician's interpretation in combination with  other laboratory tests.       Urine Drug Screen - Urine, Clean Catch [[id number]]  (Normal) Collected: 06/05/21 1649    Specimen: Urine, Clean Catch Updated: 06/05/21 1726     THC, Screen, Urine Negative     Phencyclidine (PCP), Urine Negative     Cocaine Screen, Urine Negative     Methamphetamine, Ur Negative     Opiate Screen Negative     Amphetamine Screen, Urine Negative     Benzodiazepine Screen, Urine Negative     Tricyclic Antidepressants Screen Negative     Methadone Screen, Urine Negative     Barbiturates Screen, Urine Negative     Oxycodone Screen, Urine Negative     Propoxyphene Screen Negative     Buprenorphine, Screen, Urine Negative    Narrative:      Cutoff For Drugs Screened:    Amphetamines               500 ng/ml  Barbiturates               200 ng/ml  Benzodiazepines            150 ng/ml  Cocaine                    150 ng/ml  Methadone                  200 ng/ml  Opiates                    100 ng/ml  Phencyclidine               25 ng/ml  THC                            50 ng/ml  Methamphetamine            500 ng/ml  Tricyclic Antidepressants  300 ng/ml  Oxycodone                  100 ng/ml  Propoxyphene               300 ng/ml  Buprenorphine               10 ng/ml    The normal value for all drugs tested is negative. This report includes unconfirmed screening results, with the cutoff values listed, to be used for medical treatment purposes only.  Unconfirmed results must not be used for non-medical purposes such as employment or legal testing.  Clinical consideration should be applied to any drug of abuse test, particularly when unconfirmed results are used.      [place] Draw [[id number]] Collected: 06/05/21 1605    Specimen: Blood from Arm, Left Updated: 06/05/21 1715    Narrative:      The following orders were created for panel order [place] Draw.  Procedure                               Abnormality         Status                     ---------                               -----------          ------                     [place] Blood Culture Cristhian...[[id number]]                      Final result                 Please view results for these tests on the individual orders.    [place] Blood Culture Bottle Set [[id number]] Collected: 06/05/21 1605    Specimen: Blood from Arm, Left Updated: 06/05/21 1715     Extra Tube Hold for add-ons.     Comment: Auto resulted.       Hemoglobin & Hematocrit, Blood [[id number]]  (Abnormal) Collected: 06/05/21 1641    Specimen: Blood Updated: 06/05/21 1706     Hemoglobin 6.8 g/dL      Hematocrit 22.1 %     COVID-19,[facility],Nasal Swab No Transport Media 3-4 HR TAT - Swab, Nasal Cavity [[id number]]  (Normal) Collected: 06/05/21 1605    Specimen: Swab from Nasal Cavity Updated: 06/05/21 1651     COVID19 Not Detected    Narrative:      Fact sheet for providers: https://www.fda.gov/media/224499/download     Fact sheet for patients: https://www.fda.gov/media/211641/download    Test performed by PCR.    Consider negative results in combination with clinical observations, patient history, and epidemiological information.    Ethanol [[id number]] Collected: 06/05/21 1606    Specimen: Blood Updated: 06/05/21 1649     Ethanol % <0.010 %     Narrative:      Not for legal purposes. Chain of Custody not followed.     Troponin [[id number]]  (Normal) Collected: 06/05/21 1606    Specimen: Blood Updated: 06/05/21 1643     Troponin T 0.014 ng/mL     Narrative:      Troponin T Reference Range:  <= 0.03 ng/mL-   Negative for AMI  >0.03 ng/mL-     Abnormal for myocardial necrosis.  Clinicians would have to utilize clinical acumen, EKG, Troponin and serial changes to determine if it is an Acute Myocardial Infarction or myocardial injury due to an underlying chronic condition.       Results may be falsely decreased if patient taking Biotin.      Comprehensive Metabolic Panel [[id number]]  (Abnormal) Collected: 06/05/21 1606    Specimen: Blood Updated: 06/05/21 1643     Glucose 363 mg/dL      BUN 27  mg/dL      Creatinine 1.76 mg/dL      Sodium 138 mmol/L      Potassium 3.6 mmol/L      Chloride 102 mmol/L      CO2 22.0 mmol/L      Calcium 8.3 mg/dL      Total Protein 6.8 g/dL      Albumin 3.50 g/dL      ALT (SGPT) 49 U/L      AST (SGOT) 31 U/L      Alkaline Phosphatase 311 U/L      Total Bilirubin 0.3 mg/dL      eGFR  African Amer 48 mL/min/1.73      Globulin 3.3 gm/dL      A/G Ratio 1.1 g/dL      BUN/Creatinine Ratio 15.3     Anion Gap 14.0 mmol/L     Narrative:      GFR Normal >60  Chronic Kidney Disease <60  Kidney Failure <15      BNP [[id number]]  (Abnormal) Collected: 06/05/21 1606    Specimen: Blood Updated: 06/05/21 1638     proBNP 14,485.0 pg/mL     Narrative:      Among patients with dyspnea, NT-proBNP is highly sensitive for the detection of acute congestive heart failure. In addition NT-proBNP of <300 pg/ml effectively rules out acute congestive heart failure with 99% negative predictive value.    Results may be falsely decreased if patient taking Biotin.      Lactic Acid, Plasma [[id number]]  (Normal) Collected: 06/05/21 1606    Specimen: Blood Updated: 06/05/21 1637     Lactate 2.0 mmol/L     D-dimer, Quantitative [[id number]]  (Abnormal) Collected: 06/05/21 1606    Specimen: Blood Updated: 06/05/21 1632     D-Dimer, Quantitative 2.19 mg/L (FEU)     Narrative:      Reference Range is 0-0.50 mg/L FEU. However, results <0.50 mg/L FEU tends to rule out DVT or PE. Results >0.50 mg/L FEU are not useful in predicting absence or presence of DVT or PE.      Protime-INR [[id number]]  (Abnormal) Collected: 06/05/21 1606    Specimen: Blood Updated: 06/05/21 1632     Protime 14.2 Seconds      INR 1.19    aPTT [[id number]]  (Normal) Collected: 06/05/21 1606    Specimen: Blood Updated: 06/05/21 1632     PTT 33.7 seconds     POC Occult Blood Stool [[id number]]  (Normal) Collected: 06/05/21 1631    Specimen: Stool Updated: 06/05/21 1632     Fecal Occult Blood Negative     Lot Number \199\     Expiration Date \2/28/22\      DEVELOPER LOT NUMBER \199\     DEVELOPER EXPIRATION DATE \2/28/22\     Positive Control Positive     Negative Control Negative    CBC & Differential [[id number]]  (Abnormal) Collected: 06/05/21 1606    Specimen: Blood Updated: 06/05/21 1618    Narrative:      The following orders were created for panel order CBC & Differential.  Procedure                               Abnormality         Status                     ---------                               -----------         ------                     CBC Auto Differential[[id number]]        Abnormal            Final result                 Please view results for these tests on the individual orders.    CBC Auto Differential [[id number]]  (Abnormal) Collected: 06/05/21 1606    Specimen: Blood Updated: 06/05/21 1618     WBC 7.38 10*3/mm3      RBC 2.88 10*6/mm3      Hemoglobin 7.5 g/dL      Hematocrit 24.1 %      MCV 83.7 fL      MCH 26.0 pg      MCHC 31.1 g/dL      RDW 15.5 %      RDW-SD 46.7 fl      MPV 10.0 fL      Platelets 335 10*3/mm3      Neutrophil % 79.7 %      Lymphocyte % 5.3 %      Monocyte % 11.8 %      Eosinophil % 2.3 %      Basophil % 0.5 %      Immature Grans % 0.4 %      Neutrophils, Absolute 5.88 10*3/mm3      Lymphocytes, Absolute 0.39 10*3/mm3      Monocytes, Absolute 0.87 10*3/mm3      Eosinophils, Absolute 0.17 10*3/mm3      Basophils, Absolute 0.04 10*3/mm3      Immature Grans, Absolute 0.03 10*3/mm3      nRBC 0.0 /100 WBC           Lab Results   Component Value Date    ECHOEFEST 60 04/21/2017       Imaging Results (Last 72 Hours)     Procedure Component Value Units Date/Time    US Renal Bilateral [[id number]] Collected: 06/06/21 0854     Updated: 06/06/21 0859    Narrative:      EXAMINATION: US RENAL BILATERAL- 6/6/2021 8:54 AM CDT     HISTORY: bilat renal US for acute kidney injury; J81.0-Acute pulmonary  edema.     REPORT: Sonographic images of the kidneys were obtained.     COMPARISON: There are no correlative imaging studies for  comparison.     The right kidney measures 10.0 x 4.3 x 4.6 cm and has normal morphology  and cortical echogenicity, no mass or hydronephrosis is identified.  Color Doppler images demonstrate vascular flow within the right kidney.  The left kidney measures 11.4 x 5.1 x 5.6 cm and has normal morphology  and renal cortical echogenicity. No mass or hydronephrosis is  identified. Color Doppler images demonstrate vascular flow within the  left kidney. The bladder is distended, the prostate gland measures 3.2 x  2.9 x 3.4 cm.       Impression:      Normal ultrasound of the kidneys. Mild distention of the  bladder.  This report was finalized on 06/06/2021 08:55 by Dr. Delbert Torrez MD.    XR Chest 1 View [[id number]] Collected: 06/06/21 0733     Updated: 06/06/21 0737    Narrative:      EXAMINATION: XR CHEST 1 VW- 6/6/2021 7:33 AM CDT     HISTORY: increasing SOA, pulmonary edema; J81.0-Acute pulmonary edema.     REPORT: A frontal view of the chest was obtained.     COMPARISON: Chest x-ray 6/20/2021 1603 hours.     Lungs are hypoaerated as before, there is an increase in interstitial  infiltrates, essentially codominant and likely related to pulmonary  edema. The heart is mildly enlarged. Small bilateral pleural effusions  are present. No pneumothorax is identified. There is no lobar  consolidation.       Impression:      Congestive heart failure with mild worsening of pulmonary  edema.  This report was finalized on 06/06/2021 07:34 by Dr. Delbert Torrez MD.    CT Angiogram Chest [[id number]] Collected: 06/05/21 1718     Updated: 06/05/21 1728    Narrative:      EXAMINATION:  CT ANGIOGRAM CHEST-  6/5/2021 5:01 PM CDT     HISTORY: Tachycardia, hypotension, shortness of breath.     COMPARISON : No comparison study.     DLP: 184 mGy-cm. Automated dosage control was utilized.     TECHNIQUE: CT angio was performed of the chest with contrast. Coronal,  sagittal and 3-D reconstruction were performed.     MEDIASTINUM, HEART AND  "VASCULAR STRUCTURES: There is atheromatous  disease of the thoracic aorta and coronary arteries. There is moderate  to severe cardiomegaly. The pulmonary arteries are dilated. The main  pulmonary artery segment measures 3.9 cm. There is no CT evidence of  pulmonary embolus. There is mediastinal and bilateral hilar  lymphadenopathy.     LUNGS: There are small bilateral pleural effusions. There is  centrilobular emphysema. There are interstitial infiltrates bilaterally.  There is a 5.3 cm right upper lobe groundglass nodule on image 33 of  series 8. There is a 5 mm right upper lobe groundglass nodule on image  58 of series 8. There is a 5 mm left upper lobe groundglass nodule image  55 of series 8. There is a 3 mm left upper lobe subpleural nodule on  image 48 of series 8. There is passive atelectasis in both lower lobes.     UPPER ABDOMEN: There is thickening of the gastric wall.     BONES: There are degenerative changes of the spine.       Impression:      1. Cardiomegaly, bilateral pleural effusions and bilateral interstitial  infiltrates likely due to pulmonary edema. The findings are most likely  due to congestive heart failure.  2. No CT evidence of pulmonary embolus. Dilated pulmonary arteries.  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  3. Small pulmonary nodules bilaterally. Follow-up chest CT recommended  in 12 months by Fleischner Society guidelines. This exam was marked as  \"new lung findings\" in PACS for follow-up.  4. Thickening of the gastric wall. This is not very well evaluated on  this study. Only a portion of the stomach is visualized.  5. Mediastinal and bilateral hilar lymphadenopathy. This can be seen  with congestive heart failure with engorgement of lymph nodes. Other  etiologies are not excluded. Short-term follow-up recommended in 3-6  months.     The full report of this exam was immediately signed and available to the  emergency room. The patient is currently in the " emergency room.  This report was finalized on 06/05/2021 17:24 by Dr. Pietro Hernandez MD.    XR Chest 1 View [[id number]] Collected: 06/05/21 1616     Updated: 06/05/21 1621    Narrative:      EXAMINATION: XR CHEST 1 VW- 6/5/2021 4:16 PM CDT     HISTORY: shortness of breath.     REPORT: A frontal view of the chest was obtained.     COMPARISON: Chest x-rays 9/11/2020.     The heart is mildly enlarged and there are new small bilateral pleural  effusions, as well as interstitial infiltrates which are centrally  predominant. Interstitial edema is favored with volume overload and CHF.  No lung consolidation is identified. There is no pneumothorax. No acute  osseous abnormality. There is a stable metallic foreign body projecting  over the left upper abdomen.       Impression:      Congestive heart failure with mild pulmonary edema and  bilateral small pleural effusions.  This report was finalized on 06/05/2021 16:18 by Dr. Delbert Torrez MD.          Assessment/Plan     -Acute on Chronic Diastolic congestive heart failure/volume overload: BNP 14,485,  CXR showed mild pulmonary edema and bilateral pleural effusions. CTA Chest showed bilateral pleural effusions, no evidence of PE and pulmonary nodules. Echo was done that showed LVEF to be 36-40%, moderate mitral valve and tricupsid valve regurgitation present, and RVSP mildly elevated 35-45 mmHg. EF is decreased from 66-70% in 7/2020.   Plan to optimize medical management of heart failure with resolution of volume overload with beta blocker, ACEI/ARB or possibly Entresto depending on kidney function. Ischemic workup to be done when acute issues resolve as well.  Agree with Lasix 40 mg IV now  Strict I and O, daily weights, fluid restriction 1500 ml  Continue to monitor kidney function and electrolytes    -Chronic kidney disease stage III A (baseline Cr 1.5): Cr today 1.9. Continue to monitor kidney function and electrolytes. Nephrology following patient     -Anemia: hgb  today is 8.0. Continue to monitor H and H. Transfuse as needed    -Paroxysmal Atrial Fibrillation: CHADS-VASc score of 5. Patient was taken off Eliquis after a colonoscopy last year per patient. He has continued Cardizem, and Multaq.     -Hypertension: Controlled; blood pressure has been softer today 90's systolic    -Hyperlipidemia: Continue atorvastatin    -Type 2 Diabetes mellitus    Further orders per Dr Aparicio    Thank you for asking us to follow this patient with you.     Please note this cardiology consultation note is the result of a face to face consultation with the patient, in addition to reviewing medical records at length by myself, DARIEN Clayton.    Electronically signed by DARIEN Clayton, 21, 2:38 PM CDT.          Electronically signed by Blas Aparicio MD at 21 1752          Physical Therapy Notes (most recent note)      Estephania Simmons PTA at 21 1131  Version 1 of 1         Acute Care - Physical Therapy Treatment Note  [facility]     Patient Name: Milton Rodriguez  : 1960  MRN: [id number]  Today's Date: 2021           PT Assessment (last 12 hours)      PT Evaluation and Treatment     Row Name 21 1058          Physical Therapy Time and Intention    Subjective Information  complains of;fatigue  -     Document Type  therapy note (daily note)  -     Mode of Treatment  physical therapy  -     Row Name 21 1058          General Information    Existing Precautions/Restrictions  fall;oxygen therapy device and L/min  -     Row Name 21 1058          Pain Scale: Numbers Pre/Post-Treatment    Pretreatment Pain Rating  0/10 - no pain  -     Row Name 21 1058          Bed Mobility    Comment (Bed Mobility)  up in chair  -     Row Name 21 1058          Transfers    Sit-Stand [place] (Transfers)  independent  -     Stand-Sit [place] (Transfers)  independent  -     [place] Level (Toilet Transfer)   independent  -     Assistive Device (Toilet Transfer)  commode  -     Row Name 06/11/21 1058          Toilet Transfer    Type (Toilet Transfer)  sit-stand;stand-sit  -     Row Name 06/11/21 1058          Gait/Stairs (Locomotion)    [place] Level (Gait)  independent;modified independence  -     Distance in Feet (Gait)  400  -     Row Name 06/11/21 1058          Knee (Therapeutic Exercise)    Knee (Therapeutic Exercise)  AROM (active range of motion)  -     Knee AROM (Therapeutic Exercise)  bilateral;LAQ (long arc quad);sitting;10 repetitions  -     Row Name 06/11/21 1058          Ankle (Therapeutic Exercise)    Ankle (Therapeutic Exercise)  AROM (active range of motion)  -     Ankle AROM (Therapeutic Exercise)  bilateral;dorsiflexion;sitting;10 repetitions  -     Row Name 06/11/21 1058          Plan of Care Review    Plan of Care Reviewed With  patient  -     Progress  improving  -     Outcome Summary  Pt. doing will with therapy. Pt. is Independent with transfers and gait. He walked 400'  in hallway without difficulty. He removed his O2 to walk to and from  and his O2 sat dropped to 84%. He wore 3L of O2 during his walk and he came back at 94%. Pt. is sitting at 2.5L of O2 at rest in room. Pt states he is anxious to return home today.   -     Row Name 06/11/21 1058          Vital Signs    Pre SpO2 (%)  94  -MF     O2 Delivery Pre Treatment  supplemental O2  -     Intra SpO2 (%)  84  -MF     O2 Delivery Intra Treatment  room air  -     Post SpO2 (%)  94  -MF     O2 Delivery Post Treatment  supplemental O2  -     Row Name 06/11/21 1058          Positioning and Restraints    Pre-Treatment Position  sitting in chair/recliner  -     Post Treatment Position  chair  -MF     In Chair  reclined;call light within reach;encouraged to call for assist  -       User Key  (r) = Recorded By, (t) = Taken By, (c) = Cosigned By    Initials Name Provider Type    Estephania Hussein, YVETTE  Physical Therapy Assistant        Physical Therapy Education                 Title: PT OT SLP Therapies (In Progress)     Topic: Physical Therapy (In Progress)     Point: Mobility training (Done)     Learning Progress Summary           Patient Acceptance, SOBIA ECKERT DU by BRITTANY at 6/8/2021 1357    Comment: benefits of activity, gait safety.    Acceptance, ESOBIA DU by BRITTANY at 6/8/2021 0828    Comment: Progression of PT POC, benefits of activity                   Point: Home exercise program (Not Started)     Learner Progress:  Not documented in this visit.          Point: Body mechanics (Not Started)     Learner Progress:  Not documented in this visit.          Point: Precautions (Not Started)     Learner Progress:  Not documented in this visit.                      User Key     Initials Effective Dates Name Provider Type Discipline    BRITTANY 08/02/16 -  Alberto Portillo PT DPT Physical Therapist PT              PT Recommendation and Plan     Plan of Care Reviewed With: patient  Progress: improving  Outcome Summary: Pt. doing will with therapy. Pt. is Independent with transfers and gait. He walked 400'  in hallway without difficulty. He removed his O2 to walk to and from  and his O2 sat dropped to 84%. He wore 3L of O2 during his walk and he came back at 94%. Pt. is sitting at 2.5L of O2 at rest in room. Pt states he is anxious to return home today.        Time Calculation:   PT Charges     Row Name 06/11/21 1131             Time Calculation    Start Time  1058  -MF      Stop Time  1121  -MF      Time Calculation (min)  23 min  -MF      PT Received On  06/11/21  -MF         Time Calculation- PT    Total Timed Code Minutes- PT  23 minute(s)  -MF         Timed Charges    62101 - Gait Training Minutes   23  -MF         Total Minutes    Timed Charges Total Minutes  23  -MF       Total Minutes  23  -MF        User Key  (r) = Recorded By, (t) = Taken By, (c) = Cosigned By    Initials Name Provider Type    Estephania Hussein PTA  Physical Therapy Assistant        Therapy Charges for Today     Code Description Service Date Service Provider Modifiers Qty    17371073106 HC GAIT TRAINING EA 15 MIN 6/11/2021 Estephania Simmons PTA GP 2          PT G-Codes  Outcome Measure Options: AM-PAC 6 Clicks Daily Activity (OT)  AM-PAC 6 Clicks Score (PT): 20  AM-PAC 6 Clicks Score (OT): 24    Estephania Simmons PTA  6/11/2021      Electronically signed by Estephania Simmons PTA at 06/11/21 1132          Respiratory Therapy Notes (most recent note)      Lorene Tierney, ALLEGRA at 06/09/21 1701        BIPAP changed to PRN per Dr. Martin.    Electronically signed by Lorene Tierney, ALLEGRA at 06/09/21 1702

## 2021-06-11 NOTE — THERAPY TREATMENT NOTE
Acute Care - Physical Therapy Treatment Note  [facility]     Patient Name: Milton Rodriguez  : 1960  MRN: [id number]  Today's Date: 2021           PT Assessment (last 12 hours)      PT Evaluation and Treatment     Row Name 21 1058          Physical Therapy Time and Intention    Subjective Information  complains of;fatigue  -     Document Type  therapy note (daily note)  -     Mode of Treatment  physical therapy  -     Row Name 21 1058          General Information    Existing Precautions/Restrictions  fall;oxygen therapy device and L/min  -     Row Name 21 1058          Pain Scale: Numbers Pre/Post-Treatment    Pretreatment Pain Rating  0/10 - no pain  -     Row Name 21 1058          Bed Mobility    Comment (Bed Mobility)  up in chair  -[facility] Name 21 1058          Transfers    Sit-Stand [place] (Transfers)  independent  -     Stand-Sit [place] (Transfers)  independent  -     [place] Level (Toilet Transfer)  independent  -     Assistive Device (Toilet Transfer)  commode  -[facility] Name 21 1058          Toilet Transfer    Type (Toilet Transfer)  sit-stand;stand-sit  -[facility] Name 21 1058          Gait/Stairs (Locomotion)    [place] Level (Gait)  independent;modified independence  -     Distance in Feet (Gait)  400  -[facility] Name 21 1058          Knee (Therapeutic Exercise)    Knee (Therapeutic Exercise)  AROM (active range of motion)  -     Knee AROM (Therapeutic Exercise)  bilateral;LAQ (long arc quad);sitting;10 repetitions  -[facility] Name 21 1058          Ankle (Therapeutic Exercise)    Ankle (Therapeutic Exercise)  AROM (active range of motion)  -     Ankle AROM (Therapeutic Exercise)  bilateral;dorsiflexion;sitting;10 repetitions  -     Row Name 21 1058          Plan of Care Review    Plan of Care Reviewed With  patient  -     Progress  improving  -     Outcome Summary   Pt. doing will with therapy. Pt. is Independent with transfers and gait. He walked 400'  in hallway without difficulty. He removed his O2 to walk to and from  and his O2 sat dropped to 84%. He wore 3L of O2 during his walk and he came back at 94%. Pt. is sitting at 2.5L of O2 at rest in room. Pt states he is anxious to return home today.   -MF     Row Name 06/11/21 1058          Vital Signs    Pre SpO2 (%)  94  -MF     O2 Delivery Pre Treatment  supplemental O2  -MF     Intra SpO2 (%)  84  -MF     O2 Delivery Intra Treatment  room air  -MF     Post SpO2 (%)  94  -MF     O2 Delivery Post Treatment  supplemental O2  -MF     Row Name 06/11/21 1058          Positioning and Restraints    Pre-Treatment Position  sitting in chair/recliner  -MF     Post Treatment Position  chair  -MF     In Chair  reclined;call light within reach;encouraged to call for assist  -MF       User Key  (r) = Recorded By, (t) = Taken By, (c) = Cosigned By    Initials Name Provider Type    Estephania Hussein PTA Physical Therapy Assistant        Physical Therapy Education                 Title: PT OT SLP Therapies (In Progress)     Topic: Physical Therapy (In Progress)     Point: Mobility training (Done)     Learning Progress Summary           Patient Acceptance, SOBIA ECKERT DU by BRITTANY at 6/8/2021 1357    Comment: benefits of activity, gait safety.    Acceptance, ESOBIA DU by BRITTANY at 6/8/2021 0828    Comment: Progression of PT POC, benefits of activity                   Point: Home exercise program (Not Started)     Learner Progress:  Not documented in this visit.          Point: Body mechanics (Not Started)     Learner Progress:  Not documented in this visit.          Point: Precautions (Not Started)     Learner Progress:  Not documented in this visit.                      User Key     Initials Effective Dates Name Provider Type Discipline    BRITTANY 08/02/16 -  Alberto Portillo PT DPT Physical Therapist PT              PT Recommendation and Plan      Plan of Care Reviewed With: patient  Progress: improving  Outcome Summary: Pt. doing will with therapy. Pt. is Independent with transfers and gait. He walked 400'  in hallway without difficulty. He removed his O2 to walk to and from BR and his O2 sat dropped to 84%. He wore 3L of O2 during his walk and he came back at 94%. Pt. is sitting at 2.5L of O2 at rest in room. Pt states he is anxious to return home today.        Time Calculation:   PT Charges     Row Name 06/11/21 1131             Time Calculation    Start Time  1058  -MF      Stop Time  1121  -      Time Calculation (min)  23 min  -MF      PT Received On  06/11/21  -         Time Calculation- PT    Total Timed Code Minutes- PT  23 minute(s)  -         Timed Charges    14903 - Gait Training Minutes   23  -MF         Total Minutes    Timed Charges Total Minutes  23  -MF       Total Minutes  23  -MF        User Key  (r) = Recorded By, (t) = Taken By, (c) = Cosigned By    Initials Name Provider Type     Estephania Simmons PTA Physical Therapy Assistant        Therapy Charges for Today     Code Description Service Date Service Provider Modifiers Qty    49269786895 HC GAIT TRAINING EA 15 MIN 6/11/2021 Estephania Simmons PTA GP 2          PT G-Codes  Outcome Measure Options: AM-PAC 6 Clicks Daily Activity (OT)  AM-PAC 6 Clicks Score (PT): 20  AM-PAC 6 Clicks Score (OT): 24    Estephania Simmons PTA  6/11/2021

## 2021-06-11 NOTE — CASE MANAGEMENT/SOCIAL WORK
Continued Stay Note   [place]     Patient Name: Milton Rodriguez  MRN: [id number]  Today's Date: 6/11/2021    Admit Date: 6/5/2021    Discharge Plan     Row Name 06/11/21 1430       Plan    Plan  Home with [facility]    Provided Post Acute Provider List?  Yes    Post Acute Provider List  Home Health;DME Supplier    Delivered To  Patient    Method of Delivery  Telephone    Patient/Family in Agreement with Plan  yes    Final Discharge Disposition Code  06 - home with home health care    Final Note  Referral for home health and home 02.  SW spoke to pt and provided options.  [facility].  SW faxed referral and spoke to Demi.  They will bring portable tank to pt's room shortly.  Pt chose [facility] for home health.  SW informed Ngozi and has sent referral.        Discharge Codes    No documentation.       Expected Discharge Date and Time     Expected Discharge Date Expected Discharge Time    Jun 11, 2021             YOBANY Natarajan

## 2021-06-11 NOTE — PLAN OF CARE
Problem: Adult Inpatient Plan of Care  Goal: Plan of Care Review  Outcome: Ongoing, Progressing  Flowsheets (Taken 6/11/2021 4729)  Plan of Care Reviewed With: patient  Outcome Summary: VSS. On 3.5L nasal cannula. Sinus tach 112-116. No c/o pain. Up ad mary jo. Safety maintained.
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
## 2021-06-12 ENCOUNTER — READMISSION MANAGEMENT (OUTPATIENT)
Dept: CALL CENTER | Facility: HOSPITAL | Age: 61
End: 2021-06-12

## 2021-06-12 NOTE — THERAPY DISCHARGE NOTE
Acute Care - Physical Therapy Discharge Summary  [facility]       Patient Name: Milton Rodriguez  : 1960  MRN: [id number]    Today's Date: 2021                 Admit Date: 2021      PT Recommendation and Plan    Visit Dx:    ICD-10-CM ICD-9-CM   1. Acute pulmonary edema (CMS/[facility])  J81.0 518.4   2. Heme positive stool  R19.5 792.1   3. Impaired mobility  Z74.09 799.89   4. Decreased activities of daily living (ADL)  Z78.9 V49.89   5. Acute kidney injury superimposed on chronic kidney disease (CMS/[facility])  N17.9 866.00    N18.9 585.9   6. Tobacco abuse  Z72.0 305.1   7. Type 2 diabetes mellitus with diabetic autonomic neuropathy, without long-term current use of insulin (CMS/[facility])  E11.43 250.60     337.1   8. Acute respiratory failure with hypoxia (CMS/[facility])  J96.01 518.81   9. Anemia, unspecified type  D64.9 285.9   10. Preop testing  Z01.818 V72.84   11. Stenosis of right internal carotid artery  I65.21 433.10   12. Bilateral carotid artery stenosis  I65.23 433.10     433.30   13. PFO (patent foramen ovale)  Q21.1 745.5   14. Alcohol abuse  F10.10 305.00   15. Chronic diastolic CHF (congestive heart failure) (CMS/[facility])  I50.32 428.32     428.0   16. Transient ischemic attack (TIA)  G45.9 435.9   17. Cerebrovascular accident (CVA), unspecified mechanism (CMS/[facility])  I63.9 434.91   18. Stage 3a chronic kidney disease (CMS/[facility])  N18.31 585.3   19. Lymphedema  I89.0 457.1   20. Acute on chronic combined systolic and diastolic congestive heart failure (CMS/[facility])  I50.43 428.43     428.0   21. Hypokalemia  E87.6 276.8   22. Chronic anticoagulation  Z79.01 V58.61   23. Lower extremity edema  R60.0 782.3   24. Atrial flutter, unspecified type (CMS/[facility])  I48.92 427.32   25. Mixed hyperlipidemia  E78.2 272.2   26. PAD (peripheral artery disease) (CMS/[facility])  I73.9 443.9   27. Essential hypertension  I10 401.9               PT Rehab Goals     Row Name 21 0648             Gait Training Goal 1 (PT)     Activity/Assistive Device (Gait Training Goal 1, PT)  gait (walking locomotion);decrease fall risk;improve balance and speed;increase endurance/gait distance  -AB      [place] Level (Gait Training Goal 1, PT)  supervision required  -AB      Distance (Gait Training Goal 1, PT)  250  -AB      Time Frame (Gait Training Goal 1, PT)  long term goal (LTG);10 days  -AB      Progress/Outcome (Gait Training Goal 1, PT)  goal met  -AB         Stairs Goal 1 (PT)    Activity/Assistive Device (Stairs Goal 1, PT)  ascending stairs;descending stairs  -AB      [place] Level/Cues Needed (Stairs Goal 1, PT)  supervision required  -AB      Number of Stairs (Stairs Goal 1, PT)  3-4 steps  -AB      Time Frame (Stairs Goal 1, PT)  long term goal (LTG);10 days  -AB      Progress/Outcome (Stairs Goal 1, PT)  goal not met  -AB        User Key  (r) = Recorded By, (t) = Taken By, (c) = Cosigned By    Initials Name Provider Type Discipline    Jenny Gatica PTA Physical Therapy Assistant PT              PT Discharge Summary  Anticipated Discharge Disposition (PT): home with assist  Reason for Discharge: Discharge from facility  Outcomes Achieved: Refer to plan of care for updates on goals achieved  Discharge Destination: Home with assist      Jenny Gavin PTA   6/12/2021

## 2021-06-12 NOTE — OUTREACH NOTE
Prep Survey      Responses   [facility] facility patient discharged from?  [place]   Is LACE score < 7 ?  No   Emergency Room discharge w/ pulse ox?  No   Eligibility  Readm Mgmt   Discharge diagnosis  Acute kidney injury superimposed on chronic kidney disease    Does the patient have one of the following disease processes/diagnoses(primary or secondary)?  Other   Does the patient have Home health ordered?  Yes   What is the Home health agency?   [facility]   Is there a DME ordered?  Yes   What DME was ordered?  [employer]   Prep survey completed?  Yes          Sonja Mireles RN

## 2021-06-13 ENCOUNTER — HOME CARE VISIT (OUTPATIENT)
Dept: HOME HEALTH SERVICES | Facility: CLINIC | Age: 61
End: 2021-06-13

## 2021-06-13 VITALS
TEMPERATURE: 97.7 F | SYSTOLIC BLOOD PRESSURE: 100 MMHG | DIASTOLIC BLOOD PRESSURE: 60 MMHG | HEIGHT: 67 IN | WEIGHT: 163 LBS | HEART RATE: 125 BPM | RESPIRATION RATE: 20 BRPM | OXYGEN SATURATION: 95 % | BODY MASS INDEX: 25.58 KG/M2

## 2021-06-13 PROCEDURE — G0299 HHS/HOSPICE OF RN EA 15 MIN: HCPCS
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
## 2021-06-14 NOTE — HOME HEALTH
SOC for 60 year old male with recent hospitalization for diagnosis of acute pulmonary edema. Pt has hx of ANTHONY on CKI, acute respiratory failure, acute on chronic CHF, atrial flutter, smoking, HTN and type 2 diabetes. Home care services ordered for med and oxygen teaching.

## 2021-06-15 ENCOUNTER — HOME CARE VISIT (OUTPATIENT)
Dept: HOME HEALTH SERVICES | Facility: CLINIC | Age: 61
End: 2021-06-15

## 2021-06-15 VITALS
SYSTOLIC BLOOD PRESSURE: 110 MMHG | DIASTOLIC BLOOD PRESSURE: 70 MMHG | TEMPERATURE: 97.1 F | HEART RATE: 117 BPM | OXYGEN SATURATION: 99 % | RESPIRATION RATE: 18 BRPM

## 2021-06-15 PROCEDURE — G0151 HHCP-SERV OF PT,EA 15 MIN: HCPCS

## 2021-06-17 ENCOUNTER — READMISSION MANAGEMENT (OUTPATIENT)
Dept: CALL CENTER | Facility: HOSPITAL | Age: 61
End: 2021-06-17

## 2021-06-17 NOTE — OUTREACH NOTE
Medical Week 1 Survey      Responses   [facility] patient discharged from?  [place]   Does the patient have one of the following disease processes/diagnoses(primary or secondary)?  Other   Week 1 attempt successful?  No   Unsuccessful attempts  Attempt 1          Dulce Strickland RN

## 2021-06-21 ENCOUNTER — HOME CARE VISIT (OUTPATIENT)
Dept: HOME HEALTH SERVICES | Facility: HOME HEALTHCARE | Age: 61
End: 2021-06-21

## 2021-06-22 ENCOUNTER — READMISSION MANAGEMENT (OUTPATIENT)
Dept: CALL CENTER | Facility: HOSPITAL | Age: 61
End: 2021-06-22

## 2021-06-22 NOTE — OUTREACH NOTE
Medical Week 1 Survey      Responses   [facility] patient discharged from?  [place]   Does the patient have one of the following disease processes/diagnoses(primary or secondary)?  Other   Week 1 attempt successful?  No   Unsuccessful attempts  Attempt 2          Pooja Major RN

## 2021-06-24 ENCOUNTER — READMISSION MANAGEMENT (OUTPATIENT)
Dept: CALL CENTER | Facility: HOSPITAL | Age: 61
End: 2021-06-24

## 2021-06-24 ENCOUNTER — OFFICE VISIT (OUTPATIENT)
Dept: GASTROENTEROLOGY | Facility: CLINIC | Age: 61
End: 2021-06-24

## 2021-06-24 VITALS
OXYGEN SATURATION: 95 % | BODY MASS INDEX: 27.15 KG/M2 | HEIGHT: 67 IN | WEIGHT: 173 LBS | TEMPERATURE: 97.7 F | SYSTOLIC BLOOD PRESSURE: 108 MMHG | HEART RATE: 118 BPM | DIASTOLIC BLOOD PRESSURE: 72 MMHG

## 2021-06-24 DIAGNOSIS — D64.9 ANEMIA, UNSPECIFIED TYPE: Primary | ICD-10-CM

## 2021-06-24 DIAGNOSIS — Q27.30 AVM (ARTERIOVENOUS MALFORMATION): ICD-10-CM

## 2021-06-24 PROCEDURE — 99214 OFFICE O/P EST MOD 30 MIN: CPT | Performed by: INTERNAL MEDICINE
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
## 2021-06-24 NOTE — OUTREACH NOTE
Medical Week 2 Survey      Responses   [facility] patient discharged from?  [place]   Does the patient have one of the following disease processes/diagnoses(primary or secondary)?  Other   Week 2 attempt successful?  Yes   Call start time  1831   Discharge diagnosis  Acute kidney injury superimposed on chronic kidney disease    Call end time  1836   Is patient permission given to speak with other caregiver?  Yes   Meds reviewed with patient/caregiver?  Yes   Is the patient having any side effects they believe may be caused by any medication additions or changes?  No   Does the patient have all medications ordered at discharge?  Yes   Is the patient taking all medications as directed (includes completed medication regime)?  No   What is preventing the patient from taking all medications as directed?  Other, Desires to consult PCP first   Medication comments  The vitamin D - He will talk to his provider.   Does the patient have a primary care provider?   Yes   Does the patient have an appointment with their PCP within 7 days of discharge?  Yes   Comments regarding PCP  Dr. Sapp--- He sees him again Tommorrow.    Has the patient kept scheduled appointments due by today?  Yes   What is the Home health agency?   [facility]   Has home health visited the patient within 72 hours of discharge?  Yes   What DME was ordered?  [employer]   Has all DME been delivered?  Yes   Did the patient receive a copy of their discharge instructions?  Yes   Nursing interventions  Reviewed instructions with patient   What is the patient's perception of their health status since discharge?  Improving   Is the patient/caregiver able to teach back signs and symptoms related to disease process for when to call PCP?  Yes   Is the patient/caregiver able to teach back signs and symptoms related to disease process for when to call 911?  Yes   Is the patient/caregiver able to teach back the hierarchy of who to call/visit for symptoms/problems? PCP,  Specialist, Home health nurse, Urgent Care, ED, 911  Yes   If the patient is a current smoker, are they able to teach back resources for cessation?  Not a smoker   Week 2 Call Completed?  Yes          Amanda Spaulding RN

## 2021-06-29 ENCOUNTER — HOME CARE VISIT (OUTPATIENT)
Dept: HOME HEALTH SERVICES | Facility: CLINIC | Age: 61
End: 2021-06-29

## 2021-06-29 PROCEDURE — G0299 HHS/HOSPICE OF RN EA 15 MIN: HCPCS

## 2021-06-30 VITALS
RESPIRATION RATE: 18 BRPM | TEMPERATURE: 97.8 F | SYSTOLIC BLOOD PRESSURE: 94 MMHG | OXYGEN SATURATION: 91 % | HEART RATE: 121 BPM | DIASTOLIC BLOOD PRESSURE: 56 MMHG

## 2021-07-01 ENCOUNTER — READMISSION MANAGEMENT (OUTPATIENT)
Dept: CALL CENTER | Facility: HOSPITAL | Age: 61
End: 2021-07-01

## 2021-07-01 NOTE — OUTREACH NOTE
Medical Week 3 Survey      Responses   [facility] patient discharged from?  [place]   Does the patient have one of the following disease processes/diagnoses(primary or secondary)?  Other   Week 3 attempt successful?  No   Unsuccessful attempts  Attempt 1          Fely Dyson RN

## 2021-07-02 ENCOUNTER — READMISSION MANAGEMENT (OUTPATIENT)
Dept: CALL CENTER | Facility: HOSPITAL | Age: 61
End: 2021-07-02

## 2021-07-02 NOTE — OUTREACH NOTE
Medical Week 3 Survey      Responses   [facility] patient discharged from?  [place]   Does the patient have one of the following disease processes/diagnoses(primary or secondary)?  Other   Week 3 attempt successful?  Yes   Call start time  1425   Call end time  1427   Meds reviewed with patient/caregiver?  Yes   Is the patient having any side effects they believe may be caused by any medication additions or changes?  No   Does the patient have all medications ordered at discharge?  Yes   Is the patient taking all medications as directed (includes completed medication regime)?  Yes   Does the patient have a primary care provider?   Yes   Has the patient kept scheduled appointments due by today?  Yes   What is the Home health agency?   [facility]   Has home health visited the patient within 72 hours of discharge?  Yes   What DME was ordered?  [employer]   Has all DME been delivered?  Yes   Psychosocial issues?  No   Did the patient receive a copy of their discharge instructions?  Yes   Nursing interventions  Reviewed instructions with patient   What is the patient's perception of their health status since discharge?  Improving   Is the patient/caregiver able to teach back signs and symptoms related to disease process for when to call PCP?  Yes   Is the patient/caregiver able to teach back signs and symptoms related to disease process for when to call 911?  Yes   Is the patient/caregiver able to teach back the hierarchy of who to call/visit for symptoms/problems? PCP, Specialist, Home health nurse, Urgent Care, ED, 911  Yes   If the patient is a current smoker, are they able to teach back resources for cessation?  Not a smoker   Week 3 Call Completed?  Yes          Iesha Pritchard LPN

## 2021-07-07 ENCOUNTER — HOME CARE VISIT (OUTPATIENT)
Dept: HOME HEALTH SERVICES | Facility: CLINIC | Age: 61
End: 2021-07-07

## 2021-07-07 PROCEDURE — G0299 HHS/HOSPICE OF RN EA 15 MIN: HCPCS

## 2021-07-07 PROCEDURE — G0300 HHS/HOSPICE OF LPN EA 15 MIN: HCPCS

## 2021-07-08 ENCOUNTER — OFFICE VISIT (OUTPATIENT)
Dept: CARDIOLOGY | Facility: CLINIC | Age: 61
End: 2021-07-08

## 2021-07-08 VITALS
OXYGEN SATURATION: 96 % | DIASTOLIC BLOOD PRESSURE: 56 MMHG | BODY MASS INDEX: 26.53 KG/M2 | WEIGHT: 169 LBS | SYSTOLIC BLOOD PRESSURE: 94 MMHG | HEART RATE: 117 BPM | HEIGHT: 67 IN

## 2021-07-08 VITALS
HEART RATE: 120 BPM | OXYGEN SATURATION: 98 % | RESPIRATION RATE: 22 BRPM | SYSTOLIC BLOOD PRESSURE: 120 MMHG | TEMPERATURE: 97.8 F | DIASTOLIC BLOOD PRESSURE: 68 MMHG

## 2021-07-08 DIAGNOSIS — I48.92 ATRIAL FLUTTER WITH RAPID VENTRICULAR RESPONSE (HCC): Primary | ICD-10-CM

## 2021-07-08 DIAGNOSIS — D64.9 ANEMIA, UNSPECIFIED TYPE: ICD-10-CM

## 2021-07-08 DIAGNOSIS — I50.43 ACUTE ON CHRONIC COMBINED SYSTOLIC AND DIASTOLIC CONGESTIVE HEART FAILURE (HCC): ICD-10-CM

## 2021-07-08 DIAGNOSIS — I10 ESSENTIAL HYPERTENSION: ICD-10-CM

## 2021-07-08 DIAGNOSIS — N18.32 STAGE 3B CHRONIC KIDNEY DISEASE (HCC): ICD-10-CM

## 2021-07-08 DIAGNOSIS — Z72.0 TOBACCO ABUSE: ICD-10-CM

## 2021-07-08 DIAGNOSIS — E78.2 MIXED HYPERLIPIDEMIA: ICD-10-CM

## 2021-07-08 PROCEDURE — 99215 OFFICE O/P EST HI 40 MIN: CPT | Performed by: NURSE PRACTITIONER

## 2021-07-08 RX ORDER — BENZONATATE 100 MG/1
100 CAPSULE ORAL 3 TIMES DAILY PRN
Status: ON HOLD | COMMUNITY
End: 2021-09-20

## 2021-07-08 RX ORDER — AMIODARONE HYDROCHLORIDE 200 MG/1
200 TABLET ORAL 2 TIMES DAILY
Qty: 30 TABLET | Refills: 5 | Status: SHIPPED | OUTPATIENT
Start: 2021-07-08 | End: 2021-08-09 | Stop reason: SDUPTHER

## 2021-07-08 RX ORDER — PANTOPRAZOLE SODIUM 40 MG/1
40 TABLET, DELAYED RELEASE ORAL DAILY
Status: ON HOLD | COMMUNITY
End: 2021-08-25

## 2021-07-08 NOTE — PATIENT INSTRUCTIONS
Heart Failure, Diagnosis    Heart failure is a condition in which the heart has trouble pumping blood because it has become weak or stiff. This means that the heart does not pump blood well enough for the body to stay healthy. For some people with heart failure, fluid may back up into the lungs. There may also be swelling (edema) in the lower legs. Heart failure is usually a long-term (chronic) condition. It is important for you to take good care of yourself and follow the treatment plan from your health care provider.  What are the causes?  This condition may be caused by:  · High blood pressure (hypertension). Hypertension causes the heart muscle to work harder than normal. This makes the heart stiff or weak.  · Coronary artery disease, or CAD. CAD is the buildup of cholesterol and fat (plaque) in the arteries of the heart.  · Heart attack, also called myocardial infarction. This injures the heart muscle, making it hard for the heart to pump blood.  · Abnormal heart valves. The valves do not open and close properly, forcing the heart to pump harder to keep the blood flowing.  · Heart muscle disease (cardiomyopathy or myocarditis). This is damage to the heart muscle. It can increase the risk of heart failure.  · Lung disease. The heart works harder when the lungs are not healthy.  · Abnormal heart rhythms. These can lead to heart failure.  What increases the risk?  The risk of heart failure increases as a person ages. This condition is also more likely to develop in people who:  · Are overweight.  · Are male.  · Smoke or chew tobacco.  · Abuse alcohol or illegal drugs.  · Have taken medicines that can damage the heart, such as chemotherapy drugs.  · Have diabetes.  · Have abnormal heart rhythms.  · Have thyroid problems.  · Have low blood counts (anemia).  What are the signs or symptoms?  Symptoms of this condition include:  · Shortness of breath with activity, such as when climbing stairs.  · A cough that does not  go away.  · Swelling of the feet, ankles, legs, or abdomen.  · Losing weight for no reason.  · Trouble breathing when lying flat (orthopnea).  · Waking from sleep because of the need to sit up and get more air.  · Rapid heartbeat.  · Tiredness (fatigue) and loss of energy.  · Feeling light-headed, dizzy, or close to fainting.  · Loss of appetite.  · Nausea.  · Waking up more often during the night to urinate (nocturia).  · Confusion.  How is this diagnosed?  This condition is diagnosed based on:  · Your medical history, symptoms, and a physical exam.  · Diagnostic tests, which may include:  ? Echocardiogram.  ? Electrocardiogram (ECG).  ? Chest X-ray.  ? Blood tests.  ? Exercise stress test.  ? Radionuclide scans.  ? Cardiac catheterization and angiogram.  How is this treated?  Treatment for this condition is aimed at managing the symptoms of heart failure.  Medicines  Treatment may include medicines that:  · Help lower blood pressure by relaxing (dilating) the blood vessels. These medicines are called ACE inhibitors (angiotensin-converting enzyme) and ARBs (angiotensin receptor blockers).  · Cause the kidneys to remove salt and water from the blood through urination (diuretics).  · Improve heart muscle strength and prevent the heart from beating too fast (beta blockers).  · Increase the force of the heartbeat (digoxin).  Healthy behavior changes         Treatment may also include making healthy lifestyle changes, such as:  · Reaching and staying at a healthy weight.  · Quitting smoking or chewing tobacco.  · Eating heart-healthy foods.  · Limiting or avoiding alcohol.  · Stopping the use of illegal drugs.  · Being physically active.    Other treatments  Other treatments may include:  · Procedures to open blocked arteries or repair damaged valves.  · Placing a pacemaker to improve heart function (cardiac resynchronization therapy).  · Placing a device to treat serious abnormal heart rhythms (implantable cardioverter  defibrillator, or ICD).  · Placing a device to improve the pumping ability of the heart (left ventricular assist device, or LVAD).  · Receiving a healthy heart from a donor (heart transplant). This is done when other treatments have not helped.  Follow these instructions at home:  · Manage other health conditions as told by your health care provider. These may include hypertension, diabetes, thyroid disease, or abnormal heart rhythms.  · Get ongoing education and support as needed. Learn as much as you can about heart failure.  · Keep all follow-up visits as told by your health care provider. This is important.  Summary  · Heart failure is a condition in which the heart has trouble pumping blood because it has become weak or stiff.  · This condition is caused by high blood pressure and other diseases of the heart and lungs.  · Symptoms of this condition include shortness of breath, tiredness (fatigue), nausea, and swelling of the feet, ankles, legs, or abdomen.  · Treatments for this condition may include medicines, lifestyle changes, and surgery.  · Manage other health conditions as told by your health care provider.  This information is not intended to replace advice given to you by your health care provider. Make sure you discuss any questions you have with your health care provider.  Document Revised: 03/06/2020 Document Reviewed: 03/06/2020  [employer] Patient Education © 2021 [employer]    Low-Sodium Eating Plan  Sodium, which is an element that makes up salt, helps you maintain a healthy balance of fluids in your body. Too much sodium can increase your blood pressure and cause fluid and waste to be held in your body.  Your health care provider or dietitian may recommend following this plan if you have high blood pressure (hypertension), kidney disease, liver disease, or heart failure. Eating less sodium can help lower your blood pressure, reduce swelling, and protect your heart, liver, and kidneys.  What are  "tips for following this plan?  Reading food labels  · The Nutrition Facts label lists the amount of sodium in one serving of the food. If you eat more than one serving, you must multiply the listed amount of sodium by the number of servings.  · Choose foods with less than 140 mg of sodium per serving.  · Avoid foods with 300 mg of sodium or more per serving.  Shopping    · Look for lower-sodium products, often labeled as \"low-sodium\" or \"no salt added.\"  · Always check the sodium content, even if foods are labeled as \"unsalted\" or \"no salt added.\"  · Buy fresh foods.  ? Avoid canned foods and pre-made or frozen meals.  ? Avoid canned, cured, or processed meats.  · Buy breads that have less than 80 mg of sodium per slice.  Cooking    · Eat more home-cooked food and less restaurant, buffet, and fast food.  · Avoid adding salt when cooking. Use salt-free seasonings or herbs instead of table salt or sea salt. Check with your health care provider or pharmacist before using salt substitutes.  · Cook with plant-based oils, such as canola, sunflower, or olive oil.  Meal planning  · When eating at a restaurant, ask that your food be prepared with less salt or no salt, if possible. Avoid dishes labeled as brined, pickled, cured, smoked, or made with soy sauce, miso, or teriyaki sauce.  · Avoid foods that contain MSG (monosodium glutamate). MSG is sometimes added to Chinese food, bouillon, and some canned foods.  · Make meals that can be grilled, baked, poached, roasted, or steamed. These are generally made with less sodium.  General information  Most people on this plan should limit their sodium intake to 1,500-2,000 mg (milligrams) of sodium each day.  What foods should I eat?  Fruits  Fresh, frozen, or canned fruit. Fruit juice.  Vegetables  Fresh or frozen vegetables. \"No salt added\" canned vegetables. \"No salt added\" tomato sauce and paste. Low-sodium or reduced-sodium tomato and vegetable juice.  Grains  Low-sodium " cereals, including oats, puffed wheat and rice, and shredded wheat. Low-sodium crackers. Unsalted rice. Unsalted pasta. Low-sodium bread. Whole-grain breads and whole-grain pasta.  Meats and other proteins  Fresh or frozen (no salt added) meat, poultry, seafood, and fish. Low-sodium canned tuna and salmon. Unsalted nuts. Dried peas, beans, and lentils without added salt. Unsalted canned beans. Eggs. Unsalted nut butters.  Dairy  Milk. Soy milk. Cheese that is naturally low in sodium, such as ricotta cheese, fresh mozzarella, or Swiss cheese. Low-sodium or reduced-sodium cheese. Cream cheese. Yogurt.  Seasonings and condiments  Fresh and dried herbs and spices. Salt-free seasonings. Low-sodium mustard and ketchup. Sodium-free salad dressing. Sodium-free light mayonnaise. Fresh or refrigerated horseradish. Lemon juice. Vinegar.  Other foods  Homemade, reduced-sodium, or low-sodium soups. Unsalted popcorn and pretzels. Low-salt or salt-free chips.  The items listed above may not be a complete list of foods and beverages you can eat. Contact a dietitian for more information.  What foods should I avoid?  Vegetables  Sauerkraut, pickled vegetables, and relishes. Olives. French fries. Onion rings. Regular canned vegetables (not low-sodium or reduced-sodium). Regular canned tomato sauce and paste (not low-sodium or reduced-sodium). Regular tomato and vegetable juice (not low-sodium or reduced-sodium). Frozen vegetables in sauces.  Grains  Instant hot cereals. Bread stuffing, pancake, and biscuit mixes. Croutons. Seasoned rice or pasta mixes. Noodle soup cups. Boxed or frozen macaroni and cheese. Regular salted crackers. Self-rising flour.  Meats and other proteins  Meat or fish that is salted, canned, smoked, spiced, or pickled. Precooked or cured meat, such as sausages or meat loaves. Eason. Ham. Pepperoni. Hot dogs. Corned beef. Chipped beef. Salt pork. Jerky. Pickled herring. Anchovies and sardines. Regular canned tuna.  Salted nuts.  Dairy  Processed cheese and cheese spreads. Hard cheeses. Cheese curds. Blue cheese. Feta cheese. String cheese. Regular cottage cheese. Buttermilk. Canned milk.  Fats and oils  Salted butter. Regular margarine. Ghee. Eason fat.  Seasonings and condiments  Onion salt, garlic salt, seasoned salt, table salt, and sea salt. Canned and packaged gravies. Worcestershire sauce. Tartar sauce. Barbecue sauce. Teriyaki sauce. Soy sauce, including reduced-sodium. Steak sauce. Fish sauce. Oyster sauce. Cocktail sauce. Horseradish that you find on the shelf. Regular ketchup and mustard. Meat flavorings and tenderizers. Bouillon cubes. Hot sauce. Pre-made or packaged marinades. Pre-made or packaged taco seasonings. Relishes. Regular salad dressings. Salsa.  Other foods  Salted popcorn and pretzels. Corn chips and puffs. Potato and tortilla chips. Canned or dried soups. Pizza. Frozen entrees and pot pies.  The items listed above may not be a complete list of foods and beverages you should avoid. Contact a dietitian for more information.  Summary  · Eating less sodium can help lower your blood pressure, reduce swelling, and protect your heart, liver, and kidneys.  · Most people on this plan should limit their sodium intake to 1,500-2,000 mg (milligrams) of sodium each day.  · Canned, boxed, and frozen foods are high in sodium. Restaurant foods, fast foods, and pizza are also very high in sodium. You also get sodium by adding salt to food.  · Try to cook at home, eat more fresh fruits and vegetables, and eat less fast food and canned, processed, or prepared foods.  This information is not intended to replace advice given to you by your health care provider. Make sure you discuss any questions you have with your health care provider.  Document Revised: 01/22/2021 Document Reviewed: 11/18/2020  [employer] Patient Education © 2021 [employer]

## 2021-07-08 NOTE — PROGRESS NOTES
"    Subjective:     Encounter Date:07/08/2021      Patient ID: Milton Rodriguez is a 60 y.o. male with a history of paroxsymal atrial flutter, combined HF, HTN, HLD, PAD, DM2.    Chief Complaint: hospital follow up  Atrial Flutter  This is a chronic problem. The current episode started more than 1 year ago. The problem occurs daily. The problem has been gradually worsening. Pertinent negatives include no chest pain, coughing, rash or weakness.   Congestive Heart Failure  Presents for follow-up visit. Associated symptoms include edema. Pertinent negatives include no chest pain, near-syncope, palpitations or shortness of breath. The symptoms have been stable.   Hypertension  This is a chronic problem. The current episode started more than 1 year ago. The problem has been gradually improving since onset. The problem is controlled. Pertinent negatives include no chest pain, malaise/fatigue, orthopnea, palpitations, PND or shortness of breath.   Hyperlipidemia  This is a chronic problem. The current episode started more than 1 year ago. The problem is controlled. Recent lipid tests were reviewed and are normal. Pertinent negatives include no chest pain or shortness of breath.     Patient presents today for a hospital follow up. He was admitted to the hospital on 6/5 with acute SOB and pulmonary edema. At the time of admission he was noted to be in atrial flutter with RVR and appeared asymptomatic from his rates. He also reported that he had been out of his lasix for 2 weeks prior to admission. He had a 2D echo while inpatient with EF noted to have been reduced to 36-40% from previously normal. He also had been off his Eliquis since an EGD in November that revealed AVMs. Hgb had dropped from 13 to 7.5 with occult stool being negative. He received 1u PRBC while inpatient. Today he reports he feels ok. He complains of dyspnea with exertion that is not chronic. He notes his legs are swollen but remain about the \"the " "same\". He does not use his lymphedema pumps. He does not weigh daily. He denies orthopnea and PND. He wears nocturnal O2 and sleeps well at night. He denies abdominal bloating. His PCP asked him to follow up with us due to his current state of tachycardia.     The following portions of the patient's history were reviewed and updated as appropriate: allergies, current medications, past family history, past medical history, past social history, past surgical history and problem list.    No Known Allergies    Current Outpatient Medications:   •  allopurinol (ZYLOPRIM) 100 MG tablet, Take 100 mg by mouth Daily., Disp: , Rfl:   •  atorvastatin (Lipitor) 80 MG tablet, Take 1 tablet by mouth Every Night., Disp: 30 tablet, Rfl: 0  •  benzonatate (TESSALON) 100 MG capsule, Take 100 mg by mouth 3 (Three) Times a Day As Needed for Cough., Disp: , Rfl:   •  budesonide-formoterol (Symbicort) 160-4.5 MCG/ACT inhaler, Inhale 2 puffs 2 (Two) Times a Day., Disp: 1 each, Rfl: 12  •  bumetanide (BUMEX) 1 MG tablet, Take 1 tablet by mouth Daily., Disp: 90 tablet, Rfl: 0  •  ferrous sulfate 325 (65 FE) MG tablet, Take 1 tablet by mouth Daily With Breakfast., Disp: 90 tablet, Rfl: 1  •  magnesium oxide (MAG-OX) 400 MG tablet, Take 400 mg by mouth., Disp: , Rfl:   •  magnesium oxide (MAGOX) 400 (241.3 Mg) MG tablet tablet, Take 400 mg by mouth Daily., Disp: , Rfl:   •  metFORMIN ER (GLUCOPHAGE-XR) 750 MG 24 hr tablet, Take 1 tablet by mouth Daily With Breakfast., Disp: 90 tablet, Rfl: 0  •  metoprolol succinate XL (TOPROL-XL) 100 MG 24 hr tablet, Take 1 tablet by mouth Daily., Disp: 90 tablet, Rfl: 1  •  pantoprazole (PROTONIX) 40 MG EC tablet, Take 40 mg by mouth Daily., Disp: , Rfl:   •  SITagliptin (Januvia) 100 MG tablet, Take 1 tablet by mouth Daily., Disp: 90 tablet, Rfl: 0  •  vitamin D (ERGOCALCIFEROL) 1.25 MG (00550 UT) capsule capsule, Take 50,000 Units by mouth Every 7 (Seven) Days.  , Disp: , Rfl:   •  amiodarone (PACERONE) " 200 MG tablet, Take 1 tablet by mouth 2 (Two) Times a Day., Disp: 30 tablet, Rfl: 5  •  apixaban (ELIQUIS) 5 MG tablet tablet, Take 1 tablet by mouth Every 12 (Twelve) Hours., Disp: 60 tablet, Rfl:   Past Medical History:   Diagnosis Date   • Abnormal PFT    • Acute diastolic heart failure (CMS/[facility])    • Arrhythmia     a-fib/a-flutter   • Atrial fibrillation (CMS/[facility])    • Atrial flutter (CMS/[facility])    • Atrial flutter by electrocardiogram (CMS/HCC)     11/2014- ECHO: EF 55-60%, mild PHTN, left atrium mild-moderately dilated, mild-moderate TR.   • Cerebrovascular accident (CVA) (CMS/[facility]) 7/29/2020   • CHF (congestive heart failure) (CMS/[facility])    • Diabetes mellitus (CMS/[facility])    • Edema    • Essential hypertension    • Hyperlipidemia    • Hypertension    • Iron deficiency anemia    • Loose bowel movement    • PAD (peripheral artery disease) (CMS/[facility])    • Pulmonary HTN (CMS/[facility])    • Tobacco abuse    • Type 2 diabetes mellitus with autonomic neuropathy (CMS/[facility])        Social History     Socioeconomic History   • Marital status:      Spouse name: Not on file   • Number of children: Not on file   • Years of education: Not on file   • Highest education level: Not on file   Tobacco Use   • Smoking status: Current Some Day Smoker     Packs/day: 0.25     Years: 30.00     Pack years: 7.50     Types: Cigarettes   • Smokeless tobacco: Never Used   Vaping Use   • Vaping Use: Never used   Substance and Sexual Activity   • Alcohol use: Not Currently   • Drug use: No   • Sexual activity: Defer       Review of Systems   Constitutional: Negative for malaise/fatigue, weight gain and weight loss.   Cardiovascular: Positive for dyspnea on exertion and leg swelling. Negative for chest pain, irregular heartbeat, near-syncope, orthopnea, palpitations, paroxysmal nocturnal dyspnea and syncope.   Respiratory: Negative for cough, shortness of breath, sleep disturbances due to breathing, sputum production and wheezing.    Skin: Negative  for dry skin, flushing, itching and rash.   Gastrointestinal: Negative for hematemesis and hematochezia.   Neurological: Negative for dizziness, light-headedness, loss of balance and weakness.   All other systems reviewed and are negative.         Objective:     Vitals reviewed.   Constitutional:       General: Not in acute distress.     Appearance: Healthy appearance. Well-developed. Not diaphoretic.   Eyes:      General: No scleral icterus.     Conjunctiva/sclera: Conjunctivae normal.      Pupils: Pupils are equal, round, and reactive to light.   HENT:      Head: Normocephalic.    Mouth/Throat:      Pharynx: No oropharyngeal exudate.   Neck:      Vascular: JVD present. No JVR.      Comments: JVD more prominent on right than left side  Pulmonary:      Effort: Pulmonary effort is normal. No respiratory distress.      Breath sounds: Examination of the right-upper field reveals wheezing and rhonchi. Examination of the left-upper field reveals wheezing and rhonchi. Examination of the right-lower field reveals wheezing and rhonchi. Examination of the left-lower field reveals wheezing and rhonchi. Wheezing present. Rhonchi present. No rales.   Chest:      Chest wall: Not tender to palpatation.   Cardiovascular:      Tachycardia present. Irregularly irregular rhythm.   Pulses:     Intact distal pulses.   Edema:     Peripheral edema present.     Thigh: bilateral 2+ non-pitting edema of the thigh.     Pretibial: bilateral 2+ non-pitting edema of the pretibial area.     Ankle: bilateral 2+ non-pitting edema of the ankle.     Feet: bilateral 2+ non-pitting edema of the feet.  Abdominal:      General: Bowel sounds are normal. There is no distension.      Palpations: Abdomen is soft.      Tenderness: There is no abdominal tenderness.   Musculoskeletal: Normal range of motion.      Cervical back: Normal range of motion and neck supple. Skin:     General: Skin is warm and dry.      Coloration: Skin is not pale.      Findings: No  "erythema or rash.   Neurological:      Mental Status: Alert, oriented to person, place, and time and oriented to person, place and time.      Deep Tendon Reflexes: Reflexes are normal and symmetric.   Psychiatric:         Behavior: Behavior normal.             ECG 12 Lead    Date/Time: 7/9/2021 9:14 AM  Performed by: Kiki Desir APRN  Authorized by: Kiki Desir APRN   Comparison: compared with previous ECG from 6/5/2021  Similar to previous ECG  Rhythm: atrial flutter  Rate: tachycardic  BPM: 117  Conduction comments: 2:1 AV conduction  QRS axis: normal    Clinical impression: abnormal EKG          BP 94/56   Pulse 117   Ht 170.2 cm (67\")   Wt 76.7 kg (169 lb)   SpO2 96%   BMI 26.47 kg/m²     Lab Review:   I have reviewed previous office notes, recent labs and recent cardiac testing.     Lab Results   Component Value Date    CHOL 113 06/08/2021    TRIG 62 06/08/2021    HDL 66 (H) 06/08/2021    LDL 33 06/08/2021     Results for orders placed during the hospital encounter of 06/05/21    Adult Transthoracic Echo Complete W/ Cont if Necessary Per Protocol    Interpretation Summary  · Left ventricular wall thickness is consistent with mild to moderate concentric hypertrophy.  · Left ventricular ejection fraction appears to be 36 - 40%. Left ventricular systolic function is moderately decreased.  · Left ventricular diastolic dysfunction is noted.  · The right ventricular cavity is mildly dilated. Systolic function is normal.  · There is biatrial enlargement.  · Moderate mitral valve regurgitation is present.  · Moderate tricuspid valve regurgitation is present.  · Estimated right ventricular systolic pressure from tricuspid regurgitation is mildly elevated (35-45 mmHg).          Assessment:          Diagnosis Plan   1. Atrial flutter with rapid ventricular response (CMS/HCC)  Full Pulmonary Function Test With Bronchodilator    Ambulatory Referral to Cardiac Electrophysiology   2. Acute on chronic combined " systolic and diastolic congestive heart failure (CMS/HCC)     3. Anemia, unspecified type     4. Stage 3b chronic kidney disease (CMS/HCC)     5. Essential hypertension     6. Mixed hyperlipidemia     7. Tobacco abuse            Plan:       1. Aflutter- currently tachycardic but asymptomatic. Not currently anticoagulated due to previous bleeding AVMs. Unable to increase rate controlling medications due to hypotension. Due to recently reduced EF, will stop Multaq and start Amiodarone 200mg BID. Start Eliquis 5mg BID, [id number] 3, HAS-BLED 3. Will discuss with Dr. Patel about possible reduced dose Eliquis if anemia or bleeding become an issue. Will refer to EP as he presented in decompensated HF due to atrial flutter and unable to increase rate controlling meds. Will check baseline PFTs due to starting Amio. Cardioversion in 4 weeks.   2. CHF- recent reduction in EF, likely tachycardia induced. Will plan to recheck 2D echo after restoration of NSR. Presence of class 3 symptoms. Appears fairly compensated. He has ESPINO which is likely due to his current state of tachycardia. He has chronic swelling that is no worse than previous. He has JVD. Absence of orthopnea and PND. Weight appears to be 5lbs up over 1 month from his hospital d/c wt and his wt in office currently. Continue BB and Bumex. Will plan to initiate ACEI/ARB after restoration of NSR given his current state of hypotension. Educated to weigh daily and call with significant weight gain. Reviewed signs and symptoms of CHF and what to report with the patient. Patient instructed to restrict sodium and weigh daily. Report weight gain of greater than 2 lbs overnight or 5 lbs in 1 week. Pt verbalized understanding of instructions and plan of care.   3. Anemia- previously noted and received 1u PRBC while inpatient. No active bleeding noted on EGD but GI recommended cessation of NSAIDs and anticoagulation due to presence of AVMs. Also has CKD which is likely  contributing to his anemia as well. He is a poor candidate for long term anticoagulation. I have discussed Watchman placement and they would like to proceed. Will contact Eunice Barclay RN.   4. CKD- last creatinine stable at 1.7. will continue to monitor.   5. HTN- controlled with current issues of hypotension. Will continue to monitor.   6. HLD- continue statin. Followed by PCP.   7. Tobacco abuse- Milton Rodriguez  reports that he has been smoking cigarettes. He has a 7.50 pack-year smoking history. He has never used smokeless tobacco.. I have educated him on the risk of diseases from using tobacco products such as cancer, COPD and heart disease.     I advised him to quit and he is not willing to quit.    I spent 3  minutes counseling the patient.      Follow up in 6 weeks or sooner if symptoms worsen.     I spent 40 minutes caring for Milton on this date of service. This time includes time spent by me in the following activities:preparing for the visit, reviewing tests, obtaining and/or reviewing a separately obtained history, performing a medically appropriate examination and/or evaluation , counseling and educating the patient/family/caregiver, ordering medications, tests, or procedures, documenting information in the medical record, independently interpreting results and communicating that information with the patient/family/caregiver and care coordination     I spent 5 minutes on the separately reported service of EKG interpretation. This time is not included in the time used to support the E/M service also reported today.    Current outpatient and discharge medications have been reconciled for the patient.  Reviewed by: DARINE Lewis

## 2021-07-08 NOTE — HOME HEALTH
"PT NEEDS APPOINTMENT WITH A PODIATRIST ON [facility]  JAMIE ESPINAL - PT NEEDS NEW RX WITH CORRECT DOSE PT IS TAKING 2 PER DAY METFORMIN  BUMETANIDE PT NEEDS CURRENT RX -[facility] -  DR KIM    PT IS OUTSIDE UPON ARRIVAL, PT IS ALERT PLEASANT AND ORIENTED, PT DENIES ANY PAIN OR COMPLAINTS AT THIS TIME, PT STATES HE IS \"DOING JUST FINE\" PT IS NOT WEARING O2 AT THE TIME OF VISIT, REVIEWED AND EDUCATED PT ON O2 USE ANS SAFETY PT V/U. LUNGS CLEAR BILAT AT THIS TIME, ABD SOFT AND NONTENDER, BS PRESENT X ALL FIELDS, BM WNL PER PT, PT DENIES ANY S/S OF UTI AT THIS TIME, NO EDEMA NOTED TO LOWER EXT. PT STATES HE HAS A FOLLOW UP APPOINTMENT TOMORROW AND ANOTHER APPOINTMENT ON FRIDAY. PT HAS A GOOD UNDERSTANDING OF MEDICATIONS DOSE PURPOSE ACTION AND POSSIBLE SIDE EFFECTS, DIABETIC EDUCATION REVIEWED PT V/U"

## 2021-07-09 ENCOUNTER — READMISSION MANAGEMENT (OUTPATIENT)
Dept: CALL CENTER | Facility: HOSPITAL | Age: 61
End: 2021-07-09

## 2021-07-09 ENCOUNTER — PREP FOR SURGERY (OUTPATIENT)
Dept: OTHER | Facility: HOSPITAL | Age: 61
End: 2021-07-09

## 2021-07-09 ENCOUNTER — TELEPHONE (OUTPATIENT)
Dept: CARDIOLOGY | Facility: CLINIC | Age: 61
End: 2021-07-09

## 2021-07-09 DIAGNOSIS — I48.92 ATRIAL FLUTTER WITH RAPID VENTRICULAR RESPONSE (HCC): Primary | ICD-10-CM

## 2021-07-09 PROBLEM — I50.32 CHRONIC DIASTOLIC CHF (CONGESTIVE HEART FAILURE) (HCC): Status: RESOLVED | Noted: 2020-07-29 | Resolved: 2021-07-09

## 2021-07-09 PROCEDURE — 93000 ELECTROCARDIOGRAM COMPLETE: CPT | Performed by: NURSE PRACTITIONER

## 2021-07-09 RX ORDER — SODIUM CHLORIDE 0.9 % (FLUSH) 0.9 %
3 SYRINGE (ML) INJECTION EVERY 12 HOURS SCHEDULED
Status: CANCELLED | OUTPATIENT
Start: 2021-07-09

## 2021-07-09 RX ORDER — SODIUM CHLORIDE 0.9 % (FLUSH) 0.9 %
10 SYRINGE (ML) INJECTION AS NEEDED
Status: CANCELLED | OUTPATIENT
Start: 2021-07-09

## 2021-07-09 NOTE — OUTREACH NOTE
Medical Week 4 Survey      Responses   [facility] patient discharged from?  [place]   Does the patient have one of the following disease processes/diagnoses(primary or secondary)?  Other   Week 4 attempt successful?  Yes   Call start time  1420   Call end time  1424   Discharge diagnosis  Acute kidney injury superimposed on chronic kidney disease    Meds reviewed with patient/caregiver?  Yes   Is the patient having any side effects they believe may be caused by any medication additions or changes?  No   Is the patient taking all medications as directed (includes completed medication regime)?  Yes   Has the patient kept scheduled appointments due by today?  Yes   Comments  Pt is being scheduled for a Cardioversion    Is the patient still receiving Home Health Services?  Yes   Home health comments  HH was there yesterday    DME comments  Just uses O2 at night   Psychosocial issues?  No   What is the patient's perception of their health status since discharge?  Improving   Is the patient/caregiver able to teach back signs and symptoms related to disease process for when to call PCP?  Yes   Is the patient/caregiver able to teach back signs and symptoms related to disease process for when to call 911?  Yes   Is the patient/caregiver able to teach back the hierarchy of who to call/visit for symptoms/problems? PCP, Specialist, Home health nurse, Urgent Care, ED, 911  Yes   If the patient is a current smoker, are they able to teach back resources for cessation?  Not a smoker   Week 4 Call Completed?  Yes   Would the patient like one additional call?  No   Graduated  Yes   Is the patient interested in additional calls from an ambulatory ?  NOTE:  applies to high risk patients requiring additional follow-up.  No   Did the patient feel the follow up calls were helpful during their recovery period?  Yes   Was the number of calls appropriate?  Yes   Does the patient have an Advance Directive or Living Will?  No    Is the patient/caregiver familiar with Advance Care Planning?  Yes   Would the patient like more information on Advance Care Planning?  No   Wrap up additional comments  pt states he is doing better.           Dulce Lemus RN

## 2021-07-09 NOTE — TELEPHONE ENCOUNTER
Call was placed to patient per the request of DARIEN Chavez.  Advised patient to stay on Eliquis 5 mg BID until cardioversion.  At that time we will check a CBC and if levels have decreased we will consider a lower dose.  Patient expressed understanding.

## 2021-07-14 ENCOUNTER — TRANSCRIBE ORDERS (OUTPATIENT)
Dept: LAB | Facility: HOSPITAL | Age: 61
End: 2021-07-14

## 2021-07-19 ENCOUNTER — APPOINTMENT (OUTPATIENT)
Dept: LAB | Facility: HOSPITAL | Age: 61
End: 2021-07-19

## 2021-07-19 ENCOUNTER — TRANSCRIBE ORDERS (OUTPATIENT)
Dept: ADMINISTRATIVE | Facility: HOSPITAL | Age: 61
End: 2021-07-19

## 2021-07-19 DIAGNOSIS — Z11.59 SCREENING FOR VIRAL DISEASE: Primary | ICD-10-CM

## 2021-07-20 ENCOUNTER — LAB (OUTPATIENT)
Dept: LAB | Facility: HOSPITAL | Age: 61
End: 2021-07-20

## 2021-07-20 LAB — SARS-COV-2 ORF1AB RESP QL NAA+PROBE: NOT DETECTED

## 2021-07-20 PROCEDURE — C9803 HOPD COVID-19 SPEC COLLECT: HCPCS | Performed by: NURSE PRACTITIONER

## 2021-07-20 PROCEDURE — U0004 COV-19 TEST NON-CDC HGH THRU: HCPCS | Performed by: NURSE PRACTITIONER

## 2021-07-21 ENCOUNTER — DOCUMENTATION (OUTPATIENT)
Dept: CT IMAGING | Facility: HOSPITAL | Age: 61
End: 2021-07-21

## 2021-07-22 ENCOUNTER — HOSPITAL ENCOUNTER (OUTPATIENT)
Dept: PULMONOLOGY | Facility: HOSPITAL | Age: 61
Discharge: HOME | End: 2021-07-22
Admitting: NURSE PRACTITIONER

## 2021-07-22 DIAGNOSIS — I48.92 ATRIAL FLUTTER WITH RAPID VENTRICULAR RESPONSE (HCC): ICD-10-CM

## 2021-07-22 PROCEDURE — 94060 EVALUATION OF WHEEZING: CPT

## 2021-07-22 PROCEDURE — 94726 PLETHYSMOGRAPHY LUNG VOLUMES: CPT | Performed by: INTERNAL MEDICINE

## 2021-07-22 PROCEDURE — 94729 DIFFUSING CAPACITY: CPT | Performed by: INTERNAL MEDICINE

## 2021-07-22 PROCEDURE — 94729 DIFFUSING CAPACITY: CPT

## 2021-07-22 PROCEDURE — 94060 EVALUATION OF WHEEZING: CPT | Performed by: INTERNAL MEDICINE

## 2021-07-22 PROCEDURE — 94726 PLETHYSMOGRAPHY LUNG VOLUMES: CPT

## 2021-07-22 RX ORDER — ALBUTEROL SULFATE 2.5 MG/3ML
2.5 SOLUTION RESPIRATORY (INHALATION) ONCE
Status: COMPLETED | OUTPATIENT
Start: 2021-07-22 | End: 2021-07-22

## 2021-07-22 RX ADMIN — ALBUTEROL SULFATE 2.5 MG: 2.5 SOLUTION RESPIRATORY (INHALATION) at 07:28

## 2021-07-29 ENCOUNTER — TELEPHONE (OUTPATIENT)
Dept: CARDIOLOGY | Facility: CLINIC | Age: 61
End: 2021-07-29

## 2021-07-29 DIAGNOSIS — R94.2 ABNORMAL PFTS: Primary | ICD-10-CM

## 2021-07-29 NOTE — TELEPHONE ENCOUNTER
----- Message from DARIEN Lewis sent at 7/26/2021  2:55 PM CDT -----  Please let patient know his PFTs show severe obstructive defect. If he does not have a pulmonologist, he needs one. I will send a referral if needed

## 2021-07-29 NOTE — TELEPHONE ENCOUNTER
The patient is called and notified of the PFT result and he voices understanding.  He would like a referral to a pulmonologist as he does not have one or know of one.  Gaby Matias MA

## 2021-07-30 ENCOUNTER — HOME CARE VISIT (OUTPATIENT)
Dept: HOME HEALTH SERVICES | Facility: CLINIC | Age: 61
End: 2021-07-30

## 2021-08-03 ENCOUNTER — TRANSCRIBE ORDERS (OUTPATIENT)
Dept: LAB | Facility: HOSPITAL | Age: 61
End: 2021-08-03

## 2021-08-03 DIAGNOSIS — Z01.818 PREOPERATIVE TESTING: Primary | ICD-10-CM

## 2021-08-06 NOTE — PROGRESS NOTES
Background:  Pt w dyspnea, hx chf, severe obst pft 2021   Chief Complaint  abnormal PFT    Subjective    History of Present Illness       Milton Rodriguez presents to [facility].  He has a cardioversion scheduled in the am.  He got swelling in the legs that moved up the chest and then he got more dyspneic.  He was told that there is a spot at the top of the lungs.  A PFT was abnormal.  He still gets out of breath.  Sometimes he coughs.  No wheezing.  An inhaler was equivocally helpful.  He is cutting back on his smoking. No prior history of asthma.  He uses oxygen at night.       has a past medical history of Abnormal PFT, Acute diastolic heart failure (CMS/[facility]), Arrhythmia, Atrial fibrillation (CMS/[facility]), Atrial flutter (CMS/[facility]), Atrial flutter by electrocardiogram (CMS/[facility]), Cerebrovascular accident (CVA) (CMS/[facility]) (7/29/2020), CHF (congestive heart failure) (CMS/[facility]), Diabetes mellitus (CMS/[facility]), Edema, Essential hypertension, Hyperlipidemia, Hypertension, Iron deficiency anemia, Loose bowel movement, PAD (peripheral artery disease) (CMS/[facility]), Pulmonary HTN (CMS/[facility]), Stage 3 severe COPD by GOLD classification (CMS/[facility]) (8/16/2021), Tobacco abuse, and Type 2 diabetes mellitus with autonomic neuropathy (CMS/[facility]).   has a past surgical history that includes Leg Revascularization (Right); Cataract extraction w/ intraocular lens  implant, bilateral; Abdominal surgery; Cardioversion (11/13/2014); Colonoscopy (09/24/2012); Esophagogastroduodenoscopy (09/27/2012); Colonoscopy (N/A, 3/9/2018); Esophagogastroduodenoscopy (N/A, 3/9/2018); Carotid Endarterectomy (Right, 9/14/2020); and Esophagogastroduodenoscopy (N/A, 11/13/2020).  family history includes Diabetes in his brother, brother, father, mother, and another family member; Heart disease in his brother, brother, brother and another family member; Heart failure in his brother; Hypertension in an other family  "member; Kidney disease in his brother, mother, and another family member; Kidney failure in his mother; Liver disease in his brother; No Known Problems in his brother.   reports that he has been smoking cigarettes. He has a 7.50 pack-year smoking history. He has never used smokeless tobacco. He reports previous alcohol use. He reports that he does not use drugs.  No Known Allergies    Objective     Vital Signs:   /78   Pulse 97   Ht 170.2 cm (67\")   Wt 83.5 kg (184 lb)   SpO2 97% Comment: NELY  BMI 28.82 kg/m²   Physical Exam  Constitutional:       Appearance: Normal appearance. He is not ill-appearing or diaphoretic.   Eyes:      General: No scleral icterus.     Extraocular Movements: Extraocular movements intact.      Pupils: Pupils are equal, round, and reactive to light.   Cardiovascular:      Rate and Rhythm: Rhythm irregular.   Pulmonary:      Effort: Pulmonary effort is normal. Prolonged expiration present. No accessory muscle usage, respiratory distress or retractions.      Breath sounds: Decreased breath sounds present. No wheezing, rhonchi or rales.   Skin:     Findings: No erythema or rash.   Neurological:      Mental Status: He is alert.        Result Review  Data Reviewed:{ Labs  Result Review  Imaging  Med Tab  Media :23}     Full Pulmonary Function Test With Bronchodilator (07/22/2021 07:04) severe obst mild reduced dlco  XR Chest 1 View (06/06/2021 03:10) My interpretation of radiograph: cardiomegaly, cephalization  CT Angiogram Chest (06/05/2021 17:07)  My interpretation of radiograph: bilat effusions, mild edema, passive atelectasis RLL                 Assessment and Plan {CC Problem List  Visit Diagnosis  ROS  Review (Popup)  Health Maintenance  Quality  BestPractice  Medications  SmartSets  SnapShot Encounters  Media :23}   Problem List Items Addressed This Visit        Pulmonary Problems    Multiple lung nodules    Relevant Orders    CT Chest Without Contrast Diagnostic "    Stage 3 severe COPD by GOLD classification (CMS/[facility]) - Primary    Relevant Medications    Budeson-Glycopyrrol-Formoterol (BREZTRI) 160-9-4.8 MCG/ACT aerosol inhaler    Shortness of breath       Other    Chronic combined systolic (congestive) and diastolic (congestive) heart failure (CMS/[facility])      we will change Symbicort to breztri for 1 week and then resume Symbicort.  Await cardioversion.  Suspect dyspnea and edema are due to combination of heart and lung disease.  Lung nodules warrant 3 mos follow up per Fleischner guidelines.  Will plan for September.  Phone follow up.  Follow Up {Instructions Charge Capture  Follow-up Communications :23}   No follow-ups on file.  Patient was given instructions and counseling regarding his condition or for health maintenance advice. Please see specific information pulled into the AVS if appropriate.    Electronically signed by Rocky Patel MD, 8/16/2021, 15:46 CDT

## 2021-08-09 RX ORDER — AMIODARONE HYDROCHLORIDE 200 MG/1
200 TABLET ORAL 2 TIMES DAILY
Qty: 180 TABLET | Refills: 4 | Status: SHIPPED | OUTPATIENT
Start: 2021-08-09 | End: 2021-11-12

## 2021-08-10 ENCOUNTER — LAB (OUTPATIENT)
Dept: LAB | Facility: HOSPITAL | Age: 61
End: 2021-08-10

## 2021-08-10 ENCOUNTER — OFFICE VISIT (OUTPATIENT)
Dept: PULMONOLOGY | Facility: CLINIC | Age: 61
End: 2021-08-10

## 2021-08-10 ENCOUNTER — TRANSCRIBE ORDERS (OUTPATIENT)
Dept: LAB | Facility: HOSPITAL | Age: 61
End: 2021-08-10

## 2021-08-10 VITALS
BODY MASS INDEX: 28.88 KG/M2 | WEIGHT: 184 LBS | DIASTOLIC BLOOD PRESSURE: 78 MMHG | HEART RATE: 97 BPM | SYSTOLIC BLOOD PRESSURE: 118 MMHG | HEIGHT: 67 IN | OXYGEN SATURATION: 97 %

## 2021-08-10 DIAGNOSIS — J44.9 STAGE 3 SEVERE COPD BY GOLD CLASSIFICATION (HCC): Primary | ICD-10-CM

## 2021-08-10 DIAGNOSIS — I50.42 CHRONIC COMBINED SYSTOLIC (CONGESTIVE) AND DIASTOLIC (CONGESTIVE) HEART FAILURE (HCC): ICD-10-CM

## 2021-08-10 DIAGNOSIS — Z01.818 PREOPERATIVE TESTING: Primary | ICD-10-CM

## 2021-08-10 DIAGNOSIS — R06.02 SHORTNESS OF BREATH: ICD-10-CM

## 2021-08-10 DIAGNOSIS — R91.8 MULTIPLE LUNG NODULES: ICD-10-CM

## 2021-08-10 LAB — SARS-COV-2 RNA PNL SPEC NAA+PROBE: NOT DETECTED

## 2021-08-10 PROCEDURE — C9803 HOPD COVID-19 SPEC COLLECT: HCPCS | Performed by: NURSE PRACTITIONER

## 2021-08-10 PROCEDURE — 99214 OFFICE O/P EST MOD 30 MIN: CPT | Performed by: INTERNAL MEDICINE

## 2021-08-10 PROCEDURE — 87635 SARS-COV-2 COVID-19 AMP PRB: CPT | Performed by: NURSE PRACTITIONER

## 2021-08-10 RX ORDER — INSULIN GLARGINE 100 [IU]/ML
9 INJECTION, SOLUTION SUBCUTANEOUS NIGHTLY
COMMUNITY
Start: 2021-07-28

## 2021-08-11 ENCOUNTER — HOSPITAL ENCOUNTER (OUTPATIENT)
Dept: CARDIOLOGY | Facility: HOSPITAL | Age: 61
Setting detail: HOSPITAL OUTPATIENT SURGERY
Discharge: HOME | End: 2021-08-11
Admitting: NURSE PRACTITIONER

## 2021-08-11 DIAGNOSIS — I48.92 ATRIAL FLUTTER WITH RAPID VENTRICULAR RESPONSE (HCC): ICD-10-CM

## 2021-08-11 LAB
QT INTERVAL: 388 MS
QTC INTERVAL: 497 MS

## 2021-08-11 PROCEDURE — 93005 ELECTROCARDIOGRAM TRACING: CPT | Performed by: NURSE PRACTITIONER

## 2021-08-11 PROCEDURE — 93010 ELECTROCARDIOGRAM REPORT: CPT | Performed by: INTERNAL MEDICINE

## 2021-08-11 RX ORDER — SODIUM CHLORIDE 0.9 % (FLUSH) 0.9 %
3 SYRINGE (ML) INJECTION EVERY 12 HOURS SCHEDULED
Status: DISCONTINUED | OUTPATIENT
Start: 2021-08-11 | End: 2021-08-12 | Stop reason: HOSPADM

## 2021-08-11 RX ORDER — SODIUM CHLORIDE 0.9 % (FLUSH) 0.9 %
10 SYRINGE (ML) INJECTION AS NEEDED
Status: DISCONTINUED | OUTPATIENT
Start: 2021-08-11 | End: 2021-08-12 | Stop reason: HOSPADM

## 2021-08-13 LAB
ANION GAP SERPL CALCULATED.3IONS-SCNC: 14 MMOL/L (ref 7–19)
BUN BLDV-MCNC: 36 MG/DL (ref 8–23)
CALCIUM SERPL-MCNC: 8 MG/DL (ref 8.8–10.2)
CHLORIDE BLD-SCNC: 101 MMOL/L (ref 98–111)
CO2: 26 MMOL/L (ref 22–29)
CREAT SERPL-MCNC: 2 MG/DL (ref 0.5–1.2)
GFR AFRICAN AMERICAN: 41
GFR NON-AFRICAN AMERICAN: 34
GLUCOSE BLD-MCNC: 370 MG/DL (ref 74–109)
POTASSIUM SERPL-SCNC: 2.9 MMOL/L (ref 3.5–5)
SODIUM BLD-SCNC: 141 MMOL/L (ref 136–145)

## 2021-08-16 PROBLEM — I50.42 CHRONIC COMBINED SYSTOLIC (CONGESTIVE) AND DIASTOLIC (CONGESTIVE) HEART FAILURE: Status: ACTIVE | Noted: 2019-01-14

## 2021-08-16 PROBLEM — R06.02 SHORTNESS OF BREATH: Status: ACTIVE | Noted: 2021-08-16

## 2021-08-16 PROBLEM — J44.9 STAGE 3 SEVERE COPD BY GOLD CLASSIFICATION (HCC): Status: ACTIVE | Noted: 2021-08-16

## 2021-08-16 PROBLEM — R91.8 MULTIPLE LUNG NODULES: Status: ACTIVE | Noted: 2021-08-16

## 2021-08-24 ENCOUNTER — APPOINTMENT (OUTPATIENT)
Dept: CT IMAGING | Facility: HOSPITAL | Age: 61
End: 2021-08-24

## 2021-08-24 ENCOUNTER — HOSPITAL ENCOUNTER (INPATIENT)
Facility: HOSPITAL | Age: 61
LOS: 3 days | Discharge: HOME | End: 2021-08-27
Attending: INTERNAL MEDICINE | Admitting: INTERNAL MEDICINE

## 2021-08-24 ENCOUNTER — APPOINTMENT (OUTPATIENT)
Dept: GENERAL RADIOLOGY | Facility: HOSPITAL | Age: 61
End: 2021-08-24

## 2021-08-24 DIAGNOSIS — Z78.9 DECREASED ACTIVITIES OF DAILY LIVING (ADL): ICD-10-CM

## 2021-08-24 DIAGNOSIS — R13.10 DYSPHAGIA, UNSPECIFIED TYPE: ICD-10-CM

## 2021-08-24 DIAGNOSIS — Z74.09 IMPAIRED MOBILITY: ICD-10-CM

## 2021-08-24 DIAGNOSIS — K92.2 GASTROINTESTINAL HEMORRHAGE, UNSPECIFIED GASTROINTESTINAL HEMORRHAGE TYPE: Primary | ICD-10-CM

## 2021-08-24 DIAGNOSIS — R18.8 OTHER ASCITES: ICD-10-CM

## 2021-08-24 DIAGNOSIS — I50.9 ACUTE CONGESTIVE HEART FAILURE, UNSPECIFIED HEART FAILURE TYPE (HCC): ICD-10-CM

## 2021-08-24 DIAGNOSIS — I95.9 HYPOTENSION, UNSPECIFIED HYPOTENSION TYPE: ICD-10-CM

## 2021-08-24 DIAGNOSIS — Z79.01 CHRONIC ANTICOAGULATION: ICD-10-CM

## 2021-08-24 PROBLEM — N17.9 ACUTE RENAL FAILURE SUPERIMPOSED ON STAGE 3 CHRONIC KIDNEY DISEASE: Status: ACTIVE | Noted: 2021-08-24

## 2021-08-24 PROBLEM — D62 ACUTE BLOOD LOSS ANEMIA: Status: ACTIVE | Noted: 2021-08-24

## 2021-08-24 PROBLEM — N17.9 ACUTE KIDNEY INJURY SUPERIMPOSED ON CHRONIC KIDNEY DISEASE (HCC): Status: RESOLVED | Noted: 2021-06-08 | Resolved: 2021-08-24

## 2021-08-24 PROBLEM — N18.30 ACUTE RENAL FAILURE SUPERIMPOSED ON STAGE 3 CHRONIC KIDNEY DISEASE (HCC): Status: ACTIVE | Noted: 2021-08-24

## 2021-08-24 PROBLEM — N18.9 ACUTE KIDNEY INJURY SUPERIMPOSED ON CHRONIC KIDNEY DISEASE: Status: RESOLVED | Noted: 2021-06-08 | Resolved: 2021-08-24

## 2021-08-24 LAB
ABO GROUP BLD: NORMAL
ALBUMIN SERPL-MCNC: 3.5 G/DL (ref 3.5–5.2)
ALBUMIN/GLOB SERPL: 1.3 G/DL
ALP SERPL-CCNC: 217 U/L (ref 39–117)
ALT SERPL W P-5'-P-CCNC: 49 U/L (ref 1–41)
AMMONIA BLD-SCNC: 14 UMOL/L (ref 16–60)
ANION GAP SERPL CALCULATED.3IONS-SCNC: 14 MMOL/L (ref 5–15)
APTT PPP: 29.7 SECONDS (ref 24.1–35)
AST SERPL-CCNC: 44 U/L (ref 1–40)
BASOPHILS # BLD AUTO: 0.03 10*3/MM3 (ref 0–0.2)
BASOPHILS NFR BLD AUTO: 0.4 % (ref 0–1.5)
BILIRUB SERPL-MCNC: 0.4 MG/DL (ref 0–1.2)
BLD GP AB SCN SERPL QL: NEGATIVE
BUN SERPL-MCNC: 87 MG/DL (ref 8–23)
BUN/CREAT SERPL: 34.4 (ref 7–25)
CALCIUM SPEC-SCNC: 7.9 MG/DL (ref 8.6–10.5)
CHLORIDE SERPL-SCNC: 89 MMOL/L (ref 98–107)
CO2 SERPL-SCNC: 35 MMOL/L (ref 22–29)
CREAT SERPL-MCNC: 2.53 MG/DL (ref 0.76–1.27)
D-LACTATE SERPL-SCNC: 1.8 MMOL/L (ref 0.5–2)
DEPRECATED RDW RBC AUTO: 78.8 FL (ref 37–54)
DEVELOPER EXPIRATION DATE: ABNORMAL
DEVELOPER LOT NUMBER: 203
EOSINOPHIL # BLD AUTO: 0.09 10*3/MM3 (ref 0–0.4)
EOSINOPHIL NFR BLD AUTO: 1.3 % (ref 0.3–6.2)
ERYTHROCYTE [DISTWIDTH] IN BLOOD BY AUTOMATED COUNT: 21.7 % (ref 12.3–15.4)
ETHANOL UR QL: <0.01 %
EXPIRATION DATE: ABNORMAL
FECAL OCCULT BLOOD SCREEN, POC: POSITIVE
FERRITIN SERPL-MCNC: 78.97 NG/ML (ref 30–400)
GFR SERPL CREATININE-BSD FRML MDRD: 32 ML/MIN/1.73
GLOBULIN UR ELPH-MCNC: 2.8 GM/DL
GLUCOSE BLDC GLUCOMTR-MCNC: 79 MG/DL (ref 70–130)
GLUCOSE SERPL-MCNC: 108 MG/DL (ref 65–99)
HCT VFR BLD AUTO: 16.7 % (ref 37.5–51)
HGB BLD-MCNC: 5.2 G/DL (ref 13–17.7)
IMM GRANULOCYTES # BLD AUTO: 0.03 10*3/MM3 (ref 0–0.05)
IMM GRANULOCYTES NFR BLD AUTO: 0.4 % (ref 0–0.5)
INR PPP: 1.36 (ref 0.91–1.09)
IRON 24H UR-MRATE: 32 MCG/DL (ref 59–158)
IRON SATN MFR SERPL: 8 % (ref 20–50)
LIPASE SERPL-CCNC: 45 U/L (ref 13–60)
LYMPHOCYTES # BLD AUTO: 0.32 10*3/MM3 (ref 0.7–3.1)
LYMPHOCYTES NFR BLD AUTO: 4.7 % (ref 19.6–45.3)
Lab: 203
MAGNESIUM SERPL-MCNC: 2.3 MG/DL (ref 1.6–2.4)
MCH RBC QN AUTO: 31.5 PG (ref 26.6–33)
MCHC RBC AUTO-ENTMCNC: 31.1 G/DL (ref 31.5–35.7)
MCV RBC AUTO: 101.2 FL (ref 79–97)
MONOCYTES # BLD AUTO: 0.97 10*3/MM3 (ref 0.1–0.9)
MONOCYTES NFR BLD AUTO: 14.1 % (ref 5–12)
NEGATIVE CONTROL: NEGATIVE
NEUTROPHILS NFR BLD AUTO: 5.42 10*3/MM3 (ref 1.7–7)
NEUTROPHILS NFR BLD AUTO: 79.1 % (ref 42.7–76)
NRBC BLD AUTO-RTO: 0 /100 WBC (ref 0–0.2)
NT-PROBNP SERPL-MCNC: ABNORMAL PG/ML (ref 0–900)
PLATELET # BLD AUTO: 217 10*3/MM3 (ref 140–450)
PMV BLD AUTO: 10.5 FL (ref 6–12)
POSITIVE CONTROL: POSITIVE
POTASSIUM SERPL-SCNC: 2.9 MMOL/L (ref 3.5–5.2)
PROCALCITONIN SERPL-MCNC: 0.72 NG/ML (ref 0–0.25)
PROT SERPL-MCNC: 6.3 G/DL (ref 6–8.5)
PROTHROMBIN TIME: 15.7 SECONDS (ref 11.5–13.4)
RBC # BLD AUTO: 1.65 10*6/MM3 (ref 4.14–5.8)
RETICS # AUTO: 0.18 10*6/MM3 (ref 0.02–0.13)
RETICS/RBC NFR AUTO: 11.19 % (ref 0.7–1.9)
RH BLD: POSITIVE
SARS-COV-2 RNA PNL SPEC NAA+PROBE: NOT DETECTED
SODIUM SERPL-SCNC: 138 MMOL/L (ref 136–145)
T&S EXPIRATION DATE: NORMAL
TIBC SERPL-MCNC: 413 MCG/DL (ref 298–536)
TRANSFERRIN SERPL-MCNC: 277 MG/DL (ref 200–360)
TROPONIN T SERPL-MCNC: 0.05 NG/ML (ref 0–0.03)
WBC # BLD AUTO: 6.86 10*3/MM3 (ref 3.4–10.8)

## 2021-08-24 PROCEDURE — 36430 TRANSFUSION BLD/BLD COMPNT: CPT

## 2021-08-24 PROCEDURE — 86901 BLOOD TYPING SEROLOGIC RH(D): CPT

## 2021-08-24 PROCEDURE — 82728 ASSAY OF FERRITIN: CPT | Performed by: NURSE PRACTITIONER

## 2021-08-24 PROCEDURE — 86920 COMPATIBILITY TEST SPIN: CPT

## 2021-08-24 PROCEDURE — 82077 ASSAY SPEC XCP UR&BREATH IA: CPT | Performed by: NURSE PRACTITIONER

## 2021-08-24 PROCEDURE — 86923 COMPATIBILITY TEST ELECTRIC: CPT

## 2021-08-24 PROCEDURE — 86900 BLOOD TYPING SEROLOGIC ABO: CPT

## 2021-08-24 PROCEDURE — 85730 THROMBOPLASTIN TIME PARTIAL: CPT | Performed by: NURSE PRACTITIONER

## 2021-08-24 PROCEDURE — 87040 BLOOD CULTURE FOR BACTERIA: CPT | Performed by: NURSE PRACTITIONER

## 2021-08-24 PROCEDURE — 84484 ASSAY OF TROPONIN QUANT: CPT | Performed by: NURSE PRACTITIONER

## 2021-08-24 PROCEDURE — 86901 BLOOD TYPING SEROLOGIC RH(D): CPT | Performed by: NURSE PRACTITIONER

## 2021-08-24 PROCEDURE — 84466 ASSAY OF TRANSFERRIN: CPT | Performed by: NURSE PRACTITIONER

## 2021-08-24 PROCEDURE — 86850 RBC ANTIBODY SCREEN: CPT | Performed by: NURSE PRACTITIONER

## 2021-08-24 PROCEDURE — 85045 AUTOMATED RETICULOCYTE COUNT: CPT | Performed by: NURSE PRACTITIONER

## 2021-08-24 PROCEDURE — P9016 RBC LEUKOCYTES REDUCED: HCPCS

## 2021-08-24 PROCEDURE — 71045 X-RAY EXAM CHEST 1 VIEW: CPT

## 2021-08-24 PROCEDURE — 82962 GLUCOSE BLOOD TEST: CPT

## 2021-08-24 PROCEDURE — 93005 ELECTROCARDIOGRAM TRACING: CPT | Performed by: NURSE PRACTITIONER

## 2021-08-24 PROCEDURE — 87635 SARS-COV-2 COVID-19 AMP PRB: CPT | Performed by: NURSE PRACTITIONER

## 2021-08-24 PROCEDURE — 80053 COMPREHEN METABOLIC PANEL: CPT | Performed by: NURSE PRACTITIONER

## 2021-08-24 PROCEDURE — 93010 ELECTROCARDIOGRAM REPORT: CPT | Performed by: INTERNAL MEDICINE

## 2021-08-24 PROCEDURE — 84145 PROCALCITONIN (PCT): CPT | Performed by: NURSE PRACTITIONER

## 2021-08-24 PROCEDURE — 85610 PROTHROMBIN TIME: CPT | Performed by: NURSE PRACTITIONER

## 2021-08-24 PROCEDURE — 25010000002 CALCIUM GLUCONATE PER 10 ML: Performed by: NURSE PRACTITIONER

## 2021-08-24 PROCEDURE — 86900 BLOOD TYPING SEROLOGIC ABO: CPT | Performed by: NURSE PRACTITIONER

## 2021-08-24 PROCEDURE — 74176 CT ABD & PELVIS W/O CONTRAST: CPT

## 2021-08-24 PROCEDURE — 85025 COMPLETE CBC W/AUTO DIFF WBC: CPT | Performed by: NURSE PRACTITIONER

## 2021-08-24 PROCEDURE — 99284 EMERGENCY DEPT VISIT MOD MDM: CPT

## 2021-08-24 PROCEDURE — 83690 ASSAY OF LIPASE: CPT | Performed by: NURSE PRACTITIONER

## 2021-08-24 PROCEDURE — 82140 ASSAY OF AMMONIA: CPT | Performed by: NURSE PRACTITIONER

## 2021-08-24 PROCEDURE — 70450 CT HEAD/BRAIN W/O DYE: CPT

## 2021-08-24 PROCEDURE — 83735 ASSAY OF MAGNESIUM: CPT | Performed by: NURSE PRACTITIONER

## 2021-08-24 PROCEDURE — 82270 OCCULT BLOOD FECES: CPT | Performed by: NURSE PRACTITIONER

## 2021-08-24 PROCEDURE — 83605 ASSAY OF LACTIC ACID: CPT | Performed by: NURSE PRACTITIONER

## 2021-08-24 PROCEDURE — 83880 ASSAY OF NATRIURETIC PEPTIDE: CPT | Performed by: NURSE PRACTITIONER

## 2021-08-24 PROCEDURE — 83540 ASSAY OF IRON: CPT | Performed by: NURSE PRACTITIONER

## 2021-08-24 RX ORDER — BUDESONIDE AND FORMOTEROL FUMARATE DIHYDRATE 160; 4.5 UG/1; UG/1
2 AEROSOL RESPIRATORY (INHALATION)
Status: DISCONTINUED | OUTPATIENT
Start: 2021-08-24 | End: 2021-08-27 | Stop reason: HOSPADM

## 2021-08-24 RX ORDER — DEXTROSE MONOHYDRATE 25 G/50ML
25 INJECTION, SOLUTION INTRAVENOUS
Status: DISCONTINUED | OUTPATIENT
Start: 2021-08-24 | End: 2021-08-27 | Stop reason: HOSPADM

## 2021-08-24 RX ORDER — ONDANSETRON 2 MG/ML
4 INJECTION INTRAMUSCULAR; INTRAVENOUS EVERY 6 HOURS PRN
Status: DISCONTINUED | OUTPATIENT
Start: 2021-08-24 | End: 2021-08-27 | Stop reason: HOSPADM

## 2021-08-24 RX ORDER — SODIUM CHLORIDE 0.9 % (FLUSH) 0.9 %
10 SYRINGE (ML) INJECTION AS NEEDED
Status: DISCONTINUED | OUTPATIENT
Start: 2021-08-24 | End: 2021-08-27 | Stop reason: HOSPADM

## 2021-08-24 RX ORDER — ONDANSETRON 4 MG/1
4 TABLET, FILM COATED ORAL EVERY 6 HOURS PRN
Status: DISCONTINUED | OUTPATIENT
Start: 2021-08-24 | End: 2021-08-27 | Stop reason: HOSPADM

## 2021-08-24 RX ORDER — SODIUM CHLORIDE 0.9 % (FLUSH) 0.9 %
10 SYRINGE (ML) INJECTION EVERY 12 HOURS SCHEDULED
Status: DISCONTINUED | OUTPATIENT
Start: 2021-08-24 | End: 2021-08-27 | Stop reason: HOSPADM

## 2021-08-24 RX ORDER — POTASSIUM CHLORIDE 750 MG/1
40 CAPSULE, EXTENDED RELEASE ORAL
Status: COMPLETED | OUTPATIENT
Start: 2021-08-24 | End: 2021-08-25

## 2021-08-24 RX ORDER — PANTOPRAZOLE SODIUM 40 MG/10ML
80 INJECTION, POWDER, LYOPHILIZED, FOR SOLUTION INTRAVENOUS ONCE
Status: COMPLETED | OUTPATIENT
Start: 2021-08-24 | End: 2021-08-25

## 2021-08-24 RX ORDER — ACETAMINOPHEN 650 MG/1
650 SUPPOSITORY RECTAL EVERY 4 HOURS PRN
Status: DISCONTINUED | OUTPATIENT
Start: 2021-08-24 | End: 2021-08-27 | Stop reason: HOSPADM

## 2021-08-24 RX ORDER — CALCIUM GLUCONATE 94 MG/ML
1 INJECTION, SOLUTION INTRAVENOUS ONCE
Status: COMPLETED | OUTPATIENT
Start: 2021-08-24 | End: 2021-08-24

## 2021-08-24 RX ORDER — NICOTINE POLACRILEX 4 MG
15 LOZENGE BUCCAL
Status: DISCONTINUED | OUTPATIENT
Start: 2021-08-24 | End: 2021-08-27 | Stop reason: HOSPADM

## 2021-08-24 RX ORDER — AMIODARONE HYDROCHLORIDE 200 MG/1
200 TABLET ORAL 2 TIMES DAILY
Status: DISCONTINUED | OUTPATIENT
Start: 2021-08-24 | End: 2021-08-27 | Stop reason: HOSPADM

## 2021-08-24 RX ORDER — ALLOPURINOL 100 MG/1
100 TABLET ORAL DAILY
Status: DISCONTINUED | OUTPATIENT
Start: 2021-08-25 | End: 2021-08-27 | Stop reason: HOSPADM

## 2021-08-24 RX ORDER — ACETAMINOPHEN 160 MG/5ML
650 SOLUTION ORAL EVERY 4 HOURS PRN
Status: DISCONTINUED | OUTPATIENT
Start: 2021-08-24 | End: 2021-08-27 | Stop reason: HOSPADM

## 2021-08-24 RX ORDER — ATORVASTATIN CALCIUM 40 MG/1
80 TABLET, FILM COATED ORAL NIGHTLY
Status: DISCONTINUED | OUTPATIENT
Start: 2021-08-24 | End: 2021-08-27 | Stop reason: HOSPADM

## 2021-08-24 RX ORDER — ACETAMINOPHEN 325 MG/1
650 TABLET ORAL EVERY 4 HOURS PRN
Status: DISCONTINUED | OUTPATIENT
Start: 2021-08-24 | End: 2021-08-27 | Stop reason: HOSPADM

## 2021-08-24 RX ADMIN — SODIUM CHLORIDE, PRESERVATIVE FREE 10 ML: 5 INJECTION INTRAVENOUS at 21:47

## 2021-08-24 RX ADMIN — CALCIUM GLUCONATE 1 G: 98 INJECTION, SOLUTION INTRAVENOUS at 20:06

## 2021-08-24 RX ADMIN — POTASSIUM CHLORIDE 40 MEQ: 10 CAPSULE, COATED, EXTENDED RELEASE ORAL at 22:43

## 2021-08-24 RX ADMIN — AMIODARONE HYDROCHLORIDE 200 MG: 200 TABLET ORAL at 22:43

## 2021-08-24 RX ADMIN — ATORVASTATIN CALCIUM 80 MG: 40 TABLET, FILM COATED ORAL at 22:43

## 2021-08-24 RX ADMIN — SODIUM CHLORIDE 8 MG/HR: 900 INJECTION INTRAVENOUS at 21:42

## 2021-08-24 RX ADMIN — SODIUM CHLORIDE, POTASSIUM CHLORIDE, SODIUM LACTATE AND CALCIUM CHLORIDE 1000 ML: 600; 310; 30; 20 INJECTION, SOLUTION INTRAVENOUS at 18:11

## 2021-08-25 PROBLEM — R74.01 ELEVATED TRANSAMINASE LEVEL: Status: ACTIVE | Noted: 2021-08-25

## 2021-08-25 PROBLEM — I48.0 PAF (PAROXYSMAL ATRIAL FIBRILLATION) (HCC): Status: ACTIVE | Noted: 2021-08-25

## 2021-08-25 PROBLEM — N18.32 ACUTE RENAL FAILURE SUPERIMPOSED ON STAGE 3B CHRONIC KIDNEY DISEASE: Status: ACTIVE | Noted: 2021-08-24

## 2021-08-25 PROBLEM — K92.1 GASTROINTESTINAL HEMORRHAGE WITH MELENA: Status: ACTIVE | Noted: 2021-08-24

## 2021-08-25 LAB
ALBUMIN SERPL-MCNC: 3.1 G/DL (ref 3.5–5.2)
ALBUMIN/GLOB SERPL: 1.3 G/DL
ALP SERPL-CCNC: 219 U/L (ref 39–117)
ALT SERPL W P-5'-P-CCNC: 52 U/L (ref 1–41)
ANION GAP SERPL CALCULATED.3IONS-SCNC: 9 MMOL/L (ref 5–15)
AST SERPL-CCNC: 58 U/L (ref 1–40)
BASOPHILS # BLD AUTO: 0.04 10*3/MM3 (ref 0–0.2)
BASOPHILS NFR BLD AUTO: 0.6 % (ref 0–1.5)
BILIRUB SERPL-MCNC: 0.6 MG/DL (ref 0–1.2)
BUN SERPL-MCNC: 86 MG/DL (ref 8–23)
BUN/CREAT SERPL: 35 (ref 7–25)
CALCIUM SPEC-SCNC: 7.9 MG/DL (ref 8.6–10.5)
CHLORIDE SERPL-SCNC: 93 MMOL/L (ref 98–107)
CO2 SERPL-SCNC: 37 MMOL/L (ref 22–29)
CREAT SERPL-MCNC: 2.46 MG/DL (ref 0.76–1.27)
DEPRECATED RDW RBC AUTO: 65.6 FL (ref 37–54)
EOSINOPHIL # BLD AUTO: 0.14 10*3/MM3 (ref 0–0.4)
EOSINOPHIL NFR BLD AUTO: 2.1 % (ref 0.3–6.2)
ERYTHROCYTE [DISTWIDTH] IN BLOOD BY AUTOMATED COUNT: 19.9 % (ref 12.3–15.4)
GFR SERPL CREATININE-BSD FRML MDRD: 33 ML/MIN/1.73
GLOBULIN UR ELPH-MCNC: 2.4 GM/DL
GLUCOSE BLDC GLUCOMTR-MCNC: 117 MG/DL (ref 70–130)
GLUCOSE BLDC GLUCOMTR-MCNC: 139 MG/DL (ref 70–130)
GLUCOSE BLDC GLUCOMTR-MCNC: 217 MG/DL (ref 70–130)
GLUCOSE BLDC GLUCOMTR-MCNC: 56 MG/DL (ref 70–130)
GLUCOSE BLDC GLUCOMTR-MCNC: 77 MG/DL (ref 70–130)
GLUCOSE SERPL-MCNC: 55 MG/DL (ref 65–99)
HBA1C MFR BLD: 6.3 % (ref 4.8–5.6)
HCT VFR BLD AUTO: 22.1 % (ref 37.5–51)
HCT VFR BLD AUTO: 25.5 % (ref 37.5–51)
HCT VFR BLD AUTO: 26.8 % (ref 37.5–51)
HGB BLD-MCNC: 6.9 G/DL (ref 13–17.7)
HGB BLD-MCNC: 8.3 G/DL (ref 13–17.7)
HGB BLD-MCNC: 8.8 G/DL (ref 13–17.7)
IMM GRANULOCYTES # BLD AUTO: 0.03 10*3/MM3 (ref 0–0.05)
IMM GRANULOCYTES NFR BLD AUTO: 0.4 % (ref 0–0.5)
LYMPHOCYTES # BLD AUTO: 0.4 10*3/MM3 (ref 0.7–3.1)
LYMPHOCYTES NFR BLD AUTO: 5.9 % (ref 19.6–45.3)
MCH RBC QN AUTO: 30 PG (ref 26.6–33)
MCHC RBC AUTO-ENTMCNC: 31.2 G/DL (ref 31.5–35.7)
MCV RBC AUTO: 96.1 FL (ref 79–97)
MONOCYTES # BLD AUTO: 1.08 10*3/MM3 (ref 0.1–0.9)
MONOCYTES NFR BLD AUTO: 16 % (ref 5–12)
NEUTROPHILS NFR BLD AUTO: 5.04 10*3/MM3 (ref 1.7–7)
NEUTROPHILS NFR BLD AUTO: 75 % (ref 42.7–76)
NRBC BLD AUTO-RTO: 0 /100 WBC (ref 0–0.2)
PLATELET # BLD AUTO: 214 10*3/MM3 (ref 140–450)
PMV BLD AUTO: 10.7 FL (ref 6–12)
POTASSIUM SERPL-SCNC: 3.4 MMOL/L (ref 3.5–5.2)
PROT SERPL-MCNC: 5.5 G/DL (ref 6–8.5)
QT INTERVAL: 498 MS
QTC INTERVAL: 493 MS
RBC # BLD AUTO: 2.3 10*6/MM3 (ref 4.14–5.8)
SODIUM SERPL-SCNC: 139 MMOL/L (ref 136–145)
VIT B12 BLD-MCNC: 1061 PG/ML (ref 211–946)
WBC # BLD AUTO: 6.73 10*3/MM3 (ref 3.4–10.8)

## 2021-08-25 PROCEDURE — 94799 UNLISTED PULMONARY SVC/PX: CPT

## 2021-08-25 PROCEDURE — 85025 COMPLETE CBC W/AUTO DIFF WBC: CPT | Performed by: NURSE PRACTITIONER

## 2021-08-25 PROCEDURE — 36430 TRANSFUSION BLD/BLD COMPNT: CPT

## 2021-08-25 PROCEDURE — P9016 RBC LEUKOCYTES REDUCED: HCPCS

## 2021-08-25 PROCEDURE — 63710000001 INSULIN LISPRO (HUMAN) PER 5 UNITS: Performed by: NURSE PRACTITIONER

## 2021-08-25 PROCEDURE — 82962 GLUCOSE BLOOD TEST: CPT

## 2021-08-25 PROCEDURE — 99252 IP/OBS CONSLTJ NEW/EST SF 35: CPT | Performed by: CLINICAL NURSE SPECIALIST

## 2021-08-25 PROCEDURE — 94640 AIRWAY INHALATION TREATMENT: CPT

## 2021-08-25 PROCEDURE — 97162 PT EVAL MOD COMPLEX 30 MIN: CPT

## 2021-08-25 PROCEDURE — 36415 COLL VENOUS BLD VENIPUNCTURE: CPT | Performed by: NURSE PRACTITIONER

## 2021-08-25 PROCEDURE — 85018 HEMOGLOBIN: CPT | Performed by: NURSE PRACTITIONER

## 2021-08-25 PROCEDURE — 83036 HEMOGLOBIN GLYCOSYLATED A1C: CPT | Performed by: NURSE PRACTITIONER

## 2021-08-25 PROCEDURE — 97165 OT EVAL LOW COMPLEX 30 MIN: CPT | Performed by: OCCUPATIONAL THERAPIST

## 2021-08-25 PROCEDURE — 92610 EVALUATE SWALLOWING FUNCTION: CPT | Performed by: SPEECH-LANGUAGE PATHOLOGIST

## 2021-08-25 PROCEDURE — 85014 HEMATOCRIT: CPT | Performed by: NURSE PRACTITIONER

## 2021-08-25 PROCEDURE — 82607 VITAMIN B-12: CPT | Performed by: NURSE PRACTITIONER

## 2021-08-25 PROCEDURE — 80053 COMPREHEN METABOLIC PANEL: CPT | Performed by: NURSE PRACTITIONER

## 2021-08-25 PROCEDURE — 86900 BLOOD TYPING SEROLOGIC ABO: CPT

## 2021-08-25 RX ORDER — METOLAZONE 5 MG/1
5 TABLET ORAL DAILY
COMMUNITY
End: 2021-08-27 | Stop reason: HOSPADM

## 2021-08-25 RX ORDER — FUROSEMIDE 40 MG/1
80 TABLET ORAL 2 TIMES DAILY
COMMUNITY
End: 2021-08-27 | Stop reason: HOSPADM

## 2021-08-25 RX ORDER — METFORMIN HYDROCHLORIDE 500 MG/1
500 TABLET, EXTENDED RELEASE ORAL 2 TIMES DAILY
COMMUNITY
End: 2021-08-27 | Stop reason: HOSPADM

## 2021-08-25 RX ADMIN — ALLOPURINOL 100 MG: 100 TABLET ORAL at 10:18

## 2021-08-25 RX ADMIN — BUDESONIDE AND FORMOTEROL FUMARATE DIHYDRATE 2 PUFF: 160; 4.5 AEROSOL RESPIRATORY (INHALATION) at 19:20

## 2021-08-25 RX ADMIN — AMIODARONE HYDROCHLORIDE 200 MG: 200 TABLET ORAL at 21:05

## 2021-08-25 RX ADMIN — INSULIN LISPRO 3 UNITS: 100 INJECTION, SOLUTION INTRAVENOUS; SUBCUTANEOUS at 21:05

## 2021-08-25 RX ADMIN — PANTOPRAZOLE SODIUM 80 MG: 40 INJECTION, POWDER, FOR SOLUTION INTRAVENOUS at 01:13

## 2021-08-25 RX ADMIN — SODIUM CHLORIDE 8 MG/HR: 900 INJECTION INTRAVENOUS at 13:23

## 2021-08-25 RX ADMIN — POTASSIUM CHLORIDE 40 MEQ: 10 CAPSULE, COATED, EXTENDED RELEASE ORAL at 00:55

## 2021-08-25 RX ADMIN — SODIUM CHLORIDE, PRESERVATIVE FREE 10 ML: 5 INJECTION INTRAVENOUS at 10:19

## 2021-08-25 RX ADMIN — AMIODARONE HYDROCHLORIDE 200 MG: 200 TABLET ORAL at 10:18

## 2021-08-25 RX ADMIN — ATORVASTATIN CALCIUM 80 MG: 40 TABLET, FILM COATED ORAL at 21:05

## 2021-08-25 RX ADMIN — SODIUM CHLORIDE 8 MG/HR: 900 INJECTION INTRAVENOUS at 08:28

## 2021-08-25 RX ADMIN — NICOTINE 1 PATCH: 7 PATCH, EXTENDED RELEASE TRANSDERMAL at 10:18

## 2021-08-25 RX ADMIN — SODIUM CHLORIDE 8 MG/HR: 900 INJECTION INTRAVENOUS at 23:30

## 2021-08-25 RX ADMIN — SODIUM CHLORIDE 8 MG/HR: 900 INJECTION INTRAVENOUS at 18:17

## 2021-08-25 RX ADMIN — SODIUM CHLORIDE, PRESERVATIVE FREE 10 ML: 5 INJECTION INTRAVENOUS at 21:06

## 2021-08-25 RX ADMIN — BUDESONIDE AND FORMOTEROL FUMARATE DIHYDRATE 2 PUFF: 160; 4.5 AEROSOL RESPIRATORY (INHALATION) at 10:05

## 2021-08-25 RX ADMIN — SODIUM CHLORIDE 8 MG/HR: 900 INJECTION INTRAVENOUS at 02:15

## 2021-08-26 ENCOUNTER — ANESTHESIA EVENT (OUTPATIENT)
Dept: GASTROENTEROLOGY | Facility: HOSPITAL | Age: 61
End: 2021-08-26

## 2021-08-26 ENCOUNTER — ANESTHESIA (OUTPATIENT)
Dept: GASTROENTEROLOGY | Facility: HOSPITAL | Age: 61
End: 2021-08-26

## 2021-08-26 LAB
ANION GAP SERPL CALCULATED.3IONS-SCNC: 11 MMOL/L (ref 5–15)
BH BB BLOOD EXPIRATION DATE: NORMAL
BH BB BLOOD EXPIRATION DATE: NORMAL
BH BB BLOOD TYPE BARCODE: 5100
BH BB BLOOD TYPE BARCODE: 5100
BH BB DISPENSE STATUS: NORMAL
BH BB DISPENSE STATUS: NORMAL
BH BB PRODUCT CODE: NORMAL
BH BB PRODUCT CODE: NORMAL
BH BB UNIT NUMBER: NORMAL
BH BB UNIT NUMBER: NORMAL
BUN SERPL-MCNC: 73 MG/DL (ref 8–23)
BUN/CREAT SERPL: 31.1 (ref 7–25)
CALCIUM SPEC-SCNC: 7.3 MG/DL (ref 8.6–10.5)
CHLORIDE SERPL-SCNC: 93 MMOL/L (ref 98–107)
CO2 SERPL-SCNC: 33 MMOL/L (ref 22–29)
CREAT SERPL-MCNC: 2.35 MG/DL (ref 0.76–1.27)
CROSSMATCH INTERPRETATION: NORMAL
CROSSMATCH INTERPRETATION: NORMAL
DEPRECATED RDW RBC AUTO: 62.4 FL (ref 37–54)
ERYTHROCYTE [DISTWIDTH] IN BLOOD BY AUTOMATED COUNT: 19 % (ref 12.3–15.4)
GFR SERPL CREATININE-BSD FRML MDRD: 34 ML/MIN/1.73
GLUCOSE BLDC GLUCOMTR-MCNC: 116 MG/DL (ref 70–130)
GLUCOSE BLDC GLUCOMTR-MCNC: 288 MG/DL (ref 70–130)
GLUCOSE BLDC GLUCOMTR-MCNC: 360 MG/DL (ref 70–130)
GLUCOSE BLDC GLUCOMTR-MCNC: 92 MG/DL (ref 70–130)
GLUCOSE SERPL-MCNC: 158 MG/DL (ref 65–99)
HCT VFR BLD AUTO: 25.8 % (ref 37.5–51)
HCT VFR BLD AUTO: 25.9 % (ref 37.5–51)
HCT VFR BLD AUTO: 27.8 % (ref 37.5–51)
HGB BLD-MCNC: 8.4 G/DL (ref 13–17.7)
HGB BLD-MCNC: 8.4 G/DL (ref 13–17.7)
HGB BLD-MCNC: 8.8 G/DL (ref 13–17.7)
HOLD SPECIMEN: NORMAL
MCH RBC QN AUTO: 30.9 PG (ref 26.6–33)
MCHC RBC AUTO-ENTMCNC: 32.4 G/DL (ref 31.5–35.7)
MCV RBC AUTO: 95.2 FL (ref 79–97)
PLATELET # BLD AUTO: 210 10*3/MM3 (ref 140–450)
PMV BLD AUTO: 10.4 FL (ref 6–12)
POTASSIUM SERPL-SCNC: 3.4 MMOL/L (ref 3.5–5.2)
RBC # BLD AUTO: 2.72 10*6/MM3 (ref 4.14–5.8)
SODIUM SERPL-SCNC: 137 MMOL/L (ref 136–145)
UNIT  ABO: NORMAL
UNIT  ABO: NORMAL
UNIT  RH: NORMAL
UNIT  RH: NORMAL
WBC # BLD AUTO: 5.78 10*3/MM3 (ref 3.4–10.8)

## 2021-08-26 PROCEDURE — 97116 GAIT TRAINING THERAPY: CPT

## 2021-08-26 PROCEDURE — 85014 HEMATOCRIT: CPT | Performed by: INTERNAL MEDICINE

## 2021-08-26 PROCEDURE — 94799 UNLISTED PULMONARY SVC/PX: CPT

## 2021-08-26 PROCEDURE — 97535 SELF CARE MNGMENT TRAINING: CPT | Performed by: OCCUPATIONAL THERAPIST

## 2021-08-26 PROCEDURE — 85014 HEMATOCRIT: CPT | Performed by: NURSE PRACTITIONER

## 2021-08-26 PROCEDURE — C1889 IMPLANT/INSERT DEVICE, NOC: HCPCS | Performed by: INTERNAL MEDICINE

## 2021-08-26 PROCEDURE — 85018 HEMOGLOBIN: CPT | Performed by: INTERNAL MEDICINE

## 2021-08-26 PROCEDURE — 85018 HEMOGLOBIN: CPT | Performed by: NURSE PRACTITIONER

## 2021-08-26 PROCEDURE — 85027 COMPLETE CBC AUTOMATED: CPT | Performed by: INTERNAL MEDICINE

## 2021-08-26 PROCEDURE — 80048 BASIC METABOLIC PNL TOTAL CA: CPT | Performed by: INTERNAL MEDICINE

## 2021-08-26 PROCEDURE — 25010000002 PROPOFOL 10 MG/ML EMULSION: Performed by: NURSE ANESTHETIST, CERTIFIED REGISTERED

## 2021-08-26 PROCEDURE — 0W3P8ZZ CONTROL BLEEDING IN GASTROINTESTINAL TRACT, VIA NATURAL OR ARTIFICIAL OPENING ENDOSCOPIC: ICD-10-PCS | Performed by: INTERNAL MEDICINE

## 2021-08-26 PROCEDURE — 63710000001 INSULIN LISPRO (HUMAN) PER 5 UNITS: Performed by: INTERNAL MEDICINE

## 2021-08-26 PROCEDURE — 97110 THERAPEUTIC EXERCISES: CPT

## 2021-08-26 PROCEDURE — 82962 GLUCOSE BLOOD TEST: CPT

## 2021-08-26 PROCEDURE — 43255 EGD CONTROL BLEEDING ANY: CPT | Performed by: INTERNAL MEDICINE

## 2021-08-26 DEVICE — DEV CLIP ENDO RESOLUTION360 CONTRL ROT 235CM: Type: IMPLANTABLE DEVICE | Site: STOMACH | Status: FUNCTIONAL

## 2021-08-26 RX ORDER — PROPOFOL 10 MG/ML
VIAL (ML) INTRAVENOUS AS NEEDED
Status: DISCONTINUED | OUTPATIENT
Start: 2021-08-26 | End: 2021-08-26 | Stop reason: SURG

## 2021-08-26 RX ORDER — MIDAZOLAM HYDROCHLORIDE 1 MG/ML
1 INJECTION INTRAMUSCULAR; INTRAVENOUS
Status: DISCONTINUED | OUTPATIENT
Start: 2021-08-26 | End: 2021-08-26 | Stop reason: HOSPADM

## 2021-08-26 RX ORDER — SODIUM CHLORIDE 9 MG/ML
INJECTION, SOLUTION INTRAVENOUS CONTINUOUS PRN
Status: DISCONTINUED | OUTPATIENT
Start: 2021-08-26 | End: 2021-08-26 | Stop reason: SURG

## 2021-08-26 RX ORDER — SODIUM CHLORIDE 9 MG/ML
100 INJECTION, SOLUTION INTRAVENOUS CONTINUOUS
Status: DISCONTINUED | OUTPATIENT
Start: 2021-08-26 | End: 2021-08-26

## 2021-08-26 RX ORDER — SODIUM CHLORIDE 0.9 % (FLUSH) 0.9 %
10 SYRINGE (ML) INJECTION EVERY 12 HOURS SCHEDULED
Status: DISCONTINUED | OUTPATIENT
Start: 2021-08-26 | End: 2021-08-26 | Stop reason: HOSPADM

## 2021-08-26 RX ORDER — SODIUM CHLORIDE 0.9 % (FLUSH) 0.9 %
10 SYRINGE (ML) INJECTION AS NEEDED
Status: DISCONTINUED | OUTPATIENT
Start: 2021-08-26 | End: 2021-08-26 | Stop reason: HOSPADM

## 2021-08-26 RX ORDER — LIDOCAINE HYDROCHLORIDE 20 MG/ML
INJECTION, SOLUTION EPIDURAL; INFILTRATION; INTRACAUDAL; PERINEURAL AS NEEDED
Status: DISCONTINUED | OUTPATIENT
Start: 2021-08-26 | End: 2021-08-26 | Stop reason: SURG

## 2021-08-26 RX ADMIN — BUDESONIDE AND FORMOTEROL FUMARATE DIHYDRATE 2 PUFF: 160; 4.5 AEROSOL RESPIRATORY (INHALATION) at 12:05

## 2021-08-26 RX ADMIN — INSULIN LISPRO 6 UNITS: 100 INJECTION, SOLUTION INTRAVENOUS; SUBCUTANEOUS at 18:14

## 2021-08-26 RX ADMIN — LIDOCAINE HYDROCHLORIDE 100 MG: 20 INJECTION, SOLUTION EPIDURAL; INFILTRATION; INTRACAUDAL; PERINEURAL at 14:40

## 2021-08-26 RX ADMIN — AMIODARONE HYDROCHLORIDE 200 MG: 200 TABLET ORAL at 15:39

## 2021-08-26 RX ADMIN — SODIUM CHLORIDE, PRESERVATIVE FREE 10 ML: 5 INJECTION INTRAVENOUS at 12:28

## 2021-08-26 RX ADMIN — SODIUM CHLORIDE 8 MG/HR: 900 INJECTION INTRAVENOUS at 20:48

## 2021-08-26 RX ADMIN — PROPOFOL 100 MG: 10 INJECTION, EMULSION INTRAVENOUS at 14:40

## 2021-08-26 RX ADMIN — INSULIN LISPRO 4 UNITS: 100 INJECTION, SOLUTION INTRAVENOUS; SUBCUTANEOUS at 21:01

## 2021-08-26 RX ADMIN — NICOTINE 1 PATCH: 7 PATCH, EXTENDED RELEASE TRANSDERMAL at 12:30

## 2021-08-26 RX ADMIN — AMIODARONE HYDROCHLORIDE 200 MG: 200 TABLET ORAL at 20:48

## 2021-08-26 RX ADMIN — PROPOFOL 50 MG: 10 INJECTION, EMULSION INTRAVENOUS at 14:46

## 2021-08-26 RX ADMIN — SODIUM CHLORIDE 8 MG/HR: 900 INJECTION INTRAVENOUS at 05:03

## 2021-08-26 RX ADMIN — BUDESONIDE AND FORMOTEROL FUMARATE DIHYDRATE 2 PUFF: 160; 4.5 AEROSOL RESPIRATORY (INHALATION) at 19:30

## 2021-08-26 RX ADMIN — ATORVASTATIN CALCIUM 80 MG: 40 TABLET, FILM COATED ORAL at 20:48

## 2021-08-26 RX ADMIN — SODIUM CHLORIDE 8 MG/HR: 900 INJECTION INTRAVENOUS at 22:44

## 2021-08-26 RX ADMIN — SODIUM CHLORIDE 8 MG/HR: 900 INJECTION INTRAVENOUS at 11:12

## 2021-08-26 RX ADMIN — ALLOPURINOL 100 MG: 100 TABLET ORAL at 15:39

## 2021-08-26 RX ADMIN — SODIUM CHLORIDE: 9 INJECTION, SOLUTION INTRAVENOUS at 14:35

## 2021-08-26 NOTE — ANESTHESIA PREPROCEDURE EVALUATION
Anesthesia Evaluation     Patient summary reviewed and Nursing notes reviewed   no history of anesthetic complications:  NPO Solid Status: > 8 hours  NPO Liquid Status: > 8 hours           Airway   Mallampati: I  TM distance: >3 FB  Neck ROM: full  Dental    (+) partials        Pulmonary    (+) a smoker Current, COPD,   (-) asthma, sleep apnea  Cardiovascular   Exercise tolerance: good (4-7 METS)    ECG reviewed  PT is on anticoagulation therapy    (+) hypertension, dysrhythmias Atrial Fib, Atrial Flutter, CHF Diastolic >=55%, hyperlipidemia,  carotid artery disease (s/p right cea) carotid bilateral    ROS comment: PFO    · Left ventricular wall thickness is consistent with mild to moderate concentric hypertrophy.  · Left ventricular ejection fraction appears to be 36 - 40%. Left ventricular systolic function is moderately decreased.  · Left ventricular diastolic dysfunction is noted.  · The right ventricular cavity is mildly dilated. Systolic function is normal.  · There is biatrial enlargement.  · Moderate mitral valve regurgitation is present.  · Moderate tricuspid valve regurgitation is present.  · Estimated right ventricular systolic pressure from tricuspid regurgitation is mildly elevated (35-45 mmHg).    Neuro/Psych  (+) TIA, CVA,     (-) seizures  GI/Hepatic/Renal/Endo    (+)  GERD,  renal disease CRI, diabetes mellitus,     Musculoskeletal     Abdominal    Substance History      OB/GYN          Other   blood dyscrasia anemia,                       Anesthesia Plan    ASA 3     MAC     intravenous induction     Anesthetic plan, all risks, benefits, and alternatives have been provided, discussed and informed consent has been obtained with: patient.

## 2021-08-26 NOTE — ANESTHESIA POSTPROCEDURE EVALUATION
Patient: Milton Rodriguez    Procedure Summary     Date: 08/26/21 Room / Location: [facility] ENDOSCOPY 2 / [facility] ENDOSCOPY    Anesthesia Start: 1436 Anesthesia Stop: 1455    Procedure: ESOPHAGOGASTRODUODENOSCOPY WITH ANESTHESIA (N/A ) Diagnosis:     Surgeons: Deepak Fisher MD Provider: Delbert Lema CRNA    Anesthesia Type: MAC ASA Status: 3          Anesthesia Type: MAC    Vitals  No vitals data found for the desired time range.          Post Anesthesia Care and Evaluation    Patient location during evaluation: PHASE II  Level of consciousness: awake and alert  Pain management: adequate  Airway patency: patent  Anesthetic complications: No anesthetic complications    Cardiovascular status: acceptable  Respiratory status: acceptable  Hydration status: acceptable

## 2021-08-27 VITALS
TEMPERATURE: 98.4 F | HEIGHT: 67 IN | BODY MASS INDEX: 21.06 KG/M2 | DIASTOLIC BLOOD PRESSURE: 53 MMHG | OXYGEN SATURATION: 93 % | SYSTOLIC BLOOD PRESSURE: 110 MMHG | RESPIRATION RATE: 16 BRPM | WEIGHT: 134.2 LBS | HEART RATE: 70 BPM

## 2021-08-27 PROBLEM — K31.89 EROSIVE GASTROPATHY: Status: ACTIVE | Noted: 2021-08-27

## 2021-08-27 LAB
ANION GAP SERPL CALCULATED.3IONS-SCNC: 10 MMOL/L (ref 5–15)
BUN SERPL-MCNC: 49 MG/DL (ref 8–23)
BUN/CREAT SERPL: 24.3 (ref 7–25)
CALCIUM SPEC-SCNC: 7.2 MG/DL (ref 8.6–10.5)
CHLORIDE SERPL-SCNC: 95 MMOL/L (ref 98–107)
CO2 SERPL-SCNC: 31 MMOL/L (ref 22–29)
CREAT SERPL-MCNC: 2.02 MG/DL (ref 0.76–1.27)
DEPRECATED RDW RBC AUTO: 60.6 FL (ref 37–54)
ERYTHROCYTE [DISTWIDTH] IN BLOOD BY AUTOMATED COUNT: 17.9 % (ref 12.3–15.4)
GFR SERPL CREATININE-BSD FRML MDRD: 41 ML/MIN/1.73
GLUCOSE SERPL-MCNC: 64 MG/DL (ref 65–99)
HCT VFR BLD AUTO: 25.3 % (ref 37.5–51)
HCT VFR BLD AUTO: 27.3 % (ref 37.5–51)
HGB BLD-MCNC: 8.4 G/DL (ref 13–17.7)
HGB BLD-MCNC: 8.5 G/DL (ref 13–17.7)
MCH RBC QN AUTO: 31.3 PG (ref 26.6–33)
MCHC RBC AUTO-ENTMCNC: 33.2 G/DL (ref 31.5–35.7)
MCV RBC AUTO: 94.4 FL (ref 79–97)
PLATELET # BLD AUTO: 204 10*3/MM3 (ref 140–450)
PMV BLD AUTO: 10.1 FL (ref 6–12)
POTASSIUM SERPL-SCNC: 3.4 MMOL/L (ref 3.5–5.2)
RBC # BLD AUTO: 2.68 10*6/MM3 (ref 4.14–5.8)
SODIUM SERPL-SCNC: 136 MMOL/L (ref 136–145)
WBC # BLD AUTO: 5.94 10*3/MM3 (ref 3.4–10.8)

## 2021-08-27 PROCEDURE — 97116 GAIT TRAINING THERAPY: CPT

## 2021-08-27 PROCEDURE — 80048 BASIC METABOLIC PNL TOTAL CA: CPT | Performed by: INTERNAL MEDICINE

## 2021-08-27 PROCEDURE — 99232 SBSQ HOSP IP/OBS MODERATE 35: CPT | Performed by: CLINICAL NURSE SPECIALIST

## 2021-08-27 PROCEDURE — 94799 UNLISTED PULMONARY SVC/PX: CPT

## 2021-08-27 PROCEDURE — 85027 COMPLETE CBC AUTOMATED: CPT | Performed by: INTERNAL MEDICINE

## 2021-08-27 RX ORDER — PANTOPRAZOLE SODIUM 40 MG/1
40 TABLET, DELAYED RELEASE ORAL 2 TIMES DAILY
Qty: 60 TABLET | Refills: 1 | Status: ON HOLD | OUTPATIENT
Start: 2021-08-27 | End: 2021-09-20

## 2021-08-27 RX ORDER — ASPIRIN 81 MG/1
81 TABLET ORAL DAILY
Start: 2021-08-30 | End: 2021-09-21 | Stop reason: HOSPADM

## 2021-08-27 RX ADMIN — NICOTINE 1 PATCH: 7 PATCH, EXTENDED RELEASE TRANSDERMAL at 08:41

## 2021-08-27 RX ADMIN — AMIODARONE HYDROCHLORIDE 200 MG: 200 TABLET ORAL at 08:42

## 2021-08-27 RX ADMIN — SODIUM CHLORIDE 8 MG/HR: 900 INJECTION INTRAVENOUS at 03:53

## 2021-08-27 RX ADMIN — ALLOPURINOL 100 MG: 100 TABLET ORAL at 08:42

## 2021-08-27 RX ADMIN — SODIUM CHLORIDE 8 MG/HR: 900 INJECTION INTRAVENOUS at 08:42

## 2021-08-27 RX ADMIN — BUDESONIDE AND FORMOTEROL FUMARATE DIHYDRATE 2 PUFF: 160; 4.5 AEROSOL RESPIRATORY (INHALATION) at 08:58

## 2021-08-28 ENCOUNTER — READMISSION MANAGEMENT (OUTPATIENT)
Dept: CALL CENTER | Facility: HOSPITAL | Age: 61
End: 2021-08-28

## 2021-08-28 NOTE — OUTREACH NOTE
Prep Survey      Responses   Denominational facility patient discharged from?  [place]   Is LACE score < 7 ?  No   Emergency Room discharge w/ pulse ox?  No   Eligibility  Readm Mgmt   Discharge diagnosis  Gastrointestinal hemorrhage with melena   Does the patient have one of the following disease processes/diagnoses(primary or secondary)?  Other   Does the patient have Home health ordered?  No   Is there a DME ordered?  No   Comments regarding appointments  Listed   Medication alerts for this patient  ASA   Prep survey completed?  Yes          Kandi Alvares RN

## 2021-08-29 LAB
BACTERIA SPEC AEROBE CULT: NORMAL
BACTERIA SPEC AEROBE CULT: NORMAL

## 2021-08-30 ENCOUNTER — TRANSCRIBE ORDERS (OUTPATIENT)
Dept: ADMINISTRATIVE | Facility: HOSPITAL | Age: 61
End: 2021-08-30

## 2021-08-30 DIAGNOSIS — I48.92 ATRIAL FLUTTER, UNSPECIFIED TYPE (HCC): Primary | ICD-10-CM

## 2021-08-31 ENCOUNTER — TRANSCRIBE ORDERS (OUTPATIENT)
Dept: LAB | Facility: HOSPITAL | Age: 61
End: 2021-08-31

## 2021-08-31 DIAGNOSIS — Z01.818 PREOPERATIVE TESTING: Primary | ICD-10-CM

## 2021-09-01 ENCOUNTER — READMISSION MANAGEMENT (OUTPATIENT)
Dept: CALL CENTER | Facility: HOSPITAL | Age: 61
End: 2021-09-01

## 2021-09-01 NOTE — OUTREACH NOTE
Medical Week 1 Survey      Responses   [facility] patient discharged from?  [place]   Does the patient have one of the following disease processes/diagnoses(primary or secondary)?  Other   Week 1 attempt successful?  Yes   Call start time  1546   Call end time  1549   Discharge diagnosis  Gastrointestinal hemorrhage with melena   Is patient permission given to speak with other caregiver?  Yes   List who call center can speak with  spouse- Jodi   Person spoke with today (if not patient) and relationship  spouse- Jodi/ Patient   Meds reviewed with patient/caregiver?  Yes   Is the patient having any side effects they believe may be caused by any medication additions or changes?  No   Does the patient have all medications ordered at discharge?  Yes   Is the patient taking all medications as directed (includes completed medication regime)?  Yes   Comments regarding appointments  f/u with cardiology tomorrow (9/2)   Does the patient have a primary care provider?   Yes   Does the patient have an appointment with their PCP within 7 days of discharge?  Greater than 7 days   Comments regarding PCP  f/u with PCP on 9/10   Nursing Interventions  Verified appointment date/time/provider   Has the patient kept scheduled appointments due by today?  N/A   Has home health visited the patient within 72 hours of discharge?  N/A   Psychosocial issues?  No   Did the patient receive a copy of their discharge instructions?  Yes   Nursing interventions  Reviewed instructions with patient   What is the patient's perception of their health status since discharge?  Improving   Is the patient/caregiver able to teach back the hierarchy of who to call/visit for symptoms/problems? PCP, Specialist, Home health nurse, Urgent Care, ED, 911  Yes   Week 1 call completed?  Yes   Wrap up additional comments  Says he is doing well, he will be seeing cardiology tomorrow and will ask them about the swelling in his legs and possibly restarting  his lasix.          Jessica Dowling RN

## 2021-09-02 ENCOUNTER — OFFICE VISIT (OUTPATIENT)
Dept: CARDIOLOGY | Facility: CLINIC | Age: 61
End: 2021-09-02

## 2021-09-02 ENCOUNTER — LAB (OUTPATIENT)
Dept: LAB | Facility: HOSPITAL | Age: 61
End: 2021-09-02

## 2021-09-02 VITALS
HEIGHT: 67 IN | HEART RATE: 55 BPM | SYSTOLIC BLOOD PRESSURE: 118 MMHG | OXYGEN SATURATION: 98 % | DIASTOLIC BLOOD PRESSURE: 58 MMHG | BODY MASS INDEX: 25.9 KG/M2 | WEIGHT: 165 LBS

## 2021-09-02 DIAGNOSIS — I50.42 CHRONIC COMBINED SYSTOLIC (CONGESTIVE) AND DIASTOLIC (CONGESTIVE) HEART FAILURE (HCC): ICD-10-CM

## 2021-09-02 DIAGNOSIS — I65.23 BILATERAL CAROTID ARTERY STENOSIS: ICD-10-CM

## 2021-09-02 DIAGNOSIS — I10 ESSENTIAL HYPERTENSION: ICD-10-CM

## 2021-09-02 DIAGNOSIS — E78.2 MIXED HYPERLIPIDEMIA: ICD-10-CM

## 2021-09-02 DIAGNOSIS — N18.32 STAGE 3B CHRONIC KIDNEY DISEASE (HCC): ICD-10-CM

## 2021-09-02 DIAGNOSIS — D64.9 SYMPTOMATIC ANEMIA: ICD-10-CM

## 2021-09-02 DIAGNOSIS — I89.0 LYMPHEDEMA: ICD-10-CM

## 2021-09-02 DIAGNOSIS — Q27.30 AVM (ARTERIOVENOUS MALFORMATION): ICD-10-CM

## 2021-09-02 DIAGNOSIS — Q21.12 PFO (PATENT FORAMEN OVALE): ICD-10-CM

## 2021-09-02 DIAGNOSIS — I48.92 ATRIAL FLUTTER, UNSPECIFIED TYPE (HCC): Primary | ICD-10-CM

## 2021-09-02 PROBLEM — I48.0 PAF (PAROXYSMAL ATRIAL FIBRILLATION) (HCC): Status: RESOLVED | Noted: 2021-08-25 | Resolved: 2021-09-02

## 2021-09-02 LAB
ANION GAP SERPL CALCULATED.3IONS-SCNC: 18 MMOL/L (ref 5–15)
BUN SERPL-MCNC: 58 MG/DL (ref 8–23)
BUN/CREAT SERPL: 23.4 (ref 7–25)
CALCIUM SPEC-SCNC: 9 MG/DL (ref 8.6–10.5)
CHLORIDE SERPL-SCNC: 94 MMOL/L (ref 98–107)
CO2 SERPL-SCNC: 28 MMOL/L (ref 22–29)
CREAT SERPL-MCNC: 2.48 MG/DL (ref 0.76–1.27)
GFR SERPL CREATININE-BSD FRML MDRD: 32 ML/MIN/1.73
GLUCOSE SERPL-MCNC: 187 MG/DL (ref 65–99)
POTASSIUM SERPL-SCNC: 3.8 MMOL/L (ref 3.5–5.2)
SODIUM SERPL-SCNC: 140 MMOL/L (ref 136–145)

## 2021-09-02 PROCEDURE — 80048 BASIC METABOLIC PNL TOTAL CA: CPT | Performed by: NURSE PRACTITIONER

## 2021-09-02 PROCEDURE — 99214 OFFICE O/P EST MOD 30 MIN: CPT | Performed by: NURSE PRACTITIONER

## 2021-09-02 PROCEDURE — 36415 COLL VENOUS BLD VENIPUNCTURE: CPT | Performed by: NURSE PRACTITIONER

## 2021-09-02 PROCEDURE — 93000 ELECTROCARDIOGRAM COMPLETE: CPT | Performed by: NURSE PRACTITIONER

## 2021-09-02 RX ORDER — FUROSEMIDE 40 MG/1
40 TABLET ORAL DAILY
COMMUNITY

## 2021-09-02 NOTE — PROGRESS NOTES
Subjective:     Encounter Date:09/02/2021      Patient ID: Milton Rodriguez is a 60 y.o. male with a history of paroxsymal atrial flutter, combined HF, HTN, HLD, PAD, DM2.    Chief Complaint: hospital follow up  Atrial Flutter  This is a chronic problem. The current episode started more than 1 year ago. The problem occurs daily. The problem has been rapidly improving. Pertinent negatives include no chest pain, coughing, rash or weakness.   Congestive Heart Failure  Presents for follow-up visit. Associated symptoms include edema. Pertinent negatives include no chest pain, near-syncope, palpitations or shortness of breath. The symptoms have been stable.   Hypertension  This is a chronic problem. The current episode started more than 1 year ago. The problem has been gradually improving since onset. The problem is controlled. Pertinent negatives include no chest pain, malaise/fatigue, orthopnea, palpitations, PND or shortness of breath.   Hyperlipidemia  This is a chronic problem. The current episode started more than 1 year ago. The problem is controlled. Recent lipid tests were reviewed and are normal. Pertinent negatives include no chest pain or shortness of breath.     Patient presents today for a hospital follow up. He was recently admitted to the hospital from 8/24-8/27 due to acute symptomatic anemia with complaints of weakness and dark stools. He was noted to be hypotensive with BP 73/55 and anemia with hg 5.2. He was given a total of 3u PRBC. He had an EGD during his stay that revealed bleeding erosive gastropathy. He was discharged home on ASA 81mg daily and his Eliquis was stopped. Per review of EKGs, it appears he spontaneously cardioverted on 8/24. At his last office visit in July he was noted to be tachycardic and hypotensive. He was started on Amiodarone and Eliquis due to [id number] 3 with plans for cardioversion in 4 weeks. He was also scheduled for an appointment to see EP, whom he sees  "today. In regards to his HF he was noted to be well compensated with no changes in his medications at that time. He previously was admitted to the hospital in June 6/5 with acute CHF exacerbation. He was noted to be in atrial flutter with RVR and was fairly asymptomatic regarding his tachycardia  He had stopped his lasix for 2 weeks prior to admission. He had a 2D echo while inpatient in June with EF noted to have been reduced to 36-40% from previous which was normal. He had an EGD in November that revealed AVMs with no acute bleeding and recommendations per GI to remain off anticoagulants and NSAIDs. Hgb dropped at that time from 13 to 7.5 with occult stool being negative and he received 1u PRBC while inpatient. Today he reports he feels ok. He notes his dyspnea remains unchanged from discharge. He reports his legs were \"normal size\" at discharge and has since started to swell, resulting in him increasing his lasix from 40mg BID to 80mg BID. He notes noncompliance with use of lymphedema pumps. He reports a weight gain. He denies chest pain, orthopnea and PND.     The following portions of the patient's history were reviewed and updated as appropriate: allergies, current medications, past family history, past medical history, past social history, past surgical history and problem list.    No Known Allergies    Current Outpatient Medications:   •  allopurinol (ZYLOPRIM) 100 MG tablet, Take 100 mg by mouth Daily., Disp: , Rfl:   •  amiodarone (PACERONE) 200 MG tablet, Take 1 tablet by mouth 2 (Two) Times a Day., Disp: 180 tablet, Rfl: 4  •  aspirin (aspirin) 81 MG EC tablet, Take 1 tablet by mouth Daily. Obtain OTC., Disp: , Rfl:   •  atorvastatin (Lipitor) 80 MG tablet, Take 1 tablet by mouth Every Night., Disp: 30 tablet, Rfl: 0  •  benzonatate (TESSALON) 100 MG capsule, Take 100 mg by mouth 3 (Three) Times a Day As Needed for Cough., Disp: , Rfl:   •  budesonide-formoterol (Symbicort) 160-4.5 MCG/ACT inhaler, " Inhale 2 puffs 2 (Two) Times a Day., Disp: 1 each, Rfl: 12  •  ferrous sulfate 325 (65 FE) MG tablet, Take 1 tablet by mouth Daily With Breakfast., Disp: 90 tablet, Rfl: 1  •  furosemide (LASIX) 40 MG tablet, Take 40 mg by mouth 2 (Two) Times a Day. Takes 2 po BID., Disp: , Rfl:   •  Insulin Glargine (Lantus SoloStar) 100 UNIT/ML injection pen, Inject 16 Units under the skin into the appropriate area as directed Every Night., Disp: , Rfl:   •  metoprolol succinate XL (TOPROL-XL) 100 MG 24 hr tablet, Take 1 tablet by mouth Daily., Disp: 90 tablet, Rfl: 1  •  pantoprazole (PROTONIX) 40 MG EC tablet, Take 1 tablet by mouth 2 (two) times a day., Disp: 60 tablet, Rfl: 1  •  vitamin D (ERGOCALCIFEROL) 1.25 MG (30970 UT) capsule capsule, Take 50,000 Units by mouth Every 7 (Seven) Days.  , Disp: , Rfl:   Past Medical History:   Diagnosis Date   • Abnormal PFT    • Atrial fibrillation (CMS/[facility])    • Atrial flutter by electrocardiogram (CMS/[facility])     11/2014- ECHO: EF 55-60%, mild PHTN, left atrium mild-moderately dilated, mild-moderate TR.   • Cerebrovascular accident (CVA) (CMS/[facility]) 7/29/2020   • Chronic combined systolic (congestive) and diastolic (congestive) heart failure (CMS/[facility])    • Diabetes mellitus (CMS/[facility])    • Edema    • Essential hypertension    • Hyperlipidemia    • Iron deficiency anemia    • Loose bowel movement    • Multiple pulmonary nodules     Followed by [facility] pulmonary [facility]   • PAD (peripheral artery disease) (CMS/[facility])    • Pulmonary HTN (CMS/[facility])    • Stage 3 severe COPD by GOLD classification (CMS/[facility]) 8/16/2021   • Tobacco abuse    • Type 2 diabetes mellitus with autonomic neuropathy (CMS/[facility])        Social History     Socioeconomic History   • Marital status:      Spouse name: Not on file   • Number of children: Not on file   • Years of education: Not on file   • Highest education level: Not on file   Tobacco Use   • Smoking status: Current Some Day Smoker     Packs/day: 0.25     Years:  30.00     Pack years: 7.50     Types: Cigarettes   • Smokeless tobacco: Never Used   • Tobacco comment: less than a quarter a pack a day   Vaping Use   • Vaping Use: Never used   Substance and Sexual Activity   • Alcohol use: Not Currently   • Drug use: No   • Sexual activity: Defer       Review of Systems   Constitutional: Negative for malaise/fatigue, weight gain and weight loss.   Cardiovascular: Positive for dyspnea on exertion and leg swelling. Negative for chest pain, irregular heartbeat, near-syncope, orthopnea, palpitations, paroxysmal nocturnal dyspnea and syncope.   Respiratory: Negative for cough, shortness of breath, sleep disturbances due to breathing, sputum production and wheezing.    Skin: Negative for dry skin, flushing, itching and rash.   Gastrointestinal: Negative for hematemesis and hematochezia.   Neurological: Negative for dizziness, light-headedness, loss of balance and weakness.   All other systems reviewed and are negative.         Objective:     Vitals reviewed.   Constitutional:       General: Not in acute distress.     Appearance: Healthy appearance. Well-developed. Not diaphoretic.   Eyes:      General: No scleral icterus.     Conjunctiva/sclera: Conjunctivae normal.      Pupils: Pupils are equal, round, and reactive to light.   HENT:      Head: Normocephalic.    Mouth/Throat:      Pharynx: No oropharyngeal exudate.   Neck:      Vascular: JVD present. No JVR.      Comments: JVD more prominent on right than left side  Pulmonary:      Effort: Pulmonary effort is normal. No respiratory distress.      Breath sounds: Examination of the right-upper field reveals wheezing and rhonchi. Examination of the left-upper field reveals wheezing and rhonchi. Examination of the right-lower field reveals wheezing and rhonchi. Examination of the left-lower field reveals wheezing and rhonchi. Wheezing present. Rhonchi present. No rales.   Chest:      Chest wall: Not tender to palpatation.   Cardiovascular:  "     Bradycardia present. Regular rhythm.   Pulses:     Intact distal pulses.   Edema:     Peripheral edema present.     Thigh: bilateral 2+ pitting edema of the thigh.     Pretibial: bilateral 2+ pitting edema of the pretibial area.     Ankle: bilateral 2+ pitting edema of the ankle.     Feet: bilateral 2+ pitting edema of the feet.  Abdominal:      General: Bowel sounds are normal. There is no distension.      Palpations: Abdomen is soft.      Tenderness: There is no abdominal tenderness.   Musculoskeletal: Normal range of motion.      Cervical back: Normal range of motion and neck supple. Skin:     General: Skin is warm and dry.      Coloration: Skin is not pale.      Findings: No erythema or rash.   Neurological:      Mental Status: Alert, oriented to person, place, and time and oriented to person, place and time.      Deep Tendon Reflexes: Reflexes are normal and symmetric.   Psychiatric:         Behavior: Behavior normal.             ECG 12 Lead    Date/Time: 9/2/2021 12:45 PM  Performed by: Kiki Desir APRN  Authorized by: Kiki Desir APRN   Comparison: compared with previous ECG from 8/24/2021  Similar to previous ECG  Rhythm: sinus bradycardia  Rate: bradycardic  BPM: 55  Conduction: 1st degree AV block and non-specific intraventricular conduction delay  ST Segments: ST segments normal  T Waves: T waves normal  QRS axis: normal  Other findings: prolonged QTc interval    Clinical impression: abnormal EKG          /58   Pulse 55   Ht 170.2 cm (67\")   Wt 74.8 kg (165 lb)   SpO2 98%   BMI 25.84 kg/m²     Lab Review:   I have reviewed previous office notes, recent labs and recent cardiac testing.     Lab Results   Component Value Date    CHOL 113 06/08/2021    TRIG 62 06/08/2021    HDL 66 (H) 06/08/2021    LDL 33 06/08/2021     Results for orders placed during the hospital encounter of 06/05/21    Adult Transthoracic Echo Complete W/ Cont if Necessary Per Protocol    Interpretation " Summary  · Left ventricular wall thickness is consistent with mild to moderate concentric hypertrophy.  · Left ventricular ejection fraction appears to be 36 - 40%. Left ventricular systolic function is moderately decreased.  · Left ventricular diastolic dysfunction is noted.  · The right ventricular cavity is mildly dilated. Systolic function is normal.  · There is biatrial enlargement.  · Moderate mitral valve regurgitation is present.  · Moderate tricuspid valve regurgitation is present.  · Estimated right ventricular systolic pressure from tricuspid regurgitation is mildly elevated (35-45 mmHg).          Assessment:          Diagnosis Plan   1. Atrial flutter, unspecified type (CMS/HCC)  CT Cardiac Morph With 3D Image    CT Chest Without Contrast   2. Chronic combined systolic (congestive) and diastolic (congestive) heart failure (CMS/HCC)  Basic Metabolic Panel   3. AVM (arteriovenous malformation)     4. Symptomatic anemia     5. Stage 3b chronic kidney disease (CMS/HCC)     6. Mixed hyperlipidemia     7. Essential hypertension     8. PAF (paroxysmal atrial fibrillation) (CMS/HCC)     9. PFO (patent foramen ovale)     10. Bilateral carotid artery stenosis     11. Lymphedema            Plan:       1. Aflutter- stable. Sinus harjinder today. Scheduled to see Dr. Quesada this afternoon with an atrial flutter ablation scheduled for 9/8. He is not currently anticoagulated due to recent GI bleed. I have reached out to Dr. Quesada and will let him restart his anticoagulation. He may benefit from a reduced dose of Eliquis in the future. Continue amiodarone 200mg BID. [id number] 3, HAS-BLED 3. He is a poor candidate for long term anticoagulation. I have discussed watchman with patient and he is agreeable. Timing will be determined once Dr. Quesada feels he is safe to hold his Eliquis following his ablation.   2. Combined CHF- recent reduction in EF, possibly tachycardia induced. Will plan to recheck 2D echo after 4-6 weeks  after ablation. Presence of class 3 symptoms. He has chronic swelling that is no worse than previous. He has JVD that may be chronic. Absence of orthopnea and PND. Continue BB and Bumex. Will plan to initiate ACEI/ARB after ablation due to issues with hypotension while in atrial flutter. Educated to weigh daily and call with significant weight gain. Reviewed signs and symptoms of CHF and what to report with the patient. Patient instructed to restrict sodium and weigh daily. Report weight gain of greater than 2 lbs overnight or 5 lbs in 1 week. Pt verbalized understanding of instructions and plan of care.   3. AVMs- noted per EGD   4. Anemia- secondary to CKD and GI bleeding. Recently received 3u PRBC while inpatient.    5. CKD- stable with last creatinine 2 which is his baseline. will continue to monitor.   6. HTN- controlled. Recent issues with hypotension.   7. HLD- continue statin. Followed by PCP.   8. PFO- noted per echo in 8/2020 following a CVA that was not recommended for closure per neurology.   9. Bilateral carotid stenosis- follows with vascular.   10. Lymphedema- educated on compliance with lymphedema pumps.     Follow up in 4 weeks or sooner if symptoms worsen.     I spent 30 minutes caring for Milton on this date of service. This time includes time spent by me in the following activities:preparing for the visit, reviewing tests, obtaining and/or reviewing a separately obtained history, performing a medically appropriate examination and/or evaluation , counseling and educating the patient/family/caregiver, ordering medications, tests, or procedures, documenting information in the medical record, independently interpreting results and communicating that information with the patient/family/caregiver and care coordination     Current outpatient and discharge medications have been reconciled for the patient.  Reviewed by: DARIEN Lewis

## 2021-09-03 ENCOUNTER — TELEPHONE (OUTPATIENT)
Dept: CARDIOLOGY | Facility: CLINIC | Age: 61
End: 2021-09-03

## 2021-09-03 NOTE — TELEPHONE ENCOUNTER
----- Message from DARIEN Lewis sent at 9/2/2021  6:03 PM CDT -----  Please let patient know he needs to reduce his Lasix back to 40mg twice daily.

## 2021-09-05 ENCOUNTER — PREP FOR SURGERY (OUTPATIENT)
Dept: OTHER | Facility: HOSPITAL | Age: 61
End: 2021-09-05

## 2021-09-05 DIAGNOSIS — I48.92 ATRIAL FLUTTER WITH RAPID VENTRICULAR RESPONSE (HCC): Primary | ICD-10-CM

## 2021-09-05 RX ORDER — SODIUM CHLORIDE 0.9 % (FLUSH) 0.9 %
3 SYRINGE (ML) INJECTION EVERY 12 HOURS SCHEDULED
Status: CANCELLED | OUTPATIENT
Start: 2021-09-05

## 2021-09-05 RX ORDER — SODIUM CHLORIDE 0.9 % (FLUSH) 0.9 %
10 SYRINGE (ML) INJECTION AS NEEDED
Status: CANCELLED | OUTPATIENT
Start: 2021-09-05

## 2021-09-05 RX ORDER — SODIUM CHLORIDE 9 MG/ML
50 INJECTION, SOLUTION INTRAVENOUS CONTINUOUS
Status: CANCELLED | OUTPATIENT
Start: 2021-09-05

## 2021-09-06 ENCOUNTER — LAB (OUTPATIENT)
Dept: LAB | Facility: HOSPITAL | Age: 61
End: 2021-09-06

## 2021-09-06 LAB — SARS-COV-2 ORF1AB RESP QL NAA+PROBE: NOT DETECTED

## 2021-09-06 PROCEDURE — U0004 COV-19 TEST NON-CDC HGH THRU: HCPCS | Performed by: INTERNAL MEDICINE

## 2021-09-06 PROCEDURE — C9803 HOPD COVID-19 SPEC COLLECT: HCPCS | Performed by: INTERNAL MEDICINE

## 2021-09-07 ENCOUNTER — DOCUMENTATION (OUTPATIENT)
Dept: CARDIOLOGY | Facility: CLINIC | Age: 61
End: 2021-09-07

## 2021-09-07 NOTE — PROGRESS NOTES
PRE FILLED OUT STD PAPERWORK AND PRINTED OUT RECORDS.  GAVE TO KRISTAN TO GIVE TO ISAAC TO SIGN.  Juan Carlos Killian, CMA

## 2021-09-08 ENCOUNTER — HOSPITAL ENCOUNTER (OUTPATIENT)
Facility: HOSPITAL | Age: 61
Discharge: HOME | End: 2021-09-08
Attending: INTERNAL MEDICINE | Admitting: INTERNAL MEDICINE

## 2021-09-08 VITALS
WEIGHT: 165.34 LBS | OXYGEN SATURATION: 100 % | SYSTOLIC BLOOD PRESSURE: 132 MMHG | DIASTOLIC BLOOD PRESSURE: 61 MMHG | TEMPERATURE: 97.4 F | BODY MASS INDEX: 25.95 KG/M2 | HEART RATE: 52 BPM | RESPIRATION RATE: 16 BRPM | HEIGHT: 67 IN

## 2021-09-08 DIAGNOSIS — I48.92 ATRIAL FLUTTER, UNSPECIFIED TYPE (HCC): ICD-10-CM

## 2021-09-08 DIAGNOSIS — I48.92 ATRIAL FLUTTER WITH RAPID VENTRICULAR RESPONSE (HCC): ICD-10-CM

## 2021-09-08 PROCEDURE — 25010000002 HEPARIN (PORCINE) 2000-0.9 UNIT/L-% SOLUTION: Performed by: INTERNAL MEDICINE

## 2021-09-08 PROCEDURE — C1733 CATH, EP, OTHR THAN COOL-TIP: HCPCS | Performed by: INTERNAL MEDICINE

## 2021-09-08 PROCEDURE — 93613 INTRACARDIAC EPHYS 3D MAPG: CPT | Performed by: INTERNAL MEDICINE

## 2021-09-08 PROCEDURE — C1894 INTRO/SHEATH, NON-LASER: HCPCS | Performed by: INTERNAL MEDICINE

## 2021-09-08 PROCEDURE — C1730 CATH, EP, 19 OR FEW ELECT: HCPCS | Performed by: INTERNAL MEDICINE

## 2021-09-08 PROCEDURE — 93653 COMPRE EP EVAL TX SVT: CPT | Performed by: INTERNAL MEDICINE

## 2021-09-08 PROCEDURE — 99152 MOD SED SAME PHYS/QHP 5/>YRS: CPT

## 2021-09-08 PROCEDURE — 25010000002 FENTANYL CITRATE (PF) 100 MCG/2ML SOLUTION: Performed by: INTERNAL MEDICINE

## 2021-09-08 PROCEDURE — 93621 COMP EP EVL L PAC&REC C SINS: CPT | Performed by: INTERNAL MEDICINE

## 2021-09-08 PROCEDURE — 25010000002 MIDAZOLAM PER 1 MG: Performed by: INTERNAL MEDICINE

## 2021-09-08 PROCEDURE — 99153 MOD SED SAME PHYS/QHP EA: CPT

## 2021-09-08 RX ORDER — SODIUM CHLORIDE 9 MG/ML
50 INJECTION, SOLUTION INTRAVENOUS CONTINUOUS
Status: DISCONTINUED | OUTPATIENT
Start: 2021-09-08 | End: 2021-09-08 | Stop reason: HOSPADM

## 2021-09-08 RX ORDER — SODIUM CHLORIDE 0.9 % (FLUSH) 0.9 %
3 SYRINGE (ML) INJECTION EVERY 12 HOURS SCHEDULED
Status: DISCONTINUED | OUTPATIENT
Start: 2021-09-08 | End: 2021-09-08 | Stop reason: HOSPADM

## 2021-09-08 RX ORDER — LIDOCAINE HYDROCHLORIDE 20 MG/ML
INJECTION, SOLUTION INFILTRATION; PERINEURAL AS NEEDED
Status: DISCONTINUED | OUTPATIENT
Start: 2021-09-08 | End: 2021-09-08 | Stop reason: HOSPADM

## 2021-09-08 RX ORDER — ACETAMINOPHEN 325 MG/1
650 TABLET ORAL EVERY 4 HOURS PRN
Status: DISCONTINUED | OUTPATIENT
Start: 2021-09-08 | End: 2021-09-08 | Stop reason: HOSPADM

## 2021-09-08 RX ORDER — HEPARIN SODIUM 200 [USP'U]/100ML
INJECTION, SOLUTION INTRAVENOUS AS NEEDED
Status: DISCONTINUED | OUTPATIENT
Start: 2021-09-08 | End: 2021-09-08 | Stop reason: HOSPADM

## 2021-09-08 RX ORDER — MIDAZOLAM HYDROCHLORIDE 1 MG/ML
INJECTION INTRAMUSCULAR; INTRAVENOUS AS NEEDED
Status: DISCONTINUED | OUTPATIENT
Start: 2021-09-08 | End: 2021-09-08 | Stop reason: HOSPADM

## 2021-09-08 RX ORDER — SODIUM CHLORIDE 0.9 % (FLUSH) 0.9 %
10 SYRINGE (ML) INJECTION AS NEEDED
Status: DISCONTINUED | OUTPATIENT
Start: 2021-09-08 | End: 2021-09-08 | Stop reason: HOSPADM

## 2021-09-08 RX ORDER — FENTANYL CITRATE 50 UG/ML
INJECTION, SOLUTION INTRAMUSCULAR; INTRAVENOUS AS NEEDED
Status: DISCONTINUED | OUTPATIENT
Start: 2021-09-08 | End: 2021-09-08 | Stop reason: HOSPADM

## 2021-09-08 RX ORDER — ACETAMINOPHEN 650 MG/1
650 SUPPOSITORY RECTAL EVERY 4 HOURS PRN
Status: DISCONTINUED | OUTPATIENT
Start: 2021-09-08 | End: 2021-09-08 | Stop reason: HOSPADM

## 2021-09-08 NOTE — BRIEF OP NOTE
EP/ABLATION  Progress Note    Milton Rodriguez  9/8/2021    Pre-op Diagnosis:   I48.92       Post-Op Diagnosis Codes:     * Atrial flutter, unspecified type (CMS/HCC) [I48.92]    Procedure/CPT® Codes:  89100  36951  49865      Procedure(s):  EP/Ablation    Surgeon(s):  Delbert Quesada MD    Anesthesia: Local    Staff:   Scrub Person: Rafa Tatum Jr.  Documenter: Thor Suarez RN  Invasive Technologist: Ben Schmidt; Rafa Tatum Jr.         Estimated Blood Loss: 10 cc    Findings:   1. Normal basic intervals  2. Elevated AV telly refractoriness  3. Successful ablation of atrial flutter    Complications: none          Delbert Quesada MD     Date: 9/8/2021  Time: 09:49 CDT

## 2021-09-10 ENCOUNTER — HOSPITAL ENCOUNTER (OUTPATIENT)
Dept: CT IMAGING | Facility: HOSPITAL | Age: 61
End: 2021-09-10

## 2021-09-13 ENCOUNTER — APPOINTMENT (OUTPATIENT)
Dept: CT IMAGING | Facility: HOSPITAL | Age: 61
End: 2021-09-13

## 2021-09-13 ENCOUNTER — HOSPITAL ENCOUNTER (OUTPATIENT)
Dept: CT IMAGING | Facility: HOSPITAL | Age: 61
Discharge: HOME | End: 2021-09-13
Admitting: NURSE PRACTITIONER

## 2021-09-13 DIAGNOSIS — I48.92 ATRIAL FLUTTER, UNSPECIFIED TYPE (HCC): ICD-10-CM

## 2021-09-13 PROCEDURE — 71250 CT THORAX DX C-: CPT

## 2021-09-13 PROCEDURE — 75572 CT HRT W/3D IMAGE: CPT

## 2021-09-13 PROCEDURE — 0 IOPAMIDOL PER 1 ML: Performed by: NURSE PRACTITIONER

## 2021-09-13 RX ADMIN — IOPAMIDOL 100 ML: 755 INJECTION, SOLUTION INTRAVENOUS at 10:42

## 2021-09-15 ENCOUNTER — TELEPHONE (OUTPATIENT)
Dept: PULMONOLOGY | Facility: CLINIC | Age: 61
End: 2021-09-15

## 2021-09-15 NOTE — TELEPHONE ENCOUNTER
Another provider ordered a Chest CT without contrast and was done on 9/13/21 at . I will cancel your order. His next office visit with you is 11/16/21.

## 2021-09-16 ENCOUNTER — OFFICE VISIT (OUTPATIENT)
Dept: GASTROENTEROLOGY | Facility: CLINIC | Age: 61
End: 2021-09-16

## 2021-09-16 VITALS
DIASTOLIC BLOOD PRESSURE: 60 MMHG | OXYGEN SATURATION: 97 % | HEIGHT: 67 IN | HEART RATE: 56 BPM | SYSTOLIC BLOOD PRESSURE: 124 MMHG | BODY MASS INDEX: 23.86 KG/M2 | TEMPERATURE: 96.8 F | WEIGHT: 152 LBS

## 2021-09-16 DIAGNOSIS — K25.9 GASTRIC EROSION, UNSPECIFIED CHRONICITY: Primary | ICD-10-CM

## 2021-09-16 PROCEDURE — 99214 OFFICE O/P EST MOD 30 MIN: CPT | Performed by: INTERNAL MEDICINE
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
## 2021-09-18 ENCOUNTER — APPOINTMENT (OUTPATIENT)
Dept: GENERAL RADIOLOGY | Facility: HOSPITAL | Age: 61
End: 2021-09-18

## 2021-09-18 ENCOUNTER — HOSPITAL ENCOUNTER (INPATIENT)
Facility: HOSPITAL | Age: 61
LOS: 3 days | Discharge: HOME | End: 2021-09-21
Attending: FAMILY MEDICINE | Admitting: FAMILY MEDICINE

## 2021-09-18 ENCOUNTER — APPOINTMENT (OUTPATIENT)
Dept: CT IMAGING | Facility: HOSPITAL | Age: 61
End: 2021-09-18

## 2021-09-18 DIAGNOSIS — D64.9 ANEMIA, UNSPECIFIED TYPE: ICD-10-CM

## 2021-09-18 DIAGNOSIS — K92.2 GASTROINTESTINAL HEMORRHAGE, UNSPECIFIED GASTROINTESTINAL HEMORRHAGE TYPE: ICD-10-CM

## 2021-09-18 DIAGNOSIS — Z74.09 IMPAIRED MOBILITY: ICD-10-CM

## 2021-09-18 DIAGNOSIS — N18.9 CHRONIC KIDNEY DISEASE, UNSPECIFIED CKD STAGE: ICD-10-CM

## 2021-09-18 DIAGNOSIS — U07.1 COVID-19: Primary | ICD-10-CM

## 2021-09-18 DIAGNOSIS — I50.9 CONGESTIVE HEART FAILURE, UNSPECIFIED HF CHRONICITY, UNSPECIFIED HEART FAILURE TYPE (HCC): ICD-10-CM

## 2021-09-18 LAB
ABO GROUP BLD: NORMAL
ALBUMIN SERPL-MCNC: 3.3 G/DL (ref 3.5–5.2)
ALBUMIN/GLOB SERPL: 1.3 G/DL
ALP SERPL-CCNC: 235 U/L (ref 39–117)
ALT SERPL W P-5'-P-CCNC: 94 U/L (ref 1–41)
ANION GAP SERPL CALCULATED.3IONS-SCNC: 11 MMOL/L (ref 5–15)
ARTERIAL PATENCY WRIST A: POSITIVE
AST SERPL-CCNC: 66 U/L (ref 1–40)
ATMOSPHERIC PRESS: 752 MMHG
BACTERIA UR QL AUTO: ABNORMAL /HPF
BASE EXCESS BLDA CALC-SCNC: 6.5 MMOL/L (ref 0–2)
BASOPHILS # BLD AUTO: 0.01 10*3/MM3 (ref 0–0.2)
BASOPHILS NFR BLD AUTO: 0.2 % (ref 0–1.5)
BDY SITE: ABNORMAL
BILIRUB SERPL-MCNC: 0.4 MG/DL (ref 0–1.2)
BILIRUB UR QL STRIP: NEGATIVE
BLD GP AB SCN SERPL QL: NEGATIVE
BODY TEMPERATURE: 37 C
BUN SERPL-MCNC: 99 MG/DL (ref 8–23)
BUN/CREAT SERPL: 35.2 (ref 7–25)
CALCIUM SPEC-SCNC: 7.9 MG/DL (ref 8.6–10.5)
CHLORIDE SERPL-SCNC: 96 MMOL/L (ref 98–107)
CLARITY UR: CLEAR
CO2 SERPL-SCNC: 31 MMOL/L (ref 22–29)
COLOR UR: YELLOW
CREAT SERPL-MCNC: 2.81 MG/DL (ref 0.76–1.27)
CRP SERPL-MCNC: 0.68 MG/DL (ref 0–0.5)
D DIMER PPP FEU-MCNC: 0.39 MG/L (FEU) (ref 0–0.5)
D-LACTATE SERPL-SCNC: 2 MMOL/L (ref 0.5–2)
DEPRECATED RDW RBC AUTO: 67.5 FL (ref 37–54)
DEVELOPER EXPIRATION DATE: ABNORMAL
DEVELOPER LOT NUMBER: 203
EOSINOPHIL # BLD AUTO: 0.02 10*3/MM3 (ref 0–0.4)
EOSINOPHIL NFR BLD AUTO: 0.4 % (ref 0.3–6.2)
ERYTHROCYTE [DISTWIDTH] IN BLOOD BY AUTOMATED COUNT: 18.9 % (ref 12.3–15.4)
EXPIRATION DATE: ABNORMAL
FECAL OCCULT BLOOD SCREEN, POC: POSITIVE
FERRITIN SERPL-MCNC: 182.3 NG/ML (ref 30–400)
GAS FLOW AIRWAY: 2.5 LPM
GFR SERPL CREATININE-BSD FRML MDRD: 28 ML/MIN/1.73
GLOBULIN UR ELPH-MCNC: 2.6 GM/DL
GLUCOSE BLDC GLUCOMTR-MCNC: 362 MG/DL (ref 70–130)
GLUCOSE BLDC GLUCOMTR-MCNC: 390 MG/DL (ref 70–130)
GLUCOSE BLDC GLUCOMTR-MCNC: 404 MG/DL (ref 70–130)
GLUCOSE SERPL-MCNC: 355 MG/DL (ref 65–99)
GLUCOSE UR STRIP-MCNC: NEGATIVE MG/DL
HBA1C MFR BLD: 6.8 % (ref 4.8–5.6)
HCO3 BLDA-SCNC: 31.2 MMOL/L (ref 20–26)
HCT VFR BLD AUTO: 14.2 % (ref 37.5–51)
HGB BLD-MCNC: 4.2 G/DL (ref 13–17.7)
HGB UR QL STRIP.AUTO: NEGATIVE
HYALINE CASTS UR QL AUTO: ABNORMAL /LPF
IMM GRANULOCYTES # BLD AUTO: 0.05 10*3/MM3 (ref 0–0.05)
IMM GRANULOCYTES NFR BLD AUTO: 1 % (ref 0–0.5)
IRON 24H UR-MRATE: 58 MCG/DL (ref 59–158)
IRON SATN MFR SERPL: 16 % (ref 20–50)
KETONES UR QL STRIP: NEGATIVE
LEUKOCYTE ESTERASE UR QL STRIP.AUTO: NEGATIVE
LYMPHOCYTES # BLD AUTO: 0.19 10*3/MM3 (ref 0.7–3.1)
LYMPHOCYTES NFR BLD AUTO: 3.8 % (ref 19.6–45.3)
Lab: 203
Lab: ABNORMAL
Lab: ABNORMAL
MAGNESIUM SERPL-MCNC: 2 MG/DL (ref 1.6–2.4)
MCH RBC QN AUTO: 30.9 PG (ref 26.6–33)
MCHC RBC AUTO-ENTMCNC: 29.6 G/DL (ref 31.5–35.7)
MCV RBC AUTO: 104.4 FL (ref 79–97)
MODALITY: ABNORMAL
MONOCYTES # BLD AUTO: 0.64 10*3/MM3 (ref 0.1–0.9)
MONOCYTES NFR BLD AUTO: 12.8 % (ref 5–12)
NEGATIVE CONTROL: NEGATIVE
NEUTROPHILS NFR BLD AUTO: 4.1 10*3/MM3 (ref 1.7–7)
NEUTROPHILS NFR BLD AUTO: 81.8 % (ref 42.7–76)
NITRITE UR QL STRIP: NEGATIVE
NOTIFIED BY: ABNORMAL
NOTIFIED WHO: ABNORMAL
NRBC BLD AUTO-RTO: 0.8 /100 WBC (ref 0–0.2)
NT-PROBNP SERPL-MCNC: ABNORMAL PG/ML (ref 0–900)
PCO2 BLDA: 46.4 MM HG (ref 35–45)
PCO2 TEMP ADJ BLD: 46.4 MM HG (ref 35–45)
PH BLDA: 7.44 PH UNITS (ref 7.35–7.45)
PH UR STRIP.AUTO: 6 [PH] (ref 5–8)
PH, TEMP CORRECTED: 7.44 PH UNITS (ref 7.35–7.45)
PLATELET # BLD AUTO: 126 10*3/MM3 (ref 140–450)
PMV BLD AUTO: 11.4 FL (ref 6–12)
PO2 BLDA: 79.1 MM HG (ref 83–108)
PO2 TEMP ADJ BLD: 79.1 MM HG (ref 83–108)
POSITIVE CONTROL: POSITIVE
POTASSIUM SERPL-SCNC: 3.8 MMOL/L (ref 3.5–5.2)
PROCALCITONIN SERPL-MCNC: 0.42 NG/ML (ref 0–0.25)
PROT SERPL-MCNC: 5.9 G/DL (ref 6–8.5)
PROT UR QL STRIP: ABNORMAL
RBC # BLD AUTO: 1.36 10*6/MM3 (ref 4.14–5.8)
RBC # UR: ABNORMAL /HPF
REF LAB TEST METHOD: ABNORMAL
RH BLD: POSITIVE
SAO2 % BLDCOA: 97.7 % (ref 94–99)
SARS-COV-2 RNA PNL SPEC NAA+PROBE: DETECTED
SODIUM SERPL-SCNC: 138 MMOL/L (ref 136–145)
SP GR UR STRIP: 1.01 (ref 1–1.03)
SQUAMOUS #/AREA URNS HPF: ABNORMAL /HPF
T&S EXPIRATION DATE: NORMAL
T4 FREE SERPL-MCNC: 1.48 NG/DL (ref 0.93–1.7)
TIBC SERPL-MCNC: 359 MCG/DL (ref 298–536)
TRANSFERRIN SERPL-MCNC: 241 MG/DL (ref 200–360)
TROPONIN T SERPL-MCNC: 0.04 NG/ML (ref 0–0.03)
TROPONIN T SERPL-MCNC: 0.04 NG/ML (ref 0–0.03)
TSH SERPL DL<=0.05 MIU/L-ACNC: 2.71 UIU/ML (ref 0.27–4.2)
UROBILINOGEN UR QL STRIP: ABNORMAL
VENTILATOR MODE: ABNORMAL
WBC # BLD AUTO: 5.01 10*3/MM3 (ref 3.4–10.8)
WBC UR QL AUTO: ABNORMAL /HPF

## 2021-09-18 PROCEDURE — 63710000001 INSULIN REGULAR HUMAN PER 5 UNITS: Performed by: PHYSICIAN ASSISTANT

## 2021-09-18 PROCEDURE — 86900 BLOOD TYPING SEROLOGIC ABO: CPT | Performed by: PHYSICIAN ASSISTANT

## 2021-09-18 PROCEDURE — 82746 ASSAY OF FOLIC ACID SERUM: CPT | Performed by: PHYSICIAN ASSISTANT

## 2021-09-18 PROCEDURE — 87040 BLOOD CULTURE FOR BACTERIA: CPT | Performed by: PHYSICIAN ASSISTANT

## 2021-09-18 PROCEDURE — 85025 COMPLETE CBC W/AUTO DIFF WBC: CPT | Performed by: PHYSICIAN ASSISTANT

## 2021-09-18 PROCEDURE — 93005 ELECTROCARDIOGRAM TRACING: CPT | Performed by: PHYSICIAN ASSISTANT

## 2021-09-18 PROCEDURE — 36600 WITHDRAWAL OF ARTERIAL BLOOD: CPT

## 2021-09-18 PROCEDURE — 84443 ASSAY THYROID STIM HORMONE: CPT | Performed by: PHYSICIAN ASSISTANT

## 2021-09-18 PROCEDURE — 80053 COMPREHEN METABOLIC PANEL: CPT | Performed by: PHYSICIAN ASSISTANT

## 2021-09-18 PROCEDURE — 94640 AIRWAY INHALATION TREATMENT: CPT

## 2021-09-18 PROCEDURE — 86850 RBC ANTIBODY SCREEN: CPT | Performed by: PHYSICIAN ASSISTANT

## 2021-09-18 PROCEDURE — 93010 ELECTROCARDIOGRAM REPORT: CPT | Performed by: EMERGENCY MEDICINE

## 2021-09-18 PROCEDURE — 83036 HEMOGLOBIN GLYCOSYLATED A1C: CPT | Performed by: PHYSICIAN ASSISTANT

## 2021-09-18 PROCEDURE — 86901 BLOOD TYPING SEROLOGIC RH(D): CPT

## 2021-09-18 PROCEDURE — 86140 C-REACTIVE PROTEIN: CPT | Performed by: PHYSICIAN ASSISTANT

## 2021-09-18 PROCEDURE — 84439 ASSAY OF FREE THYROXINE: CPT | Performed by: PHYSICIAN ASSISTANT

## 2021-09-18 PROCEDURE — 86923 COMPATIBILITY TEST ELECTRIC: CPT

## 2021-09-18 PROCEDURE — 83605 ASSAY OF LACTIC ACID: CPT | Performed by: PHYSICIAN ASSISTANT

## 2021-09-18 PROCEDURE — 84466 ASSAY OF TRANSFERRIN: CPT | Performed by: PHYSICIAN ASSISTANT

## 2021-09-18 PROCEDURE — 82728 ASSAY OF FERRITIN: CPT | Performed by: FAMILY MEDICINE

## 2021-09-18 PROCEDURE — 82803 BLOOD GASES ANY COMBINATION: CPT

## 2021-09-18 PROCEDURE — 81001 URINALYSIS AUTO W/SCOPE: CPT | Performed by: PHYSICIAN ASSISTANT

## 2021-09-18 PROCEDURE — 84484 ASSAY OF TROPONIN QUANT: CPT | Performed by: PHYSICIAN ASSISTANT

## 2021-09-18 PROCEDURE — 74176 CT ABD & PELVIS W/O CONTRAST: CPT

## 2021-09-18 PROCEDURE — P9016 RBC LEUKOCYTES REDUCED: HCPCS

## 2021-09-18 PROCEDURE — 82607 VITAMIN B-12: CPT | Performed by: PHYSICIAN ASSISTANT

## 2021-09-18 PROCEDURE — 94799 UNLISTED PULMONARY SVC/PX: CPT

## 2021-09-18 PROCEDURE — 86901 BLOOD TYPING SEROLOGIC RH(D): CPT | Performed by: PHYSICIAN ASSISTANT

## 2021-09-18 PROCEDURE — 36430 TRANSFUSION BLD/BLD COMPNT: CPT

## 2021-09-18 PROCEDURE — 82962 GLUCOSE BLOOD TEST: CPT

## 2021-09-18 PROCEDURE — 25010000002 ONDANSETRON PER 1 MG: Performed by: PHYSICIAN ASSISTANT

## 2021-09-18 PROCEDURE — 83880 ASSAY OF NATRIURETIC PEPTIDE: CPT | Performed by: PHYSICIAN ASSISTANT

## 2021-09-18 PROCEDURE — 86900 BLOOD TYPING SEROLOGIC ABO: CPT

## 2021-09-18 PROCEDURE — 87635 SARS-COV-2 COVID-19 AMP PRB: CPT | Performed by: PHYSICIAN ASSISTANT

## 2021-09-18 PROCEDURE — 84145 PROCALCITONIN (PCT): CPT | Performed by: PHYSICIAN ASSISTANT

## 2021-09-18 PROCEDURE — 85379 FIBRIN DEGRADATION QUANT: CPT | Performed by: PHYSICIAN ASSISTANT

## 2021-09-18 PROCEDURE — 99285 EMERGENCY DEPT VISIT HI MDM: CPT

## 2021-09-18 PROCEDURE — 83540 ASSAY OF IRON: CPT | Performed by: PHYSICIAN ASSISTANT

## 2021-09-18 PROCEDURE — 82270 OCCULT BLOOD FECES: CPT | Performed by: PHYSICIAN ASSISTANT

## 2021-09-18 PROCEDURE — 71045 X-RAY EXAM CHEST 1 VIEW: CPT

## 2021-09-18 PROCEDURE — 83735 ASSAY OF MAGNESIUM: CPT | Performed by: PHYSICIAN ASSISTANT

## 2021-09-18 RX ORDER — ACETAMINOPHEN 325 MG/1
650 TABLET ORAL EVERY 4 HOURS PRN
Status: DISCONTINUED | OUTPATIENT
Start: 2021-09-18 | End: 2021-09-21 | Stop reason: HOSPADM

## 2021-09-18 RX ORDER — PANTOPRAZOLE SODIUM 40 MG/10ML
40 INJECTION, POWDER, LYOPHILIZED, FOR SOLUTION INTRAVENOUS EVERY 12 HOURS SCHEDULED
Status: DISCONTINUED | OUTPATIENT
Start: 2021-09-18 | End: 2021-09-21 | Stop reason: HOSPADM

## 2021-09-18 RX ORDER — FUROSEMIDE 40 MG/1
40 TABLET ORAL
Status: DISCONTINUED | OUTPATIENT
Start: 2021-09-19 | End: 2021-09-21 | Stop reason: HOSPADM

## 2021-09-18 RX ORDER — SODIUM CHLORIDE 0.9 % (FLUSH) 0.9 %
10 SYRINGE (ML) INJECTION AS NEEDED
Status: DISCONTINUED | OUTPATIENT
Start: 2021-09-18 | End: 2021-09-21 | Stop reason: HOSPADM

## 2021-09-18 RX ORDER — DEXTROSE MONOHYDRATE 25 G/50ML
25 INJECTION, SOLUTION INTRAVENOUS
Status: DISCONTINUED | OUTPATIENT
Start: 2021-09-18 | End: 2021-09-21 | Stop reason: HOSPADM

## 2021-09-18 RX ORDER — SODIUM CHLORIDE 0.9 % (FLUSH) 0.9 %
10 SYRINGE (ML) INJECTION EVERY 12 HOURS SCHEDULED
Status: DISCONTINUED | OUTPATIENT
Start: 2021-09-18 | End: 2021-09-21 | Stop reason: HOSPADM

## 2021-09-18 RX ORDER — SODIUM CHLORIDE 9 MG/ML
75 INJECTION, SOLUTION INTRAVENOUS CONTINUOUS
Status: DISCONTINUED | OUTPATIENT
Start: 2021-09-18 | End: 2021-09-18

## 2021-09-18 RX ORDER — PANTOPRAZOLE SODIUM 40 MG/10ML
80 INJECTION, POWDER, LYOPHILIZED, FOR SOLUTION INTRAVENOUS ONCE
Status: COMPLETED | OUTPATIENT
Start: 2021-09-18 | End: 2021-09-18

## 2021-09-18 RX ORDER — AMIODARONE HYDROCHLORIDE 200 MG/1
200 TABLET ORAL 2 TIMES DAILY
Status: DISCONTINUED | OUTPATIENT
Start: 2021-09-18 | End: 2021-09-21 | Stop reason: HOSPADM

## 2021-09-18 RX ORDER — ATORVASTATIN CALCIUM 40 MG/1
80 TABLET, FILM COATED ORAL NIGHTLY
Status: DISCONTINUED | OUTPATIENT
Start: 2021-09-18 | End: 2021-09-21 | Stop reason: HOSPADM

## 2021-09-18 RX ORDER — NICOTINE POLACRILEX 4 MG
15 LOZENGE BUCCAL
Status: DISCONTINUED | OUTPATIENT
Start: 2021-09-18 | End: 2021-09-21 | Stop reason: HOSPADM

## 2021-09-18 RX ORDER — METOPROLOL SUCCINATE 100 MG/1
100 TABLET, EXTENDED RELEASE ORAL
Status: DISCONTINUED | OUTPATIENT
Start: 2021-09-19 | End: 2021-09-21 | Stop reason: HOSPADM

## 2021-09-18 RX ORDER — ONDANSETRON 2 MG/ML
4 INJECTION INTRAMUSCULAR; INTRAVENOUS ONCE
Status: COMPLETED | OUTPATIENT
Start: 2021-09-18 | End: 2021-09-18

## 2021-09-18 RX ORDER — ALLOPURINOL 100 MG/1
100 TABLET ORAL DAILY
Status: DISCONTINUED | OUTPATIENT
Start: 2021-09-19 | End: 2021-09-21 | Stop reason: HOSPADM

## 2021-09-18 RX ORDER — BUDESONIDE AND FORMOTEROL FUMARATE DIHYDRATE 160; 4.5 UG/1; UG/1
2 AEROSOL RESPIRATORY (INHALATION)
Status: DISCONTINUED | OUTPATIENT
Start: 2021-09-18 | End: 2021-09-21 | Stop reason: HOSPADM

## 2021-09-18 RX ADMIN — SODIUM CHLORIDE 1000 ML: 9 INJECTION, SOLUTION INTRAVENOUS at 16:01

## 2021-09-18 RX ADMIN — BUDESONIDE AND FORMOTEROL FUMARATE DIHYDRATE 2 PUFF: 160; 4.5 AEROSOL RESPIRATORY (INHALATION) at 23:24

## 2021-09-18 RX ADMIN — ONDANSETRON 4 MG: 2 INJECTION INTRAMUSCULAR; INTRAVENOUS at 16:01

## 2021-09-18 RX ADMIN — PANTOPRAZOLE SODIUM 80 MG: 40 INJECTION, POWDER, FOR SOLUTION INTRAVENOUS at 17:12

## 2021-09-18 RX ADMIN — INSULIN HUMAN 6.8 UNITS: 100 INJECTION, SOLUTION PARENTERAL at 19:32

## 2021-09-18 NOTE — ED NOTES
Patient was educated on the signs and symptoms of a transfusion reaction which may include:    • Hives  • Itching/Rash/Urticarias  • Flushing  • Chills  • Fever (if greater than 1.8 degrees F or 1 degree C from the pre-transfusion baseline temperature And the increased result is a temperature greater than or equal to 100.4 °F)  • Nausea/Vomiting  • Chest/Abdominal/Back Pain  • Pain at the Infusion Site  • Headache  • Muscle Aches/Myalgia  • Dyspnea/Pulmonary Edema/Rales  • Cyanosis  • Coughing/Wheezing  • Shock  • Rigors  • Hypoxemia  • Hypertension  • Hypotension  • Abnormal Bleeding  • Oliguria/Anuria  • Hemoglobinuria  • Tachycardia    Patient to notify staff if any signs/symptoms occur.  Patient verbalizes understanding.     Duc Ohara, RN  09/18/21 0055
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
## 2021-09-19 PROBLEM — U07.1 REAL TIME REVERSE TRANSCRIPTASE PCR POSITIVE FOR COVID-19 VIRUS: Status: ACTIVE | Noted: 2021-09-19

## 2021-09-19 PROBLEM — D62 ACUTE ON CHRONIC BLOOD LOSS ANEMIA: Status: ACTIVE | Noted: 2021-09-18

## 2021-09-19 LAB
ANION GAP SERPL CALCULATED.3IONS-SCNC: 9 MMOL/L (ref 5–15)
APTT PPP: 34.9 SECONDS (ref 24.1–35)
BASOPHILS # BLD AUTO: 0.02 10*3/MM3 (ref 0–0.2)
BASOPHILS NFR BLD AUTO: 0.4 % (ref 0–1.5)
BUN SERPL-MCNC: 91 MG/DL (ref 8–23)
BUN/CREAT SERPL: 38.4 (ref 7–25)
CALCIUM SPEC-SCNC: 7.6 MG/DL (ref 8.6–10.5)
CHLORIDE SERPL-SCNC: 100 MMOL/L (ref 98–107)
CO2 SERPL-SCNC: 30 MMOL/L (ref 22–29)
CREAT SERPL-MCNC: 2.37 MG/DL (ref 0.76–1.27)
DEPRECATED RDW RBC AUTO: 63.7 FL (ref 37–54)
EOSINOPHIL # BLD AUTO: 0.08 10*3/MM3 (ref 0–0.4)
EOSINOPHIL NFR BLD AUTO: 1.5 % (ref 0.3–6.2)
ERYTHROCYTE [DISTWIDTH] IN BLOOD BY AUTOMATED COUNT: 20.2 % (ref 12.3–15.4)
FOLATE SERPL-MCNC: >20 NG/ML (ref 4.78–24.2)
GFR SERPL CREATININE-BSD FRML MDRD: 34 ML/MIN/1.73
GLUCOSE BLDC GLUCOMTR-MCNC: 164 MG/DL (ref 70–130)
GLUCOSE BLDC GLUCOMTR-MCNC: 218 MG/DL (ref 70–130)
GLUCOSE BLDC GLUCOMTR-MCNC: 225 MG/DL (ref 70–130)
GLUCOSE BLDC GLUCOMTR-MCNC: 365 MG/DL (ref 70–130)
GLUCOSE SERPL-MCNC: 332 MG/DL (ref 65–99)
HCT VFR BLD AUTO: 18.3 % (ref 37.5–51)
HCT VFR BLD AUTO: 21.5 % (ref 37.5–51)
HCT VFR BLD AUTO: 24.4 % (ref 37.5–51)
HGB BLD-MCNC: 5.9 G/DL (ref 13–17.7)
HGB BLD-MCNC: 6.8 G/DL (ref 13–17.7)
HGB BLD-MCNC: 7.7 G/DL (ref 13–17.7)
IMM GRANULOCYTES # BLD AUTO: 0.07 10*3/MM3 (ref 0–0.05)
IMM GRANULOCYTES NFR BLD AUTO: 1.4 % (ref 0–0.5)
INR PPP: 1.11 (ref 0.91–1.09)
LYMPHOCYTES # BLD AUTO: 0.25 10*3/MM3 (ref 0.7–3.1)
LYMPHOCYTES NFR BLD AUTO: 4.8 % (ref 19.6–45.3)
MCH RBC QN AUTO: 30.1 PG (ref 26.6–33)
MCHC RBC AUTO-ENTMCNC: 32.2 G/DL (ref 31.5–35.7)
MCV RBC AUTO: 93.4 FL (ref 79–97)
MONOCYTES # BLD AUTO: 0.69 10*3/MM3 (ref 0.1–0.9)
MONOCYTES NFR BLD AUTO: 13.3 % (ref 5–12)
NEUTROPHILS NFR BLD AUTO: 4.07 10*3/MM3 (ref 1.7–7)
NEUTROPHILS NFR BLD AUTO: 78.6 % (ref 42.7–76)
NRBC BLD AUTO-RTO: 1.7 /100 WBC (ref 0–0.2)
PLATELET # BLD AUTO: 124 10*3/MM3 (ref 140–450)
PMV BLD AUTO: 10.5 FL (ref 6–12)
POTASSIUM SERPL-SCNC: 3.7 MMOL/L (ref 3.5–5.2)
PROTHROMBIN TIME: 13.9 SECONDS (ref 11.9–14.6)
QT INTERVAL: 528 MS
QTC INTERVAL: 531 MS
RBC # BLD AUTO: 1.96 10*6/MM3 (ref 4.14–5.8)
SODIUM SERPL-SCNC: 139 MMOL/L (ref 136–145)
VIT B12 BLD-MCNC: 706 PG/ML (ref 211–946)
WBC # BLD AUTO: 5.18 10*3/MM3 (ref 3.4–10.8)

## 2021-09-19 PROCEDURE — 85730 THROMBOPLASTIN TIME PARTIAL: CPT | Performed by: FAMILY MEDICINE

## 2021-09-19 PROCEDURE — P9016 RBC LEUKOCYTES REDUCED: HCPCS

## 2021-09-19 PROCEDURE — 94799 UNLISTED PULMONARY SVC/PX: CPT

## 2021-09-19 PROCEDURE — 86900 BLOOD TYPING SEROLOGIC ABO: CPT

## 2021-09-19 PROCEDURE — 85014 HEMATOCRIT: CPT | Performed by: FAMILY MEDICINE

## 2021-09-19 PROCEDURE — 99254 IP/OBS CNSLTJ NEW/EST MOD 60: CPT | Performed by: INTERNAL MEDICINE

## 2021-09-19 PROCEDURE — 85025 COMPLETE CBC W/AUTO DIFF WBC: CPT | Performed by: FAMILY MEDICINE

## 2021-09-19 PROCEDURE — 36430 TRANSFUSION BLD/BLD COMPNT: CPT

## 2021-09-19 PROCEDURE — 80048 BASIC METABOLIC PNL TOTAL CA: CPT | Performed by: FAMILY MEDICINE

## 2021-09-19 PROCEDURE — 82962 GLUCOSE BLOOD TEST: CPT

## 2021-09-19 PROCEDURE — 63710000001 INSULIN LISPRO (HUMAN) PER 5 UNITS: Performed by: FAMILY MEDICINE

## 2021-09-19 PROCEDURE — 85610 PROTHROMBIN TIME: CPT | Performed by: FAMILY MEDICINE

## 2021-09-19 PROCEDURE — 85018 HEMOGLOBIN: CPT | Performed by: FAMILY MEDICINE

## 2021-09-19 RX ORDER — IPRATROPIUM BROMIDE AND ALBUTEROL SULFATE 2.5; .5 MG/3ML; MG/3ML
3 SOLUTION RESPIRATORY (INHALATION)
Status: DISCONTINUED | OUTPATIENT
Start: 2021-09-19 | End: 2021-09-19

## 2021-09-19 RX ORDER — ALBUTEROL SULFATE 90 UG/1
2 AEROSOL, METERED RESPIRATORY (INHALATION) EVERY 4 HOURS PRN
Status: DISCONTINUED | OUTPATIENT
Start: 2021-09-19 | End: 2021-09-21 | Stop reason: HOSPADM

## 2021-09-19 RX ADMIN — AMIODARONE HYDROCHLORIDE 200 MG: 200 TABLET ORAL at 09:06

## 2021-09-19 RX ADMIN — AMIODARONE HYDROCHLORIDE 200 MG: 200 TABLET ORAL at 01:16

## 2021-09-19 RX ADMIN — ATORVASTATIN CALCIUM 80 MG: 40 TABLET, FILM COATED ORAL at 01:15

## 2021-09-19 RX ADMIN — SODIUM CHLORIDE, PRESERVATIVE FREE 10 ML: 5 INJECTION INTRAVENOUS at 01:19

## 2021-09-19 RX ADMIN — METOPROLOL SUCCINATE 100 MG: 100 TABLET, EXTENDED RELEASE ORAL at 09:06

## 2021-09-19 RX ADMIN — INSULIN LISPRO 4 UNITS: 100 INJECTION, SOLUTION INTRAVENOUS; SUBCUTANEOUS at 12:19

## 2021-09-19 RX ADMIN — BUDESONIDE AND FORMOTEROL FUMARATE DIHYDRATE 2 PUFF: 160; 4.5 AEROSOL RESPIRATORY (INHALATION) at 20:26

## 2021-09-19 RX ADMIN — INSULIN LISPRO 4 UNITS: 100 INJECTION, SOLUTION INTRAVENOUS; SUBCUTANEOUS at 17:32

## 2021-09-19 RX ADMIN — INSULIN LISPRO 2 UNITS: 100 INJECTION, SOLUTION INTRAVENOUS; SUBCUTANEOUS at 21:20

## 2021-09-19 RX ADMIN — AMIODARONE HYDROCHLORIDE 200 MG: 200 TABLET ORAL at 21:21

## 2021-09-19 RX ADMIN — ATORVASTATIN CALCIUM 80 MG: 40 TABLET, FILM COATED ORAL at 21:21

## 2021-09-19 RX ADMIN — PANTOPRAZOLE SODIUM 40 MG: 40 INJECTION, POWDER, FOR SOLUTION INTRAVENOUS at 21:21

## 2021-09-19 RX ADMIN — FUROSEMIDE 40 MG: 40 TABLET ORAL at 17:32

## 2021-09-19 RX ADMIN — BUDESONIDE AND FORMOTEROL FUMARATE DIHYDRATE 2 PUFF: 160; 4.5 AEROSOL RESPIRATORY (INHALATION) at 08:08

## 2021-09-19 RX ADMIN — FUROSEMIDE 40 MG: 40 TABLET ORAL at 09:06

## 2021-09-19 RX ADMIN — ALLOPURINOL 100 MG: 100 TABLET ORAL at 09:06

## 2021-09-19 RX ADMIN — INSULIN LISPRO 8 UNITS: 100 INJECTION, SOLUTION INTRAVENOUS; SUBCUTANEOUS at 09:06

## 2021-09-19 RX ADMIN — SODIUM CHLORIDE, PRESERVATIVE FREE 10 ML: 5 INJECTION INTRAVENOUS at 09:09

## 2021-09-19 RX ADMIN — SODIUM CHLORIDE, PRESERVATIVE FREE 10 ML: 5 INJECTION INTRAVENOUS at 21:25

## 2021-09-19 RX ADMIN — PANTOPRAZOLE SODIUM 40 MG: 40 INJECTION, POWDER, FOR SOLUTION INTRAVENOUS at 09:09

## 2021-09-19 RX ADMIN — PANTOPRAZOLE SODIUM 40 MG: 40 INJECTION, POWDER, FOR SOLUTION INTRAVENOUS at 01:19

## 2021-09-19 NOTE — PROGRESS NOTES
[facility] Medicine Services  INPATIENT PROGRESS NOTE    Length of Stay: 1  Date of Admission: 9/18/2021  Primary Care Physician: Thanh Weeks MD    Subjective   Chief Complaint: GI bleed/Covid positive/COPD    HPI   Patient no acute distress.  Patient is fully on 2 L of oxygen, patient baseline.  T-max 99.5.  T-current 98.7.  Blood pressure stable.    Review of Systems   Constitutional: Positive for activity change, appetite change and fatigue. Negative for chills and fever.   HENT: Negative for hearing loss, nosebleeds, tinnitus and trouble swallowing.    Eyes: Negative for visual disturbance.   Respiratory: Positive for shortness of breath. Negative for cough, chest tightness and wheezing.    Cardiovascular: Negative for chest pain, palpitations and leg swelling.   Gastrointestinal: Negative for abdominal distention, abdominal pain, blood in stool, constipation, diarrhea, nausea and vomiting.   Endocrine: Negative for cold intolerance, heat intolerance, polydipsia, polyphagia and polyuria.   Genitourinary: Negative for decreased urine volume, difficulty urinating, dysuria, flank pain, frequency and hematuria.   Musculoskeletal: Positive for arthralgias, gait problem and myalgias. Negative for joint swelling.   Skin: Negative for rash.   Allergic/Immunologic: Negative for immunocompromised state.   Neurological: Positive for weakness. Negative for dizziness, syncope, light-headedness and headaches.   Hematological: Negative for adenopathy. Does not bruise/bleed easily.   Psychiatric/Behavioral: Negative for confusion and sleep disturbance. The patient is not nervous/anxious.           All pertinent negatives and positives are as above. All other systems have been reviewed and are negative unless otherwise stated.     Objective    Temp:  [97.1 °F (36.2 °C)-99.5 °F (37.5 °C)] 98.7 °F (37.1 °C)  Heart Rate:  [59-85] 62  Resp:  [16-20] 18  BP: ()/(47-78)  129/53    Intake/Output Summary (Last 24 hours) at 9/19/2021 0944  Last data filed at 9/19/2021 0900  Gross per 24 hour   Intake 1260 ml   Output 550 ml   Net 710 ml     Physical Exam  Vitals and nursing note reviewed.   Constitutional:       Appearance: He is well-developed.   HENT:      Head: Normocephalic.   Eyes:      Conjunctiva/sclera: Conjunctivae normal.      Pupils: Pupils are equal, round, and reactive to light.   Neck:      Vascular: No JVD.   Cardiovascular:      Rate and Rhythm: Normal rate and regular rhythm.      Heart sounds: Normal heart sounds. No murmur heard.   No friction rub. No gallop.    Pulmonary:      Effort: Pulmonary effort is normal. No respiratory distress.      Breath sounds: Normal breath sounds. No wheezing or rales.      Comments: Diminished breath sound bilateral, clear.  Patient is on 2 L of oxygen.  Chest:      Chest wall: No tenderness.   Abdominal:      General: Bowel sounds are normal. There is no distension.      Palpations: Abdomen is soft.      Tenderness: There is no abdominal tenderness. There is no guarding or rebound.   Musculoskeletal:         General: No tenderness or deformity. Normal range of motion.      Cervical back: Neck supple.   Skin:     General: Skin is warm and dry.      Capillary Refill: Capillary refill takes 2 to 3 seconds.      Findings: No rash.   Neurological:      General: No focal deficit present.      Mental Status: He is alert and oriented to person, place, and time.      Cranial Nerves: No cranial nerve deficit.      Motor: Weakness present. No abnormal muscle tone.      Coordination: Coordination abnormal.      Gait: Gait abnormal.      Deep Tendon Reflexes: Reflexes normal.   Psychiatric:         Behavior: Behavior normal.         Thought Content: Thought content normal.         Judgment: Judgment normal.         Results Review:  Lab Results (last 24 hours)     Procedure Component Value Units Date/Time    POC Glucose Once [[id number]]   (Abnormal) Collected: 09/19/21 0818    Specimen: Blood Updated: 09/19/21 0829     Glucose 365 mg/dL      Comment: : [id number] Elaine ID: [id number]       Basic Metabolic Panel [[id number]]  (Abnormal) Collected: 09/19/21 0427    Specimen: Blood Updated: 09/19/21 0520     Glucose 332 mg/dL      BUN 91 mg/dL      Creatinine 2.37 mg/dL      Sodium 139 mmol/L      Potassium 3.7 mmol/L      Chloride 100 mmol/L      CO2 30.0 mmol/L      Calcium 7.6 mg/dL      eGFR  African Amer 34 mL/min/1.73      BUN/Creatinine Ratio 38.4     Anion Gap 9.0 mmol/L     Narrative:      GFR Normal >60  Chronic Kidney Disease <60  Kidney Failure <15      CBC & Differential [[id number]]  (Abnormal) Collected: 09/19/21 0427    Specimen: Blood Updated: 09/19/21 0459    Narrative:      The following orders were created for panel order CBC & Differential.  Procedure                               Abnormality         Status                     ---------                               -----------         ------                     CBC Auto Differential[[id number]]        Abnormal            Final result                 Please view results for these tests on the individual orders.    CBC Auto Differential [[id number]]  (Abnormal) Collected: 09/19/21 0427    Specimen: Blood Updated: 09/19/21 0459     WBC 5.18 10*3/mm3      RBC 1.96 10*6/mm3      Hemoglobin 5.9 g/dL      Hematocrit 18.3 %      MCV 93.4 fL      MCH 30.1 pg      MCHC 32.2 g/dL      RDW 20.2 %      RDW-SD 63.7 fl      MPV 10.5 fL      Platelets 124 10*3/mm3      Neutrophil % 78.6 %      Lymphocyte % 4.8 %      Monocyte % 13.3 %      Eosinophil % 1.5 %      Basophil % 0.4 %      Immature Grans % 1.4 %      Neutrophils, Absolute 4.07 10*3/mm3      Lymphocytes, Absolute 0.25 10*3/mm3      Monocytes, Absolute 0.69 10*3/mm3      Eosinophils, Absolute 0.08 10*3/mm3      Basophils, Absolute 0.02 10*3/mm3      Immature Grans, Absolute 0.07 10*3/mm3      nRBC 1.7 /100 WBC     Ferritin  [[id number]]  (Normal) Collected: 09/18/21 1935    Specimen: Blood Updated: 09/18/21 2259     Ferritin 182.30 ng/mL     Narrative:      Results may be falsely decreased if patient taking Biotin.      Troponin [[id number]]  (Abnormal) Collected: 09/18/21 1935    Specimen: Blood Updated: 09/18/21 2010     Troponin T 0.041 ng/mL     Narrative:      Troponin T Reference Range:  <= 0.03 ng/mL-   Negative for AMI  >0.03 ng/mL-     Abnormal for myocardial necrosis.  Clinicians would have to utilize clinical acumen, EKG, Troponin and serial changes to determine if it is an Acute Myocardial Infarction or myocardial injury due to an underlying chronic condition.       Results may be falsely decreased if patient taking Biotin.      Urinalysis, Microscopic Only - Urine, Clean Catch [[id number]]  (Abnormal) Collected: 09/18/21 1931    Specimen: Urine, Clean Catch Updated: 09/18/21 1955     RBC, UA 0-2 /HPF      WBC, UA None Seen /HPF      Bacteria, UA None Seen /HPF      Squamous Epithelial Cells, UA None Seen /HPF      Hyaline Casts, UA None Seen /LPF      Methodology Automated Microscopy    Urinalysis With Culture If Indicated - Urine, Clean Catch [[id number]]  (Abnormal) Collected: 09/18/21 1931    Specimen: Urine, Clean Catch Updated: 09/18/21 1955     Color, UA Yellow     Appearance, UA Clear     pH, UA 6.0     Specific Gravity, UA 1.013     Glucose, UA Negative     Ketones, UA Negative     Bilirubin, UA Negative     Blood, UA Negative     Protein,  mg/dL (2+)     Leuk Esterase, UA Negative     Nitrite, UA Negative     Urobilinogen, UA 0.2 E.U./dL    POC Glucose Once [[id number]]  (Abnormal) Collected: 09/18/21 1927    Specimen: Blood Updated: 09/18/21 1949     Glucose 390 mg/dL      Comment: : [id number] Ohara justinMeter ID: [id number]       POC Glucose Once [[id number]]  (Abnormal) Collected: 09/18/21 1926    Specimen: Blood Updated: 09/18/21 1949     Glucose 404 mg/dL      Comment: : [id number] Ohara justinMeter  ID: [id number]       Hemoglobin A1c [[id number]]  (Abnormal) Collected: 09/18/21 1701    Specimen: Blood Updated: 09/18/21 1943     Hemoglobin A1C 6.80 %     Narrative:      Hemoglobin A1C Ranges:    Increased Risk for Diabetes  5.7% to 6.4%  Diabetes                     >= 6.5%  Diabetic Goal                < 7.0%    Iron Profile [[id number]]  (Abnormal) Collected: 09/18/21 1601    Specimen: Blood Updated: 09/18/21 1816     Iron 58 mcg/dL      Iron Saturation 16 %      Transferrin 241 mg/dL      TIBC 359 mcg/dL     C-reactive Protein [[id number]]  (Abnormal) Collected: 09/18/21 1601    Specimen: Blood Updated: 09/18/21 1816     C-Reactive Protein 0.68 mg/dL     CBC & Differential [[id number]]  (Abnormal) Collected: 09/18/21 1701    Specimen: Blood Updated: 09/18/21 1752    Narrative:      The following orders were created for panel order CBC & Differential.  Procedure                               Abnormality         Status                     ---------                               -----------         ------                     CBC Auto Differential[[id number]]        Abnormal            Final result                 Please view results for these tests on the individual orders.    CBC Auto Differential [[id number]]  (Abnormal) Collected: 09/18/21 1701    Specimen: Blood Updated: 09/18/21 1752     WBC 5.01 10*3/mm3      RBC 1.36 10*6/mm3      Hemoglobin 4.2 g/dL      Hematocrit 14.2 %      .4 fL      MCH 30.9 pg      MCHC 29.6 g/dL      RDW 18.9 %      RDW-SD 67.5 fl      MPV 11.4 fL      Platelets 126 10*3/mm3      Neutrophil % 81.8 %      Lymphocyte % 3.8 %      Monocyte % 12.8 %      Eosinophil % 0.4 %      Basophil % 0.2 %      Immature Grans % 1.0 %      Neutrophils, Absolute 4.10 10*3/mm3      Lymphocytes, Absolute 0.19 10*3/mm3      Monocytes, Absolute 0.64 10*3/mm3      Eosinophils, Absolute 0.02 10*3/mm3      Basophils, Absolute 0.01 10*3/mm3      Immature Grans, Absolute 0.05 10*3/mm3      nRBC 0.8 /100  WBC     Vitamin B12 [[id number]] Collected: 09/18/21 1741    Specimen: Blood Updated: 09/18/21 1747    Folate [[id number]] Collected: 09/18/21 1741    Specimen: Blood Updated: 09/18/21 1747    D-dimer, Quantitative [[id number]]  (Normal) Collected: 09/18/21 1706    Specimen: Blood Updated: 09/18/21 1729     D-Dimer, Quantitative 0.39 mg/L (FEU)     Narrative:      Reference Range is 0-0.50 mg/L FEU. However, results <0.50 mg/L FEU tends to rule out DVT or PE. Results >0.50 mg/L FEU are not useful in predicting absence or presence of DVT or PE.      COVID-19,[facility]-HOUSE,Nasal Swab No Transport Media 3-4 HR TAT - Swab, Nasal Cavity [[id number]]  (Abnormal) Collected: 09/18/21 1601    Specimen: Swab from Nasal Cavity Updated: 09/18/21 1725     COVID19 Detected    Narrative:      Fact sheet for providers: https://www.fda.gov/media/011847/download     Fact sheet for patients: https://www.fda.gov/media/827667/download    Test performed by PCR.    Consider negative results in combination with clinical observations, patient history, and epidemiological information.    Troponin [[id number]]  (Abnormal) Collected: 09/18/21 1601    Specimen: Blood Updated: 09/18/21 1721     Troponin T 0.040 ng/mL     Narrative:      Troponin T Reference Range:  <= 0.03 ng/mL-   Negative for AMI  >0.03 ng/mL-     Abnormal for myocardial necrosis.  Clinicians would have to utilize clinical acumen, EKG, Troponin and serial changes to determine if it is an Acute Myocardial Infarction or myocardial injury due to an underlying chronic condition.       Results may be falsely decreased if patient taking Biotin.      BNP [[id number]]  (Abnormal) Collected: 09/18/21 1601    Specimen: Blood Updated: 09/18/21 1719     proBNP 15,873.0 pg/mL     Narrative:      Among patients with dyspnea, NT-proBNP is highly sensitive for the detection of acute congestive heart failure. In addition NT-proBNP of <300 pg/ml effectively rules out acute congestive heart  "failure with 99% negative predictive value.    Results may be falsely decreased if patient taking Biotin.      TSH [[id number]]  (Normal) Collected: 09/18/21 1601    Specimen: Blood Updated: 09/18/21 1707     TSH 2.710 uIU/mL     T4, Free [[id number]]  (Normal) Collected: 09/18/21 1601    Specimen: Blood Updated: 09/18/21 1706     Free T4 1.48 ng/dL     Narrative:      Results may be falsely increased if patient taking Biotin.      Procalcitonin [[id number]]  (Abnormal) Collected: 09/18/21 1601    Specimen: Blood Updated: 09/18/21 1705     Procalcitonin 0.42 ng/mL     Narrative:      As a Marker for Sepsis (Non-Neonates):     1. <0.5 ng/mL represents a low risk of severe sepsis and/or septic shock.  2. >2 ng/mL represents a high risk of severe sepsis and/or septic shock.    As a Marker for Lower Respiratory Tract Infections that require antibiotic therapy:  PCT on Admission     Antibiotic Therapy             6-12 Hrs later  >0.5                          Strongly Recommended            >0.25 - <0.5             Recommended  0.1 - 0.25                  Discouraged                       Remeasure/reassess PCT  <0.1                         Strongly Discouraged         Remeasure/reassess PCT      As 28 day mortality risk marker: \"Change in Procalcitonin Result\" (>80% or <=80%) if Day 0 (or Day 1) and Day 4 values are available. Refer to http://www.[employer]-pct-calculator.com/    Change in PCT <=80 %   A decrease of PCT levels below or equal to 80% defines a positive change in PCT test result representing a higher risk for 28-day all-cause mortality of patients diagnosed with severe sepsis or septic shock.    Change in PCT >80 %   A decrease of PCT levels of more than 80% defines a negative change in PCT result representing a lower risk for 28-day all-cause mortality of patients diagnosed with severe sepsis or septic shock.                Comprehensive Metabolic Panel [[id number]]  (Abnormal) Collected: 09/18/21 1601    " Specimen: Blood Updated: 09/18/21 1704     Glucose 355 mg/dL      BUN 99 mg/dL      Creatinine 2.81 mg/dL      Sodium 138 mmol/L      Potassium 3.8 mmol/L      Chloride 96 mmol/L      CO2 31.0 mmol/L      Calcium 7.9 mg/dL      Total Protein 5.9 g/dL      Albumin 3.30 g/dL      ALT (SGPT) 94 U/L      AST (SGOT) 66 U/L      Alkaline Phosphatase 235 U/L      Total Bilirubin 0.4 mg/dL      eGFR  African Amer 28 mL/min/1.73      Globulin 2.6 gm/dL      A/G Ratio 1.3 g/dL      BUN/Creatinine Ratio 35.2     Anion Gap 11.0 mmol/L     Narrative:      GFR Normal >60  Chronic Kidney Disease <60  Kidney Failure <15      Magnesium [[id number]]  (Normal) Collected: 09/18/21 1601    Specimen: Blood Updated: 09/18/21 1659     Magnesium 2.0 mg/dL     POC Occult Blood Stool [[id number]]  (Abnormal) Collected: 09/18/21 1656    Specimen: Stool from Per Rectum Updated: 09/18/21 1656     Fecal Occult Blood Positive     Lot Number \[id number]\     Expiration Date \04/30/2022\     DEVELOPER LOT NUMBER \203\     DEVELOPER EXPIRATION DATE \04/30/2022\     Positive Control Positive     Negative Control Negative    Lactic Acid, Plasma [[id number]]  (Normal) Collected: 09/18/21 1601    Specimen: Blood Updated: 09/18/21 1652     Lactate 2.0 mmol/L     Blood Culture - Blood, Arm, Right [[id number]] Collected: 09/18/21 1601    Specimen: Blood from Arm, Right Updated: 09/18/21 1644    Blood Culture - Blood, Arm, Right [[id number]] Collected: 09/18/21 1601    Specimen: Blood from Arm, Right Updated: 09/18/21 1643    Blood Gas, Arterial - [[id number]]  (Abnormal) Collected: 09/18/21 1617    Specimen: Arterial Blood Updated: 09/18/21 1628     Site Right Radial     Pedrito's Test Positive     pH, Arterial 7.436 pH units      pCO2, Arterial 46.4 mm Hg      Comment: 83 Value above reference range        pO2, Arterial 79.1 mm Hg      Comment: 84 Value below reference range        HCO3, Arterial 31.2 mmol/L      Comment: 83 Value above reference range        Base  Excess, Arterial 6.5 mmol/L      Comment: 83 Value above reference range        O2 Saturation, Arterial 97.7 %      Temperature 37.0 C      Barometric Pressure for Blood Gas 752 mmHg      Modality Nasal Cannula     Flow Rate 2.5 lpm      Ventilator Mode NA     Notified Who TESSIE BARBER PA-C     Notified By [id number]     Notified Time 09/18/2021 16:34     Collected by [id number]     Comment: Meter: [id number]     :  [id number]        pCO2, Temperature Corrected 46.4 mm Hg      pH, Temp Corrected 7.436 pH Units      pO2, Temperature Corrected 79.1 mm Hg     POC Glucose Once [[id number]]  (Abnormal) Collected: 09/18/21 1605    Specimen: Blood Updated: 09/18/21 1616     Glucose 362 mg/dL      Comment: : [id number] Ohara justinMeter ID: [id number]              Cultures:  No results found for: BLOODCX, URINECX, WOUNDCX, MRSACX, RESPCX, STOOLCX    Radiology Data:    Imaging Results (Last 24 Hours)     Procedure Component Value Units Date/Time    CT Abdomen Pelvis Without Contrast [[id number]] Collected: 09/18/21 1927     Updated: 09/18/21 1937    Narrative:      EXAM: CT Abdomen and Pelvis without contrast      9/18/2021 6:54 PM CDT  INDICATION: hemacult positive, black stool, NVD, weakness;  U07.1-COVID-19; N18.9-Chronic kidney disease, unspecified; I50.9-Heart  failure, unspecified; D64.9-Anemia, unspecified  COMPARISON: CT abdomen pelvis dated 8/24/2021  TECHNIQUE:  Abdomen and pelvis were scanned utilizing a multidetector helical  scanner from the diaphragm to the ischial tuberosities without  administration of IV contrast. Coronal and sagittal reformations were  obtained. [Routine protocol is performed.]     Radiation dose equals  mGy-cm.  Automated exposure control dose  reduction technique was implemented.        FINDINGS:     LINES and TUBES: None.     LOWER THORAX: Severe cardiomegaly. Small right and trace left pleural  effusions.     HEPATOBILIARY:  No focal hepatic lesions. Hepatomegaly. No  liver  laceration.   No biliary ductal dilatation.      GALLBLADDER:  Not visualized.     SPLEEN:  No splenomegaly.    No splenic laceration.      PANCREAS:  No focal masses or ductal dilatation.      ADRENALS:  No adrenal nodules.      KIDNEYS/URETERS:  Kidneys are symmetric.   No hydronephrosis.   No  cystic or solid mass lesions.  No stones. Increased perinephric fat  stranding.     GI TRACT: Diffuse abnormal wall thickening of the stomach  redemonstrated. No bowel obstruction..   No acute appendicitis..      PELVIC ORGANS/BLADDER:  Bladder is moderately distended..      LYMPH NODES:  There is no inguinal canal, pelvic wall, retroperitoneal  mesenteric lymphadenopathy by size criteria..      VESSELS:  Atherosclerotic disease, significant..      PERITONEUM / RETROPERITONEUM:  Trace perihepatic ascites  redemonstrated..      BONES:  Avascular necrosis in the right femur redemonstrated. No acute  osseous pathology. Mild degenerative changes..      SOFT TISSUES:  Unremarkable.        Impression:      1.  Severe cardiomegaly and developing pleural effusions, most  concerning for fluid overload.  2.  Significant diffuse wall thickening of the stomach. Recommend direct  visualization.  3.  Additional chronic findings as above.  This report was finalized on 09/18/2021 19:34 by Dr. Carmel Egan MD.    XR Chest 1 View [[id number]] Collected: 09/18/21 1635     Updated: 09/18/21 1640    Narrative:      EXAMINATION: XR CHEST 1 VW- 9/18/2021 4:35 PM CDT     HISTORY: Weakness, cough     COMPARISON: 8/24/2021     FINDINGS:  The heart is enlarged. Atherosclerotic calcifications are seen in the  aorta. These interstitial changes are noted. There are superimposed  groundglass opacities throughout the right lung. There is no  pneumothorax or pleural effusion. Pulmonary vasculature is stable.       Impression:      Cardiomegaly. Chronic changes with superimposed airspace  disease throughout the right lung concerning for an  infectious or  inflammatory process.  This report was finalized on 09/18/2021 16:37 by Dr. Tj Pedersen MD.          No Known Allergies    Scheduled meds:   allopurinol, 100 mg, Oral, Daily  amiodarone, 200 mg, Oral, BID  atorvastatin, 80 mg, Oral, Nightly  budesonide-formoterol, 2 puff, Inhalation, BID - RT  furosemide, 40 mg, Oral, BID  insulin lispro, 0-9 Units, Subcutaneous, 4x Daily With Meals & Nightly  metoprolol succinate XL, 100 mg, Oral, Q24H  pantoprazole, 40 mg, Intravenous, Q12H  sodium chloride, 10 mL, Intravenous, Q12H        PRN meds:  •  acetaminophen  •  dextrose  •  dextrose  •  glucagon (human recombinant)  •  [COMPLETED] Insert peripheral IV **AND** sodium chloride  •  sodium chloride    Assessment/Plan       Real time reverse transcriptase PCR positive for COVID-19 virus    Hypertension    PAD (peripheral artery disease) (CMS/[facility])    Hyperlipidemia    Type 2 diabetes mellitus with autonomic neuropathy (CMS/[facility])    Cerebrovascular accident (CVA) (CMS/[facility])    CKD (chronic kidney disease) stage 3, GFR 30-59 ml/min (CMS/[facility])    Stage 3 severe COPD by GOLD classification (CMS/[facility])    Erosive gastropathy with bleeding    AVM (arteriovenous malformation)    Acute on chronic blood loss anemia      Plan:  GI bleed.  History of AV malformation.  History of erosive gastropathy with bleeding.  Fecal occult positive.  Protonix drip.  GI consult.  Serial hemoglobin.  CT scan abdomen pelvic-Severe cardiomegaly and developing pleural effusions- concerning for fluid overload, Significant diffuse wall thickening of the stomach.   Status post 3 units of blood transfusion.    Anemia.  Anemia panel pending.    Nausea.  Zofran as needed.    Covid positive/COPD-stage III gold classification/pulmonary hypertension.  Patient is on chronic 2 L of oxygen at home.  Status post vaccination with Varun [employer] Varun in June of this year.  Symbicort.  Duo nebs 4 times daily.  Chest x-ray-Cardiomegaly,  Chronic changes with  superimposed airspace disease throughout the right lung concerning for an infectious or inflammatory process.  Patient is currently on 2 L oxygen-this is patient baseline    History of atrial fibrillation/CHF/hypertension/hyperlipidemia.  Patient is currently in sinus rhythm.  Amiodarone.  Lipitor.  Lasix.  Toprol.  Hold aspirin due to GI bleed.  Echocardiogram 6/6/2021-ejection fraction 36 to 40%, moderate concentric hypertrophy, diastolic dysfunction, biatrial enlargement, moderate mitral valve regurgitation, moderate tricuspid valve regurgitation.    History of CVA.    Chronic kidney disease stage III.  Creatinine is improving.    Gout.  Allopurinol.     Nutrition.  N.p.o.    Deconditioning.  PT consult.    Discharge Planning: 3 to 6 days.    Electronically signed by Stuart Martin MD, 09/19/21, 8:09 AM CDT.

## 2021-09-19 NOTE — PLAN OF CARE
Goal Outcome Evaluation:  Plan of Care Reviewed With: patient        Progress: no change  Outcome Summary: New admit to MICU, pt arrived via cart from ED, pt a/o x4, pt able to verbalize wants/needs at this time, pt stated that was extremly hungry when arrived to MICU pt given sandwich , pt eating with no issues stated at this time, pt cooperative with care, very pleasant with staff, safety maintained.

## 2021-09-19 NOTE — PLAN OF CARE
Goal Outcome Evaluation:  Plan of Care Reviewed With: patient        Progress: no change  Outcome Summary: no co pain. He is chon on RA. His Hgb was 6.8 after 3 units of blood, another unit given. no active bleeding noted. GI consulted. cont to monitor.
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
## 2021-09-20 LAB
ANION GAP SERPL CALCULATED.3IONS-SCNC: 8 MMOL/L (ref 5–15)
BASOPHILS # BLD AUTO: 0.01 10*3/MM3 (ref 0–0.2)
BASOPHILS NFR BLD AUTO: 0.2 % (ref 0–1.5)
BH BB BLOOD EXPIRATION DATE: NORMAL
BH BB BLOOD TYPE BARCODE: 5100
BH BB DISPENSE STATUS: NORMAL
BH BB PRODUCT CODE: NORMAL
BH BB UNIT NUMBER: NORMAL
BUN SERPL-MCNC: 76 MG/DL (ref 8–23)
BUN/CREAT SERPL: 35.2 (ref 7–25)
CALCIUM SPEC-SCNC: 7.7 MG/DL (ref 8.6–10.5)
CHLORIDE SERPL-SCNC: 96 MMOL/L (ref 98–107)
CO2 SERPL-SCNC: 33 MMOL/L (ref 22–29)
CREAT SERPL-MCNC: 2.16 MG/DL (ref 0.76–1.27)
CROSSMATCH INTERPRETATION: NORMAL
DEPRECATED RDW RBC AUTO: 72.1 FL (ref 37–54)
EOSINOPHIL # BLD AUTO: 0.32 10*3/MM3 (ref 0–0.4)
EOSINOPHIL NFR BLD AUTO: 5.4 % (ref 0.3–6.2)
ERYTHROCYTE [DISTWIDTH] IN BLOOD BY AUTOMATED COUNT: 23.5 % (ref 12.3–15.4)
GFR SERPL CREATININE-BSD FRML MDRD: 38 ML/MIN/1.73
GLUCOSE BLDC GLUCOMTR-MCNC: 155 MG/DL (ref 70–130)
GLUCOSE BLDC GLUCOMTR-MCNC: 218 MG/DL (ref 70–130)
GLUCOSE BLDC GLUCOMTR-MCNC: 245 MG/DL (ref 70–130)
GLUCOSE BLDC GLUCOMTR-MCNC: 264 MG/DL (ref 70–130)
GLUCOSE SERPL-MCNC: 232 MG/DL (ref 65–99)
HCT VFR BLD AUTO: 22.4 % (ref 37.5–51)
HCT VFR BLD AUTO: 22.7 % (ref 37.5–51)
HCT VFR BLD AUTO: 24.4 % (ref 37.5–51)
HCT VFR BLD AUTO: 27.4 % (ref 37.5–51)
HGB BLD-MCNC: 7.3 G/DL (ref 13–17.7)
HGB BLD-MCNC: 7.4 G/DL (ref 13–17.7)
HGB BLD-MCNC: 7.8 G/DL (ref 13–17.7)
HGB BLD-MCNC: 8.7 G/DL (ref 13–17.7)
IMM GRANULOCYTES # BLD AUTO: 0.07 10*3/MM3 (ref 0–0.05)
IMM GRANULOCYTES NFR BLD AUTO: 1.2 % (ref 0–0.5)
LYMPHOCYTES # BLD AUTO: 0.26 10*3/MM3 (ref 0.7–3.1)
LYMPHOCYTES NFR BLD AUTO: 4.4 % (ref 19.6–45.3)
MCH RBC QN AUTO: 28.8 PG (ref 26.6–33)
MCHC RBC AUTO-ENTMCNC: 32.6 G/DL (ref 31.5–35.7)
MCV RBC AUTO: 88.3 FL (ref 79–97)
MONOCYTES # BLD AUTO: 0.64 10*3/MM3 (ref 0.1–0.9)
MONOCYTES NFR BLD AUTO: 10.9 % (ref 5–12)
NEUTROPHILS NFR BLD AUTO: 4.59 10*3/MM3 (ref 1.7–7)
NEUTROPHILS NFR BLD AUTO: 77.9 % (ref 42.7–76)
NRBC BLD AUTO-RTO: 1 /100 WBC (ref 0–0.2)
PLATELET # BLD AUTO: 125 10*3/MM3 (ref 140–450)
PMV BLD AUTO: 10.2 FL (ref 6–12)
POTASSIUM SERPL-SCNC: 2.9 MMOL/L (ref 3.5–5.2)
RBC # BLD AUTO: 2.57 10*6/MM3 (ref 4.14–5.8)
SODIUM SERPL-SCNC: 137 MMOL/L (ref 136–145)
UNIT  ABO: NORMAL
UNIT  RH: NORMAL
WBC # BLD AUTO: 5.89 10*3/MM3 (ref 3.4–10.8)

## 2021-09-20 PROCEDURE — 85018 HEMOGLOBIN: CPT | Performed by: FAMILY MEDICINE

## 2021-09-20 PROCEDURE — 99232 SBSQ HOSP IP/OBS MODERATE 35: CPT | Performed by: INTERNAL MEDICINE

## 2021-09-20 PROCEDURE — 94799 UNLISTED PULMONARY SVC/PX: CPT

## 2021-09-20 PROCEDURE — 82962 GLUCOSE BLOOD TEST: CPT

## 2021-09-20 PROCEDURE — 63710000001 INSULIN LISPRO (HUMAN) PER 5 UNITS: Performed by: FAMILY MEDICINE

## 2021-09-20 PROCEDURE — 25010000002 IRON SUCROSE PER 1 MG: Performed by: FAMILY MEDICINE

## 2021-09-20 PROCEDURE — 85025 COMPLETE CBC W/AUTO DIFF WBC: CPT | Performed by: FAMILY MEDICINE

## 2021-09-20 PROCEDURE — 97162 PT EVAL MOD COMPLEX 30 MIN: CPT

## 2021-09-20 PROCEDURE — 80048 BASIC METABOLIC PNL TOTAL CA: CPT | Performed by: FAMILY MEDICINE

## 2021-09-20 PROCEDURE — 85014 HEMATOCRIT: CPT | Performed by: FAMILY MEDICINE

## 2021-09-20 RX ORDER — METFORMIN HYDROCHLORIDE 500 MG/1
500 TABLET, EXTENDED RELEASE ORAL 2 TIMES DAILY
COMMUNITY
End: 2023-01-15 | Stop reason: HOSPADM

## 2021-09-20 RX ORDER — METOLAZONE 5 MG/1
5 TABLET ORAL DAILY
COMMUNITY
End: 2021-11-12

## 2021-09-20 RX ADMIN — PANTOPRAZOLE SODIUM 40 MG: 40 INJECTION, POWDER, FOR SOLUTION INTRAVENOUS at 09:00

## 2021-09-20 RX ADMIN — INSULIN LISPRO 4 UNITS: 100 INJECTION, SOLUTION INTRAVENOUS; SUBCUTANEOUS at 17:03

## 2021-09-20 RX ADMIN — INSULIN LISPRO 4 UNITS: 100 INJECTION, SOLUTION INTRAVENOUS; SUBCUTANEOUS at 09:00

## 2021-09-20 RX ADMIN — IRON SUCROSE 100 MG: 20 INJECTION, SOLUTION INTRAVENOUS at 16:45

## 2021-09-20 RX ADMIN — PANTOPRAZOLE SODIUM 40 MG: 40 INJECTION, POWDER, FOR SOLUTION INTRAVENOUS at 21:15

## 2021-09-20 RX ADMIN — SODIUM CHLORIDE, PRESERVATIVE FREE 10 ML: 5 INJECTION INTRAVENOUS at 21:15

## 2021-09-20 RX ADMIN — FUROSEMIDE 40 MG: 40 TABLET ORAL at 09:00

## 2021-09-20 RX ADMIN — AMIODARONE HYDROCHLORIDE 200 MG: 200 TABLET ORAL at 09:00

## 2021-09-20 RX ADMIN — BUDESONIDE AND FORMOTEROL FUMARATE DIHYDRATE 2 PUFF: 160; 4.5 AEROSOL RESPIRATORY (INHALATION) at 20:02

## 2021-09-20 RX ADMIN — ATORVASTATIN CALCIUM 80 MG: 40 TABLET, FILM COATED ORAL at 21:15

## 2021-09-20 RX ADMIN — AMIODARONE HYDROCHLORIDE 200 MG: 200 TABLET ORAL at 21:15

## 2021-09-20 RX ADMIN — FUROSEMIDE 40 MG: 40 TABLET ORAL at 17:03

## 2021-09-20 RX ADMIN — ALLOPURINOL 100 MG: 100 TABLET ORAL at 09:00

## 2021-09-20 RX ADMIN — METOPROLOL SUCCINATE 100 MG: 100 TABLET, EXTENDED RELEASE ORAL at 09:00

## 2021-09-20 RX ADMIN — INSULIN LISPRO 2 UNITS: 100 INJECTION, SOLUTION INTRAVENOUS; SUBCUTANEOUS at 21:13

## 2021-09-20 RX ADMIN — SODIUM CHLORIDE, PRESERVATIVE FREE 10 ML: 5 INJECTION INTRAVENOUS at 09:01

## 2021-09-20 RX ADMIN — INSULIN LISPRO 6 UNITS: 100 INJECTION, SOLUTION INTRAVENOUS; SUBCUTANEOUS at 12:01

## 2021-09-20 RX ADMIN — BUDESONIDE AND FORMOTEROL FUMARATE DIHYDRATE 2 PUFF: 160; 4.5 AEROSOL RESPIRATORY (INHALATION) at 07:25

## 2021-09-20 NOTE — PROGRESS NOTES
[facility]  INPATIENT PROGRESS NOTE    Patient Name: Milton Rodriguez  Date of Admission: 9/18/2021  Today's Date: 09/20/21  Length of Stay: 2  Primary Care Physician: Thanh Weeks MD    Subjective   Chief Complaint: follow up anemia  HPI   Doing ok.  Afebrile, no SOA.  Presently on room air.  Having dark stools.  No abdominal pain.          Review of Systems   Constitutional: Positive for fatigue. Negative for fever.   HENT: Negative for congestion and ear pain.    Eyes: Negative for redness and visual disturbance.   Respiratory: Negative for cough, shortness of breath and wheezing.    Cardiovascular: Negative for chest pain and palpitations.   Gastrointestinal: Negative for abdominal pain, diarrhea, nausea and vomiting.   Endocrine: Negative for cold intolerance and heat intolerance.   Genitourinary: Negative for dysuria and frequency.   Musculoskeletal: Negative for arthralgias and back pain.   Skin: Negative for rash and wound.   Neurological: Positive for weakness. Negative for dizziness and headaches.   Psychiatric/Behavioral: Negative for confusion. The patient is not nervous/anxious.         All pertinent negatives and positives are as above. All other systems have been reviewed and are negative unless otherwise stated.     Objective    Temp:  [98 °F (36.7 °C)-98.8 °F (37.1 °C)] 98.3 °F (36.8 °C)  Heart Rate:  [53-66] 56  Resp:  [15-22] 15  BP: (114-139)/(49-62) 114/52  Physical Exam  Vitals reviewed.   Constitutional:       Appearance: He is well-developed.   HENT:      Head: Normocephalic and atraumatic.      Right Ear: External ear normal.      Left Ear: External ear normal.      Nose: Nose normal.   Eyes:      General: No scleral icterus.        Right eye: No discharge.         Left eye: No discharge.      Conjunctiva/sclera: Conjunctivae normal.      Pupils: Pupils are equal, round, and reactive to light.   Neck:      Thyroid: No  thyromegaly.      Trachea: No tracheal deviation.   Cardiovascular:      Rate and Rhythm: Normal rate and regular rhythm.      Heart sounds: Normal heart sounds. No murmur heard.   No friction rub. No gallop.    Pulmonary:      Effort: Pulmonary effort is normal. No respiratory distress.      Breath sounds: Normal breath sounds. No stridor. No wheezing or rales.   Chest:      Chest wall: No tenderness.   Abdominal:      General: Bowel sounds are normal. There is no distension.      Palpations: Abdomen is soft. There is no mass.      Tenderness: There is no abdominal tenderness. There is no guarding or rebound.      Hernia: No hernia is present.   Musculoskeletal:         General: No deformity. Normal range of motion.      Cervical back: Normal range of motion and neck supple.   Lymphadenopathy:      Cervical: No cervical adenopathy.   Skin:     General: Skin is warm and dry.      Coloration: Skin is not pale.      Findings: No erythema or rash.   Neurological:      Mental Status: He is alert and oriented to person, place, and time.      Cranial Nerves: No cranial nerve deficit.      Motor: No abnormal muscle tone.      Coordination: Coordination normal.      Deep Tendon Reflexes: Reflexes are normal and symmetric. Reflexes normal.   Psychiatric:         Behavior: Behavior normal.         Thought Content: Thought content normal.         Judgment: Judgment normal.             Results Review:  I have reviewed the labs, radiology results, and diagnostic studies.    Laboratory Data:   Results from last 7 days   Lab Units 09/20/21  0830 09/20/21  0010 09/19/21  1637 09/19/21  1111 09/19/21  0427 09/18/21  1701 09/18/21  1701   WBC 10*3/mm3 5.89  --   --   --  5.18  --  5.01   HEMOGLOBIN g/dL 7.4*  7.3* 7.8* 7.7*   < > 5.9*   < > 4.2*   HEMATOCRIT % 22.7*  22.4* 24.4* 24.4*   < > 18.3*   < > 14.2*   PLATELETS 10*3/mm3 125*  --   --   --  124*  --  126*    < > = values in this interval not displayed.        Results from  last 7 days   Lab Units 09/20/21  0830 09/19/21  0427 09/18/21  1601   SODIUM mmol/L 137 139 138   POTASSIUM mmol/L 2.9* 3.7 3.8   CHLORIDE mmol/L 96* 100 96*   CO2 mmol/L 33.0* 30.0* 31.0*   BUN mg/dL 76* 91* 99*   CREATININE mg/dL 2.16* 2.37* 2.81*   CALCIUM mg/dL 7.7* 7.6* 7.9*   BILIRUBIN mg/dL  --   --  0.4   ALK PHOS U/L  --   --  235*   ALT (SGPT) U/L  --   --  94*   AST (SGOT) U/L  --   --  66*   GLUCOSE mg/dL 232* 332* 355*       Culture Data:   Blood Culture   Date Value Ref Range Status   09/18/2021 No growth at 24 hours  Preliminary   09/18/2021 No growth at 24 hours  Preliminary       Radiology Data:   Imaging Results (Last 24 Hours)     ** No results found for the last 24 hours. **          I have reviewed the patient's current medications.     Labs reviewed:anemia    Imaging reviewed:CXR, CT Abdomen and pelvis    Telemetry reviewed    Telemetry independently interpreted by me: NSR      Assessment/Plan     Active Hospital Problems    Diagnosis    • **Real time reverse transcriptase PCR positive for COVID-19 virus    • Acute on chronic blood loss anemia    • AVM (arteriovenous malformation)    • Erosive gastropathy with bleeding    • Stage 3 severe COPD by GOLD classification (CMS/[facility])    • Cerebrovascular accident (CVA) (CMS/[facility])    • CKD (chronic kidney disease) stage 3, GFR 30-59 ml/min (CMS/[facility])    • Hypertension    • PAD (peripheral artery disease) (CMS/[facility])    • Hyperlipidemia    • Type 2 diabetes mellitus with autonomic neuropathy (CMS/[facility])      1.  GI Bleed  -IV protonix   -GI following  -Trend H&H    2.  COVID-19  -monitor    3.  HTN  -Metoprolol    4.  T2DM  -SSI    5.  HLD  -Statin    6.  Iron deficiency anemia  -IV Iron            Discharge Planning: I expect the patient to be discharged to home in 2-3 days    Electronically signed by Jose Alfredo Norman MD, 09/20/21, 15:07 CDT.
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
## 2021-09-20 NOTE — PLAN OF CARE
Goal Outcome Evaluation:  Plan of Care Reviewed With: patient        Progress: no change  Outcome Summary: pt cooperative pleasant with care pt able to verbalize want's/ needs pt resting through night with 2L O2 via NC, safety maintained

## 2021-09-20 NOTE — CASE MANAGEMENT/SOCIAL WORK
Discharge Planning Assessment  [facility]     Patient Name: Milton Rodriguez  MRN: [id number]  Today's Date: 9/20/2021    Admit Date: 9/18/2021    Discharge Needs Assessment     Row Name 09/20/21 1129       Living Environment    Lives With  spouse    Name(s) of Who Lives With Patient  Spouse - Jodi Rodriguez    Current Living Arrangements  home/apartment/condo    Primary Care Provided by  self    Provides Primary Care For  no one    Family Caregiver if Needed  spouse    Quality of Family Relationships  supportive;helpful;involved    Able to Return to Prior Arrangements  yes       Resource/Environmental Concerns    Resource/Environmental Concerns  none       Transition Planning    Patient/Family Anticipates Transition to  home with family    Patient/Family Anticipated Services at Transition  none    Transportation Anticipated  family or friend will provide       Discharge Needs Assessment    Readmission Within the Last 30 Days  current reason for admission unrelated to previous admission    Equipment Currently Used at Home  cpap;oxygen;grab bar    Concerns to be Addressed  no discharge needs identified    Anticipated Changes Related to Illness  none    Equipment Needed After Discharge  none    Current Discharge Risk  chronically ill    Discharge Coordination/Progress  Patient resides at home with his spouse.  Patient on COVID precautions, chart reviewed as his wife is also admitted.  Patient has a PCP and RX coverage.  Per chart, [facility]  Patient plans to return home upon discharge.  SW will follow for needs.        Discharge Plan    No documentation.       Continued Care and Services - Admitted Since 9/18/2021    Coordination has not been started for this encounter.         Demographic Summary    No documentation.       Functional Status    No documentation.       Psychosocial    No documentation.       Abuse/Neglect    No documentation.       Legal    No documentation.        Substance Abuse    No documentation.       Patient Forms    No documentation.           YOBANY Del Rosario
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
## 2021-09-20 NOTE — PROGRESS NOTES
"  Inpatient Gastroenterology Service    Follow-up Note    Deepak Fisher MD, FACP        Complaints  -No new GI complaints  -\"dark stools, but no mariela melenic stool        Physical Exam  -GI component unchanged        Laboratory of Note  -see EMR  -Hb stable  -BUN and BUN/Cr slightly improved        Imaging of Note  -see EMR; no new        Assessment  -chronic macrocytic anemia with interval worsening.  (+) FOB, but no overt signs/syptoms of ongoing GI hemorrhage.  Hb stabel within lab variation and IV fluid parameters.    -EGD was less than one month ago.  B12 has been normal, folate level normal.  He does have a significant history of ethanol use, but is reported as quit at present.  Chronic renal impairment likely impacts his blood counts adversely, as well.  His anemia is certainly multifactorial: G blood loss, ethanol toxicity, and renal insufficiency.  Continued use of ASA as his antiplatelet agent likely precludes interval healing of erosive gastropathy.  -AST and ALT are minimally elevated, and should be followed.  The pattern is not suggestive of ethanol related hepatocellular injury.  -SARS-2 active infection  -CHF           Recommendations  -consider hematology consult regarding other factors in chronic anemia  -would D/C ASA.  If antiplatelet agent is appropriate, would chose one that is not toxic to GI tract mucosa (I.e., Brilinta, Plavix, etc.)  -supportive measures at present from GI standpoint  -acid suppression: PPi  -would consider adding sucralfate or misoprostil.  Dosage of sucralfate may need to be adjusted in light of renal function.  Some CRF patients are not good candidates for sucralfate in light of the aluminum load.  If this is a concern, the alternative would be misoprostol 100 mgm po BID to start.  -would give fiber supplementation daily, such as Citrucel sugar-free or Metamucil sugar-free  -would consider QOD Iron dosing for equivalent absorption with fewer side effects  -follow " H&H, transfuse as clinically appropriate, including FFP and platelets as appropriate  -avoid all agents toxic to GI tract mucosa as clinically practicable  -diet as desired by primary inpatient care team  -repeat EGD is unlikely to add new information at this short interval/would be low yield in this situation, so not advised  -no urgent indication for gastrointestinal endoscopy at this time  -follow-up office visit in GI clinic office in two weeks           Thank you for allowing us to participate in the care of this patient.    Sincerely,    SHRUTI Fisher MD, FACP

## 2021-09-20 NOTE — PLAN OF CARE
Goal Outcome Evaluation:      Pt remains AOx4, on room air.  Standby assist.  Large BM today dark brown/black.  H/H levels increased.  Clear liquid diet, tolerating well.  VSS.  No c/o pain.

## 2021-09-20 NOTE — NURSING NOTE
RN discussed with Cate LEDEZMA regarding if patient is to remain NPO.      She asked if pt was showing s/s of active bleeding, RN stated no.  Informed of pt last BM being dark brown.      Per Cate LEDEZMA patient can continue with clear liquids.  If active bleeding occurs, NPO.

## 2021-09-20 NOTE — PLAN OF CARE
Goal Outcome Evaluation:  Plan of Care Reviewed With: patient           Outcome Summary: PT eval complete. He is alert and oriented x 4. He was supervision to get OOB, stand and ambulate within his room. He ambulates ~ 60-75 ft in his room and he reports that he is at his baseline mobility. he did not desat with activity performed. He had no further PT needs, questions or concerns. PT to sign off, anticipate d.c home with assist.
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
## 2021-09-21 ENCOUNTER — READMISSION MANAGEMENT (OUTPATIENT)
Dept: CALL CENTER | Facility: HOSPITAL | Age: 61
End: 2021-09-21

## 2021-09-21 VITALS
OXYGEN SATURATION: 97 % | SYSTOLIC BLOOD PRESSURE: 120 MMHG | DIASTOLIC BLOOD PRESSURE: 42 MMHG | HEART RATE: 54 BPM | TEMPERATURE: 98.6 F | WEIGHT: 139.8 LBS | BODY MASS INDEX: 21.94 KG/M2 | RESPIRATION RATE: 18 BRPM | HEIGHT: 67 IN

## 2021-09-21 PROBLEM — D89.831 CYTOKINE RELEASE SYNDROME, GRADE 1: Status: ACTIVE | Noted: 2021-09-21

## 2021-09-21 LAB
ANION GAP SERPL CALCULATED.3IONS-SCNC: 10 MMOL/L (ref 5–15)
BASOPHILS # BLD AUTO: 0.01 10*3/MM3 (ref 0–0.2)
BASOPHILS NFR BLD AUTO: 0.1 % (ref 0–1.5)
BUN SERPL-MCNC: 71 MG/DL (ref 8–23)
BUN/CREAT SERPL: 32.4 (ref 7–25)
CALCIUM SPEC-SCNC: 8.2 MG/DL (ref 8.6–10.5)
CHLORIDE SERPL-SCNC: 98 MMOL/L (ref 98–107)
CO2 SERPL-SCNC: 34 MMOL/L (ref 22–29)
CREAT SERPL-MCNC: 2.19 MG/DL (ref 0.76–1.27)
DEPRECATED RDW RBC AUTO: 71.7 FL (ref 37–54)
EOSINOPHIL # BLD AUTO: 0.19 10*3/MM3 (ref 0–0.4)
EOSINOPHIL NFR BLD AUTO: 2.8 % (ref 0.3–6.2)
ERYTHROCYTE [DISTWIDTH] IN BLOOD BY AUTOMATED COUNT: 23.2 % (ref 12.3–15.4)
GFR SERPL CREATININE-BSD FRML MDRD: 37 ML/MIN/1.73
GLUCOSE BLDC GLUCOMTR-MCNC: 126 MG/DL (ref 70–130)
GLUCOSE BLDC GLUCOMTR-MCNC: 399 MG/DL (ref 70–130)
GLUCOSE SERPL-MCNC: 51 MG/DL (ref 65–99)
HCT VFR BLD AUTO: 24.5 % (ref 37.5–51)
HCT VFR BLD AUTO: 25.1 % (ref 37.5–51)
HGB BLD-MCNC: 7.8 G/DL (ref 13–17.7)
HGB BLD-MCNC: 8.1 G/DL (ref 13–17.7)
IMM GRANULOCYTES # BLD AUTO: 0.08 10*3/MM3 (ref 0–0.05)
IMM GRANULOCYTES NFR BLD AUTO: 1.2 % (ref 0–0.5)
LYMPHOCYTES # BLD AUTO: 0.39 10*3/MM3 (ref 0.7–3.1)
LYMPHOCYTES NFR BLD AUTO: 5.8 % (ref 19.6–45.3)
MCH RBC QN AUTO: 28.7 PG (ref 26.6–33)
MCHC RBC AUTO-ENTMCNC: 32.3 G/DL (ref 31.5–35.7)
MCV RBC AUTO: 89 FL (ref 79–97)
MONOCYTES # BLD AUTO: 0.75 10*3/MM3 (ref 0.1–0.9)
MONOCYTES NFR BLD AUTO: 11.2 % (ref 5–12)
NEUTROPHILS NFR BLD AUTO: 5.26 10*3/MM3 (ref 1.7–7)
NEUTROPHILS NFR BLD AUTO: 78.9 % (ref 42.7–76)
NRBC BLD AUTO-RTO: 1 /100 WBC (ref 0–0.2)
PLATELET # BLD AUTO: 150 10*3/MM3 (ref 140–450)
PMV BLD AUTO: 9.4 FL (ref 6–12)
POTASSIUM SERPL-SCNC: 2.5 MMOL/L (ref 3.5–5.2)
POTASSIUM SERPL-SCNC: 3.1 MMOL/L (ref 3.5–5.2)
RBC # BLD AUTO: 2.82 10*6/MM3 (ref 4.14–5.8)
SODIUM SERPL-SCNC: 142 MMOL/L (ref 136–145)
WBC # BLD AUTO: 6.68 10*3/MM3 (ref 3.4–10.8)

## 2021-09-21 PROCEDURE — 85018 HEMOGLOBIN: CPT | Performed by: FAMILY MEDICINE

## 2021-09-21 PROCEDURE — 85014 HEMATOCRIT: CPT | Performed by: FAMILY MEDICINE

## 2021-09-21 PROCEDURE — 85025 COMPLETE CBC W/AUTO DIFF WBC: CPT | Performed by: FAMILY MEDICINE

## 2021-09-21 PROCEDURE — 94799 UNLISTED PULMONARY SVC/PX: CPT

## 2021-09-21 PROCEDURE — 80048 BASIC METABOLIC PNL TOTAL CA: CPT | Performed by: FAMILY MEDICINE

## 2021-09-21 PROCEDURE — 82962 GLUCOSE BLOOD TEST: CPT

## 2021-09-21 PROCEDURE — 63710000001 INSULIN LISPRO (HUMAN) PER 5 UNITS: Performed by: FAMILY MEDICINE

## 2021-09-21 PROCEDURE — 84132 ASSAY OF SERUM POTASSIUM: CPT | Performed by: FAMILY MEDICINE

## 2021-09-21 RX ORDER — POTASSIUM CHLORIDE 750 MG/1
40 CAPSULE, EXTENDED RELEASE ORAL ONCE
Status: COMPLETED | OUTPATIENT
Start: 2021-09-21 | End: 2021-09-21

## 2021-09-21 RX ORDER — POTASSIUM CHLORIDE 7.45 MG/ML
10 INJECTION INTRAVENOUS
Status: DISCONTINUED | OUTPATIENT
Start: 2021-09-21 | End: 2021-09-21 | Stop reason: HOSPADM

## 2021-09-21 RX ORDER — PANTOPRAZOLE SODIUM 40 MG/1
40 TABLET, DELAYED RELEASE ORAL 2 TIMES DAILY
Qty: 60 TABLET | Refills: 0 | Status: SHIPPED | OUTPATIENT
Start: 2021-09-21

## 2021-09-21 RX ORDER — POTASSIUM CHLORIDE 750 MG/1
40 CAPSULE, EXTENDED RELEASE ORAL AS NEEDED
Status: DISCONTINUED | OUTPATIENT
Start: 2021-09-21 | End: 2021-09-21 | Stop reason: HOSPADM

## 2021-09-21 RX ORDER — POTASSIUM CHLORIDE 1.5 G/1.77G
40 POWDER, FOR SOLUTION ORAL AS NEEDED
Status: DISCONTINUED | OUTPATIENT
Start: 2021-09-21 | End: 2021-09-21 | Stop reason: HOSPADM

## 2021-09-21 RX ADMIN — ALLOPURINOL 100 MG: 100 TABLET ORAL at 09:06

## 2021-09-21 RX ADMIN — PANTOPRAZOLE SODIUM 40 MG: 40 INJECTION, POWDER, FOR SOLUTION INTRAVENOUS at 09:06

## 2021-09-21 RX ADMIN — AMIODARONE HYDROCHLORIDE 200 MG: 200 TABLET ORAL at 09:06

## 2021-09-21 RX ADMIN — FUROSEMIDE 40 MG: 40 TABLET ORAL at 09:06

## 2021-09-21 RX ADMIN — INSULIN LISPRO 8 UNITS: 100 INJECTION, SOLUTION INTRAVENOUS; SUBCUTANEOUS at 13:02

## 2021-09-21 RX ADMIN — POTASSIUM CHLORIDE 40 MEQ: 10 CAPSULE, COATED, EXTENDED RELEASE ORAL at 09:12

## 2021-09-21 RX ADMIN — SODIUM CHLORIDE, PRESERVATIVE FREE 10 ML: 5 INJECTION INTRAVENOUS at 09:08

## 2021-09-21 RX ADMIN — POTASSIUM CHLORIDE 40 MEQ: 10 CAPSULE, COATED, EXTENDED RELEASE ORAL at 15:17

## 2021-09-21 RX ADMIN — BUDESONIDE AND FORMOTEROL FUMARATE DIHYDRATE 2 PUFF: 160; 4.5 AEROSOL RESPIRATORY (INHALATION) at 06:55

## 2021-09-21 RX ADMIN — POTASSIUM CHLORIDE 40 MEQ: 10 CAPSULE, COATED, EXTENDED RELEASE ORAL at 06:21

## 2021-09-21 RX ADMIN — METOPROLOL SUCCINATE 100 MG: 100 TABLET, EXTENDED RELEASE ORAL at 09:06

## 2021-09-21 RX ADMIN — POTASSIUM CHLORIDE 40 MEQ: 10 CAPSULE, COATED, EXTENDED RELEASE ORAL at 02:31

## 2021-09-21 NOTE — DISCHARGE SUMMARY
[facility] Medicine Services  DISCHARGE SUMMARY       Date of Admission: 9/18/2021  Date of Discharge:  9/21/2021  Primary Care Physician: Thanh Weeks MD    Presenting Problem/History of Present Illness:  Anemia [D64.9]     Final Discharge Diagnoses:  Active Hospital Problems    Diagnosis    • **Real time reverse transcriptase PCR positive for COVID-19 virus    • Acute on chronic blood loss anemia    • AVM (arteriovenous malformation)    • Erosive gastropathy with bleeding    • Stage 3 severe COPD by GOLD classification (CMS/[facility])    • Cerebrovascular accident (CVA) (CMS/[facility])    • CKD (chronic kidney disease) stage 3, GFR 30-59 ml/min (CMS/[facility])    • Hypertension    • PAD (peripheral artery disease) (CMS/[facility])    • Hyperlipidemia    • Type 2 diabetes mellitus with autonomic neuropathy (CMS/[facility])    1.  GI Bleed  -IV protonix   -GI following  -Trend H&H     2.  COVID-19  -monitor     3.  HTN  -Metoprolol     4.  T2DM  -SSI     5.  HLD  -Statin     6.  Iron deficiency anemia  -IV Iron    Consults:   1.  GI    Procedures Performed:   n/a    Pertinent Test Results:   Positive for COVID-19    Results for orders placed during the hospital encounter of 06/05/21    Adult Transthoracic Echo Complete W/ Cont if Necessary Per Protocol    Interpretation Summary  · Left ventricular wall thickness is consistent with mild to moderate concentric hypertrophy.  · Left ventricular ejection fraction appears to be 36 - 40%. Left ventricular systolic function is moderately decreased.  · Left ventricular diastolic dysfunction is noted.  · The right ventricular cavity is mildly dilated. Systolic function is normal.  · There is biatrial enlargement.  · Moderate mitral valve regurgitation is present.  · Moderate tricuspid valve regurgitation is present.  · Estimated right ventricular systolic pressure from tricuspid regurgitation is mildly elevated (35-45 mmHg).      Chief Complaint on Day of Discharge:  "feeling better    History of Present Illness on Day of Discharge:   Doing well, no black or bloody stools.  Wants to go home  No SOA, tolerating room air    Hospital Course:  The patient is a 60 y.o. male who presented to [facility] with dark stools and anemia.  He was transfused multiple units of PRBC's.  His H&H has improved.  He was treated with IV protonix.  GI was consulted.  They felt a repeat EGD would be of limited benefit given the short time since his last EGD and abnormal findings at that time.  They recommended continued observation.  His H&H has stablized and even trended up.     He was found to be positive for COVID-19.  He has not had SOA.  He was put on oxygen today for comfort but his oxygen sats were in the mid 90's when I took it off of him.    GI recommends switching from ASA to plavix at d/c.  The patient is agreeable to this.  He is apparently on this for stroke prevention as he has had bleeding issues on NOAC's in the past.  He has an appointment with cardiology at the start of the month.  If he's able to be restarted on full AC, Plavix can likely be stopped.     He is comfortable with being discharged and with the discharge plan.  He was given the chance to ask questions and all questions were answered to his satisfaction.        Condition on Discharge:  stable    Physical Exam on Discharge:  /42 (BP Location: Left arm, Patient Position: Lying)   Pulse 54   Temp 98.6 °F (37 °C) (Oral)   Resp 18   Ht 170.2 cm (67\")   Wt 63.4 kg (139 lb 12.8 oz)   SpO2 97%   BMI 21.90 kg/m²   Physical Exam  Vitals reviewed.   Constitutional:       Appearance: He is well-developed.   HENT:      Head: Normocephalic and atraumatic.      Right Ear: External ear normal.      Left Ear: External ear normal.      Nose: Nose normal.   Eyes:      General: No scleral icterus.        Right eye: No discharge.         Left eye: No discharge.      Conjunctiva/sclera: Conjunctivae normal.      Pupils: " Pupils are equal, round, and reactive to light.   Neck:      Thyroid: No thyromegaly.      Trachea: No tracheal deviation.   Cardiovascular:      Rate and Rhythm: Normal rate and regular rhythm.      Heart sounds: Normal heart sounds. No murmur heard.   No friction rub. No gallop.    Pulmonary:      Effort: Pulmonary effort is normal. No respiratory distress.      Breath sounds: Normal breath sounds. No stridor. No wheezing or rales.   Chest:      Chest wall: No tenderness.   Abdominal:      General: Bowel sounds are normal. There is no distension.      Palpations: Abdomen is soft. There is no mass.      Tenderness: There is no abdominal tenderness. There is no guarding or rebound.      Hernia: No hernia is present.   Musculoskeletal:         General: No deformity. Normal range of motion.      Cervical back: Normal range of motion and neck supple.   Lymphadenopathy:      Cervical: No cervical adenopathy.   Skin:     General: Skin is warm and dry.      Coloration: Skin is not pale.      Findings: No erythema or rash.   Neurological:      Mental Status: He is alert and oriented to person, place, and time.      Cranial Nerves: No cranial nerve deficit.      Motor: No abnormal muscle tone.      Coordination: Coordination normal.      Deep Tendon Reflexes: Reflexes are normal and symmetric. Reflexes normal.   Psychiatric:         Behavior: Behavior normal.         Thought Content: Thought content normal.         Judgment: Judgment normal.         Discharge Disposition:  home    Discharge Medications:     Discharge Medications      ASK your doctor about these medications      Instructions Start Date   allopurinol 100 MG tablet  Commonly known as: ZYLOPRIM   100 mg, Oral, Daily      amiodarone 200 MG tablet  Commonly known as: PACERONE   200 mg, Oral, 2 Times Daily      aspirin 81 MG EC tablet   81 mg, Oral, Daily, Obtain OTC.      atorvastatin 80 MG tablet  Commonly known as: Lipitor   80 mg, Oral, Nightly       budesonide-formoterol 160-4.5 MCG/ACT inhaler  Commonly known as: Symbicort   2 puffs, Inhalation, 2 Times Daily - RT      ferrous sulfate 325 (65 FE) MG tablet   325 mg, Oral, Daily With Breakfast      furosemide 40 MG tablet  Commonly known as: LASIX   80 mg, Oral, 2 Times Daily      Lantus SoloStar 100 UNIT/ML injection pen  Generic drug: Insulin Glargine   16 Units, Subcutaneous, Nightly      metFORMIN  MG 24 hr tablet  Commonly known as: GLUCOPHAGE-XR   750 mg, Oral, Daily With Breakfast      metOLazone 5 MG tablet  Commonly known as: ZAROXOLYN   5 mg, Oral, Daily      metoprolol succinate  MG 24 hr tablet  Commonly known as: TOPROL-XL   100 mg, Oral, Every 24 Hours Scheduled      vitamin D 1.25 MG (29921 UT) capsule capsule  Commonly known as: ERGOCALCIFEROL   50,000 Units, Oral, Every 7 Days             Discharge Diet: heart healthy/ADA    Activity at Discharge: as tolerated    Discharge Care Plan/Instructions: go to ER for fever or SOA, black or bloody stools    Follow-up Appointments:   Future Appointments   Date Time Provider Department Center   10/1/2021  9:30 AM Kiki Desir APRN [facility] CD PAD PAD   11/16/2021  3:45 PM Rocky Patel MD [facility] RD PAD PAD   11/26/2021 10:00 AM PAD US NIVAS CART 2  PAD US PAD   11/26/2021 11:00 AM PAD US NIVAS CART 2 BH PAD US PAD   11/26/2021  1:30 PM Kina Leon APRN [facility] VS PAD PAD       Test Results Pending at Discharge: n/a    Electronically signed by Jose Alfredo Norman MD, 09/21/21, 13:57 CDT.    Time: 35 minutes

## 2021-09-21 NOTE — PLAN OF CARE
Goal Outcome Evaluation:  Plan of Care Reviewed With: patient        Progress: improving   VSS, tolerating diet well, denies pain, denies SOA, ambulating in room well, has had no BM this shift, last BM was normal and void of signs of blood, pt denies nausea/emesis. Pt remains on RA, anxious to be discharged, pt has no further questions or concerns at this time, safety maintained, will continue to monitor.

## 2021-09-21 NOTE — PLAN OF CARE
Goal Outcome Evaluation:  Plan of Care Reviewed With: patient        Progress: no change  Outcome Summary: Pt appeared to sleep most of the night. VSS. K+ 2.5 this am, MD notified, electrolyte protocol intiated. Will recheck K+. Enhanced isolation precautions continued.

## 2021-09-22 ENCOUNTER — READMISSION MANAGEMENT (OUTPATIENT)
Dept: CALL CENTER | Facility: HOSPITAL | Age: 61
End: 2021-09-22

## 2021-09-22 NOTE — OUTREACH NOTE
COVID-19 Week 1 Survey      Responses   [facility] patient discharged from?  [place]   Does the patient have one of the following disease processes/diagnoses(primary or secondary)?  COVID-19   COVID-19 underlying condition?  None   Call Number  Call 1   Week 1 Call successful?  Yes   Call start time  1136   Call end time  1140   Discharge diagnosis  Real time reverse transcriptase PCR positive for COVID-19 virus   Is patient permission given to speak with other caregiver?  Yes   List who call center can speak with  spouse- Jodi   Person spoke with today (if not patient) and relationship  spouse- Jodi/ Patient   Meds reviewed with patient/caregiver?  Yes   Is the patient having any side effects they believe may be caused by any medication additions or changes?  No   Does the patient have all medications ordered at discharge?  Yes   Is the patient taking all medications as directed (includes completed medication regime)?  Yes   Comments regarding appointments  .   Does the patient have a primary care provider?   Yes   Comments regarding PCP  f/u is scheduled but pt can't remember when   Does the patient have an appointment with their PCP or specialist within 7 days of discharge?  Yes   Has the patient kept scheduled appointments due by today?  N/A   Psychosocial issues?  No   Comments  Pt reports loose stools are starting to lighten up.    Did the patient receive a copy of their discharge instructions?  Yes   Did the patient receive a copy of COVID-19 specific instructions?  Yes   Nursing interventions  Reviewed instructions with patient   What is the patient's perception of their health status since discharge?  Improving   Does the patient have any of the following symptoms?  None [taste is altered, everything tasting salty, he reports. ]   Nursing Interventions  Nurse provided patient education   Pulse Ox monitoring  None   Is the patient/caregiver able to teach back steps to recovery at home?  Set  small, achievable goals for return to baseline health, Rest and rebuild strength, gradually increase activity   If the patient is a current smoker, are they able to teach back resources for cessation?  Not a smoker   Is the patient/caregiver able to teach back the hierarchy of who to call/visit for symptoms/problems? PCP, Specialist, Home health nurse, Urgent Care, ED, 911  Yes   COVID-19 call completed?  Yes   Wrap up additional comments  .          Kim Hollis RN

## 2021-09-22 NOTE — PAYOR COMM NOTE
"DC HOME 9-21-21  UR  [phone]    [id number]  Lauren Rodriguez (60 y.o. Male)     Date of Birth Social Security Number Address Home Phone MRN    1960  [id number]  [facility] [place] [address] [phone] [id number]    [employer] Marital Status          Zoroastrian        Admission Date Admission Type Admitting Provider Attending Provider Department, Room/Bed    9/18/21 Emergency Jose Alfredo Norman MD  [facility] INTENSIVE CARE, I015/1    Discharge Date Discharge Disposition Discharge Destination        9/21/2021 Home or Self Care              Attending Provider: (none)   Allergies: No Known Allergies    Isolation: None   Infection: COVID (confirmed) (09/18/21)   Code Status: Prior    Ht: 170.2 cm (67\")   Wt: 63.4 kg (139 lb 12.8 oz)    Admission Cmt: None   Principal Problem: Real time reverse transcriptase PCR positive for COVID-19 virus [U07.1]                 Active Insurance as of 9/18/2021     Primary Coverage     Payor Plan Insurance Group Employer/Plan Group    [employer]HEM BLUE CROSS ANTHEM BLUE CROSS BLUE SHIELD PPO [id number]     Payor Plan Address Payor Plan Phone Number Payor Plan Fax Number Effective Dates    [address] [phone]  1/1/2019 - None Entered    [place] [address]       Subscriber Name Subscriber Birth Date Member ID       LAUREN RODRIGUEZ 1960 [id number]                 Emergency Contacts      (Rel.) Home Phone Work Phone Mobile Phone    Jodi Rodriguez (Spouse) [phone] -- [phone]               Discharge Summary      Jose Alfredo Norman MD at 09/21/21 1352              [facility] Medicine Services  DISCHARGE SUMMARY       Date of Admission: 9/18/2021  Date of Discharge:  9/21/2021  Primary Care Physician: Thanh Weeks MD    Presenting Problem/History of Present Illness:  Anemia [D64.9]     Final Discharge Diagnoses:  Active Hospital Problems    Diagnosis    • **Real time " reverse transcriptase PCR positive for COVID-19 virus    • Acute on chronic blood loss anemia    • AVM (arteriovenous malformation)    • Erosive gastropathy with bleeding    • Stage 3 severe COPD by GOLD classification (CMS/[facility])    • Cerebrovascular accident (CVA) (CMS/[facility])    • CKD (chronic kidney disease) stage 3, GFR 30-59 ml/min (CMS/[facility])    • Hypertension    • PAD (peripheral artery disease) (CMS/[facility])    • Hyperlipidemia    • Type 2 diabetes mellitus with autonomic neuropathy (CMS/[facility])    1.  GI Bleed  -IV protonix   -GI following  -Trend H&H     2.  COVID-19  -monitor     3.  HTN  -Metoprolol     4.  T2DM  -SSI     5.  HLD  -Statin     6.  Iron deficiency anemia  -IV Iron    Consults:   1.  GI    Procedures Performed:   n/a    Pertinent Test Results:   Positive for COVID-19    Results for orders placed during the hospital encounter of 06/05/21    Adult Transthoracic Echo Complete W/ Cont if Necessary Per Protocol    Interpretation Summary  · Left ventricular wall thickness is consistent with mild to moderate concentric hypertrophy.  · Left ventricular ejection fraction appears to be 36 - 40%. Left ventricular systolic function is moderately decreased.  · Left ventricular diastolic dysfunction is noted.  · The right ventricular cavity is mildly dilated. Systolic function is normal.  · There is biatrial enlargement.  · Moderate mitral valve regurgitation is present.  · Moderate tricuspid valve regurgitation is present.  · Estimated right ventricular systolic pressure from tricuspid regurgitation is mildly elevated (35-45 mmHg).      Chief Complaint on Day of Discharge: feeling better    History of Present Illness on Day of Discharge:   Doing well, no black or bloody stools.  Wants to go home  No SOA, tolerating room air    Hospital Course:  The patient is a 60 y.o. male who presented to [facility] with dark stools and anemia.  He was transfused multiple units of PRBC's.  His H&H has improved.  He was  "treated with IV protonix.  GI was consulted.  They felt a repeat EGD would be of limited benefit given the short time since his last EGD and abnormal findings at that time.  They recommended continued observation.  His H&H has stablized and even trended up.     He was found to be positive for COVID-19.  He has not had SOA.  He was put on oxygen today for comfort but his oxygen sats were in the mid 90's when I took it off of him.    GI recommends switching from ASA to plavix at d/c.  The patient is agreeable to this.  He is apparently on this for stroke prevention as he has had bleeding issues on NOAC's in the past.  He has an appointment with cardiology at the start of the month.  If he's able to be restarted on full AC, Plavix can likely be stopped.     He is comfortable with being discharged and with the discharge plan.  He was given the chance to ask questions and all questions were answered to his satisfaction.        Condition on Discharge:  stable    Physical Exam on Discharge:  /42 (BP Location: Left arm, Patient Position: Lying)   Pulse 54   Temp 98.6 °F (37 °C) (Oral)   Resp 18   Ht 170.2 cm (67\")   Wt 63.4 kg (139 lb 12.8 oz)   SpO2 97%   BMI 21.90 kg/m²   Physical Exam  Vitals reviewed.   Constitutional:       Appearance: He is well-developed.   HENT:      Head: Normocephalic and atraumatic.      Right Ear: External ear normal.      Left Ear: External ear normal.      Nose: Nose normal.   Eyes:      General: No scleral icterus.        Right eye: No discharge.         Left eye: No discharge.      Conjunctiva/sclera: Conjunctivae normal.      Pupils: Pupils are equal, round, and reactive to light.   Neck:      Thyroid: No thyromegaly.      Trachea: No tracheal deviation.   Cardiovascular:      Rate and Rhythm: Normal rate and regular rhythm.      Heart sounds: Normal heart sounds. No murmur heard.   No friction rub. No gallop.    Pulmonary:      Effort: Pulmonary effort is normal. No " respiratory distress.      Breath sounds: Normal breath sounds. No stridor. No wheezing or rales.   Chest:      Chest wall: No tenderness.   Abdominal:      General: Bowel sounds are normal. There is no distension.      Palpations: Abdomen is soft. There is no mass.      Tenderness: There is no abdominal tenderness. There is no guarding or rebound.      Hernia: No hernia is present.   Musculoskeletal:         General: No deformity. Normal range of motion.      Cervical back: Normal range of motion and neck supple.   Lymphadenopathy:      Cervical: No cervical adenopathy.   Skin:     General: Skin is warm and dry.      Coloration: Skin is not pale.      Findings: No erythema or rash.   Neurological:      Mental Status: He is alert and oriented to person, place, and time.      Cranial Nerves: No cranial nerve deficit.      Motor: No abnormal muscle tone.      Coordination: Coordination normal.      Deep Tendon Reflexes: Reflexes are normal and symmetric. Reflexes normal.   Psychiatric:         Behavior: Behavior normal.         Thought Content: Thought content normal.         Judgment: Judgment normal.         Discharge Disposition:  home    Discharge Medications:     Discharge Medications      ASK your doctor about these medications      Instructions Start Date   allopurinol 100 MG tablet  Commonly known as: ZYLOPRIM   100 mg, Oral, Daily      amiodarone 200 MG tablet  Commonly known as: PACERONE   200 mg, Oral, 2 Times Daily      aspirin 81 MG EC tablet   81 mg, Oral, Daily, Obtain OTC.      atorvastatin 80 MG tablet  Commonly known as: Lipitor   80 mg, Oral, Nightly      budesonide-formoterol 160-4.5 MCG/ACT inhaler  Commonly known as: Symbicort   2 puffs, Inhalation, 2 Times Daily - RT      ferrous sulfate 325 (65 FE) MG tablet   325 mg, Oral, Daily With Breakfast      furosemide 40 MG tablet  Commonly known as: LASIX   80 mg, Oral, 2 Times Daily      Lantus SoloStar 100 UNIT/ML injection pen  Generic drug:  Insulin Glargine   16 Units, Subcutaneous, Nightly      metFORMIN  MG 24 hr tablet  Commonly known as: GLUCOPHAGE-XR   750 mg, Oral, Daily With Breakfast      metOLazone 5 MG tablet  Commonly known as: ZAROXOLYN   5 mg, Oral, Daily      metoprolol succinate  MG 24 hr tablet  Commonly known as: TOPROL-XL   100 mg, Oral, Every 24 Hours Scheduled      vitamin D 1.25 MG (52496 UT) capsule capsule  Commonly known as: ERGOCALCIFEROL   50,000 Units, Oral, Every 7 Days             Discharge Diet: heart healthy/ADA    Activity at Discharge: as tolerated    Discharge Care Plan/Instructions: go to ER for fever or SOA, black or bloody stools    Follow-up Appointments:   Future Appointments   Date Time Provider Department Center   10/1/2021  9:30 AM Kiki Desir APRN [facility] CD PAD PAD   11/16/2021  3:45 PM Rocky Patel MD [facility] RD PAD PAD   11/26/2021 10:00 AM PAD US NIVAS CART 2 BH PAD US PAD   11/26/2021 11:00 AM PAD US NIVAS CART 2 BH PAD US PAD   11/26/2021  1:30 PM Kina Leon APRN [facility] VS PAD PAD       Test Results Pending at Discharge: n/a    Electronically signed by Jose Alfredo Norman MD, 09/21/21, 13:57 CDT.    Time: 35 minutes          Electronically signed by Jose Alfredo Norman MD at 09/21/21 [id number]

## 2021-09-23 ENCOUNTER — READMISSION MANAGEMENT (OUTPATIENT)
Dept: CALL CENTER | Facility: HOSPITAL | Age: 61
End: 2021-09-23

## 2021-09-23 LAB
BACTERIA SPEC AEROBE CULT: NORMAL
BACTERIA SPEC AEROBE CULT: NORMAL

## 2021-09-23 NOTE — OUTREACH NOTE
COVID-19 Week 1 Survey      Responses   [facility] patient discharged from?  [place]   Does the patient have one of the following disease processes/diagnoses(primary or secondary)?  COVID-19   COVID-19 underlying condition?  COPD   Call Number  Call 2   Week 1 Call successful?  Yes   Call start time  0932   Call end time  0943   Discharge diagnosis  COVID19, Acute on Chronic blood loss anemia, erosive gastropathy with bleeding.    Is patient permission given to speak with other caregiver?  Yes   List who call center can speak with  spouse- Jodi   Person spoke with today (if not patient) and relationship  spouse- Jodi/ Patient   Meds reviewed with patient/caregiver?  Yes   Does the patient have all medications ordered at discharge?  Yes   Is the patient taking all medications as directed (includes completed medication regime)?  Yes   Does the patient have a primary care provider?   Yes   Comments regarding PCP  Follow up with Thanh Weeks MD. Patient reports that he has an appt in October. Did not give exact date.    Has the patient kept scheduled appointments due by today?  N/A   Has home health visited the patient within 72 hours of discharge?  N/A   DME comments  Patient has O2 for nighttime use.    Psychosocial issues?  No   Did the patient receive a copy of their discharge instructions?  Yes   Nursing interventions  Reviewed instructions with patient   What is the patient's perception of their health status since discharge?  Improving [Patient denies any symptoms from COVID and denies seeing any blood in stools since discharge. ]   Does the patient have any of the following symptoms?  None   Nursing Interventions  Nurse provided patient education   Pulse Ox monitoring  Intermittent   O2 Sat: education provided  Sat levels   O2 Sat education comments  Patient states O2 sat % on room air.    Is the patient/caregiver able to teach back steps to recovery at home?  Set small, achievable goals  for return to baseline health, Rest and rebuild strength, gradually increase activity   If the patient is a current smoker, are they able to teach back resources for cessation?  Not a smoker   Is the patient/caregiver able to teach back the hierarchy of who to call/visit for symptoms/problems? PCP, Specialist, Home health nurse, Urgent Care, ED, 911  Yes   Patient reports what zone on this call?  Green Zone [Zone documented per patient report of symptoms. ]   Green Zone  Reports doing well, Breathing without shortness of breath, Usual activity and exercise level   Green Zone interventions:  Take daily medications, Use oxygen as prescribed   COVID-19 call completed?  Yes   Revoked  No further contact(revokes)-requires comment   Is the patient interested in additional calls from an ambulatory ?  NOTE:  applies to high risk patients requiring additional follow-up.  No   Graduated/Revoked comments  Wife prefers talk to patient. Patient not engaged in call. Difficult to complete survey. Patient denies any symptoms from COVID. Encouraged to follow up with Dr. No further calls.           Dulce Strickland RN

## 2021-09-24 RX ORDER — DRONEDARONE 400 MG/1
TABLET, FILM COATED ORAL
Qty: 180 TABLET | Refills: 3 | OUTPATIENT
Start: 2021-09-24

## 2021-10-01 ENCOUNTER — OFFICE VISIT (OUTPATIENT)
Dept: CARDIOLOGY | Facility: CLINIC | Age: 61
End: 2021-10-01

## 2021-10-01 VITALS
SYSTOLIC BLOOD PRESSURE: 110 MMHG | HEIGHT: 67 IN | DIASTOLIC BLOOD PRESSURE: 58 MMHG | WEIGHT: 137 LBS | BODY MASS INDEX: 21.5 KG/M2 | HEART RATE: 57 BPM | OXYGEN SATURATION: 96 %

## 2021-10-01 DIAGNOSIS — N18.32 STAGE 3B CHRONIC KIDNEY DISEASE (HCC): ICD-10-CM

## 2021-10-01 DIAGNOSIS — I50.42 CHRONIC COMBINED SYSTOLIC (CONGESTIVE) AND DIASTOLIC (CONGESTIVE) HEART FAILURE (HCC): ICD-10-CM

## 2021-10-01 DIAGNOSIS — E78.2 MIXED HYPERLIPIDEMIA: ICD-10-CM

## 2021-10-01 DIAGNOSIS — I48.3 TYPICAL ATRIAL FLUTTER (HCC): Primary | ICD-10-CM

## 2021-10-01 DIAGNOSIS — Q21.12 PFO (PATENT FORAMEN OVALE): ICD-10-CM

## 2021-10-01 DIAGNOSIS — D64.9 SYMPTOMATIC ANEMIA: ICD-10-CM

## 2021-10-01 DIAGNOSIS — I65.23 BILATERAL CAROTID ARTERY STENOSIS: ICD-10-CM

## 2021-10-01 DIAGNOSIS — I89.0 LYMPHEDEMA: ICD-10-CM

## 2021-10-01 DIAGNOSIS — Q27.30 AVM (ARTERIOVENOUS MALFORMATION): ICD-10-CM

## 2021-10-01 DIAGNOSIS — I10 PRIMARY HYPERTENSION: ICD-10-CM

## 2021-10-01 PROCEDURE — 93000 ELECTROCARDIOGRAM COMPLETE: CPT | Performed by: NURSE PRACTITIONER

## 2021-10-01 PROCEDURE — 99214 OFFICE O/P EST MOD 30 MIN: CPT | Performed by: NURSE PRACTITIONER

## 2021-10-01 NOTE — PROGRESS NOTES
"    Subjective:     Encounter Date:10/01/2021      Patient ID: Miltno Rodriguez is a 60 y.o. male with known paroxsymal atrial flutter, combined HF, HTN, HLD, PAD, and DM2.    Chief Complaint: \"I feel good\"  Atrial Flutter  This is a chronic problem. The current episode started more than 1 year ago. The problem occurs daily. The problem has been rapidly improving. Pertinent negatives include no chest pain, coughing, rash or weakness.   Congestive Heart Failure  Presents for follow-up visit. Associated symptoms include edema. Pertinent negatives include no chest pain, near-syncope, palpitations or shortness of breath. The symptoms have been stable.   Hypertension  This is a chronic problem. The current episode started more than 1 year ago. The problem has been gradually improving since onset. The problem is controlled. Pertinent negatives include no chest pain, malaise/fatigue, orthopnea, palpitations, PND or shortness of breath.   Hyperlipidemia  This is a chronic problem. The current episode started more than 1 year ago. The problem is controlled. Recent lipid tests were reviewed and are normal. Pertinent negatives include no chest pain or shortness of breath.     Patient presents today for a hospital follow up and follow up after ablation of atrial flutter. He was admitted to the hospital from 9/18-9/21 and was diagnosed with COVID-19 and anemia. Hgb on arrival was 4.2. He was transfused with multiple units of PRBC. GI recommended his ASA be changed to Plavix. At his last visit on 9/2 his Eliquis was restarted as he was planned for an ablation on 9/8 with Dr. Quesada. Ablation was noted to be successful. He was diagnosed with combined CHF in June during an admission to the hospital at which time his EF was noted to be reduced to 36-40% from previously normal. He was also noted to be in atrial flutter with rapid ventricular response. He has been undergoing workup for watchman placement due to multiple recent " hospital admissions for anemia requiring PRBC transfusions. Since his most recent discharge he reports he has felt good. He does not weigh daily but appears to be down 26lbs since his last visit. He denies dyspnea, edema, chest pain, palpitations, orthopnea and PND.         The following portions of the patient's history were reviewed and updated as appropriate: allergies, current medications, past family history, past medical history, past social history, past surgical history and problem list.    No Known Allergies    Current Outpatient Medications:   •  allopurinol (ZYLOPRIM) 100 MG tablet, Take 100 mg by mouth Daily., Disp: , Rfl:   •  amiodarone (PACERONE) 200 MG tablet, Take 1 tablet by mouth 2 (Two) Times a Day., Disp: 180 tablet, Rfl: 4  •  atorvastatin (Lipitor) 80 MG tablet, Take 1 tablet by mouth Every Night., Disp: 30 tablet, Rfl: 0  •  budesonide-formoterol (Symbicort) 160-4.5 MCG/ACT inhaler, Inhale 2 puffs 2 (Two) Times a Day., Disp: 1 each, Rfl: 12  •  ferrous sulfate 325 (65 FE) MG tablet, Take 1 tablet by mouth Daily With Breakfast., Disp: 90 tablet, Rfl: 1  •  furosemide (LASIX) 40 MG tablet, Take 80 mg by mouth 2 (Two) Times a Day., Disp: , Rfl:   •  Insulin Glargine (Lantus SoloStar) 100 UNIT/ML injection pen, Inject 16 Units under the skin into the appropriate area as directed Every Night., Disp: , Rfl:   •  metFORMIN ER (GLUCOPHAGE-XR) 750 MG 24 hr tablet, Take 750 mg by mouth Daily With Breakfast., Disp: , Rfl:   •  metOLazone (ZAROXOLYN) 5 MG tablet, Take 5 mg by mouth Daily., Disp: , Rfl:   •  metoprolol succinate XL (TOPROL-XL) 100 MG 24 hr tablet, Take 1 tablet by mouth Daily., Disp: 90 tablet, Rfl: 1  •  pantoprazole (Protonix) 40 MG EC tablet, Take 1 tablet by mouth 2 (two) times a day., Disp: 60 tablet, Rfl: 0  •  vitamin D (ERGOCALCIFEROL) 1.25 MG (27996 UT) capsule capsule, Take 50,000 Units by mouth Every 7 (Seven) Days.  , Disp: , Rfl:   Past Medical History:   Diagnosis Date   •  Abnormal PFT    • Atrial fibrillation ([facility])    • Atrial flutter by electrocardiogram ([facility])     11/2014- ECHO: EF 55-60%, mild PHTN, left atrium mild-moderately dilated, mild-moderate TR.   • Cerebrovascular accident (CVA) ([facility]) 7/29/2020   • Chronic combined systolic (congestive) and diastolic (congestive) heart failure ([facility])    • Diabetes mellitus ([facility])    • Edema    • Essential hypertension    • Hyperlipidemia    • Iron deficiency anemia    • Loose bowel movement    • Multiple pulmonary nodules     Followed by [facility] pulmonary clinic   • PAD (peripheral artery disease) ([facility])    • Pulmonary HTN ([facility])    • Stage 3 severe COPD by GOLD classification ([facility]) 8/16/2021   • Tobacco abuse    • Type 2 diabetes mellitus with autonomic neuropathy ([facility])        Social History     Socioeconomic History   • Marital status:      Spouse name: Not on file   • Number of children: Not on file   • Years of education: Not on file   • Highest education level: Not on file   Tobacco Use   • Smoking status: Current Some Day Smoker     Packs/day: 0.25     Years: 30.00     Pack years: 7.50     Types: Cigarettes   • Smokeless tobacco: Never Used   • Tobacco comment: less than a quarter a pack a day   Vaping Use   • Vaping Use: Never used   Substance and Sexual Activity   • Alcohol use: Not Currently   • Drug use: No   • Sexual activity: Defer       Review of Systems   Constitutional: Negative for malaise/fatigue, weight gain and weight loss.   Cardiovascular: Negative for chest pain, dyspnea on exertion, irregular heartbeat, leg swelling, near-syncope, orthopnea, palpitations, paroxysmal nocturnal dyspnea and syncope.   Respiratory: Negative for cough, shortness of breath, sleep disturbances due to breathing, sputum production and wheezing.    Skin: Negative for dry skin, flushing, itching and rash.   Gastrointestinal: Negative for hematemesis and hematochezia.   Neurological: Negative for dizziness, light-headedness, loss of balance  and weakness.   All other systems reviewed and are negative.         Objective:     Vitals reviewed.   Constitutional:       General: Not in acute distress.     Appearance: Healthy appearance. Well-developed. Not diaphoretic.   Eyes:      General: No scleral icterus.     Conjunctiva/sclera: Conjunctivae normal.      Pupils: Pupils are equal, round, and reactive to light.   HENT:      Head: Normocephalic.    Mouth/Throat:      Pharynx: No oropharyngeal exudate.   Neck:      Vascular: No JVD or JVR.   Pulmonary:      Effort: Pulmonary effort is normal. No respiratory distress.      Breath sounds: Normal breath sounds. No decreased breath sounds. No wheezing. No rhonchi. No rales.   Chest:      Chest wall: Not tender to palpatation.   Cardiovascular:      Bradycardia present. Regular rhythm.      Murmurs: There is a grade 2/6 high frequency blowing holosystolic murmur at the apex.   Pulses:     Intact distal pulses.   Edema:     Peripheral edema absent.   Abdominal:      General: Bowel sounds are normal. There is no distension.      Palpations: Abdomen is soft.      Tenderness: There is no abdominal tenderness.   Musculoskeletal: Normal range of motion.      Cervical back: Normal range of motion and neck supple. Skin:     General: Skin is warm and dry.      Coloration: Skin is not pale.      Findings: No erythema or rash.   Neurological:      Mental Status: Alert, oriented to person, place, and time and oriented to person, place and time.      Deep Tendon Reflexes: Reflexes are normal and symmetric.   Psychiatric:         Behavior: Behavior normal.             ECG 12 Lead    Date/Time: 10/1/2021 11:45 AM  Performed by: Kiki Desir APRN  Authorized by: Kiki Desir APRN   Comparison: compared with previous ECG from 9/18/2021  Similar to previous ECG  Rhythm: sinus rhythm  Rate: normal  BPM: 62  Conduction: incomplete right bundle branch block and 1st degree AV block  QRS axis: left  Other: no other  "findings    Clinical impression: abnormal EKG          /58   Pulse 57   Ht 170.2 cm (67.01\")   Wt 62.1 kg (137 lb)   SpO2 96%   BMI 21.45 kg/m²     Lab Review:   I have reviewed previous office notes, recent labs and recent cardiac testing.     Lab Results   Component Value Date    CHOL 113 06/08/2021    CHLPL 156 12/11/2020    TRIG 62 06/08/2021    HDL 66 (H) 06/08/2021    LDL 33 06/08/2021     Results for orders placed during the hospital encounter of 06/05/21    Adult Transthoracic Echo Complete W/ Cont if Necessary Per Protocol    Interpretation Summary  · Left ventricular wall thickness is consistent with mild to moderate concentric hypertrophy.  · Left ventricular ejection fraction appears to be 36 - 40%. Left ventricular systolic function is moderately decreased.  · Left ventricular diastolic dysfunction is noted.  · The right ventricular cavity is mildly dilated. Systolic function is normal.  · There is biatrial enlargement.  · Moderate mitral valve regurgitation is present.  · Moderate tricuspid valve regurgitation is present.  · Estimated right ventricular systolic pressure from tricuspid regurgitation is mildly elevated (35-45 mmHg).          Assessment:          Diagnosis Plan   1. Typical atrial flutter (HCC)     2. Chronic combined systolic (congestive) and diastolic (congestive) heart failure (HCC)     3. AVM (arteriovenous malformation)     4. Symptomatic anemia     5. Stage 3b chronic kidney disease (HCC)     6. Primary hypertension     7. Mixed hyperlipidemia     8. PFO (patent foramen ovale)     9. Bilateral carotid artery stenosis     10. Lymphedema            Plan:       1. Aflutter- stable, s/p ablation per Dr. Quesada on 9/8. Sinus harjinder today. Remains on Amiodarone 200mg BID. Not currently on anticoagulation due to recent GI bleed. Will restart closer to time of watchman implant. Will reach out to Eunice Barclay RN regarding timing for implant. [id number] 3, HAS-BLED 3.   2. " Combined CHF-compensated with class 2 symptoms. Much improved since restoration of NSR. Will plan to recheck EF in 2-3 weeks. Continue BB and Bumex. Most recent creatinine at d/c was 2.19. Reports his PCP is going to draw labs in the next few days. Will hold off on ACEI/ARB due to CKD and borderline hypotension. If creatinine is improved on redraw will try to initiate low dose. Educated to weigh daily and call with significant weight gain. Reviewed signs and symptoms of CHF and what to report with the patient. Patient instructed to restrict sodium and weigh daily. Report weight gain of greater than 2 lbs overnight or 5 lbs in 1 week. Pt verbalized understanding of instructions and plan of care.   3. AVMs- noted per EGD while inpatient.  4. Anemia- secondary to CKD and GI bleeding.   5. CKD- stable with last creatinine 2.19 which is his baseline. will continue to monitor.   6. HTN- controlled. Recent issues with hypotension.   7. HLD- continue statin. Followed by PCP.   8. PFO- noted per echo in 8/2020 following a CVA that was not recommended for closure per neurology.   9. Bilateral carotid stenosis- follows with vascular.   10. Lymphedema- educated on compliance with lymphedema pumps.     Follow up in 4 weeks or sooner if symptoms worsen.     I spent 30 minutes caring for Milton on this date of service. This time includes time spent by me in the following activities:preparing for the visit, reviewing tests, obtaining and/or reviewing a separately obtained history, performing a medically appropriate examination and/or evaluation , counseling and educating the patient/family/caregiver, ordering medications, tests, or procedures, documenting information in the medical record, independently interpreting results and communicating that information with the patient/family/caregiver and care coordination     Current outpatient and discharge medications have been reconciled for the patient.  Reviewed by: Kiki Desir,  APRN

## 2021-10-04 ENCOUNTER — TELEPHONE (OUTPATIENT)
Dept: CARDIOLOGY | Facility: CLINIC | Age: 61
End: 2021-10-04

## 2021-10-04 NOTE — TELEPHONE ENCOUNTER
Patient is contacted and informed to stop taking amiodarone.  Pt voices understanding and stated he will stop taking it.  TERESITA BRAR MA

## 2021-10-04 NOTE — TELEPHONE ENCOUNTER
----- Message from DARIEN Lewis sent at 10/4/2021 10:05 AM CDT -----  Can you call patient and tell him he can stop his Amiodarone.    Thanks.   ----- Message -----  From: Delbert Quesada MD  Sent: 10/4/2021   6:58 AM CDT  To: DARIEN Lewis    I intended to stop his amiodarone post-ablation. Thx.  ----- Message -----  From: Kiki Desir APRN  Sent: 10/1/2021   9:49 AM CDT  To: Delbert Quesada MD    Patient underwent an atrial flutter ablation on 9/8. He remains on BID amiodarone. How long do you want him on BID dosing and does he need to remain on amiodarone long term?

## 2021-10-08 ENCOUNTER — TELEPHONE (OUTPATIENT)
Dept: CARDIOLOGY | Facility: CLINIC | Age: 61
End: 2021-10-08

## 2021-10-08 NOTE — TELEPHONE ENCOUNTER
----- Message from DARIEN Lewis sent at 10/8/2021  9:29 AM CDT -----  Can we get CBC and CMP from PCP that should have been drawn at some point this week?

## 2021-10-14 ENCOUNTER — TELEPHONE (OUTPATIENT)
Dept: CARDIOLOGY | Facility: CLINIC | Age: 61
End: 2021-10-14

## 2021-10-14 NOTE — TELEPHONE ENCOUNTER
Call to pt to discuss shared decision appt and watchman planning. Pt is unable to be off next week for watchman implant. He just recently went back to work.    Shared decision appt made for 10/22 at 1145. We will discuss November Watchman implant date at that appt.

## 2021-10-22 ENCOUNTER — PREP FOR SURGERY (OUTPATIENT)
Dept: OTHER | Facility: HOSPITAL | Age: 61
End: 2021-10-22

## 2021-10-22 ENCOUNTER — OFFICE VISIT (OUTPATIENT)
Dept: CARDIOLOGY | Facility: CLINIC | Age: 61
End: 2021-10-22

## 2021-10-22 VITALS
BODY MASS INDEX: 24.8 KG/M2 | WEIGHT: 158 LBS | DIASTOLIC BLOOD PRESSURE: 68 MMHG | HEART RATE: 75 BPM | SYSTOLIC BLOOD PRESSURE: 130 MMHG | HEIGHT: 67 IN | OXYGEN SATURATION: 94 %

## 2021-10-22 DIAGNOSIS — K31.89 EROSIVE GASTROPATHY: ICD-10-CM

## 2021-10-22 DIAGNOSIS — I50.42 CHRONIC COMBINED SYSTOLIC (CONGESTIVE) AND DIASTOLIC (CONGESTIVE) HEART FAILURE (HCC): Primary | ICD-10-CM

## 2021-10-22 DIAGNOSIS — I10 PRIMARY HYPERTENSION: ICD-10-CM

## 2021-10-22 DIAGNOSIS — I48.92 ATRIAL FLUTTER, UNSPECIFIED TYPE (HCC): ICD-10-CM

## 2021-10-22 DIAGNOSIS — I48.0 PAF (PAROXYSMAL ATRIAL FIBRILLATION) (HCC): Primary | ICD-10-CM

## 2021-10-22 DIAGNOSIS — I73.9 PAD (PERIPHERAL ARTERY DISEASE) (HCC): ICD-10-CM

## 2021-10-22 DIAGNOSIS — Z79.01 CHRONIC ANTICOAGULATION: ICD-10-CM

## 2021-10-22 DIAGNOSIS — D64.9 SYMPTOMATIC ANEMIA: ICD-10-CM

## 2021-10-22 PROCEDURE — 99214 OFFICE O/P EST MOD 30 MIN: CPT | Performed by: INTERNAL MEDICINE

## 2021-10-22 RX ORDER — SODIUM CHLORIDE, SODIUM LACTATE, POTASSIUM CHLORIDE, CALCIUM CHLORIDE 600; 310; 30; 20 MG/100ML; MG/100ML; MG/100ML; MG/100ML
1 INJECTION, SOLUTION INTRAVENOUS CONTINUOUS
Status: CANCELLED | OUTPATIENT
Start: 2021-10-22

## 2021-10-22 RX ORDER — SODIUM CHLORIDE 0.9 % (FLUSH) 0.9 %
10 SYRINGE (ML) INJECTION AS NEEDED
Status: CANCELLED | OUTPATIENT
Start: 2021-10-22

## 2021-10-22 RX ORDER — ASPIRIN 325 MG
325 TABLET ORAL ONCE
Status: CANCELLED | OUTPATIENT
Start: 2021-10-22 | End: 2021-10-22

## 2021-10-22 RX ORDER — BUPIVACAINE HCL/0.9 % NACL/PF 0.1 %
2 PLASTIC BAG, INJECTION (ML) EPIDURAL ONCE
Status: CANCELLED | OUTPATIENT
Start: 2021-10-22 | End: 2021-10-22

## 2021-10-22 RX ORDER — SODIUM CHLORIDE 0.9 % (FLUSH) 0.9 %
10 SYRINGE (ML) INJECTION EVERY 12 HOURS SCHEDULED
Status: CANCELLED | OUTPATIENT
Start: 2021-10-22

## 2021-10-22 NOTE — PROGRESS NOTES
Reason for Visit: Left atrial appendage closure shared decision making.     HPI:  Milton Rodriguez is a 60 y.o. male is here today for Left atrial appendage closure shared decision making.  He normally follows with Dr. Patel for paroxysmal atrial flutter, congestive heart failure, hypertension, hyperlipidemia, peripheral arterial disease, and diabetes mellitus type 2.  He was recently hospitalized in September for COVID-19 infection and anemia.  His hemoglobin got down to 4.2.  He required multiple units of blood transfusions.  He has had multiple previous hospitalizations for anemia that required blood transfusions.  Due to these, bleeding issues, and multiple transfusion requirements he is felt to be a poor long-term candidate for chronic anticoagulation.  He is pursuing alternative options such as left atrial appendage occlusion with the Watchman device.  He underwent CTA cardiac morphology on 9/13/2021.      Patient Active Problem List   Diagnosis   • Tobacco abuse   • Hypertension   • PAD (peripheral artery disease) ([facility])   • Hyperlipidemia   • Type 2 diabetes mellitus with autonomic neuropathy ([facility])   • Atrial flutter ([facility])   • Lower extremity edema   • Chronic anticoagulation   • Hypokalemia   • Heme positive stool   • Chronic combined systolic (congestive) and diastolic (congestive) heart failure ([facility])   • Lymphedema   • Cerebrovascular accident (CVA) ([facility])   • CKD (chronic kidney disease) stage 3, GFR 30-59 ml/min ([facility])   • CVA (cerebral vascular accident) ([facility])   • Transient ischemic attack (TIA)   • Alcohol abuse   • PFO (patent foramen ovale)   • Bilateral carotid artery stenosis   • Stenosis of right internal carotid artery   • Preop testing   • Acute pulmonary edema ([facility])   • Symptomatic anemia   • Acute respiratory failure with hypoxia ([facility])   • Multiple lung nodules   • Stage 3 severe COPD by GOLD classification ([facility])   • Shortness of breath   • Gastrointestinal hemorrhage with melena   •  Acute renal failure superimposed on stage 3b chronic kidney disease (HCC)   • Acute blood loss anemia   • Elevated transaminase level   • Erosive gastropathy with bleeding   • AVM (arteriovenous malformation)   • Acute on chronic blood loss anemia   • Real time reverse transcriptase PCR positive for COVID-19 virus   • Cytokine release syndrome, grade 1       Social History     Tobacco Use   • Smoking status: Current Some Day Smoker     Packs/day: 0.25     Years: 30.00     Pack years: 7.50     Types: Cigarettes   • Smokeless tobacco: Never Used   • Tobacco comment: less than a quarter a pack a day   Vaping Use   • Vaping Use: Never used   Substance Use Topics   • Alcohol use: Not Currently   • Drug use: No       Family History   Problem Relation Age of Onset   • Heart disease Other    • Hypertension Other    • Kidney disease Other    • Diabetes Other    • Kidney disease Mother    • Kidney failure Mother    • Diabetes Mother    • Diabetes Father    • Heart failure Brother         chf   • Diabetes Brother    • Diabetes Brother    • Kidney disease Brother    • No Known Problems Brother    • Heart disease Brother    • Heart disease Brother    • Heart disease Brother    • Liver disease Brother    • Colon cancer Neg Hx    • Esophageal cancer Neg Hx    • Colon polyps Neg Hx        The following portions of the patient's history were reviewed and updated as appropriate: allergies, current medications, past family history, past medical history, past social history, past surgical history and problem list.      Current Outpatient Medications:   •  allopurinol (ZYLOPRIM) 100 MG tablet, Take 100 mg by mouth Daily., Disp: , Rfl:   •  amiodarone (PACERONE) 200 MG tablet, Take 1 tablet by mouth 2 (Two) Times a Day., Disp: 180 tablet, Rfl: 4  •  atorvastatin (Lipitor) 80 MG tablet, Take 1 tablet by mouth Every Night., Disp: 30 tablet, Rfl: 0  •  budesonide-formoterol (Symbicort) 160-4.5 MCG/ACT inhaler, Inhale 2 puffs 2 (Two) Times a  "Day., Disp: 1 each, Rfl: 12  •  ferrous sulfate 325 (65 FE) MG tablet, Take 1 tablet by mouth Daily With Breakfast., Disp: 90 tablet, Rfl: 1  •  furosemide (LASIX) 40 MG tablet, Take 80 mg by mouth 2 (Two) Times a Day., Disp: , Rfl:   •  Insulin Glargine (Lantus SoloStar) 100 UNIT/ML injection pen, Inject 16 Units under the skin into the appropriate area as directed Every Night., Disp: , Rfl:   •  metFORMIN ER (GLUCOPHAGE-XR) 750 MG 24 hr tablet, Take 750 mg by mouth Daily With Breakfast., Disp: , Rfl:   •  metOLazone (ZAROXOLYN) 5 MG tablet, Take 5 mg by mouth Daily., Disp: , Rfl:   •  metoprolol succinate XL (TOPROL-XL) 100 MG 24 hr tablet, Take 1 tablet by mouth Daily., Disp: 90 tablet, Rfl: 1  •  pantoprazole (Protonix) 40 MG EC tablet, Take 1 tablet by mouth 2 (two) times a day., Disp: 60 tablet, Rfl: 0  •  vitamin D (ERGOCALCIFEROL) 1.25 MG (88043 UT) capsule capsule, Take 50,000 Units by mouth Every 7 (Seven) Days.  , Disp: , Rfl:     Review of Systems   Constitutional: Negative for chills and fever.   Cardiovascular: Negative for chest pain and paroxysmal nocturnal dyspnea.   Respiratory: Negative for cough and shortness of breath.    Skin: Negative for rash.   Gastrointestinal: Negative for abdominal pain and heartburn.   Neurological: Negative for dizziness and numbness.       Objective   /68 (BP Location: Left arm, Patient Position: Sitting, Cuff Size: Adult)   Pulse 75   Ht 170.2 cm (67.01\")   Wt 71.7 kg (158 lb)   SpO2 94%   BMI 24.74 kg/m²   Constitutional:       Appearance: Well-developed and normal weight.   HENT:      Head: Normocephalic and atraumatic.   Pulmonary:      Effort: Pulmonary effort is normal.      Breath sounds: Normal breath sounds.   Cardiovascular:      Normal rate. Regular rhythm.      Murmurs: There is no murmur.      No gallop.   Edema:     Pretibial: bilateral 1+ edema of the pretibial area.  Skin:     General: Skin is warm and dry.   Neurological:      Mental Status: " Alert and oriented to person, place, and time.       Procedures  CHADS-VASc Risk Assessment            6 Total Score    1 CHF    1 Hypertension    1 DM    2 PRIOR STROKE/TIA/THROMBO    1 Vascular Disease        Criteria that do not apply:    Age >/= 75    Age 65-74    Sex: Female           ICD-10-CM ICD-9-CM   1. Chronic combined systolic (congestive) and diastolic (congestive) heart failure (HCC)  I50.42 428.42     428.0   2. Atrial flutter, unspecified type (HCC)  I48.92 427.32   3. PAD (peripheral artery disease) ([facility])  I73.9 443.9   4. Primary hypertension  I10 401.9   5. Chronic anticoagulation  Z79.01 V58.61   6. Erosive gastropathy with bleeding  K31.89 537.89   7. Symptomatic anemia  D64.9 285.9         Assessment/Plan:  Based on the history, it has been determined that the patient is a poor candidate for long-term oral anticoagulation.  The patient may, however, be tolerant to short-term anticoagulation as necessary.    Specifically regarding anticoagulation they have demonstrated: Anemia, bleeding, recurrent blood transfusions    We have discussed that you need stroke and bleeding risk on and off anticoagulation.  The individual [id number] stroke risk store is 6 (CHF, hypertension, diabetes, history of CVA/TIA, vascular disease), indicating an adjusted stroke rate of 9.8%/year.    Based on both stroke and bleeding risk, shared decision has been made to pursue closure of the left atrial appendage is a safe, effective alternative to long-term oral anticoagulation therapy for stroke prophylaxis.  This will reduce her long-term risk of incidence of bleeding.  Information regarding left atrial appendage closure and shared decision making were provided to patient.

## 2021-10-25 ENCOUNTER — OFFICE VISIT (OUTPATIENT)
Dept: GASTROENTEROLOGY | Facility: CLINIC | Age: 61
End: 2021-10-25

## 2021-10-25 VITALS
SYSTOLIC BLOOD PRESSURE: 128 MMHG | BODY MASS INDEX: 24.8 KG/M2 | HEART RATE: 70 BPM | TEMPERATURE: 98 F | OXYGEN SATURATION: 97 % | HEIGHT: 67 IN | WEIGHT: 158 LBS | DIASTOLIC BLOOD PRESSURE: 68 MMHG

## 2021-10-25 DIAGNOSIS — D50.9 IRON DEFICIENCY ANEMIA, UNSPECIFIED IRON DEFICIENCY ANEMIA TYPE: Primary | ICD-10-CM

## 2021-10-25 DIAGNOSIS — K92.1 MELENA: ICD-10-CM

## 2021-10-25 PROCEDURE — 99214 OFFICE O/P EST MOD 30 MIN: CPT | Performed by: INTERNAL MEDICINE
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
## 2021-11-12 ENCOUNTER — OFFICE VISIT (OUTPATIENT)
Dept: CARDIOLOGY | Facility: CLINIC | Age: 61
End: 2021-11-12

## 2021-11-12 VITALS
DIASTOLIC BLOOD PRESSURE: 60 MMHG | SYSTOLIC BLOOD PRESSURE: 102 MMHG | WEIGHT: 153 LBS | BODY MASS INDEX: 24.01 KG/M2 | OXYGEN SATURATION: 96 % | HEART RATE: 78 BPM | HEIGHT: 67 IN

## 2021-11-12 DIAGNOSIS — N18.30 STAGE 3 CHRONIC KIDNEY DISEASE, UNSPECIFIED WHETHER STAGE 3A OR 3B CKD (HCC): ICD-10-CM

## 2021-11-12 DIAGNOSIS — I65.23 BILATERAL CAROTID ARTERY STENOSIS: ICD-10-CM

## 2021-11-12 DIAGNOSIS — I48.3 TYPICAL ATRIAL FLUTTER (HCC): Primary | ICD-10-CM

## 2021-11-12 DIAGNOSIS — E78.2 MIXED HYPERLIPIDEMIA: ICD-10-CM

## 2021-11-12 DIAGNOSIS — D64.9 SYMPTOMATIC ANEMIA: ICD-10-CM

## 2021-11-12 DIAGNOSIS — Q27.30 AVM (ARTERIOVENOUS MALFORMATION): ICD-10-CM

## 2021-11-12 DIAGNOSIS — I89.0 LYMPHEDEMA: ICD-10-CM

## 2021-11-12 DIAGNOSIS — I50.42 CHRONIC COMBINED SYSTOLIC (CONGESTIVE) AND DIASTOLIC (CONGESTIVE) HEART FAILURE (HCC): ICD-10-CM

## 2021-11-12 DIAGNOSIS — Z72.0 TOBACCO ABUSE: ICD-10-CM

## 2021-11-12 DIAGNOSIS — I10 PRIMARY HYPERTENSION: ICD-10-CM

## 2021-11-12 DIAGNOSIS — Q21.12 PFO (PATENT FORAMEN OVALE): ICD-10-CM

## 2021-11-12 PROCEDURE — 99214 OFFICE O/P EST MOD 30 MIN: CPT | Performed by: NURSE PRACTITIONER

## 2021-11-12 RX ORDER — MAGNESIUM OXIDE 400 MG/1
400 TABLET ORAL 2 TIMES DAILY
COMMUNITY
Start: 2021-11-01

## 2021-11-12 NOTE — PATIENT INSTRUCTIONS
IF YOU SMOKE OR USE TOBACCO PLEASE READ THE FOLLOWING:  Why is smoking bad for me?  Smoking increases the risk of heart disease, lung disease, vascular disease, stroke, and cancer. If you smoke, STOP!    Tobacco Use Disorder  Tobacco use disorder (TUD) occurs when a person craves, seeks, and uses tobacco, regardless of the consequences. This disorder can cause problems with mental and physical health. It can affect your ability to have healthy relationships, and it can keep you from meeting your responsibilities at work, home, or school.  Tobacco may be:  · Smoked as a cigarette or cigar.  · Inhaled using e-cigarettes.  · Smoked in a pipe or hookah.  · Chewed as smokeless tobacco.  · Inhaled into the nostrils as snuff.  Tobacco products contain a dangerous chemical called nicotine, which is very addictive. Nicotine triggers hormones that make the body feel stimulated and works on areas of the brain that make you feel good. These effects can make it hard for people to quit nicotine.  Tobacco contains many other unsafe chemicals that can damage almost every organ in the body. Smoking tobacco also puts others in danger due to fire risk and possible health problems caused by breathing in secondhand smoke.  What are the signs or symptoms?  Symptoms of TUD may include:  · Being unable to slow down or stop your tobacco use.  · Spending an abnormal amount of time getting or using tobacco.  · Craving tobacco. Cravings may last for up to 6 months after quitting.  · Tobacco use that:  ? Interferes with your work, school, or home life.  ? Interferes with your personal and social relationships.  ? Makes you give up activities that you once enjoyed or found important.  · Using tobacco even though you know that it is:  ? Dangerous or bad for your health or someone else's health.  ? Causing problems in your life.  · Needing more and more of the substance to get the same effect (developing tolerance).  · Experiencing unpleasant  symptoms if you do not use the substance (withdrawal). Withdrawal symptoms may include:  ? Depressed, anxious, or irritable mood.  ? Difficulty concentrating.  ? Increased appetite.  ? Restlessness or trouble sleeping.  · Using the substance to avoid withdrawal.  How is this diagnosed?  This condition may be diagnosed based on:  · Your current and past tobacco use. Your health care provider may ask questions about how your tobacco use affects your life.  · A physical exam.  You may be diagnosed with TUD if you have at least two symptoms within a 12-month period.  How is this treated?  This condition is treated by stopping tobacco use. Many people are unable to quit on their own and need help. Treatment may include:  · Nicotine replacement therapy (NRT). NRT provides nicotine without the other harmful chemicals in tobacco. NRT gradually lowers the dosage of nicotine in the body and reduces withdrawal symptoms. NRT is available as:  ? Over-the-counter gums, lozenges, and skin patches.  ? Prescription mouth inhalers and nasal sprays.  · Medicine that acts on the brain to reduce cravings and withdrawal symptoms.  · A type of talk therapy that examines your triggers for tobacco use, how to avoid them, and how to cope with cravings (behavioral therapy).  · Hypnosis. This may help with withdrawal symptoms.  · Joining a support group for others coping with TUD.  The best treatment for TUD is usually a combination of medicine, talk therapy, and support groups. Recovery can be a long process. Many people start using tobacco again after stopping (relapse). If you relapse, it does not mean that treatment will not work.  Follow these instructions at home:    Lifestyle  · Do not use any products that contain nicotine or tobacco, such as cigarettes and e-cigarettes.  · Avoid things that trigger tobacco use as much as you can. Triggers include people and situations that usually cause you to use tobacco.  · Avoid drinks that contain  caffeine, including coffee. These may worsen some withdrawal symptoms.  · Find ways to manage stress. Wanting to smoke may cause stress, and stress can make you want to smoke. Relaxation techniques such as deep breathing, meditation, and yoga may help.  · Attend support groups as needed. These groups are an important part of long-term recovery for many people.  General instructions  · Take over-the-counter and prescription medicines only as told by your health care provider.  · Check with your health care provider before taking any new prescription or over-the-counter medicines.  · Decide on a friend, family member, or smoking quit-line (such as 1-800-QUIT-NOW in the [place] that you can call or text when you feel the urge to smoke or when you need help coping with cravings.  · Keep all follow-up visits as told by your health care provider and therapist. This is important.  Contact a health care provider if:  · You are not able to take your medicines as prescribed.  · Your symptoms get worse, even with treatment.  Summary  · Tobacco use disorder (TUD) occurs when a person craves, seeks, and uses tobacco regardless of the consequences.  · This condition may be diagnosed based on your current and past tobacco use and a physical exam.  · Many people are unable to quit on their own and need help. Recovery can be a long process.  · The most effective treatment for TUD is usually a combination of medicine, talk therapy, and support groups.  This information is not intended to replace advice given to you by your health care provider. Make sure you discuss any questions you have with your health care provider.  Document Revised: 12/05/2018 Document Reviewed: 12/05/2018  [employer] Patient Education © 2021 [employer]

## 2021-11-12 NOTE — PROGRESS NOTES
Subjective:     Encounter Date:11/11/2021      Patient ID: Milton Rodriguez is a 60 y.o. male with known paroxsymal atrial flutter, combined HF, HTN, HLD, PAD, and DM2.    Chief Complaint:  Atrial Flutter  This is a chronic problem. The current episode started more than 1 year ago. The problem occurs daily. The problem has been rapidly improving. Pertinent negatives include no chest pain, coughing, rash or weakness.   Congestive Heart Failure  Presents for follow-up visit. Associated symptoms include edema. Pertinent negatives include no chest pain, near-syncope, palpitations or shortness of breath. The symptoms have been stable.   Hypertension  This is a chronic problem. The current episode started more than 1 year ago. The problem has been gradually improving since onset. The problem is controlled. Pertinent negatives include no chest pain, malaise/fatigue, orthopnea, palpitations, PND or shortness of breath.   Hyperlipidemia  This is a chronic problem. The current episode started more than 1 year ago. The problem is controlled. Recent lipid tests were reviewed and are normal. Pertinent negatives include no chest pain or shortness of breath.     Patient presents today for a follow up. He is followed for atrial flutter and CHF. He was diagnosed with combined CHF in June during an admission to the hospital at which time his EF was noted to be reduced to 36-40% from previously normal at which time he was noted to be in atrial flutter with RVR. He underwent a successful ablation per Dr. Quesada on 9/8. He has had multiple recent hospital admissions for anemia requiring PRBC transfusions therefore is planning on Watchman placement. He reports he has felt well. He takes 40mg lasix BID when he feels like his legs are swollen. He denies chest pain, dyspnea, edema, palpitations, orthopnea and PND. He has a colonoscopy scheduled for November 22.         The following portions of the patient's history were reviewed  and updated as appropriate: allergies, current medications, past family history, past medical history, past social history, past surgical history and problem list.    No Known Allergies    Current Outpatient Medications:   •  allopurinol (ZYLOPRIM) 100 MG tablet, Take 100 mg by mouth Daily., Disp: , Rfl:   •  atorvastatin (Lipitor) 80 MG tablet, Take 1 tablet by mouth Every Night., Disp: 30 tablet, Rfl: 0  •  budesonide-formoterol (Symbicort) 160-4.5 MCG/ACT inhaler, Inhale 2 puffs 2 (Two) Times a Day., Disp: 1 each, Rfl: 12  •  ferrous sulfate 325 (65 FE) MG tablet, Take 1 tablet by mouth Daily With Breakfast., Disp: 90 tablet, Rfl: 1  •  furosemide (LASIX) 40 MG tablet, Take 40 mg by mouth 2 (Two) Times a Day As Needed (swelling, weight gain, shortness of breath)., Disp: , Rfl:   •  Insulin Glargine (Lantus SoloStar) 100 UNIT/ML injection pen, Inject 16 Units under the skin into the appropriate area as directed Every Night., Disp: , Rfl:   •  magnesium oxide (MAG-OX) 400 MG tablet, , Disp: , Rfl:   •  metFORMIN ER (GLUCOPHAGE-XR) 750 MG 24 hr tablet, Take 750 mg by mouth 2 (Two) Times a Day., Disp: , Rfl:   •  metoprolol succinate XL (TOPROL-XL) 100 MG 24 hr tablet, Take 1 tablet by mouth Daily., Disp: 90 tablet, Rfl: 1  •  pantoprazole (Protonix) 40 MG EC tablet, Take 1 tablet by mouth 2 (two) times a day., Disp: 60 tablet, Rfl: 0  •  vitamin D (ERGOCALCIFEROL) 1.25 MG (16957 UT) capsule capsule, Take 50,000 Units by mouth Every 7 (Seven) Days.  , Disp: , Rfl:   Past Medical History:   Diagnosis Date   • Abnormal PFT    • Atrial fibrillation (HCC)    • Atrial flutter by electrocardiogram ([facility])     11/2014- ECHO: EF 55-60%, mild PHTN, left atrium mild-moderately dilated, mild-moderate TR.   • Cerebrovascular accident (CVA) ([facility]) 7/29/2020   • Chronic combined systolic (congestive) and diastolic (congestive) heart failure (HCC)    • Diabetes mellitus ([facility])    • Edema    • Essential hypertension    • Hyperlipidemia     • Iron deficiency anemia    • Loose bowel movement    • Multiple pulmonary nodules     Followed by [facility] pulmonary clinic   • PAD (peripheral artery disease) ([facility])    • Pulmonary HTN ([facility])    • Stage 3 severe COPD by GOLD classification ([facility]) 8/16/2021   • Tobacco abuse    • Type 2 diabetes mellitus with autonomic neuropathy ([facility])        Social History     Socioeconomic History   • Marital status:    Tobacco Use   • Smoking status: Current Some Day Smoker     Packs/day: 0.25     Years: 30.00     Pack years: 7.50     Types: Cigarettes   • Smokeless tobacco: Never Used   • Tobacco comment: less than a quarter a pack a day   Vaping Use   • Vaping Use: Never used   Substance and Sexual Activity   • Alcohol use: Not Currently   • Drug use: No   • Sexual activity: Defer       Review of Systems   Constitutional: Negative for malaise/fatigue, weight gain and weight loss.   Cardiovascular: Positive for leg swelling. Negative for chest pain, dyspnea on exertion, irregular heartbeat, near-syncope, orthopnea, palpitations, paroxysmal nocturnal dyspnea and syncope.   Respiratory: Negative for cough, shortness of breath, sleep disturbances due to breathing, sputum production and wheezing.    Skin: Negative for dry skin, flushing, itching and rash.   Gastrointestinal: Negative for hematemesis and hematochezia.   Neurological: Negative for dizziness, light-headedness, loss of balance and weakness.   All other systems reviewed and are negative.         Objective:     Vitals reviewed.   Constitutional:       General: Not in acute distress.     Appearance: Healthy appearance. Well-developed. Not diaphoretic.   Eyes:      General: No scleral icterus.     Conjunctiva/sclera: Conjunctivae normal.      Pupils: Pupils are equal, round, and reactive to light.   HENT:      Head: Normocephalic.    Mouth/Throat:      Pharynx: No oropharyngeal exudate.   Neck:      Vascular: No JVD or JVR.   Pulmonary:      Effort: Pulmonary effort  "is normal. No respiratory distress.      Breath sounds: Normal breath sounds. No decreased breath sounds. No wheezing. No rhonchi. No rales.   Chest:      Chest wall: Not tender to palpatation.   Cardiovascular:      Bradycardia present. Regular rhythm.      Murmurs: There is a grade 2/6 high frequency blowing holosystolic murmur at the apex.   Pulses:     Intact distal pulses.   Edema:     Peripheral edema present.     Pretibial: bilateral 2+ pitting edema of the pretibial area.     Ankle: bilateral 2+ pitting edema of the ankle.     Feet: bilateral 2+ pitting edema of the feet.     Comments: Patient reports legs are at baseline.   Abdominal:      General: Bowel sounds are normal. There is no distension.      Palpations: Abdomen is soft.      Tenderness: There is no abdominal tenderness.   Musculoskeletal: Normal range of motion.      Cervical back: Normal range of motion and neck supple. Skin:     General: Skin is warm and dry.      Coloration: Skin is not pale.      Findings: No erythema or rash.   Neurological:      Mental Status: Alert, oriented to person, place, and time and oriented to person, place and time.      Deep Tendon Reflexes: Reflexes are normal and symmetric.   Psychiatric:         Behavior: Behavior normal.           Procedures  /60   Pulse 78   Ht 170.2 cm (67\")   Wt 69.4 kg (153 lb)   SpO2 96%   BMI 23.96 kg/m²     Lab Review:   I have reviewed previous office notes, recent labs and recent cardiac testing.     Lab Results   Component Value Date    CHOL 113 06/08/2021    CHLPL 156 12/11/2020    TRIG 62 06/08/2021    HDL 66 (H) 06/08/2021    LDL 33 06/08/2021     Results for orders placed during the hospital encounter of 06/05/21    Adult Transthoracic Echo Complete W/ Cont if Necessary Per Protocol    Interpretation Summary  · Left ventricular wall thickness is consistent with mild to moderate concentric hypertrophy.  · Left ventricular ejection fraction appears to be 36 - 40%. Left " ventricular systolic function is moderately decreased.  · Left ventricular diastolic dysfunction is noted.  · The right ventricular cavity is mildly dilated. Systolic function is normal.  · There is biatrial enlargement.  · Moderate mitral valve regurgitation is present.  · Moderate tricuspid valve regurgitation is present.  · Estimated right ventricular systolic pressure from tricuspid regurgitation is mildly elevated (35-45 mmHg).          Assessment:          Diagnosis Plan   1. Typical atrial flutter (HCC)     2. Chronic combined systolic (congestive) and diastolic (congestive) heart failure (HCC)  Adult Transthoracic Echo Complete w/ Color, Spectral and Contrast if necessary per protocol   3. AVM (arteriovenous malformation)     4. Symptomatic anemia     5. Stage 3 chronic kidney disease, unspecified whether stage 3a or 3b CKD (HCC)     6. Primary hypertension     7. Mixed hyperlipidemia     8. PFO (patent foramen ovale)     9. Bilateral carotid artery stenosis     10. Lymphedema     11. Tobacco abuse            Plan:       1. Aflutter- stable, s/p ablation per Dr. Quesada on 9/8. Sinus harjinder today. Not currently on anticoagulation due to recent GI bleed. Will restart closer to time of watchman implant. Will reach out to Eunice Barclay RN regarding timing for implant. [id number] 3, HAS-BLED 3.   2. Combined CHF-compensated with class 2 symptoms. Much improved since restoration of NSR. Will plan to recheck EF. Continue BB and Bumex. Most recent creatinine 2.52. Will hold off on ACEI/ARB due to borderline hypotension. If creatinine is improved on redraw will try to initiate low dose. Educated to weigh daily and call with significant weight gain. Reviewed signs and symptoms of CHF and what to report with the patient. Patient instructed to restrict sodium and weigh daily. Report weight gain of greater than 2 lbs overnight or 5 lbs in 1 week. Pt verbalized understanding of instructions and plan of care.   3. AVMs-  noted per EGD while inpatient. Following with GI.   4. Anemia- secondary to CKD and GI bleeding.   5. CKD- stable with last creatinine 2.52 which is his baseline. will continue to monitor.   6. HTN- controlled. Recent issues with hypotension.   7. HLD- continue statin. Followed by PCP.   8. PFO- noted per echo in 8/2020 following a CVA that was not recommended for closure per neurology.   9. Bilateral carotid stenosis- follows with vascular.   10. Lymphedema- educated on compliance with lymphedema pumps.     Follow up in 3 months or sooner if symptoms worsen.     I spent 30 minutes caring for Milton on this date of service. This time includes time spent by me in the following activities:preparing for the visit, reviewing tests, obtaining and/or reviewing a separately obtained history, performing a medically appropriate examination and/or evaluation , counseling and educating the patient/family/caregiver, ordering medications, tests, or procedures, documenting information in the medical record, independently interpreting results and communicating that information with the patient/family/caregiver and care coordination     Current outpatient and discharge medications have been reconciled for the patient.  Reviewed by: DARIEN Lewis

## 2021-11-16 ENCOUNTER — OFFICE VISIT (OUTPATIENT)
Dept: PULMONOLOGY | Facility: CLINIC | Age: 61
End: 2021-11-16

## 2021-11-16 VITALS
HEIGHT: 67 IN | WEIGHT: 161 LBS | HEART RATE: 80 BPM | BODY MASS INDEX: 25.27 KG/M2 | DIASTOLIC BLOOD PRESSURE: 80 MMHG | OXYGEN SATURATION: 94 % | SYSTOLIC BLOOD PRESSURE: 130 MMHG

## 2021-11-16 DIAGNOSIS — J44.9 STAGE 3 SEVERE COPD BY GOLD CLASSIFICATION (HCC): Primary | ICD-10-CM

## 2021-11-16 DIAGNOSIS — I50.42 CHRONIC COMBINED SYSTOLIC (CONGESTIVE) AND DIASTOLIC (CONGESTIVE) HEART FAILURE (HCC): ICD-10-CM

## 2021-11-16 DIAGNOSIS — R91.8 MULTIPLE LUNG NODULES: ICD-10-CM

## 2021-11-16 PROCEDURE — 99213 OFFICE O/P EST LOW 20 MIN: CPT | Performed by: INTERNAL MEDICINE
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
## 2021-11-22 ENCOUNTER — ANESTHESIA (OUTPATIENT)
Dept: GASTROENTEROLOGY | Facility: HOSPITAL | Age: 61
End: 2021-11-22

## 2021-11-22 ENCOUNTER — ANESTHESIA EVENT (OUTPATIENT)
Dept: GASTROENTEROLOGY | Facility: HOSPITAL | Age: 61
End: 2021-11-22

## 2021-11-22 ENCOUNTER — HOSPITAL ENCOUNTER (OUTPATIENT)
Facility: HOSPITAL | Age: 61
Setting detail: HOSPITAL OUTPATIENT SURGERY
Discharge: HOME | End: 2021-11-22
Attending: INTERNAL MEDICINE | Admitting: INTERNAL MEDICINE

## 2021-11-22 VITALS
RESPIRATION RATE: 20 BRPM | OXYGEN SATURATION: 99 % | BODY MASS INDEX: 27.82 KG/M2 | DIASTOLIC BLOOD PRESSURE: 82 MMHG | WEIGHT: 167 LBS | TEMPERATURE: 97.7 F | SYSTOLIC BLOOD PRESSURE: 156 MMHG | HEART RATE: 69 BPM | HEIGHT: 65 IN

## 2021-11-22 LAB — GLUCOSE BLDC GLUCOMTR-MCNC: 87 MG/DL (ref 70–130)

## 2021-11-22 PROCEDURE — 82962 GLUCOSE BLOOD TEST: CPT

## 2021-11-22 PROCEDURE — 25010000002 PROPOFOL 10 MG/ML EMULSION: Performed by: NURSE ANESTHETIST, CERTIFIED REGISTERED

## 2021-11-22 PROCEDURE — 45378 DIAGNOSTIC COLONOSCOPY: CPT | Performed by: INTERNAL MEDICINE

## 2021-11-22 RX ORDER — IPRATROPIUM BROMIDE AND ALBUTEROL SULFATE 2.5; .5 MG/3ML; MG/3ML
3 SOLUTION RESPIRATORY (INHALATION)
Status: DISCONTINUED | OUTPATIENT
Start: 2021-11-22 | End: 2021-11-22 | Stop reason: HOSPADM

## 2021-11-22 RX ORDER — SODIUM CHLORIDE 0.9 % (FLUSH) 0.9 %
10 SYRINGE (ML) INJECTION AS NEEDED
Status: DISCONTINUED | OUTPATIENT
Start: 2021-11-22 | End: 2021-11-22 | Stop reason: HOSPADM

## 2021-11-22 RX ORDER — PROPOFOL 10 MG/ML
VIAL (ML) INTRAVENOUS AS NEEDED
Status: DISCONTINUED | OUTPATIENT
Start: 2021-11-22 | End: 2021-11-22 | Stop reason: SURG

## 2021-11-22 RX ORDER — SODIUM CHLORIDE 9 MG/ML
500 INJECTION, SOLUTION INTRAVENOUS CONTINUOUS PRN
Status: DISCONTINUED | OUTPATIENT
Start: 2021-11-22 | End: 2021-11-22 | Stop reason: HOSPADM

## 2021-11-22 RX ORDER — SODIUM CHLORIDE 0.9 % (FLUSH) 0.9 %
10 SYRINGE (ML) INJECTION AS NEEDED
Status: CANCELLED | OUTPATIENT
Start: 2021-11-22

## 2021-11-22 RX ORDER — SODIUM CHLORIDE 9 MG/ML
100 INJECTION, SOLUTION INTRAVENOUS CONTINUOUS
Status: CANCELLED | OUTPATIENT
Start: 2021-11-22

## 2021-11-22 RX ORDER — SODIUM CHLORIDE 0.9 % (FLUSH) 0.9 %
10 SYRINGE (ML) INJECTION EVERY 12 HOURS SCHEDULED
Status: CANCELLED | OUTPATIENT
Start: 2021-11-22

## 2021-11-22 RX ORDER — MIDAZOLAM HYDROCHLORIDE 1 MG/ML
1 INJECTION, SOLUTION INTRAMUSCULAR; INTRAVENOUS
Status: CANCELLED | OUTPATIENT
Start: 2021-11-22

## 2021-11-22 RX ORDER — LIDOCAINE HYDROCHLORIDE 20 MG/ML
INJECTION, SOLUTION EPIDURAL; INFILTRATION; INTRACAUDAL; PERINEURAL AS NEEDED
Status: DISCONTINUED | OUTPATIENT
Start: 2021-11-22 | End: 2021-11-22 | Stop reason: SURG

## 2021-11-22 RX ADMIN — IPRATROPIUM BROMIDE AND ALBUTEROL SULFATE 3 ML: 2.5; .5 SOLUTION RESPIRATORY (INHALATION) at 08:03

## 2021-11-22 RX ADMIN — SODIUM CHLORIDE: 0.9 INJECTION, SOLUTION INTRAVENOUS at 09:18

## 2021-11-22 RX ADMIN — PROPOFOL 200 MG: 10 INJECTION, EMULSION INTRAVENOUS at 09:20

## 2021-11-22 RX ADMIN — LIDOCAINE HYDROCHLORIDE 50 MG: 20 INJECTION, SOLUTION EPIDURAL; INFILTRATION; INTRACAUDAL; PERINEURAL at 09:20

## 2021-11-22 NOTE — ANESTHESIA POSTPROCEDURE EVALUATION
"Patient: Milton Rodriguez    Procedure Summary     Date: 11/22/21 Room / Location: [facility] ENDOSCOPY 4 / [facility] ENDOSCOPY    Anesthesia Start: 0918 Anesthesia Stop: 0935    Procedure: COLONOSCOPY WITH ANESTHESIA (N/A ) Diagnosis:       Iron deficiency anemia, unspecified iron deficiency anemia type      (Iron deficiency anemia, unspecified iron deficiency anemia type [D50.9])    Surgeons: Adan Gomez DO Provider: Ender Escobar CRNA    Anesthesia Type: MAC ASA Status: 3          Anesthesia Type: MAC    Vitals  Vitals Value Taken Time   /64 11/22/21 0938   Temp     Pulse 66 11/22/21 0938   Resp     SpO2 89 % 11/22/21 0938   Vitals shown include unvalidated device data.        Post Anesthesia Care and Evaluation    Patient location during evaluation: PACU  Patient participation: waiting for patient participation  Level of consciousness: responsive to light touch  Pain score: 0  Pain management: adequate  Airway patency: patent  Anesthetic complications: No anesthetic complications  PONV Status: none  Cardiovascular status: acceptable  Respiratory status: acceptable  Hydration status: acceptable    Comments: Blood pressure 133/84, pulse 100, temperature 97.7 °F (36.5 °C), temperature source Temporal, resp. rate 20, height 165.1 cm (65\"), weight 75.8 kg (167 lb), SpO2 92 %.          "

## 2021-11-22 NOTE — ANESTHESIA PREPROCEDURE EVALUATION
Anesthesia Evaluation     Patient summary reviewed and Nursing notes reviewed   no history of anesthetic complications:  NPO Solid Status: > 8 hours  NPO Liquid Status: > 8 hours           Airway   Mallampati: I  TM distance: >3 FB  Neck ROM: full  Dental    (+) partials        Pulmonary    (+) a smoker Current, COPD, home oxygen (2lpm ),   (-) asthma, sleep apnea  Cardiovascular   Exercise tolerance: good (4-7 METS)    ECG reviewed  PT is on anticoagulation therapy    (+) hypertension, dysrhythmias Atrial Fib, Atrial Flutter, CHF Diastolic >=55%, hyperlipidemia,  carotid artery disease (s/p right cea) carotid bilateral    ROS comment: PFO    · Left ventricular wall thickness is consistent with mild to moderate concentric hypertrophy.  · Left ventricular ejection fraction appears to be 36 - 40%. Left ventricular systolic function is moderately decreased.  · Left ventricular diastolic dysfunction is noted.  · The right ventricular cavity is mildly dilated. Systolic function is normal.  · There is biatrial enlargement.  · Moderate mitral valve regurgitation is present.  · Moderate tricuspid valve regurgitation is present.  · Estimated right ventricular systolic pressure from tricuspid regurgitation is mildly elevated (35-45 mmHg).    Neuro/Psych  (+) TIA, CVA,     (-) seizures  GI/Hepatic/Renal/Endo    (+)  GERD,  renal disease CRI, diabetes mellitus,     Musculoskeletal     Abdominal    Substance History      OB/GYN          Other   blood dyscrasia anemia,                       Anesthesia Plan    ASA 3     MAC   (Advised to bring oxygen tank next time; plans for Watchman procedure in December; preop bronchodilator )  intravenous induction     Anesthetic plan, all risks, benefits, and alternatives have been provided, discussed and informed consent has been obtained with: patient and spouse/significant other.

## 2021-11-30 ENCOUNTER — TELEPHONE (OUTPATIENT)
Dept: CARDIOLOGY | Facility: CLINIC | Age: 61
End: 2021-11-30

## 2021-12-06 ENCOUNTER — LAB (OUTPATIENT)
Dept: LAB | Facility: HOSPITAL | Age: 61
End: 2021-12-06

## 2021-12-06 ENCOUNTER — TELEPHONE (OUTPATIENT)
Dept: VASCULAR SURGERY | Facility: CLINIC | Age: 61
End: 2021-12-06

## 2021-12-06 DIAGNOSIS — I48.0 PAF (PAROXYSMAL ATRIAL FIBRILLATION) (HCC): ICD-10-CM

## 2021-12-06 LAB — SARS-COV-2 ORF1AB RESP QL NAA+PROBE: NOT DETECTED

## 2021-12-06 PROCEDURE — C9803 HOPD COVID-19 SPEC COLLECT: HCPCS | Performed by: NURSE PRACTITIONER

## 2021-12-06 PROCEDURE — U0004 COV-19 TEST NON-CDC HGH THRU: HCPCS | Performed by: NURSE PRACTITIONER

## 2021-12-06 NOTE — TELEPHONE ENCOUNTER
Spoke with Mr Rodriguez letting him know he did not need to come to his appointment on Monday, December 6th, 2021 at 1115 am with Kina LEDEZMA as he didn't go to his testing prior so we would need to reschedule. Mr Rodriguez verbalized understanding, I then transferred him over to scheduling at [phone] opt #[phone]

## 2021-12-07 ENCOUNTER — TELEPHONE (OUTPATIENT)
Dept: GASTROENTEROLOGY | Facility: CLINIC | Age: 61
End: 2021-12-07

## 2021-12-09 ENCOUNTER — HOSPITAL ENCOUNTER (INPATIENT)
Facility: HOSPITAL | Age: 61
LOS: 1 days | Discharge: HOME | End: 2021-12-09
Attending: INTERNAL MEDICINE | Admitting: INTERNAL MEDICINE

## 2021-12-09 ENCOUNTER — APPOINTMENT (OUTPATIENT)
Dept: CARDIOLOGY | Facility: HOSPITAL | Age: 61
End: 2021-12-09

## 2021-12-09 VITALS
BODY MASS INDEX: 25.95 KG/M2 | OXYGEN SATURATION: 93 % | TEMPERATURE: 98.3 F | DIASTOLIC BLOOD PRESSURE: 72 MMHG | RESPIRATION RATE: 22 BRPM | HEIGHT: 67 IN | SYSTOLIC BLOOD PRESSURE: 176 MMHG | WEIGHT: 165.34 LBS | HEART RATE: 83 BPM

## 2021-12-09 DIAGNOSIS — I48.0 PAF (PAROXYSMAL ATRIAL FIBRILLATION) (HCC): ICD-10-CM

## 2021-12-09 LAB
ABO GROUP BLD: NORMAL
ACT BLD: 214 SECONDS (ref 82–152)
ACT BLD: 220 SECONDS (ref 82–152)
ALBUMIN SERPL-MCNC: 3.8 G/DL (ref 3.5–5.2)
ALBUMIN/GLOB SERPL: 1.1 G/DL
ALP SERPL-CCNC: 216 U/L (ref 39–117)
ALT SERPL W P-5'-P-CCNC: 110 U/L (ref 1–41)
ANION GAP SERPL CALCULATED.3IONS-SCNC: 8 MMOL/L (ref 5–15)
APTT PPP: 31.4 SECONDS (ref 24.1–35)
AST SERPL-CCNC: 52 U/L (ref 1–40)
BH CV ECHO MEAS - AO MAX PG: 7.2 MMHG
BH CV ECHO MEAS - AO V2 MAX: 134 CM/SEC
BH CV ECHO MEAS - BSA(HAYCOCK): 1.9 M^2
BH CV ECHO MEAS - BSA: 1.8 M^2
BH CV ECHO MEAS - BZI_BMI: 26.6 KILOGRAMS/M^2
BH CV ECHO MEAS - BZI_METRIC_HEIGHT: 167.6 CM
BH CV ECHO MEAS - BZI_METRIC_WEIGHT: 74.8 KG
BH CV ECHO MEAS - EDV(MOD-SP4): 95.4 ML
BH CV ECHO MEAS - EF(MOD-SP4): 54.8 %
BH CV ECHO MEAS - ESV(MOD-SP4): 43.1 ML
BH CV ECHO MEAS - LV DIASTOLIC VOL/BSA (35-75): 51.8 ML/M^2
BH CV ECHO MEAS - LV SYSTOLIC VOL/BSA (12-30): 23.4 ML/M^2
BH CV ECHO MEAS - LVLD AP4: 8.3 CM
BH CV ECHO MEAS - LVLS AP4: 6.4 CM
BH CV ECHO MEAS - MR MAX PG: 172.7 MMHG
BH CV ECHO MEAS - MR MAX VEL: 657 CM/SEC
BH CV ECHO MEAS - RAP SYSTOLE: 10 MMHG
BH CV ECHO MEAS - RVSP: 40.9 MMHG
BH CV ECHO MEAS - SI(MOD-SP4): 28.4 ML/M^2
BH CV ECHO MEAS - SV(MOD-SP4): 52.3 ML
BH CV ECHO MEAS - TR MAX VEL: 278 CM/SEC
BILIRUB SERPL-MCNC: 0.2 MG/DL (ref 0–1.2)
BLD GP AB SCN SERPL QL: NEGATIVE
BUN SERPL-MCNC: 41 MG/DL (ref 8–23)
BUN/CREAT SERPL: 18.5 (ref 7–25)
CALCIUM SPEC-SCNC: 9 MG/DL (ref 8.6–10.5)
CHLORIDE SERPL-SCNC: 101 MMOL/L (ref 98–107)
CO2 SERPL-SCNC: 32 MMOL/L (ref 22–29)
CREAT SERPL-MCNC: 2.22 MG/DL (ref 0.76–1.27)
DEPRECATED RDW RBC AUTO: 45.4 FL (ref 37–54)
ERYTHROCYTE [DISTWIDTH] IN BLOOD BY AUTOMATED COUNT: 13.7 % (ref 12.3–15.4)
GFR SERPL CREATININE-BSD FRML MDRD: 37 ML/MIN/1.73
GLOBULIN UR ELPH-MCNC: 3.6 GM/DL
GLUCOSE SERPL-MCNC: 86 MG/DL (ref 65–99)
HCT VFR BLD AUTO: 37 % (ref 37.5–51)
HGB BLD-MCNC: 11.7 G/DL (ref 13–17.7)
INR PPP: 1.01 (ref 0.91–1.09)
LV EF 2D ECHO EST: 55 %
MAXIMAL PREDICTED HEART RATE: 158 BPM
MCH RBC QN AUTO: 28.5 PG (ref 26.6–33)
MCHC RBC AUTO-ENTMCNC: 31.6 G/DL (ref 31.5–35.7)
MCV RBC AUTO: 90.2 FL (ref 79–97)
PLATELET # BLD AUTO: 220 10*3/MM3 (ref 140–450)
PMV BLD AUTO: 9.9 FL (ref 6–12)
POTASSIUM SERPL-SCNC: 3.3 MMOL/L (ref 3.5–5.2)
PROT SERPL-MCNC: 7.4 G/DL (ref 6–8.5)
PROTHROMBIN TIME: 12.9 SECONDS (ref 11.9–14.6)
RBC # BLD AUTO: 4.1 10*6/MM3 (ref 4.14–5.8)
RH BLD: POSITIVE
SODIUM SERPL-SCNC: 141 MMOL/L (ref 136–145)
STRESS TARGET HR: 134 BPM
T&S EXPIRATION DATE: NORMAL
WBC NRBC COR # BLD: 9.13 10*3/MM3 (ref 3.4–10.8)

## 2021-12-09 PROCEDURE — C1760 CLOSURE DEV, VASC: HCPCS | Performed by: INTERNAL MEDICINE

## 2021-12-09 PROCEDURE — C1769 GUIDE WIRE: HCPCS | Performed by: INTERNAL MEDICINE

## 2021-12-09 PROCEDURE — 85027 COMPLETE CBC AUTOMATED: CPT | Performed by: INTERNAL MEDICINE

## 2021-12-09 PROCEDURE — 25010000002 FENTANYL CITRATE (PF) 50 MCG/ML SOLUTION: Performed by: INTERNAL MEDICINE

## 2021-12-09 PROCEDURE — 93005 ELECTROCARDIOGRAM TRACING: CPT | Performed by: INTERNAL MEDICINE

## 2021-12-09 PROCEDURE — C1894 INTRO/SHEATH, NON-LASER: HCPCS | Performed by: INTERNAL MEDICINE

## 2021-12-09 PROCEDURE — 25010000002 DIPHENHYDRAMINE PER 50 MG: Performed by: INTERNAL MEDICINE

## 2021-12-09 PROCEDURE — 25010000002 CEFAZOLIN PER 500 MG: Performed by: INTERNAL MEDICINE

## 2021-12-09 PROCEDURE — 85730 THROMBOPLASTIN TIME PARTIAL: CPT | Performed by: INTERNAL MEDICINE

## 2021-12-09 PROCEDURE — 93321 DOPPLER ECHO F-UP/LMTD STD: CPT | Performed by: INTERNAL MEDICINE

## 2021-12-09 PROCEDURE — 86901 BLOOD TYPING SEROLOGIC RH(D): CPT | Performed by: INTERNAL MEDICINE

## 2021-12-09 PROCEDURE — C1759 CATH, INTRA ECHOCARDIOGRAPHY: HCPCS | Performed by: INTERNAL MEDICINE

## 2021-12-09 PROCEDURE — 02L73DK OCCLUSION OF LEFT ATRIAL APPENDAGE WITH INTRALUMINAL DEVICE, PERCUTANEOUS APPROACH: ICD-10-PCS | Performed by: INTERNAL MEDICINE

## 2021-12-09 PROCEDURE — C1889 IMPLANT/INSERT DEVICE, NOC: HCPCS | Performed by: INTERNAL MEDICINE

## 2021-12-09 PROCEDURE — 85610 PROTHROMBIN TIME: CPT | Performed by: INTERNAL MEDICINE

## 2021-12-09 PROCEDURE — 93321 DOPPLER ECHO F-UP/LMTD STD: CPT

## 2021-12-09 PROCEDURE — 85347 COAGULATION TIME ACTIVATED: CPT

## 2021-12-09 PROCEDURE — 99152 MOD SED SAME PHYS/QHP 5/>YRS: CPT | Performed by: INTERNAL MEDICINE

## 2021-12-09 PROCEDURE — 33340 PERQ CLSR TCAT L ATR APNDGE: CPT | Performed by: INTERNAL MEDICINE

## 2021-12-09 PROCEDURE — 93308 TTE F-UP OR LMTD: CPT | Performed by: INTERNAL MEDICINE

## 2021-12-09 PROCEDURE — 25010000002 HEPARIN (PORCINE) PER 1000 UNITS: Performed by: INTERNAL MEDICINE

## 2021-12-09 PROCEDURE — 93308 TTE F-UP OR LMTD: CPT

## 2021-12-09 PROCEDURE — 86900 BLOOD TYPING SEROLOGIC ABO: CPT | Performed by: INTERNAL MEDICINE

## 2021-12-09 PROCEDURE — 25010000002 HEPARIN (PORCINE) 2000-0.9 UNIT/L-% SOLUTION: Performed by: INTERNAL MEDICINE

## 2021-12-09 PROCEDURE — C1893 INTRO/SHEATH, FIXED,NON-PEEL: HCPCS | Performed by: INTERNAL MEDICINE

## 2021-12-09 PROCEDURE — 93325 DOPPLER ECHO COLOR FLOW MAPG: CPT | Performed by: INTERNAL MEDICINE

## 2021-12-09 PROCEDURE — 93325 DOPPLER ECHO COLOR FLOW MAPG: CPT

## 2021-12-09 PROCEDURE — 80053 COMPREHEN METABOLIC PANEL: CPT | Performed by: INTERNAL MEDICINE

## 2021-12-09 PROCEDURE — 86850 RBC ANTIBODY SCREEN: CPT | Performed by: INTERNAL MEDICINE

## 2021-12-09 PROCEDURE — 25010000002 IOPAMIDOL 61 % SOLUTION: Performed by: INTERNAL MEDICINE

## 2021-12-09 PROCEDURE — 25010000002 MIDAZOLAM PER 1 MG: Performed by: INTERNAL MEDICINE

## 2021-12-09 PROCEDURE — 93799 UNLISTED CV SVC/PROCEDURE: CPT | Performed by: INTERNAL MEDICINE

## 2021-12-09 PROCEDURE — 99153 MOD SED SAME PHYS/QHP EA: CPT | Performed by: INTERNAL MEDICINE

## 2021-12-09 DEVICE — CAP DEV PROC WATCHMAN LAA: Type: IMPLANTABLE DEVICE | Status: FUNCTIONAL

## 2021-12-09 DEVICE — LEFT ATRIAL APPENDAGE CLOSURE DEVICE WITH DELIVERY SYSTEM
Type: IMPLANTABLE DEVICE | Status: FUNCTIONAL
Brand: WATCHMAN FLX™

## 2021-12-09 RX ORDER — MIDAZOLAM HYDROCHLORIDE 1 MG/ML
INJECTION INTRAMUSCULAR; INTRAVENOUS AS NEEDED
Status: DISCONTINUED | OUTPATIENT
Start: 2021-12-09 | End: 2021-12-09 | Stop reason: HOSPADM

## 2021-12-09 RX ORDER — HEPARIN SODIUM 1000 [USP'U]/ML
INJECTION, SOLUTION INTRAVENOUS; SUBCUTANEOUS AS NEEDED
Status: DISCONTINUED | OUTPATIENT
Start: 2021-12-09 | End: 2021-12-09 | Stop reason: HOSPADM

## 2021-12-09 RX ORDER — ACETAMINOPHEN 325 MG/1
650 TABLET ORAL EVERY 4 HOURS PRN
Status: CANCELLED | OUTPATIENT
Start: 2021-12-09

## 2021-12-09 RX ORDER — DIPHENHYDRAMINE HYDROCHLORIDE 50 MG/ML
INJECTION INTRAMUSCULAR; INTRAVENOUS AS NEEDED
Status: DISCONTINUED | OUTPATIENT
Start: 2021-12-09 | End: 2021-12-09 | Stop reason: HOSPADM

## 2021-12-09 RX ORDER — METOLAZONE 5 MG/1
5 TABLET ORAL DAILY
COMMUNITY
End: 2022-01-17

## 2021-12-09 RX ORDER — SODIUM CHLORIDE 9 MG/ML
75 INJECTION, SOLUTION INTRAVENOUS CONTINUOUS
Status: CANCELLED | OUTPATIENT
Start: 2021-12-09 | End: 2021-12-09

## 2021-12-09 RX ORDER — HEPARIN SODIUM 200 [USP'U]/100ML
INJECTION, SOLUTION INTRAVENOUS AS NEEDED
Status: DISCONTINUED | OUTPATIENT
Start: 2021-12-09 | End: 2021-12-09 | Stop reason: HOSPADM

## 2021-12-09 RX ORDER — ASPIRIN 81 MG/1
81 TABLET ORAL DAILY
Qty: 100 TABLET | Refills: 3
Start: 2021-12-09 | End: 2023-01-15 | Stop reason: HOSPADM

## 2021-12-09 RX ORDER — SODIUM CHLORIDE 0.9 % (FLUSH) 0.9 %
10 SYRINGE (ML) INJECTION AS NEEDED
Status: DISCONTINUED | OUTPATIENT
Start: 2021-12-09 | End: 2021-12-09 | Stop reason: HOSPADM

## 2021-12-09 RX ORDER — BUPIVACAINE HCL/0.9 % NACL/PF 0.1 %
2 PLASTIC BAG, INJECTION (ML) EPIDURAL ONCE
Status: COMPLETED | OUTPATIENT
Start: 2021-12-09 | End: 2021-12-09

## 2021-12-09 RX ORDER — ASPIRIN 325 MG
TABLET ORAL
Status: COMPLETED
Start: 2021-12-09 | End: 2021-12-09

## 2021-12-09 RX ORDER — ASPIRIN 325 MG
325 TABLET ORAL ONCE
Status: DISCONTINUED | OUTPATIENT
Start: 2021-12-09 | End: 2021-12-09 | Stop reason: HOSPADM

## 2021-12-09 RX ORDER — FENTANYL CITRATE 50 UG/ML
INJECTION, SOLUTION INTRAMUSCULAR; INTRAVENOUS AS NEEDED
Status: DISCONTINUED | OUTPATIENT
Start: 2021-12-09 | End: 2021-12-09 | Stop reason: HOSPADM

## 2021-12-09 RX ORDER — SODIUM CHLORIDE, SODIUM LACTATE, POTASSIUM CHLORIDE, CALCIUM CHLORIDE 600; 310; 30; 20 MG/100ML; MG/100ML; MG/100ML; MG/100ML
1 INJECTION, SOLUTION INTRAVENOUS CONTINUOUS
Status: DISCONTINUED | OUTPATIENT
Start: 2021-12-09 | End: 2021-12-09 | Stop reason: HOSPADM

## 2021-12-09 RX ORDER — SODIUM CHLORIDE 0.9 % (FLUSH) 0.9 %
10 SYRINGE (ML) INJECTION EVERY 12 HOURS SCHEDULED
Status: DISCONTINUED | OUTPATIENT
Start: 2021-12-09 | End: 2021-12-09 | Stop reason: HOSPADM

## 2021-12-09 RX ORDER — LIDOCAINE HYDROCHLORIDE 20 MG/ML
INJECTION, SOLUTION INFILTRATION; PERINEURAL AS NEEDED
Status: DISCONTINUED | OUTPATIENT
Start: 2021-12-09 | End: 2021-12-09 | Stop reason: HOSPADM

## 2021-12-09 RX ADMIN — Medication 2 G: at 11:00

## 2021-12-09 RX ADMIN — ASPIRIN 325 MG: 325 TABLET ORAL at 08:57

## 2021-12-10 LAB
QT INTERVAL: 458 MS
QTC INTERVAL: 494 MS

## 2021-12-29 ENCOUNTER — TELEPHONE (OUTPATIENT)
Dept: PULMONOLOGY | Facility: CLINIC | Age: 61
End: 2021-12-29

## 2022-01-03 ENCOUNTER — TELEPHONE (OUTPATIENT)
Dept: PULMONOLOGY | Facility: CLINIC | Age: 62
End: 2022-01-03

## 2022-01-03 NOTE — TELEPHONE ENCOUNTER
Called patient to move up appointment at request of Dr. Sapp's office for Dr. Patel to try to get authorization for a Chest CT.  No answer or VM.

## 2022-01-07 NOTE — TELEPHONE ENCOUNTER
Unable to contact to patient to move up appt to schedule Chest CT.  Faxed message back to Elizabeth/Dr. Sapp to have patient call the office to schedule an appointment.

## 2022-01-13 ENCOUNTER — TELEPHONE (OUTPATIENT)
Dept: VASCULAR SURGERY | Facility: CLINIC | Age: 62
End: 2022-01-13

## 2022-01-14 ENCOUNTER — OFFICE VISIT (OUTPATIENT)
Dept: VASCULAR SURGERY | Facility: CLINIC | Age: 62
End: 2022-01-14

## 2022-01-14 ENCOUNTER — HOSPITAL ENCOUNTER (OUTPATIENT)
Dept: ULTRASOUND IMAGING | Facility: HOSPITAL | Age: 62
Discharge: HOME | End: 2022-01-14

## 2022-01-14 VITALS
DIASTOLIC BLOOD PRESSURE: 62 MMHG | SYSTOLIC BLOOD PRESSURE: 110 MMHG | HEIGHT: 67 IN | WEIGHT: 165 LBS | BODY MASS INDEX: 25.9 KG/M2

## 2022-01-14 DIAGNOSIS — E78.2 MIXED HYPERLIPIDEMIA: ICD-10-CM

## 2022-01-14 DIAGNOSIS — I10 PRIMARY HYPERTENSION: ICD-10-CM

## 2022-01-14 DIAGNOSIS — I65.23 BILATERAL CAROTID ARTERY STENOSIS: ICD-10-CM

## 2022-01-14 DIAGNOSIS — I73.9 PAD (PERIPHERAL ARTERY DISEASE): ICD-10-CM

## 2022-01-14 DIAGNOSIS — I65.23 BILATERAL CAROTID ARTERY STENOSIS: Primary | ICD-10-CM

## 2022-01-14 PROCEDURE — 93923 UPR/LXTR ART STDY 3+ LVLS: CPT | Performed by: SURGERY

## 2022-01-14 PROCEDURE — 93880 EXTRACRANIAL BILAT STUDY: CPT

## 2022-01-14 PROCEDURE — 93880 EXTRACRANIAL BILAT STUDY: CPT | Performed by: SURGERY

## 2022-01-14 PROCEDURE — 99214 OFFICE O/P EST MOD 30 MIN: CPT | Performed by: NURSE PRACTITIONER

## 2022-01-14 PROCEDURE — 93923 UPR/LXTR ART STDY 3+ LVLS: CPT
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
## 2022-01-17 ENCOUNTER — OFFICE VISIT (OUTPATIENT)
Dept: CARDIOLOGY | Facility: CLINIC | Age: 62
End: 2022-01-17

## 2022-01-17 VITALS
HEART RATE: 78 BPM | BODY MASS INDEX: 23.23 KG/M2 | DIASTOLIC BLOOD PRESSURE: 58 MMHG | SYSTOLIC BLOOD PRESSURE: 102 MMHG | HEIGHT: 67 IN | WEIGHT: 148 LBS | OXYGEN SATURATION: 97 %

## 2022-01-17 DIAGNOSIS — E78.2 MIXED HYPERLIPIDEMIA: ICD-10-CM

## 2022-01-17 DIAGNOSIS — I89.0 LYMPHEDEMA: ICD-10-CM

## 2022-01-17 DIAGNOSIS — Z95.818 PRESENCE OF WATCHMAN LEFT ATRIAL APPENDAGE CLOSURE DEVICE: ICD-10-CM

## 2022-01-17 DIAGNOSIS — I48.3 TYPICAL ATRIAL FLUTTER: ICD-10-CM

## 2022-01-17 DIAGNOSIS — N18.30 STAGE 3 CHRONIC KIDNEY DISEASE, UNSPECIFIED WHETHER STAGE 3A OR 3B CKD: ICD-10-CM

## 2022-01-17 DIAGNOSIS — Q21.12 PFO (PATENT FORAMEN OVALE): ICD-10-CM

## 2022-01-17 DIAGNOSIS — I50.42 CHRONIC COMBINED SYSTOLIC (CONGESTIVE) AND DIASTOLIC (CONGESTIVE) HEART FAILURE: ICD-10-CM

## 2022-01-17 DIAGNOSIS — R07.89 CHEST PAIN, ATYPICAL: Primary | ICD-10-CM

## 2022-01-17 DIAGNOSIS — I10 PRIMARY HYPERTENSION: ICD-10-CM

## 2022-01-17 DIAGNOSIS — I65.23 BILATERAL CAROTID ARTERY STENOSIS: ICD-10-CM

## 2022-01-17 PROCEDURE — 93000 ELECTROCARDIOGRAM COMPLETE: CPT | Performed by: NURSE PRACTITIONER

## 2022-01-17 PROCEDURE — 99214 OFFICE O/P EST MOD 30 MIN: CPT | Performed by: NURSE PRACTITIONER

## 2022-01-17 NOTE — PROGRESS NOTES
"    Subjective:     Encounter Date:01/17/2022      Patient ID: Milton Rodriguez is a 61 y.o. male with paroxsymal atrial flutter, combined HF, HTN, HLD, PAD, and DM2.    Chief Complaint: \"wanted to make sure my heart was ok after having chest pain\"    Congestive Heart Failure  Presents for follow-up visit. Associated symptoms include chest pain and edema. Pertinent negatives include no near-syncope, palpitations or shortness of breath. The symptoms have been stable.   Atrial Flutter  This is a chronic problem. The current episode started more than 1 year ago. The problem occurs daily. The problem has been rapidly improving. Associated symptoms include chest pain. Pertinent negatives include no coughing, rash or weakness.   Hypertension  This is a chronic problem. The current episode started more than 1 year ago. The problem has been gradually improving since onset. The problem is controlled. Associated symptoms include chest pain. Pertinent negatives include no malaise/fatigue, orthopnea, palpitations, PND or shortness of breath.   Hyperlipidemia  This is a chronic problem. The current episode started more than 1 year ago. The problem is controlled. Recent lipid tests were reviewed and are normal. Associated symptoms include chest pain. Pertinent negatives include no shortness of breath.   Chest Pain   Pertinent negatives include no cough, dizziness, irregular heartbeat, malaise/fatigue, near-syncope, orthopnea, palpitations, PND, shortness of breath, sputum production, syncope or weakness.   His past medical history is significant for CHF and hyperlipidemia.   Leg Swelling  Associated symptoms include chest pain. Pertinent negatives include no coughing, rash or weakness.     Patient presents today for an evaluation after 2 episodes of chest pain that occurred while lying flat at night and resolved upon standing. He denies pain with exertion. He reports he had mild bilateral ankle swelling during that time " and restarted his lasix 40mg BID during the time of his chest pain. He denies weight gain, orthopnea and PND.  He is followed for atrial flutter and CHF. He was diagnosed with combined CHF in June during an admission to the hospital at which time his EF was noted to be reduced to 36-40% from previously normal at which time he was noted to be in atrial flutter with RVR. He underwent a successful ablation per Dr. Quesada on 9/8. He had multiple hospital admissions for anemia and underwent placement of the watchman flex device in December.     The following portions of the patient's history were reviewed and updated as appropriate: allergies, current medications, past family history, past medical history, past social history, past surgical history and problem list.    No Known Allergies    Current Outpatient Medications:   •  allopurinol (ZYLOPRIM) 100 MG tablet, Take 100 mg by mouth Daily., Disp: , Rfl:   •  apixaban (ELIQUIS) 2.5 MG tablet tablet, Take 1 tablet by mouth Every 12 (Twelve) Hours., Disp: 60 tablet, Rfl: 1  •  aspirin (aspirin) 81 MG EC tablet, Take 1 tablet by mouth Daily., Disp: 100 tablet, Rfl: 3  •  atorvastatin (Lipitor) 80 MG tablet, Take 1 tablet by mouth Every Night., Disp: 30 tablet, Rfl: 0  •  budesonide-formoterol (Symbicort) 160-4.5 MCG/ACT inhaler, Inhale 2 puffs 2 (Two) Times a Day., Disp: 1 each, Rfl: 12  •  ferrous sulfate 325 (65 FE) MG tablet, Take 1 tablet by mouth Daily With Breakfast. (Patient taking differently: Take 325 mg by mouth Daily With Breakfast.), Disp: 90 tablet, Rfl: 1  •  furosemide (LASIX) 40 MG tablet, Take 40 mg by mouth 2 (Two) Times a Day As Needed (swelling, weight gain, shortness of breath)., Disp: , Rfl:   •  Insulin Glargine (Lantus SoloStar) 100 UNIT/ML injection pen, Inject 16 Units under the skin into the appropriate area as directed Every Night., Disp: , Rfl:   •  magnesium oxide (MAG-OX) 400 MG tablet, Take 400 mg by mouth 2 (Two) Times a Day., Disp: , Rfl:    •  metFORMIN ER (GLUCOPHAGE-XR) 500 MG 24 hr tablet, Take 500 mg by mouth 2 (Two) Times a Day., Disp: , Rfl:   •  metoprolol succinate XL (TOPROL-XL) 100 MG 24 hr tablet, Take 1 tablet by mouth Daily., Disp: 90 tablet, Rfl: 1  •  pantoprazole (Protonix) 40 MG EC tablet, Take 1 tablet by mouth 2 (two) times a day. (Patient taking differently: Take 40 mg by mouth Daily.), Disp: 60 tablet, Rfl: 0  •  vitamin D (ERGOCALCIFEROL) 1.25 MG (13678 UT) capsule capsule, Take 50,000 Units by mouth Every 7 (Seven) Days.  , Disp: , Rfl:   Past Medical History:   Diagnosis Date   • Abnormal PFT    • Atrial fibrillation ([facility])    • Atrial flutter by electrocardiogram ([facility])     11/2014- ECHO: EF 55-60%, mild PHTN, left atrium mild-moderately dilated, mild-moderate TR.   • Cerebrovascular accident (CVA) ([facility]) 7/29/2020   • Chronic combined systolic (congestive) and diastolic (congestive) heart failure ([facility])    • Diabetes mellitus ([facility])    • Edema    • Essential hypertension    • Hyperlipidemia    • Iron deficiency anemia    • Loose bowel movement    • Multiple pulmonary nodules     Followed by [facility] pulmonary clinic   • PAD (peripheral artery disease) ([facility])    • Pulmonary HTN ([facility])    • Stage 3 severe COPD by GOLD classification ([facility]) 8/16/2021   • Tobacco abuse    • Type 2 diabetes mellitus with autonomic neuropathy ([facility])        Social History     Socioeconomic History   • Marital status:    Tobacco Use   • Smoking status: Current Some Day Smoker     Packs/day: 0.25     Years: 30.00     Pack years: 7.50     Types: Cigarettes   • Smokeless tobacco: Never Used   • Tobacco comment: less than a quarter a pack a day   Vaping Use   • Vaping Use: Never used   Substance and Sexual Activity   • Alcohol use: Not Currently   • Drug use: No   • Sexual activity: Defer       Review of Systems   Constitutional: Negative for malaise/fatigue, weight gain and weight loss.   Cardiovascular: Positive for chest pain and leg swelling. Negative  for dyspnea on exertion, irregular heartbeat, near-syncope, orthopnea, palpitations, paroxysmal nocturnal dyspnea and syncope.   Respiratory: Negative for cough, shortness of breath, sleep disturbances due to breathing, sputum production and wheezing.    Skin: Negative for dry skin, flushing, itching and rash.   Gastrointestinal: Negative for hematemesis and hematochezia.   Neurological: Negative for dizziness, light-headedness, loss of balance and weakness.   All other systems reviewed and are negative.         Objective:     Vitals reviewed.   Constitutional:       General: Not in acute distress.     Appearance: Healthy appearance. Well-developed. Not diaphoretic.   Eyes:      General: No scleral icterus.     Conjunctiva/sclera: Conjunctivae normal.      Pupils: Pupils are equal, round, and reactive to light.   HENT:      Head: Normocephalic.    Mouth/Throat:      Pharynx: No oropharyngeal exudate.   Neck:      Vascular: No JVD or JVR.   Pulmonary:      Effort: Pulmonary effort is normal. No respiratory distress.      Breath sounds: Normal breath sounds. No decreased breath sounds. No wheezing. No rhonchi. No rales.   Chest:      Chest wall: Not tender to palpatation.   Cardiovascular:      Bradycardia present. Regular rhythm.      Murmurs: There is a high frequency blowing holosystolic murmur at the apex.   Pulses:     Intact distal pulses.   Edema:     Peripheral edema absent.   Abdominal:      General: Bowel sounds are normal. There is no distension.      Palpations: Abdomen is soft.      Tenderness: There is no abdominal tenderness.   Musculoskeletal: Normal range of motion.      Cervical back: Normal range of motion and neck supple. Skin:     General: Skin is warm and dry.      Coloration: Skin is not pale.      Findings: No erythema or rash.   Neurological:      Mental Status: Alert, oriented to person, place, and time and oriented to person, place and time.      Deep Tendon Reflexes: Reflexes are normal and  "symmetric.   Psychiatric:         Behavior: Behavior normal.             ECG 12 Lead    Date/Time: 1/17/2022 3:22 PM  Performed by: Kiki Desir APRN  Authorized by: Kiki Desir APRN   Comparison: compared with previous ECG from 12/9/2021  Similar to previous ECG  Rhythm: sinus bradycardia  Rate: bradycardic  BPM: 78  Conduction: conduction normal  Q waves: V4, V5 and V6    ST Segments: ST segments normal  T Waves: T waves normal  QRS axis: normal  Other findings: left ventricular hypertrophy    Clinical impression: abnormal EKG          /58   Pulse 78   Ht 170.2 cm (67\")   Wt 67.1 kg (148 lb)   SpO2 97%   BMI 23.18 kg/m²     Lab Review:   I have reviewed previous office notes, recent labs and recent cardiac testing.     Lab Results   Component Value Date    CHOL 113 06/08/2021    CHLPL 156 12/11/2020    TRIG 62 06/08/2021    HDL 66 (H) 06/08/2021    LDL 33 06/08/2021     Results for orders placed during the hospital encounter of 12/09/21    Adult Transthoracic Echo Limited W/ Cont if Necessary Per Protocol    Interpretation Summary  · Left ventricular wall thickness is consistent with moderate concentric hypertrophy.  · Estimated left ventricular EF = 55% Left ventricular systolic function is normal.  · Estimated right ventricular systolic pressure from tricuspid regurgitation is mildly elevated (35-45 mmHg).  · Mild pulmonary hypertension is present.          Assessment:          Diagnosis Plan   1. Chest pain, atypical     2. Typical atrial flutter (HCC)     3. Presence of Watchman left atrial appendage closure device     4. Chronic combined systolic (congestive) and diastolic (congestive) heart failure (HCC)     5. Stage 3 chronic kidney disease, unspecified whether stage 3a or 3b CKD (HCC)     6. Primary hypertension     7. Mixed hyperlipidemia     8. PFO (patent foramen ovale)     9. Bilateral carotid artery stenosis     10. Lymphedema            Plan:       1. Chest pain- atypical for " cardiac ischemic as it occurred only when lying down and self resolved and EKG is negative for ischemic changes. Could have been secondary to mild volume overload as he used Lasix the day his symptoms started.   2. Aflutter- stable. s/p ablation per Dr. Quesada on 9/8/21. Sinus harjinder today. S/p watchman. Continue ASA and Eliquis.   3. Watchman- s/p placement of a 27mm Watchman flex device on 12/9. Continue ASA and Eliquis. His 45 day RADHA should be Jan 29th but there is nothing scheduled. Will reach out to Eunice Barclay RN to clarify.  4. Combined CHF-compensated with class 2 symptoms. EF improved to normal after restoration of NSR per echo in December. Continue BB and Lasix. BP also improved after restoration of NSR but remains mildly low at 102. Will not start ACEi/ARB. Reviewed signs and symptoms of CHF and what to report with the patient. Patient instructed to restrict sodium and weigh daily. Report weight gain of greater than 2 lbs overnight or 5 lbs in 1 week. Pt verbalized understanding of instructions and plan of care.   5. CKD- stable with last creatinine 2.22 which is near his baseline. continue to monitor.   6. HTN- controlled. Recent issues with hypotension.   7. HLD- continue statin. Followed by PCP.   8. PFO- noted per echo in 8/2020 following a CVA that was not recommended for closure per neurology.   9. Bilateral carotid stenosis- follows with vascular.   10. Lymphedema- controlled.     Follow up in 3 months or sooner if symptoms worsen.     I spent 30 minutes caring for Milton on this date of service. This time includes time spent by me in the following activities:preparing for the visit, reviewing tests, obtaining and/or reviewing a separately obtained history, performing a medically appropriate examination and/or evaluation , counseling and educating the patient/family/caregiver, ordering medications, tests, or procedures, documenting information in the medical record, independently interpreting  results and communicating that information with the patient/family/caregiver and care coordination

## 2022-01-18 ENCOUNTER — TELEPHONE (OUTPATIENT)
Dept: VASCULAR SURGERY | Facility: CLINIC | Age: 62
End: 2022-01-18

## 2022-01-18 NOTE — TELEPHONE ENCOUNTER
Spoke with Mr Rodriguez letting him know that Dr Valera wanted to get a closer look at his Carotid Arteries, so we are going to get an MRA of the Neck. Mr Rodriguez verbalized understanding and asked we please mail the information to him.

## 2022-01-19 ENCOUNTER — TELEPHONE (OUTPATIENT)
Dept: CARDIOLOGY | Facility: CLINIC | Age: 62
End: 2022-01-19

## 2022-01-20 ENCOUNTER — TELEPHONE (OUTPATIENT)
Dept: VASCULAR SURGERY | Facility: CLINIC | Age: 62
End: 2022-01-20

## 2022-01-20 NOTE — TELEPHONE ENCOUNTER
Spoke with  Michael letting him know that Dr Valera wanted to get a closer look at his Carotid Arteries with a CT of Neck. I advised [facility] Rodriguez to [facility] 8 am on Thursday, February 3rd, 2022 for Hydration prior to the CT Scan at 1030 am. Mr Rodriguez verbalized understanding.

## 2022-01-26 RX ORDER — APIXABAN 2.5 MG/1
TABLET, FILM COATED ORAL
Qty: 60 TABLET | Refills: 1 | OUTPATIENT
Start: 2022-01-26

## 2022-01-26 RX ORDER — APIXABAN 5 MG/1
TABLET, FILM COATED ORAL
Qty: 180 TABLET | Refills: 3 | Status: SHIPPED | OUTPATIENT
Start: 2022-01-26 | End: 2022-02-18

## 2022-01-28 ENCOUNTER — PREP FOR SURGERY (OUTPATIENT)
Dept: OTHER | Facility: HOSPITAL | Age: 62
End: 2022-01-28

## 2022-01-28 DIAGNOSIS — I48.0 PAF (PAROXYSMAL ATRIAL FIBRILLATION): Primary | ICD-10-CM

## 2022-01-28 RX ORDER — SODIUM CHLORIDE 9 MG/ML
20 INJECTION, SOLUTION INTRAVENOUS CONTINUOUS
Status: CANCELLED | OUTPATIENT
Start: 2022-01-28

## 2022-02-02 PROBLEM — I65.29 CAROTID ARTERY STENOSIS: Status: ACTIVE | Noted: 2022-02-02

## 2022-02-02 RX ORDER — SODIUM CHLORIDE 9 MG/ML
125 INJECTION, SOLUTION INTRAVENOUS ONCE
Status: CANCELLED | OUTPATIENT
Start: 2022-02-03

## 2022-02-03 ENCOUNTER — APPOINTMENT (OUTPATIENT)
Dept: CT IMAGING | Facility: HOSPITAL | Age: 62
End: 2022-02-03

## 2022-02-03 ENCOUNTER — APPOINTMENT (OUTPATIENT)
Dept: ONCOLOGY | Facility: HOSPITAL | Age: 62
End: 2022-02-03

## 2022-02-09 ENCOUNTER — TELEPHONE (OUTPATIENT)
Dept: VASCULAR SURGERY | Facility: CLINIC | Age: 62
End: 2022-02-09

## 2022-02-09 NOTE — TELEPHONE ENCOUNTER
Called the patient to see if he was ready to schedule.  I asked about the phone note that stated he was trying to get his insurance taken care of.  He said that it was just that bills have been piling up and he can't afford it right now.  I told the patient to just call us back when he was ready.

## 2022-02-14 ENCOUNTER — HOSPITAL ENCOUNTER (OUTPATIENT)
Dept: CARDIOLOGY | Facility: HOSPITAL | Age: 62
Discharge: HOME | End: 2022-02-14

## 2022-02-15 ENCOUNTER — TELEPHONE (OUTPATIENT)
Dept: CARDIOLOGY | Facility: CLINIC | Age: 62
End: 2022-02-15

## 2022-02-15 NOTE — TELEPHONE ENCOUNTER
Call to pt to see why he missed his RADHA appt yesterday. He reports that he forgot about that. I told him we would like to reschedule and will try to work around his schedule. He says that Fridays are best for him. I will have the office get him rescheduled. He verbalized understanding.

## 2022-02-16 ENCOUNTER — LAB (OUTPATIENT)
Dept: LAB | Facility: HOSPITAL | Age: 62
End: 2022-02-16

## 2022-02-16 DIAGNOSIS — I48.0 PAF (PAROXYSMAL ATRIAL FIBRILLATION): ICD-10-CM

## 2022-02-16 LAB — SARS-COV-2 ORF1AB RESP QL NAA+PROBE: NOT DETECTED

## 2022-02-16 PROCEDURE — U0004 COV-19 TEST NON-CDC HGH THRU: HCPCS

## 2022-02-16 PROCEDURE — C9803 HOPD COVID-19 SPEC COLLECT: HCPCS

## 2022-02-18 ENCOUNTER — HOSPITAL ENCOUNTER (OUTPATIENT)
Dept: CARDIOLOGY | Facility: HOSPITAL | Age: 62
Discharge: HOME | End: 2022-02-18
Admitting: INTERNAL MEDICINE

## 2022-02-18 VITALS
RESPIRATION RATE: 18 BRPM | OXYGEN SATURATION: 96 % | DIASTOLIC BLOOD PRESSURE: 78 MMHG | WEIGHT: 156.8 LBS | SYSTOLIC BLOOD PRESSURE: 142 MMHG | HEIGHT: 67 IN | BODY MASS INDEX: 24.61 KG/M2 | HEART RATE: 69 BPM | TEMPERATURE: 96.8 F

## 2022-02-18 DIAGNOSIS — I48.0 PAF (PAROXYSMAL ATRIAL FIBRILLATION): ICD-10-CM

## 2022-02-18 LAB
BH CV ECHO MEAS - BSA(HAYCOCK): 1.8 M^2
BH CV ECHO MEAS - BSA: 1.8 M^2
BH CV ECHO MEAS - BZI_BMI: 24.4 KILOGRAMS/M^2
BH CV ECHO MEAS - BZI_METRIC_HEIGHT: 170.2 CM
BH CV ECHO MEAS - BZI_METRIC_WEIGHT: 70.8 KG
LV EF 2D ECHO EST: 60 %
MAXIMAL PREDICTED HEART RATE: 158 BPM
STRESS TARGET HR: 134 BPM

## 2022-02-18 PROCEDURE — 93325 DOPPLER ECHO COLOR FLOW MAPG: CPT

## 2022-02-18 PROCEDURE — 93312 ECHO TRANSESOPHAGEAL: CPT

## 2022-02-18 PROCEDURE — 93325 DOPPLER ECHO COLOR FLOW MAPG: CPT | Performed by: INTERNAL MEDICINE

## 2022-02-18 PROCEDURE — 93321 DOPPLER ECHO F-UP/LMTD STD: CPT

## 2022-02-18 PROCEDURE — 93312 ECHO TRANSESOPHAGEAL: CPT | Performed by: INTERNAL MEDICINE

## 2022-02-18 PROCEDURE — 25010000002 FENTANYL CITRATE (PF) 100 MCG/2ML SOLUTION: Performed by: INTERNAL MEDICINE

## 2022-02-18 PROCEDURE — 25010000002 MIDAZOLAM HCL (PF) 5 MG/5ML SOLUTION: Performed by: INTERNAL MEDICINE

## 2022-02-18 PROCEDURE — S0260 H&P FOR SURGERY: HCPCS | Performed by: INTERNAL MEDICINE

## 2022-02-18 PROCEDURE — 93321 DOPPLER ECHO F-UP/LMTD STD: CPT | Performed by: INTERNAL MEDICINE

## 2022-02-18 RX ORDER — FENTANYL CITRATE 50 UG/ML
INJECTION, SOLUTION INTRAMUSCULAR; INTRAVENOUS
Status: COMPLETED | OUTPATIENT
Start: 2022-02-18 | End: 2022-02-18

## 2022-02-18 RX ORDER — MIDAZOLAM HYDROCHLORIDE 1 MG/ML
INJECTION, SOLUTION INTRAMUSCULAR; INTRAVENOUS
Status: COMPLETED | OUTPATIENT
Start: 2022-02-18 | End: 2022-02-18

## 2022-02-18 RX ORDER — SODIUM CHLORIDE 9 MG/ML
20 INJECTION, SOLUTION INTRAVENOUS CONTINUOUS
Status: DISCONTINUED | OUTPATIENT
Start: 2022-02-18 | End: 2022-02-19 | Stop reason: HOSPADM

## 2022-02-18 RX ORDER — CLOPIDOGREL BISULFATE 75 MG/1
75 TABLET ORAL DAILY
Qty: 90 TABLET | Refills: 1 | Status: SHIPPED | OUTPATIENT
Start: 2022-02-18 | End: 2022-06-30

## 2022-02-18 RX ADMIN — SODIUM CHLORIDE 20 ML/HR: 9 INJECTION, SOLUTION INTRAVENOUS at 07:55

## 2022-02-18 RX ADMIN — MIDAZOLAM HYDROCHLORIDE 2 MG: 1 INJECTION, SOLUTION INTRAMUSCULAR; INTRAVENOUS at 08:52

## 2022-02-18 RX ADMIN — FENTANYL CITRATE 25 MCG: 50 INJECTION, SOLUTION INTRAMUSCULAR; INTRAVENOUS at 08:54

## 2022-02-18 RX ADMIN — MIDAZOLAM HYDROCHLORIDE 2 MG: 1 INJECTION, SOLUTION INTRAMUSCULAR; INTRAVENOUS at 08:54

## 2022-02-18 RX ADMIN — FENTANYL CITRATE 50 MCG: 50 INJECTION, SOLUTION INTRAMUSCULAR; INTRAVENOUS at 08:52
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
## 2022-02-18 NOTE — NURSING NOTE
While assessing the patient, post procedure, it was noted that the patient's O2 saturation was in the low 80's. HOB elevated to increase oxygen flow, 2L of O2 nasal cannula placed on the patient. Deep breathing exercises demonstrated and performed. O2 saturation increased to 100. After assessing the saturation for a moderate amount of time, the nasal cannula was removed from the patient. The patient remained 97 on room air. Safety maintained while in the closed observation unit. No C/O pain. Eden Au LPN

## 2022-04-11 RX ORDER — AMIODARONE HYDROCHLORIDE 200 MG/1
TABLET ORAL
Qty: 180 TABLET | Refills: 4 | OUTPATIENT
Start: 2022-04-11

## 2022-05-11 ENCOUNTER — TRANSCRIBE ORDERS (OUTPATIENT)
Dept: ADMINISTRATIVE | Facility: HOSPITAL | Age: 62
End: 2022-05-11

## 2022-05-11 DIAGNOSIS — R19.02 ABDOMINAL MASS, LEFT UPPER QUADRANT: ICD-10-CM

## 2022-05-11 DIAGNOSIS — R10.12 ABDOMINAL PAIN, LEFT UPPER QUADRANT: Primary | ICD-10-CM

## 2022-05-16 ENCOUNTER — HOSPITAL ENCOUNTER (OUTPATIENT)
Dept: CT IMAGING | Facility: HOSPITAL | Age: 62
Discharge: HOME | End: 2022-05-16
Admitting: FAMILY MEDICINE

## 2022-05-16 DIAGNOSIS — R10.12 ABDOMINAL PAIN, LEFT UPPER QUADRANT: ICD-10-CM

## 2022-05-16 DIAGNOSIS — R19.02 ABDOMINAL MASS, LEFT UPPER QUADRANT: ICD-10-CM

## 2022-05-16 PROCEDURE — 74176 CT ABD & PELVIS W/O CONTRAST: CPT

## 2022-05-16 PROCEDURE — 25010000002 IOPAMIDOL 61 % SOLUTION: Performed by: FAMILY MEDICINE

## 2022-05-16 RX ADMIN — IOPAMIDOL 50 ML: 612 INJECTION, SOLUTION INTRAVENOUS at 09:48

## 2022-05-26 ENCOUNTER — OFFICE VISIT (OUTPATIENT)
Dept: CARDIOLOGY | Facility: CLINIC | Age: 62
End: 2022-05-26

## 2022-05-26 VITALS
OXYGEN SATURATION: 97 % | HEART RATE: 85 BPM | WEIGHT: 143 LBS | BODY MASS INDEX: 22.44 KG/M2 | DIASTOLIC BLOOD PRESSURE: 62 MMHG | HEIGHT: 67 IN | SYSTOLIC BLOOD PRESSURE: 92 MMHG

## 2022-05-26 DIAGNOSIS — N18.30 STAGE 3 CHRONIC KIDNEY DISEASE, UNSPECIFIED WHETHER STAGE 3A OR 3B CKD: ICD-10-CM

## 2022-05-26 DIAGNOSIS — I48.3 TYPICAL ATRIAL FLUTTER: ICD-10-CM

## 2022-05-26 DIAGNOSIS — E78.2 MIXED HYPERLIPIDEMIA: ICD-10-CM

## 2022-05-26 DIAGNOSIS — I50.42 CHRONIC COMBINED SYSTOLIC (CONGESTIVE) AND DIASTOLIC (CONGESTIVE) HEART FAILURE: ICD-10-CM

## 2022-05-26 DIAGNOSIS — I10 PRIMARY HYPERTENSION: ICD-10-CM

## 2022-05-26 DIAGNOSIS — R07.89 OTHER CHEST PAIN: Primary | ICD-10-CM

## 2022-05-26 DIAGNOSIS — Z95.818 PRESENCE OF WATCHMAN LEFT ATRIAL APPENDAGE CLOSURE DEVICE: ICD-10-CM

## 2022-05-26 DIAGNOSIS — Q21.12 PFO (PATENT FORAMEN OVALE): ICD-10-CM

## 2022-05-26 DIAGNOSIS — I89.0 LYMPHEDEMA: ICD-10-CM

## 2022-05-26 PROBLEM — Z79.01 CHRONIC ANTICOAGULATION: Status: RESOLVED | Noted: 2017-10-27 | Resolved: 2022-05-26

## 2022-05-26 PROCEDURE — 93000 ELECTROCARDIOGRAM COMPLETE: CPT | Performed by: NURSE PRACTITIONER

## 2022-05-26 PROCEDURE — 99214 OFFICE O/P EST MOD 30 MIN: CPT | Performed by: NURSE PRACTITIONER

## 2022-05-26 NOTE — PROGRESS NOTES
"    Subjective:     Encounter Date:05/26/2022      Patient ID: Milton Rodriguez is a 61 y.o. male with paroxsymal atrial flutter, combined HF, HTN, HLD, PAD, and DM2.    Chief Complaint: \"I'm having chest pain again and wanted to get looked at\"    Chest Pain   This is a recurrent problem. The current episode started in the past 7 days. The onset quality is gradual. The problem occurs daily. The problem has been waxing and waning. The pain is present in the substernal region. The quality of the pain is described as heavy. Associated symptoms include a cough, orthopnea and shortness of breath. Pertinent negatives include no dizziness, irregular heartbeat, malaise/fatigue, near-syncope, palpitations, PND, sputum production, syncope or weakness.   His past medical history is significant for CHF and hyperlipidemia.   Congestive Heart Failure  Presents for follow-up visit. Associated symptoms include chest pain, edema, orthopnea and shortness of breath. Pertinent negatives include no fatigue, near-syncope, palpitations, paroxysmal nocturnal dyspnea or unexpected weight change. The symptoms have been worsening.   Atrial Flutter  This is a chronic problem. The current episode started more than 1 year ago. The problem occurs rarely. The problem has been rapidly improving. Associated symptoms include chest pain and coughing. Pertinent negatives include no fatigue, rash or weakness.   Hypertension  This is a chronic problem. The current episode started more than 1 year ago. The problem has been gradually improving since onset. The problem is controlled. Associated symptoms include chest pain, orthopnea and shortness of breath. Pertinent negatives include no malaise/fatigue, palpitations or PND.   Hyperlipidemia  This is a chronic problem. The current episode started more than 1 year ago. The problem is controlled. Recent lipid tests were reviewed and are normal. Associated symptoms include chest pain and shortness of " breath.     Patient presents today for an evaluation of chest pain. He was last seen in January with similar complaints. At that time he was noted to be volume overloaded and his symptoms subsided after increasing his Lasix. Today he reports he has developed substernal chest pain that occurs while lying flat at night and improves but does not resolve upon standing. He denies pain with exertion. He notes bilateral leg edema, orthopnea, and shortness of breath with and without exertion. since his last visit his nephrologist has decreased his lasix 40mg to daily from BID. He reports he has not increased his Lasix as he was afraid it would hurt his kidneys. He is not weighing himself due to his scales being broken.     He is followed for combined CHF with EF 36-40% from previously normal. He was also noted to be in new onset atrial flutter with RVR. He underwent a successful ablation per Dr. Quesada on 9/8/21. He had a watchman placed in 12/2021 due to issues with anemia on anticoagulation. 45 day RADHA was completed in February and noted a normal LVEF of 60% with watchman device being well seated without iftikhar-device leak. His Eliquis was then changed to Plavix. He notes he has been off his ASA and Plavix because that's what he thought he was told to do.     The following portions of the patient's history were reviewed and updated as appropriate: allergies, current medications, past family history, past medical history, past social history, past surgical history and problem list.    No Known Allergies    Current Outpatient Medications:   •  allopurinol (ZYLOPRIM) 100 MG tablet, Take 100 mg by mouth Daily., Disp: , Rfl:   •  atorvastatin (Lipitor) 80 MG tablet, Take 1 tablet by mouth Every Night., Disp: 30 tablet, Rfl: 0  •  budesonide-formoterol (Symbicort) 160-4.5 MCG/ACT inhaler, Inhale 2 puffs 2 (Two) Times a Day., Disp: 1 each, Rfl: 12  •  ferrous sulfate 325 (65 FE) MG tablet, Take 1 tablet by mouth Daily With  Breakfast. (Patient taking differently: Take 325 mg by mouth Daily With Breakfast.), Disp: 90 tablet, Rfl: 1  •  furosemide (LASIX) 40 MG tablet, Take 40 mg by mouth Daily., Disp: , Rfl:   •  Insulin Glargine (Lantus SoloStar) 100 UNIT/ML injection pen, Inject 16 Units under the skin into the appropriate area as directed Every Night., Disp: , Rfl:   •  magnesium oxide (MAG-OX) 400 MG tablet, Take 400 mg by mouth 2 (Two) Times a Day., Disp: , Rfl:   •  metFORMIN ER (GLUCOPHAGE-XR) 500 MG 24 hr tablet, Take 500 mg by mouth 2 (Two) Times a Day., Disp: , Rfl:   •  metoprolol succinate XL (TOPROL-XL) 100 MG 24 hr tablet, Take 1 tablet by mouth Daily., Disp: 90 tablet, Rfl: 1  •  pantoprazole (Protonix) 40 MG EC tablet, Take 1 tablet by mouth 2 (two) times a day. (Patient taking differently: Take 40 mg by mouth 2 (Two) Times a Day.), Disp: 60 tablet, Rfl: 0  •  vitamin D (ERGOCALCIFEROL) 1.25 MG (07298 UT) capsule capsule, Take 50,000 Units by mouth Every 7 (Seven) Days.  , Disp: , Rfl:   •  aspirin (aspirin) 81 MG EC tablet, Take 1 tablet by mouth Daily., Disp: 100 tablet, Rfl: 3  •  clopidogrel (PLAVIX) 75 MG tablet, Take 1 tablet by mouth Daily., Disp: 90 tablet, Rfl: 1  Past Medical History:   Diagnosis Date   • Abnormal PFT    • Atrial fibrillation ([facility])    • Atrial flutter by electrocardiogram ([facility])     11/2014- ECHO: EF 55-60%, mild PHTN, left atrium mild-moderately dilated, mild-moderate TR.   • Cerebrovascular accident (CVA) ([facility]) 7/29/2020   • Chronic combined systolic (congestive) and diastolic (congestive) heart failure ([facility])    • Diabetes mellitus ([facility])    • Edema    • Essential hypertension    • Hyperlipidemia    • Iron deficiency anemia    • Loose bowel movement    • Multiple pulmonary nodules     Followed by [facility] pulmonary clinic   • PAD (peripheral artery disease) ([facility])    • Pulmonary HTN ([facility])    • Stage 3 severe COPD by GOLD classification ([facility]) 8/16/2021   • Tobacco abuse    • Type 2 diabetes  mellitus with autonomic neuropathy (HCC)        Social History     Socioeconomic History   • Marital status:    Tobacco Use   • Smoking status: Current Some Day Smoker     Packs/day: 0.25     Years: 30.00     Pack years: 7.50     Types: Cigarettes   • Smokeless tobacco: Never Used   • Tobacco comment: less than a quarter a pack a day   Vaping Use   • Vaping Use: Never used   Substance and Sexual Activity   • Alcohol use: Not Currently   • Drug use: No   • Sexual activity: Defer       Review of Systems   Constitutional: Negative for fatigue, malaise/fatigue, unexpected weight change, weight gain and weight loss.   Cardiovascular: Positive for chest pain, dyspnea on exertion, leg swelling and orthopnea. Negative for irregular heartbeat, near-syncope, palpitations, paroxysmal nocturnal dyspnea and syncope.   Respiratory: Positive for cough and shortness of breath. Negative for sleep disturbances due to breathing, sputum production and wheezing.    Skin: Negative for dry skin, flushing, itching and rash.   Gastrointestinal: Negative for hematemesis and hematochezia.   Neurological: Negative for dizziness, light-headedness, loss of balance and weakness.   All other systems reviewed and are negative.         Objective:     Vitals reviewed.   Constitutional:       General: Not in acute distress.     Appearance: Healthy appearance. Well-developed. Not diaphoretic.   Eyes:      General: No scleral icterus.     Conjunctiva/sclera: Conjunctivae normal.      Pupils: Pupils are equal, round, and reactive to light.   HENT:      Head: Normocephalic.    Mouth/Throat:      Pharynx: No oropharyngeal exudate.   Neck:      Vascular: No JVD or JVR.   Pulmonary:      Effort: Pulmonary effort is normal. No respiratory distress.      Breath sounds: Normal breath sounds. No decreased breath sounds. No wheezing. No rhonchi. No rales.   Chest:      Chest wall: Not tender to palpatation.   Cardiovascular:      Bradycardia present.  "Regular rhythm.      Murmurs: There is a high frequency blowing holosystolic murmur at the apex.   Pulses:     Intact distal pulses.   Edema:     Peripheral edema present.     Pretibial: bilateral 3+ edema of the pretibial area with pitting on the left.     Ankle: bilateral 3+ pitting edema of the ankle.     Feet: bilateral 3+ pitting edema of the feet.  Abdominal:      General: Bowel sounds are normal. There is no distension.      Palpations: Abdomen is soft.      Tenderness: There is no abdominal tenderness.   Musculoskeletal: Normal range of motion.      Cervical back: Normal range of motion and neck supple. Skin:     General: Skin is warm and dry.      Coloration: Skin is not pale.      Findings: No erythema or rash.   Neurological:      Mental Status: Alert, oriented to person, place, and time and oriented to person, place and time.      Deep Tendon Reflexes: Reflexes are normal and symmetric.   Psychiatric:         Behavior: Behavior normal.             ECG 12 Lead    Date/Time: 5/26/2022 2:50 PM  Performed by: Kiki Desir APRN  Authorized by: Kiki Desir APRN   Comparison: compared with previous ECG from 1/17/2022  Similar to previous ECG  Rhythm: sinus rhythm  Rate: normal  BPM: 85  Conduction: conduction normal  ST Segments: ST segments normal  T Waves: T waves normal  QRS axis: normal  Other findings: left ventricular hypertrophy    Clinical impression: abnormal EKG          BP 92/62   Pulse 85   Ht 170.2 cm (67\")   Wt 64.9 kg (143 lb)   SpO2 97%   BMI 22.40 kg/m²     Lab Review:   I have reviewed previous office notes, recent labs and recent cardiac testing.     Lab Results   Component Value Date    CHOL 113 06/08/2021    CHLPL 156 12/11/2020    TRIG 62 06/08/2021    HDL 66 (H) 06/08/2021    LDL 33 06/08/2021     Results for orders placed during the hospital encounter of 02/18/22    Adult Transesophageal Echo (RADHA) W/ Cont if Necessary Per Protocol    Interpretation Summary  · Left " ventricular wall thickness is consistent with mild concentric hypertrophy.  · Estimated left ventricular EF = 60% Left ventricular systolic function is normal.  · The watchman device is well seated without evidence of peridevice leak          Assessment:          Diagnosis Plan   1. Other chest pain     2. Typical atrial flutter (HCC)     3. Presence of Watchman left atrial appendage closure device     4. Chronic combined systolic (congestive) and diastolic (congestive) heart failure (HCC)     5. Stage 3 chronic kidney disease, unspecified whether stage 3a or 3b CKD (HCC)     6. Primary hypertension     7. Mixed hyperlipidemia     8. PFO (patent foramen ovale)     9. Lymphedema            Plan:       1. Chest pain- atypical for cardiac ischemic as it is exacerbated when  lying down and self resolved. EKG is negative for ischemic changes. Likely secondary to volume overload. See #4.  2. Aflutter- stable. s/p ablation per Dr. Quesada on 9/8/21. NSR per EKG. S/p watchman. Continue ASA and Plavix   3. Watchman- s/p placement of a 27mm Watchman flex device on 12/9/2021. 45 day RADHA noted no peridevice leak. Educated to resume his ASA and Plavix until advised by Eunice Barclay RN to stop  4. Combined CHF-decompensated with class 3-4 symptoms. EF improved to normal after restoration of NSR per echo in December and remained normal per RADHA in Feb. Due to complaints of edema, orthopnea, chest pain, and dyspnea recommend he increase his lasix from daily to BID for 3 days. Continue BB. Not on ACEi/ARB due to hypotension and CKD. Reviewed signs and symptoms of CHF and what to report with the patient. Patient instructed to restrict sodium and weigh daily. Report weight gain of greater than 2 lbs overnight or 5 lbs in 1 week. Pt verbalized understanding of instructions and plan of care.   5. CKD- stable. Followed by nephrology.  6. HTN- controlled. Recent issues with hypotension.   7. HLD- continue statin. Followed by PCP.   8. PFO-  noted per echo in 8/2020 following a CVA that was not recommended for closure per neurology.   9. Bilateral carotid stenosis- follows with vascular.   10. Lymphedema- presence of edema due to volume overload.     Follow up in 3 weeks or sooner if symptoms worsen.     I spent 30 minutes caring for Milton on this date of service. This time includes time spent by me in the following activities:preparing for the visit, reviewing tests, obtaining and/or reviewing a separately obtained history, performing a medically appropriate examination and/or evaluation , counseling and educating the patient/family/caregiver, ordering medications, tests, or procedures, documenting information in the medical record, independently interpreting results and communicating that information with the patient/family/caregiver and care coordination

## 2022-06-01 LAB
ALBUMIN SERPL-MCNC: 3.5 G/DL (ref 3.5–5.2)
ALP BLD-CCNC: 128 U/L (ref 40–130)
ALT SERPL-CCNC: 18 U/L (ref 5–41)
ANION GAP SERPL CALCULATED.3IONS-SCNC: 20 MMOL/L (ref 7–19)
AST SERPL-CCNC: 14 U/L (ref 5–40)
BACTERIA: NEGATIVE /HPF
BASOPHILS ABSOLUTE: 0 K/UL (ref 0–0.2)
BASOPHILS RELATIVE PERCENT: 0.6 % (ref 0–1)
BILIRUB SERPL-MCNC: <0.2 MG/DL (ref 0.2–1.2)
BILIRUBIN URINE: NEGATIVE
BLOOD, URINE: NEGATIVE
BUN BLDV-MCNC: 68 MG/DL (ref 8–23)
CALCIUM SERPL-MCNC: 8.4 MG/DL (ref 8.8–10.2)
CHLORIDE BLD-SCNC: 86 MMOL/L (ref 98–111)
CLARITY: CLEAR
CO2: 26 MMOL/L (ref 22–29)
COLOR: YELLOW
CREAT SERPL-MCNC: 3 MG/DL (ref 0.5–1.2)
CREATININE URINE: 82.4 MG/DL (ref 4.2–622)
CRYSTALS, UA: ABNORMAL /HPF
EOSINOPHILS ABSOLUTE: 0.5 K/UL (ref 0–0.6)
EOSINOPHILS RELATIVE PERCENT: 7.9 % (ref 0–5)
EPITHELIAL CELLS, UA: 0 /HPF (ref 0–5)
GFR AFRICAN AMERICAN: 26
GFR NON-AFRICAN AMERICAN: 21
GLUCOSE BLD-MCNC: 316 MG/DL (ref 74–109)
GLUCOSE URINE: 100 MG/DL
HCT VFR BLD CALC: 29.4 % (ref 42–52)
HEMOGLOBIN: 9.3 G/DL (ref 14–18)
HYALINE CASTS: 3 /HPF (ref 0–8)
IMMATURE GRANULOCYTES #: 0 K/UL
KETONES, URINE: NEGATIVE MG/DL
LEUKOCYTE ESTERASE, URINE: NEGATIVE
LYMPHOCYTES ABSOLUTE: 0.3 K/UL (ref 1.1–4.5)
LYMPHOCYTES RELATIVE PERCENT: 4.5 % (ref 20–40)
MAGNESIUM: 1.9 MG/DL (ref 1.6–2.4)
MCH RBC QN AUTO: 30.3 PG (ref 27–31)
MCHC RBC AUTO-ENTMCNC: 31.6 G/DL (ref 33–37)
MCV RBC AUTO: 95.8 FL (ref 80–94)
MONOCYTES ABSOLUTE: 0.8 K/UL (ref 0–0.9)
MONOCYTES RELATIVE PERCENT: 12.8 % (ref 0–10)
NEUTROPHILS ABSOLUTE: 4.6 K/UL (ref 1.5–7.5)
NEUTROPHILS RELATIVE PERCENT: 73.9 % (ref 50–65)
NITRITE, URINE: NEGATIVE
PARATHYROID HORMONE INTACT: 165.4 PG/ML (ref 15–65)
PDW BLD-RTO: 17.1 % (ref 11.5–14.5)
PH UA: 6 (ref 5–8)
PHOSPHORUS: 4.9 MG/DL (ref 2.5–4.5)
PLATELET # BLD: 366 K/UL (ref 130–400)
PMV BLD AUTO: 9.7 FL (ref 9.4–12.4)
POTASSIUM SERPL-SCNC: 2.6 MMOL/L (ref 3.5–5)
PROTEIN PROTEIN: 155 MG/DL (ref 15–45)
PROTEIN UA: 300 MG/DL
RBC # BLD: 3.07 M/UL (ref 4.7–6.1)
RBC UA: 0 /HPF (ref 0–4)
SODIUM BLD-SCNC: 132 MMOL/L (ref 136–145)
SPECIFIC GRAVITY UA: 1.01 (ref 1–1.03)
TOTAL PROTEIN: 6.5 G/DL (ref 6.6–8.7)
URIC ACID, SERUM: 9.4 MG/DL (ref 3.4–7)
UROBILINOGEN, URINE: 0.2 E.U./DL
VITAMIN D 25-HYDROXY: 51.5 NG/ML
WBC # BLD: 6.2 K/UL (ref 4.8–10.8)
WBC UA: 0 /HPF (ref 0–5)

## 2022-06-07 NOTE — PROGRESS NOTES
Chief Complaint  stage 3 severe copd    Subjective    History of Present Illness {CC  Problem List  Visit Diagnosis   Encounters  Notes  Medications  Labs  Result Review Imaging  Media     Milton Rodriguez presents to [facility] for:    Mr. Rodriguez is here for open management of severe COPD.  He is also needing oxygen recertification.  He tells me his breathing has been the same.  He has not had any recent hospitalizations or respiratory infections.  He continues to utilize Symbicort and does well with this.  He tells me has not been wearing oxygen continuously except only when he needs it which is per his report a couple times a month.  He does continue to smoke proximately three fourths a pack of cigarettes a day.  He has been having issues with left leg weakness.  Last PFT was in July of last year.       Prior to Admission medications    Medication Sig Start Date End Date Taking? Authorizing Provider   allopurinol (ZYLOPRIM) 100 MG tablet Take 100 mg by mouth Daily.    ProviderMing MD   aspirin (aspirin) 81 MG EC tablet Take 1 tablet by mouth Daily. 12/9/21   Delbert Patel MD   atorvastatin (Lipitor) 80 MG tablet Take 1 tablet by mouth Every Night. 7/30/20   Fady Erickson MD   budesonide-formoterol (Symbicort) 160-4.5 MCG/ACT inhaler Inhale 2 puffs 2 (Two) Times a Day. 6/11/21   Stuart Martin MD   clopidogrel (PLAVIX) 75 MG tablet Take 1 tablet by mouth Daily. 2/18/22   Delbert Patel MD   ferrous sulfate 325 (65 FE) MG tablet Take 1 tablet by mouth Daily With Breakfast.  Patient taking differently: Take 325 mg by mouth Daily With Breakfast. 6/11/21   Stuart Martin MD   furosemide (LASIX) 40 MG tablet Take 40 mg by mouth Daily.    Provider, MD Ming   Insulin Glargine (Lantus SoloStar) 100 UNIT/ML injection pen Inject 16 Units under the skin into the appropriate area as directed Every Night. 7/28/21   Provider  "MD Ming   magnesium oxide (MAG-OX) 400 MG tablet Take 400 mg by mouth 2 (Two) Times a Day. 11/1/21   ProviderMing MD   metFORMIN ER (GLUCOPHAGE-XR) 500 MG 24 hr tablet Take 500 mg by mouth 2 (Two) Times a Day.    ProviderMing MD   metoprolol succinate XL (TOPROL-XL) 100 MG 24 hr tablet Take 1 tablet by mouth Daily. 6/12/21   Stuart Martin MD   pantoprazole (Protonix) 40 MG EC tablet Take 1 tablet by mouth 2 (two) times a day.  Patient taking differently: Take 40 mg by mouth 2 (Two) Times a Day. 9/21/21   Jose Alfredo Norman MD   vitamin D (ERGOCALCIFEROL) 1.25 MG (06694 UT) capsule capsule Take 50,000 Units by mouth Every 7 (Seven) Days.   3/29/21   ProviderMing MD       Social History     Socioeconomic History   • Marital status:    Tobacco Use   • Smoking status: Current Some Day Smoker     Packs/day: 0.25     Years: 30.00     Pack years: 7.50     Types: Cigarettes   • Smokeless tobacco: Never Used   • Tobacco comment: less than a quarter a pack a day   Vaping Use   • Vaping Use: Never used   Substance and Sexual Activity   • Alcohol use: Not Currently   • Drug use: No   • Sexual activity: Defer       Objective   Vital Signs:   /60   Pulse 81   Ht 170.2 cm (67\")   Wt 61.2 kg (135 lb)   SpO2 98% Comment: ra  BMI 21.14 kg/m²     Physical Exam  Constitutional:       General: He is not in acute distress.     Interventions: Face mask in place.   HENT:      Head: Normocephalic.      Nose: Nose normal.      Mouth/Throat:      Mouth: Mucous membranes are moist.   Eyes:      General: No scleral icterus.  Cardiovascular:      Rate and Rhythm: Normal rate.   Pulmonary:      Effort: No respiratory distress.   Abdominal:      General: There is no distension.   Neurological:      Mental Status: He is alert and oriented to person, place, and time.   Psychiatric:         Mood and Affect: Mood normal.         Behavior: Behavior is cooperative.        Result Review :{ Labs  " Result Review  Imaging  Med Tab  Media :          Results for orders placed during the hospital encounter of 07/22/21    Full Pulmonary Function Test With Bronchodilator    Narrative  [facility] - Pulmonary Function Test    [address] [place] [address]  [facility]  [id number]  [phone]    Patient : Milton Rodriguez  MRN : [id number]  CSN : [id number]  Pulmonologist : Milton Long MD  Date : 7/25/2021    ______________________________________________________________________    Interpretation :  1.  Spirometry is consistent with a severe obstructive ventilatory defect.  2.  There is no significant change in spirometry postbronchodilator.  3.  Lung volumes reveal a coexisting restrictive ventilatory defect.  There is also a decrease in inspiratory capacity.  4.  There is a severe diffusion impairment which when corrected for alveolar volume is a mild bordering on moderate diffusion impairment.  5.  When current studies are compared to studies from November 26 of 2014, there has been a drop in the patient's FVC and FEV1 on both pre and postbronchodilator studies compared to previous.  There has also been a decrease in total lung capacity compared to previous.  When corrected for alveolar volume, there has also been a decrease in diffusion capacity compared to previous.      Mitlon Long MD    Rest/Exercise Pulse Ox Values        Some values may be hidden. Unless noted otherwise, only the newest values recorded on each date are displayed.         Rest/Exercise Pulse Ox Results 6/10/22   Rest room air SAT % 98   Exercise room air SAT % 98                        Assessment and Plan {CC Problem List  Visit Diagnosis  ROS  Review (Popup)  Health Maintenance  Quality  BestPractice  Medications  SmartSets  SnapShot Encounters  Media      Diagnoses and all orders for this visit:    1. Stage 3 severe COPD by GOLD classification (HCC) (Primary)  -     Walking Oximetry  -     albuterol sulfate   (90 Base) MCG/ACT inhaler; Inhale 2 puffs Every 4 (Four) Hours As Needed for Wheezing.  Dispense: 18 g; Refill: 12  -     Overnight Sleep Oximetry Study; Future    2. Tobacco abuse        BMI is within normal parameters. No other follow-up for BMI required.      He is stable from a pulmonary standpoint.  Continue Symbicort and albuterol HFA as needed.  Walking oximetry today.  He did not qualify for continuous oxygen.  Check overnight oximetry to assess need for nocturnal oxygen.  Smoking cessation education provided.  Follow-up with Dr. Aragon in regards to left leg weakness.  Follow-up in 6 months with spirometry.  Call in interim with issues.    Airam Fabian, DARIEN  6/10/2022  16:33 CDT    Follow Up {Instructions Charge Capture  Follow-up Communications   Return in about 6 months (around 12/10/2022) for spirometry.    Patient was given instructions and counseling regarding his condition or for health maintenance advice. Please see specific information pulled into the AVS if appropriate.

## 2022-06-10 ENCOUNTER — OFFICE VISIT (OUTPATIENT)
Dept: PULMONOLOGY | Facility: CLINIC | Age: 62
End: 2022-06-10

## 2022-06-10 VITALS
DIASTOLIC BLOOD PRESSURE: 60 MMHG | HEART RATE: 81 BPM | HEIGHT: 67 IN | OXYGEN SATURATION: 98 % | BODY MASS INDEX: 21.19 KG/M2 | WEIGHT: 135 LBS | SYSTOLIC BLOOD PRESSURE: 110 MMHG

## 2022-06-10 DIAGNOSIS — Z72.0 TOBACCO ABUSE: Chronic | ICD-10-CM

## 2022-06-10 DIAGNOSIS — J44.9 STAGE 3 SEVERE COPD BY GOLD CLASSIFICATION: Primary | Chronic | ICD-10-CM

## 2022-06-10 PROCEDURE — 99214 OFFICE O/P EST MOD 30 MIN: CPT | Performed by: NURSE PRACTITIONER

## 2022-06-10 RX ORDER — ALBUTEROL SULFATE 90 UG/1
2 AEROSOL, METERED RESPIRATORY (INHALATION) EVERY 4 HOURS PRN
Qty: 18 G | Refills: 12 | Status: SHIPPED | OUTPATIENT
Start: 2022-06-10 | End: 2022-08-18

## 2022-06-10 NOTE — PROCEDURES
Walking Oximetry  Performed by: Airam Fabian APRN  Authorized by: Airam Fabian APRN     Supplemental Oxygen: PRN  Rest room air SAT %:  98  Exercise room air SAT %:  98

## 2022-06-17 DIAGNOSIS — J44.9 STAGE 3 SEVERE COPD BY GOLD CLASSIFICATION: Chronic | ICD-10-CM

## 2022-06-30 ENCOUNTER — TELEPHONE (OUTPATIENT)
Dept: CARDIOLOGY | Facility: CLINIC | Age: 62
End: 2022-06-30

## 2022-06-30 NOTE — TELEPHONE ENCOUNTER
Call to pt to let him know that he is 6 months post Watchman and can stop taking his Plavix. He was instructed to continue a baby ASA daily. He verbalized understanding.

## 2022-07-05 RX ORDER — CLOPIDOGREL BISULFATE 75 MG/1
TABLET ORAL
OUTPATIENT
Start: 2022-07-05

## 2022-07-06 ENCOUNTER — APPOINTMENT (OUTPATIENT)
Dept: ULTRASOUND IMAGING | Facility: HOSPITAL | Age: 62
End: 2022-07-06

## 2022-07-06 ENCOUNTER — HOSPITAL ENCOUNTER (EMERGENCY)
Facility: HOSPITAL | Age: 62
Discharge: HOME | End: 2022-07-06
Admitting: EMERGENCY MEDICINE

## 2022-07-06 ENCOUNTER — APPOINTMENT (OUTPATIENT)
Dept: GENERAL RADIOLOGY | Facility: HOSPITAL | Age: 62
End: 2022-07-06

## 2022-07-06 VITALS
HEIGHT: 67 IN | BODY MASS INDEX: 21.97 KG/M2 | OXYGEN SATURATION: 100 % | HEART RATE: 73 BPM | WEIGHT: 140 LBS | DIASTOLIC BLOOD PRESSURE: 66 MMHG | TEMPERATURE: 98.1 F | RESPIRATION RATE: 10 BRPM | SYSTOLIC BLOOD PRESSURE: 177 MMHG

## 2022-07-06 DIAGNOSIS — R60.0 LEG EDEMA: Primary | ICD-10-CM

## 2022-07-06 DIAGNOSIS — E87.1 HYPONATREMIA: ICD-10-CM

## 2022-07-06 DIAGNOSIS — E87.6 CHRONIC HYPOKALEMIA: ICD-10-CM

## 2022-07-06 LAB
ALBUMIN SERPL-MCNC: 3.5 G/DL (ref 3.5–5.2)
ALBUMIN/GLOB SERPL: 1.2 G/DL
ALP SERPL-CCNC: 106 U/L (ref 39–117)
ALT SERPL W P-5'-P-CCNC: 26 U/L (ref 1–41)
ANION GAP SERPL CALCULATED.3IONS-SCNC: 8 MMOL/L (ref 5–15)
AST SERPL-CCNC: 29 U/L (ref 1–40)
BASOPHILS # BLD AUTO: 0.04 10*3/MM3 (ref 0–0.2)
BASOPHILS NFR BLD AUTO: 0.6 % (ref 0–1.5)
BILIRUB SERPL-MCNC: <0.2 MG/DL (ref 0–1.2)
BUN SERPL-MCNC: 51 MG/DL (ref 8–23)
BUN/CREAT SERPL: 20.4 (ref 7–25)
CALCIUM SPEC-SCNC: 8.8 MG/DL (ref 8.6–10.5)
CHLORIDE SERPL-SCNC: 91 MMOL/L (ref 98–107)
CO2 SERPL-SCNC: 30 MMOL/L (ref 22–29)
CREAT SERPL-MCNC: 2.5 MG/DL (ref 0.76–1.27)
DEPRECATED RDW RBC AUTO: 47.7 FL (ref 37–54)
EGFRCR SERPLBLD CKD-EPI 2021: 28.3 ML/MIN/1.73
EOSINOPHIL # BLD AUTO: 0.76 10*3/MM3 (ref 0–0.4)
EOSINOPHIL NFR BLD AUTO: 10.6 % (ref 0.3–6.2)
ERYTHROCYTE [DISTWIDTH] IN BLOOD BY AUTOMATED COUNT: 13.7 % (ref 12.3–15.4)
GLOBULIN UR ELPH-MCNC: 2.9 GM/DL
GLUCOSE SERPL-MCNC: 79 MG/DL (ref 65–99)
HCT VFR BLD AUTO: 26.8 % (ref 37.5–51)
HGB BLD-MCNC: 8.8 G/DL (ref 13–17.7)
IMM GRANULOCYTES # BLD AUTO: 0.03 10*3/MM3 (ref 0–0.05)
IMM GRANULOCYTES NFR BLD AUTO: 0.4 % (ref 0–0.5)
LYMPHOCYTES # BLD AUTO: 0.38 10*3/MM3 (ref 0.7–3.1)
LYMPHOCYTES NFR BLD AUTO: 5.3 % (ref 19.6–45.3)
MAGNESIUM SERPL-MCNC: 2 MG/DL (ref 1.6–2.4)
MCH RBC QN AUTO: 31.3 PG (ref 26.6–33)
MCHC RBC AUTO-ENTMCNC: 32.8 G/DL (ref 31.5–35.7)
MCV RBC AUTO: 95.4 FL (ref 79–97)
MONOCYTES # BLD AUTO: 0.8 10*3/MM3 (ref 0.1–0.9)
MONOCYTES NFR BLD AUTO: 11.2 % (ref 5–12)
NEUTROPHILS NFR BLD AUTO: 5.14 10*3/MM3 (ref 1.7–7)
NEUTROPHILS NFR BLD AUTO: 71.9 % (ref 42.7–76)
NRBC BLD AUTO-RTO: 0 /100 WBC (ref 0–0.2)
NT-PROBNP SERPL-MCNC: ABNORMAL PG/ML (ref 0–900)
PLATELET # BLD AUTO: 322 10*3/MM3 (ref 140–450)
PMV BLD AUTO: 9.8 FL (ref 6–12)
POTASSIUM SERPL-SCNC: 3 MMOL/L (ref 3.5–5.2)
PROT SERPL-MCNC: 6.4 G/DL (ref 6–8.5)
RBC # BLD AUTO: 2.81 10*6/MM3 (ref 4.14–5.8)
SODIUM SERPL-SCNC: 129 MMOL/L (ref 136–145)
WBC NRBC COR # BLD: 7.15 10*3/MM3 (ref 3.4–10.8)

## 2022-07-06 PROCEDURE — 73564 X-RAY EXAM KNEE 4 OR MORE: CPT

## 2022-07-06 PROCEDURE — 93970 EXTREMITY STUDY: CPT | Performed by: SURGERY

## 2022-07-06 PROCEDURE — 94664 DEMO&/EVAL PT USE INHALER: CPT

## 2022-07-06 PROCEDURE — 96365 THER/PROPH/DIAG IV INF INIT: CPT

## 2022-07-06 PROCEDURE — 71045 X-RAY EXAM CHEST 1 VIEW: CPT

## 2022-07-06 PROCEDURE — 80053 COMPREHEN METABOLIC PANEL: CPT | Performed by: PHYSICIAN ASSISTANT

## 2022-07-06 PROCEDURE — 94799 UNLISTED PULMONARY SVC/PX: CPT

## 2022-07-06 PROCEDURE — 90471 IMMUNIZATION ADMIN: CPT | Performed by: PHYSICIAN ASSISTANT

## 2022-07-06 PROCEDURE — 83735 ASSAY OF MAGNESIUM: CPT | Performed by: PHYSICIAN ASSISTANT

## 2022-07-06 PROCEDURE — 36415 COLL VENOUS BLD VENIPUNCTURE: CPT

## 2022-07-06 PROCEDURE — 83880 ASSAY OF NATRIURETIC PEPTIDE: CPT | Performed by: PHYSICIAN ASSISTANT

## 2022-07-06 PROCEDURE — 85025 COMPLETE CBC W/AUTO DIFF WBC: CPT | Performed by: PHYSICIAN ASSISTANT

## 2022-07-06 PROCEDURE — 99284 EMERGENCY DEPT VISIT MOD MDM: CPT

## 2022-07-06 PROCEDURE — 25010000002 TETANUS-DIPHTH-ACELL PERTUSSIS 5-2.5-18.5 LF-MCG/0.5 SUSPENSION PREFILLED SYRINGE: Performed by: PHYSICIAN ASSISTANT

## 2022-07-06 PROCEDURE — 25010000002 POTASSIUM CHLORIDE PER 2 MEQ: Performed by: PHYSICIAN ASSISTANT

## 2022-07-06 PROCEDURE — 93970 EXTREMITY STUDY: CPT

## 2022-07-06 PROCEDURE — 90715 TDAP VACCINE 7 YRS/> IM: CPT | Performed by: PHYSICIAN ASSISTANT

## 2022-07-06 PROCEDURE — 94640 AIRWAY INHALATION TREATMENT: CPT

## 2022-07-06 RX ORDER — SODIUM CHLORIDE 0.9 % (FLUSH) 0.9 %
10 SYRINGE (ML) INJECTION AS NEEDED
Status: DISCONTINUED | OUTPATIENT
Start: 2022-07-06 | End: 2022-07-06 | Stop reason: HOSPADM

## 2022-07-06 RX ORDER — ALBUTEROL SULFATE 2.5 MG/3ML
2.5 SOLUTION RESPIRATORY (INHALATION) ONCE
Status: COMPLETED | OUTPATIENT
Start: 2022-07-06 | End: 2022-07-06

## 2022-07-06 RX ORDER — BENZONATATE 100 MG/1
100 CAPSULE ORAL 3 TIMES DAILY PRN
COMMUNITY
End: 2022-08-18

## 2022-07-06 RX ORDER — POTASSIUM CHLORIDE 14.9 MG/ML
20 INJECTION INTRAVENOUS ONCE
Status: COMPLETED | OUTPATIENT
Start: 2022-07-06 | End: 2022-07-06

## 2022-07-06 RX ORDER — THIAMINE HCL 50 MG
100 TABLET ORAL DAILY
COMMUNITY
End: 2022-08-18

## 2022-07-06 RX ORDER — BUMETANIDE 1 MG/1
1 TABLET ORAL DAILY
Status: ON HOLD | COMMUNITY
End: 2022-10-14

## 2022-07-06 RX ORDER — HYDROCODONE BITARTRATE AND ACETAMINOPHEN 5; 325 MG/1; MG/1
1 TABLET ORAL ONCE
Status: COMPLETED | OUTPATIENT
Start: 2022-07-06 | End: 2022-07-06

## 2022-07-06 RX ORDER — POTASSIUM CHLORIDE 750 MG/1
30 CAPSULE, EXTENDED RELEASE ORAL ONCE
Status: COMPLETED | OUTPATIENT
Start: 2022-07-06 | End: 2022-07-06

## 2022-07-06 RX ADMIN — ALBUTEROL SULFATE 2.5 MG: 2.5 SOLUTION RESPIRATORY (INHALATION) at 08:44

## 2022-07-06 RX ADMIN — TETANUS TOXOID, REDUCED DIPHTHERIA TOXOID AND ACELLULAR PERTUSSIS VACCINE, ADSORBED 0.5 ML: 5; 2.5; 8; 8; 2.5 SUSPENSION INTRAMUSCULAR at 08:39

## 2022-07-06 RX ADMIN — POTASSIUM CHLORIDE 20 MEQ: 14.9 INJECTION, SOLUTION INTRAVENOUS at 10:39

## 2022-07-06 RX ADMIN — HYDROCODONE BITARTRATE AND ACETAMINOPHEN 1 TABLET: 5; 325 TABLET ORAL at 11:50

## 2022-07-06 RX ADMIN — POTASSIUM CHLORIDE 30 MEQ: 10 CAPSULE, COATED, EXTENDED RELEASE ORAL at 10:36
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
## 2022-07-06 NOTE — ED NOTES
Patient unable to tolerate potassium iv. Patients provider made aware. Potassium IV stopped at this time.

## 2022-07-06 NOTE — ED TRIAGE NOTES
"Pt fell in parking lot walking into work, left leg gave out. Pt reports knee pain, no neck or back pain, did not hit his head. ON asa. Watchman for afib hx. cva in 2020. Pt has left leg swelling \"off and on for a while\"  "

## 2022-07-14 ENCOUNTER — TELEPHONE (OUTPATIENT)
Dept: VASCULAR SURGERY | Facility: CLINIC | Age: 62
End: 2022-07-14

## 2022-07-14 DIAGNOSIS — I65.23 BILATERAL CAROTID ARTERY STENOSIS: Primary | ICD-10-CM

## 2022-07-14 DIAGNOSIS — I73.9 PAD (PERIPHERAL ARTERY DISEASE): ICD-10-CM

## 2022-07-15 ENCOUNTER — TELEPHONE (OUTPATIENT)
Dept: VASCULAR SURGERY | Facility: CLINIC | Age: 62
End: 2022-07-15

## 2022-07-20 ENCOUNTER — TELEPHONE (OUTPATIENT)
Dept: VASCULAR SURGERY | Facility: CLINIC | Age: 62
End: 2022-07-20

## 2022-07-20 NOTE — TELEPHONE ENCOUNTER
Caller: Milton Rodriguez    Relationship to patient: Self    Best call back number: [phone]    Patient is needing:   PT HAD APPT ON 7.15.22 THAT WAS CANCELED BECAUSE OF SDDITIONAL TESTING WAS NEEDED.PT WAS TOLD THAT HE WOULD RECEIVE A CALL BACK REGARDING SCHEDULING A TEST BEFORE HE PROCEEDS WITH SCHEDULING.     Do you require a callback: YES

## 2022-07-20 NOTE — TELEPHONE ENCOUNTER
Pt called and the HUB sent a message regarding this patient.  I told him that his testing had been scheduled and that we had made him an appt with the doctor the same day.  I told him, in the message, that we wanted him to call back to give him all of this information to be sure that he received it.

## 2022-07-21 ENCOUNTER — TELEPHONE (OUTPATIENT)
Dept: VASCULAR SURGERY | Facility: CLINIC | Age: 62
End: 2022-07-21

## 2022-07-21 NOTE — TELEPHONE ENCOUNTER
Pt returned call.  Informed of the appointment time for testing and office visit at this time.  Sent reminders in the mail as well per patient request.

## 2022-08-17 ENCOUNTER — TELEPHONE (OUTPATIENT)
Dept: VASCULAR SURGERY | Facility: CLINIC | Age: 62
End: 2022-08-17

## 2022-08-18 ENCOUNTER — HOSPITAL ENCOUNTER (OUTPATIENT)
Dept: ULTRASOUND IMAGING | Facility: HOSPITAL | Age: 62
Discharge: HOME | End: 2022-08-18

## 2022-08-18 ENCOUNTER — OFFICE VISIT (OUTPATIENT)
Dept: VASCULAR SURGERY | Facility: CLINIC | Age: 62
End: 2022-08-18

## 2022-08-18 VITALS
DIASTOLIC BLOOD PRESSURE: 68 MMHG | HEIGHT: 67 IN | WEIGHT: 143 LBS | SYSTOLIC BLOOD PRESSURE: 170 MMHG | HEART RATE: 73 BPM | OXYGEN SATURATION: 98 % | BODY MASS INDEX: 22.44 KG/M2

## 2022-08-18 DIAGNOSIS — N18.30 STAGE 3 CHRONIC KIDNEY DISEASE, UNSPECIFIED WHETHER STAGE 3A OR 3B CKD: ICD-10-CM

## 2022-08-18 DIAGNOSIS — Z01.818 PREOP TESTING: ICD-10-CM

## 2022-08-18 DIAGNOSIS — I65.23 BILATERAL CAROTID ARTERY STENOSIS: ICD-10-CM

## 2022-08-18 DIAGNOSIS — I73.9 PAD (PERIPHERAL ARTERY DISEASE): Primary | ICD-10-CM

## 2022-08-18 DIAGNOSIS — I73.9 PAD (PERIPHERAL ARTERY DISEASE): ICD-10-CM

## 2022-08-18 PROCEDURE — 93880 EXTRACRANIAL BILAT STUDY: CPT

## 2022-08-18 PROCEDURE — 93923 UPR/LXTR ART STDY 3+ LVLS: CPT | Performed by: SURGERY

## 2022-08-18 PROCEDURE — 99214 OFFICE O/P EST MOD 30 MIN: CPT | Performed by: SURGERY

## 2022-08-18 PROCEDURE — 93923 UPR/LXTR ART STDY 3+ LVLS: CPT

## 2022-08-18 PROCEDURE — 93880 EXTRACRANIAL BILAT STUDY: CPT | Performed by: SURGERY

## 2022-08-18 RX ORDER — SODIUM CHLORIDE 9 MG/ML
100 INJECTION, SOLUTION INTRAVENOUS CONTINUOUS
Status: CANCELLED | OUTPATIENT
Start: 2022-08-18 | End: 2022-08-18

## 2022-08-18 RX ORDER — CLOPIDOGREL BISULFATE 75 MG/1
75 TABLET ORAL DAILY
Status: ON HOLD | COMMUNITY
End: 2022-10-14
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
## 2022-09-01 ENCOUNTER — TELEPHONE (OUTPATIENT)
Dept: VASCULAR SURGERY | Facility: CLINIC | Age: 62
End: 2022-09-01

## 2022-09-02 ENCOUNTER — TELEPHONE (OUTPATIENT)
Dept: VASCULAR SURGERY | Facility: CLINIC | Age: 62
End: 2022-09-02

## 2022-09-13 RX ORDER — CLOPIDOGREL BISULFATE 75 MG/1
TABLET ORAL
Qty: 90 TABLET | Refills: 1 | OUTPATIENT
Start: 2022-09-13

## 2022-10-07 ENCOUNTER — HOSPITAL ENCOUNTER (OUTPATIENT)
Dept: GENERAL RADIOLOGY | Facility: HOSPITAL | Age: 62
Discharge: HOME | End: 2022-10-07

## 2022-10-07 ENCOUNTER — PRE-ADMISSION TESTING (OUTPATIENT)
Dept: PREADMISSION TESTING | Facility: HOSPITAL | Age: 62
End: 2022-10-07

## 2022-10-07 VITALS
OXYGEN SATURATION: 97 % | WEIGHT: 147.71 LBS | RESPIRATION RATE: 16 BRPM | BODY MASS INDEX: 23.18 KG/M2 | HEART RATE: 86 BPM | SYSTOLIC BLOOD PRESSURE: 140 MMHG | DIASTOLIC BLOOD PRESSURE: 60 MMHG | HEIGHT: 67 IN

## 2022-10-07 DIAGNOSIS — Z01.818 PREOP TESTING: ICD-10-CM

## 2022-10-07 DIAGNOSIS — I48.0 PAF (PAROXYSMAL ATRIAL FIBRILLATION): ICD-10-CM

## 2022-10-07 DIAGNOSIS — I73.9 PAD (PERIPHERAL ARTERY DISEASE): ICD-10-CM

## 2022-10-07 LAB
ANION GAP SERPL CALCULATED.3IONS-SCNC: 7 MMOL/L (ref 5–15)
APTT PPP: 28.2 SECONDS (ref 24.1–35)
BASOPHILS # BLD AUTO: 0.06 10*3/MM3 (ref 0–0.2)
BASOPHILS NFR BLD AUTO: 0.8 % (ref 0–1.5)
BUN SERPL-MCNC: 69 MG/DL (ref 8–23)
BUN/CREAT SERPL: 30.9 (ref 7–25)
CALCIUM SPEC-SCNC: 8.9 MG/DL (ref 8.6–10.5)
CHLORIDE SERPL-SCNC: 99 MMOL/L (ref 98–107)
CO2 SERPL-SCNC: 28 MMOL/L (ref 22–29)
CREAT SERPL-MCNC: 2.23 MG/DL (ref 0.76–1.27)
DEPRECATED RDW RBC AUTO: 46.9 FL (ref 37–54)
EGFRCR SERPLBLD CKD-EPI 2021: 32.5 ML/MIN/1.73
EOSINOPHIL # BLD AUTO: 0.77 10*3/MM3 (ref 0–0.4)
EOSINOPHIL NFR BLD AUTO: 9.6 % (ref 0.3–6.2)
ERYTHROCYTE [DISTWIDTH] IN BLOOD BY AUTOMATED COUNT: 14.1 % (ref 12.3–15.4)
GLUCOSE SERPL-MCNC: 123 MG/DL (ref 65–99)
HCT VFR BLD AUTO: 35.7 % (ref 37.5–51)
HGB BLD-MCNC: 11.6 G/DL (ref 13–17.7)
IMM GRANULOCYTES # BLD AUTO: 0.03 10*3/MM3 (ref 0–0.05)
IMM GRANULOCYTES NFR BLD AUTO: 0.4 % (ref 0–0.5)
INR PPP: 1.04 (ref 0.91–1.09)
LYMPHOCYTES # BLD AUTO: 0.32 10*3/MM3 (ref 0.7–3.1)
LYMPHOCYTES NFR BLD AUTO: 4 % (ref 19.6–45.3)
MCH RBC QN AUTO: 29.7 PG (ref 26.6–33)
MCHC RBC AUTO-ENTMCNC: 32.5 G/DL (ref 31.5–35.7)
MCV RBC AUTO: 91.3 FL (ref 79–97)
MONOCYTES # BLD AUTO: 0.62 10*3/MM3 (ref 0.1–0.9)
MONOCYTES NFR BLD AUTO: 7.8 % (ref 5–12)
NEUTROPHILS NFR BLD AUTO: 6.19 10*3/MM3 (ref 1.7–7)
NEUTROPHILS NFR BLD AUTO: 77.4 % (ref 42.7–76)
NRBC BLD AUTO-RTO: 0 /100 WBC (ref 0–0.2)
PLATELET # BLD AUTO: 233 10*3/MM3 (ref 140–450)
PMV BLD AUTO: 9.9 FL (ref 6–12)
POTASSIUM SERPL-SCNC: 3.7 MMOL/L (ref 3.5–5.2)
PROTHROMBIN TIME: 13.2 SECONDS (ref 11.9–14.6)
RBC # BLD AUTO: 3.91 10*6/MM3 (ref 4.14–5.8)
SODIUM SERPL-SCNC: 134 MMOL/L (ref 136–145)
WBC NRBC COR # BLD: 7.99 10*3/MM3 (ref 3.4–10.8)

## 2022-10-07 PROCEDURE — 85730 THROMBOPLASTIN TIME PARTIAL: CPT

## 2022-10-07 PROCEDURE — 36415 COLL VENOUS BLD VENIPUNCTURE: CPT

## 2022-10-07 PROCEDURE — 85025 COMPLETE CBC W/AUTO DIFF WBC: CPT

## 2022-10-07 PROCEDURE — 71046 X-RAY EXAM CHEST 2 VIEWS: CPT

## 2022-10-07 PROCEDURE — 80048 BASIC METABOLIC PNL TOTAL CA: CPT

## 2022-10-07 PROCEDURE — 85610 PROTHROMBIN TIME: CPT

## 2022-10-07 PROCEDURE — 93010 ELECTROCARDIOGRAM REPORT: CPT | Performed by: EMERGENCY MEDICINE

## 2022-10-07 PROCEDURE — 93005 ELECTROCARDIOGRAM TRACING: CPT

## 2022-10-07 NOTE — DISCHARGE INSTRUCTIONS
Before you come to the hospital        Arrival time: AS DIRECTED BY OFFICE     YOU MAY TAKE THE FOLLOWING MEDICATION(S) THE MORNING OF SURGERY WITH A SIP OF WATER: METOPROLOL           ALL OTHER HOME MEDICATION CHECK WITH YOUR PHYSICIAN (especially if you are taking diabetes medicines or blood thinners)    Do not take any Erectile Dysfunction medications (EX: CIALIS, VIAGRA) 24 hours prior to surgery.      If you were given and instructed to use a germ- killing soap, use as directed the night before surgery and again the morning of surgery or as directed by your surgeon.    (See attached information for How to Use Chlorhexidine for Bathing if applicable.)            Eating and drinking restrictions prior to scheduled arrival time    2 Hours before arrival time STOP   Drinking Clear liquids (water, apple juice-no pulp)     6 Hours before arrival time STOP   Milk or drinks that contain milk, full liquids    6 Hours before arrival time STOP   Light meals or foods, such as toast or cereal    8 Hours before arrival time STOP   Heavy foods, such as meat, fried foods, or fatty foods    (It is extremely important that you follow these guidelines to prevent delay or cancelation of your procedure)     Clear Liquids  Water and flavored water                                                                      Clear Fruit juices, such as cranberry juice and apple juice.  Black coffee (NO cream of any kind, including powdered).  Plain tea  Clear bouillon or broth.  Flavored gelatin.  Soda.  Gatorade or Powerade.  Full liquid examples  Juices that have pulp.  Frozen ice pops that contain fruit pieces.  Coffee with creamer  Milk.  Yogurt.                MANAGING PAIN AFTER SURGERY    We know you are probably wondering what your pain will be like after surgery.  Following surgery it is unrealistic to expect you will not have pain.   Pain is how our bodies let us know that something is wrong or cautions us to be careful.  That said,  our goal is to make your pain tolerable.    Methods we may use to treat your pain include (oral or IV medications, PCAs, epidurals, nerve blocks, etc.)   While some procedures require IV pain medications for a short time after surgery, transitioning to pain medications by mouth allows for better management of pain.   Your nurse will encourage you to take oral pain medications whenever possible.  IV medications work almost immediately, but only last a short while.  Taking medications by mouth allows for a more constant level of medication in your blood stream for a longer period of time.      Once your pain is out of control it is harder to get back under control.  It is important you are aware when your next dose of pain medication is due.  If you are admitted, your nurse may write the time of your next dose on the white board in your room to help you remember.      We are interested in your pain and encourage you to inform us about aggravating factors during your visit.   Many times a simple repositioning every few hours can make a big difference.    If your physician says it is okay, do not let your pain prevent you from getting out of bed. Be sure to call your nurse for assistance prior to getting up so you do not fall.      Before surgery, please decide your tolerable pain goal.  These faces help describe the pain ratings we use on a 0-10 scale.   Be prepared to tell us your goal and whether or not you take pain or anxiety medications at home.          Preparing for Surgery  Preparing for surgery is an important part of your care. It can make things go more smoothly and help you avoid complications. The steps leading up to surgery may vary among hospitals. Follow all instructions given to you by your health care providers. Ask questions if you do not understand something. Talk about any concerns that you have.  Here are some questions to consider asking before your surgery:  If my surgery is not an emergency (is  elective), when would be the best time to have the surgery?  What arrangements do I need to make for work, home, or school?  What will my recovery be like? How long will it be before I can return to normal activities?  Will I need to prepare my home? Will I need to arrange care for me or my children?  Should I expect to have pain after surgery? What are my pain management options? Are there nonmedical options that I can try for pain?  Tell a health care provider about:  Any allergies you have.  All medicines you are taking, including vitamins, herbs, eye drops, creams, and over-the-counter medicines.  Any problems you or family members have had with anesthetic medicines.  Any blood disorders you have.  Any surgeries you have had.  Any medical conditions you have.  Whether you are pregnant or may be pregnant.  What are the risks?  The risks and complications of surgery depend on the specific procedure that you have. Discuss all the risks with your health care providers before your surgery. Ask about common surgical complications, which may include:  Infection.  Bleeding or a need for blood replacement (transfusion).  Allergic reactions to medicines.  Damage to surrounding nerves, tissues, or structures.  A blood clot.  Scarring.  Failure of the surgery to correct the problem.  Follow these instructions before the procedure:  Several days or weeks before your procedure  You may have a physical exam by your primary health care provider to make sure it is safe for you to have surgery.  You may have testing. This may include a chest X-ray, blood and urine tests, electrocardiogram (ECG), or other testing.  Ask your health care provider about:  Changing or stopping your regular medicines. This is especially important if you are taking diabetes medicines or blood thinners.  Taking medicines such as aspirin and ibuprofen. These medicines can thin your blood. Do not take these medicines unless your health care provider tells  you to take them.  Taking over-the-counter medicines, vitamins, herbs, and supplements.  Do not use any products that contain nicotine or tobacco, such as cigarettes and e-cigarettes. If you need help quitting, ask your health care provider.  Avoid alcohol.  Ask your health care provider if there are exercises you can do to prepare for surgery.  Eat a healthy diet.   Plan to have someone take you home from the hospital or clinic.  Plan to have a responsible adult care for you for at least 24 hours after you leave the hospital or clinic. This is important.  The day before your procedure  You may be given antibiotic medicine to take by mouth to help prevent infection. Take it as told by your health care provider.  You may be asked to shower with a germ-killing soap.  Follow instructions from your health care provider about eating and drinking restrictions. This includes gum, mints and hard candy.  Pack comfortable clothes according to your procedure.   The day of your procedure  You may need to take another shower with a germ-killing soap before you leave home in the morning.  With a small sip of water, take only the medicines that you are told to take.  Remove all jewelry including rings.   Leave anything you consider valuable at home except hearing aids if needed.  Do not wear any makeup, nail polish, powder, deodorant, lotion, hair accessories, or anything on your skin or body except your clothes.  If you will be staying in the hospital, bring a case to hold your glasses, contacts, or dentures. You may also want to bring your robe and non-skid footwear.  If you wear oxygen at home, bring it with you the day of surgery.  If instructed by your health care provider, bring your sleep apnea device with you on the day of your surgery (if this applies to you).  You may want to leave your suitcase and sleep apnea device in the car until after surgery.   Arrive at the hospital as scheduled.  Bring a friend or family member  with you who can help to answer questions and be present while you meet with your health care provider.  At the hospital  When you arrive at the hospital:  Go to registration located at the main entrance of the hospital. You will be registered and given a beeper and a sticker sheet. Take the stickers to the Outpatient nurses desk and place in the black tray. This is to notify staff that you have arrived. Then return to the lobby to wait.   When your beeper lights up and vibrates proceed through the double doors, under the stairs, and a member of the Outpatient Surgery staff will escort you to your preoperative room.  You may have to wear compression sleeves. These help to prevent blood clots and reduce swelling in your legs.  An IV may be inserted into one of your veins.              In the operating room, you may be given one or more of the following:        A medicine to help you relax (sedative).        A medicine to numb the area (local anesthetic).        A medicine to make you fall asleep (general anesthetic).        A medicine that is injected into an area of your body to numb everything below the                      injection site (regional anesthetic).  You may be given an antibiotic through your IV to help prevent infection.  Your surgical site will be marked or identified.    Contact a health care provider if you:  Develop a fever of more than 100.4°F (38°C) or other feelings of illness during the 48 hours before your surgery.  Have symptoms that get worse.  Have questions or concerns about your surgery.  Summary  Preparing for surgery can make the procedure go more smoothly and lower your risk of complications.  Before surgery, make a list of questions and concerns to discuss with your surgeon. Ask about the risks and possible complications.  In the days or weeks before your surgery, follow all instructions from your health care provider. You may need to stop smoking, avoid alcohol, follow eating  restrictions, and change or stop your regular medicines.  Contact your surgeon if you develop a fever or other signs of illness during the few days before your surgery.  This information is not intended to replace advice given to you by your health care provider. Make sure you discuss any questions you have with your health care provider.  Document Revised: 12/21/2018 Document Reviewed: 10/23/2018  [employer] Patient Education © 2021 [employer]

## 2022-10-09 ENCOUNTER — PREP FOR SURGERY (OUTPATIENT)
Dept: OTHER | Facility: HOSPITAL | Age: 62
End: 2022-10-09

## 2022-10-09 LAB
QT INTERVAL: 410 MS
QTC INTERVAL: 470 MS

## 2022-10-13 ENCOUNTER — TELEPHONE (OUTPATIENT)
Dept: VASCULAR SURGERY | Facility: CLINIC | Age: 62
End: 2022-10-13
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
## 2022-10-14 ENCOUNTER — HOSPITAL ENCOUNTER (OUTPATIENT)
Facility: HOSPITAL | Age: 62
Setting detail: HOSPITAL OUTPATIENT SURGERY
Discharge: HOME | End: 2022-10-14
Attending: SURGERY | Admitting: SURGERY

## 2022-10-14 ENCOUNTER — APPOINTMENT (OUTPATIENT)
Dept: INTERVENTIONAL RADIOLOGY/VASCULAR | Facility: HOSPITAL | Age: 62
End: 2022-10-14

## 2022-10-14 ENCOUNTER — ANESTHESIA (OUTPATIENT)
Dept: PERIOP | Facility: HOSPITAL | Age: 62
End: 2022-10-14

## 2022-10-14 ENCOUNTER — HOSPITAL ENCOUNTER (OUTPATIENT)
Dept: GENERAL RADIOLOGY | Facility: HOSPITAL | Age: 62
Setting detail: HOSPITAL OUTPATIENT SURGERY
Discharge: HOME | End: 2022-10-14

## 2022-10-14 ENCOUNTER — ANESTHESIA EVENT (OUTPATIENT)
Dept: PERIOP | Facility: HOSPITAL | Age: 62
End: 2022-10-14

## 2022-10-14 VITALS
SYSTOLIC BLOOD PRESSURE: 151 MMHG | HEART RATE: 71 BPM | DIASTOLIC BLOOD PRESSURE: 65 MMHG | RESPIRATION RATE: 12 BRPM | TEMPERATURE: 98.4 F | OXYGEN SATURATION: 96 %

## 2022-10-14 DIAGNOSIS — Z01.818 PREOP TESTING: ICD-10-CM

## 2022-10-14 DIAGNOSIS — I73.9 PAD (PERIPHERAL ARTERY DISEASE): ICD-10-CM

## 2022-10-14 LAB
ABO GROUP BLD: NORMAL
BLD GP AB SCN SERPL QL: NEGATIVE
GLUCOSE BLDC GLUCOMTR-MCNC: 74 MG/DL (ref 70–130)
GLUCOSE BLDC GLUCOMTR-MCNC: 91 MG/DL (ref 70–130)
RH BLD: POSITIVE
T&S EXPIRATION DATE: NORMAL

## 2022-10-14 PROCEDURE — 75625 CONTRAST EXAM ABDOMINL AORTA: CPT

## 2022-10-14 PROCEDURE — 25010000002 IOPAMIDOL 61 % SOLUTION: Performed by: SURGERY

## 2022-10-14 PROCEDURE — C1769 GUIDE WIRE: HCPCS | Performed by: SURGERY

## 2022-10-14 PROCEDURE — 25010000002 HEPARIN (PORCINE) PER 1000 UNITS: Performed by: SURGERY

## 2022-10-14 PROCEDURE — C1894 INTRO/SHEATH, NON-LASER: HCPCS | Performed by: SURGERY

## 2022-10-14 PROCEDURE — 71046 X-RAY EXAM CHEST 2 VIEWS: CPT

## 2022-10-14 PROCEDURE — C1714 CATH, TRANS ATHERECTOMY, DIR: HCPCS | Performed by: SURGERY

## 2022-10-14 PROCEDURE — 82962 GLUCOSE BLOOD TEST: CPT

## 2022-10-14 PROCEDURE — C1760 CLOSURE DEV, VASC: HCPCS

## 2022-10-14 PROCEDURE — C1887 CATHETER, GUIDING: HCPCS | Performed by: SURGERY

## 2022-10-14 PROCEDURE — 75625 CONTRAST EXAM ABDOMINL AORTA: CPT | Performed by: SURGERY

## 2022-10-14 PROCEDURE — 25010000002 PROPOFOL 10 MG/ML EMULSION: Performed by: NURSE ANESTHETIST, CERTIFIED REGISTERED

## 2022-10-14 PROCEDURE — 86901 BLOOD TYPING SEROLOGIC RH(D): CPT | Performed by: SURGERY

## 2022-10-14 PROCEDURE — 25010000002 PROPOFOL 1000 MG/100ML EMULSION: Performed by: NURSE ANESTHETIST, CERTIFIED REGISTERED

## 2022-10-14 PROCEDURE — 25010000002 CEFAZOLIN PER 500 MG: Performed by: SURGERY

## 2022-10-14 PROCEDURE — 25010000002 HEPARIN (PORCINE) PER 1000 UNITS: Performed by: NURSE ANESTHETIST, CERTIFIED REGISTERED

## 2022-10-14 PROCEDURE — C2623 CATH, TRANSLUMIN, DRUG-COAT: HCPCS | Performed by: SURGERY

## 2022-10-14 PROCEDURE — 37221 PR REVSC OPN/PRQ ILIAC ART W/STNT PLMT & ANGIOPLSTY: CPT | Performed by: SURGERY

## 2022-10-14 PROCEDURE — 25010000002 MIDAZOLAM PER 1 MG: Performed by: ANESTHESIOLOGY

## 2022-10-14 PROCEDURE — 37225 PR REVSC OPN/PRQ FEM/POP W/ATHRC/ANGIOP SM VSL: CPT | Performed by: SURGERY

## 2022-10-14 PROCEDURE — C1874 STENT, COATED/COV W/DEL SYS: HCPCS | Performed by: SURGERY

## 2022-10-14 PROCEDURE — 25010000002 ONDANSETRON PER 1 MG: Performed by: NURSE ANESTHETIST, CERTIFIED REGISTERED

## 2022-10-14 PROCEDURE — 76000 FLUOROSCOPY <1 HR PHYS/QHP: CPT

## 2022-10-14 PROCEDURE — C1725 CATH, TRANSLUMIN NON-LASER: HCPCS | Performed by: SURGERY

## 2022-10-14 PROCEDURE — 25010000002 FENTANYL CITRATE (PF) 100 MCG/2ML SOLUTION: Performed by: NURSE ANESTHETIST, CERTIFIED REGISTERED

## 2022-10-14 PROCEDURE — C1884 EMBOLIZATION PROTECT SYST: HCPCS | Performed by: SURGERY

## 2022-10-14 PROCEDURE — 75716 ARTERY X-RAYS ARMS/LEGS: CPT

## 2022-10-14 PROCEDURE — 75710 ARTERY X-RAYS ARM/LEG: CPT | Performed by: SURGERY

## 2022-10-14 PROCEDURE — 86900 BLOOD TYPING SEROLOGIC ABO: CPT | Performed by: SURGERY

## 2022-10-14 PROCEDURE — 75710 ARTERY X-RAYS ARM/LEG: CPT

## 2022-10-14 PROCEDURE — 86850 RBC ANTIBODY SCREEN: CPT | Performed by: SURGERY

## 2022-10-14 PROCEDURE — 37222 PR REVASCULARIZATION ILIAC ART ANGIOP EA IPSI VSL: CPT | Performed by: SURGERY

## 2022-10-14 DEVICE — IMPLANTABLE DEVICE: Type: IMPLANTABLE DEVICE | Site: GROIN | Status: FUNCTIONAL

## 2022-10-14 RX ORDER — SODIUM CHLORIDE 0.9 % (FLUSH) 0.9 %
3 SYRINGE (ML) INJECTION AS NEEDED
Status: DISCONTINUED | OUTPATIENT
Start: 2022-10-14 | End: 2022-10-14 | Stop reason: HOSPADM

## 2022-10-14 RX ORDER — LIDOCAINE HYDROCHLORIDE 10 MG/ML
0.5 INJECTION, SOLUTION EPIDURAL; INFILTRATION; INTRACAUDAL; PERINEURAL ONCE AS NEEDED
Status: DISCONTINUED | OUTPATIENT
Start: 2022-10-14 | End: 2022-10-14 | Stop reason: HOSPADM

## 2022-10-14 RX ORDER — MIDAZOLAM HYDROCHLORIDE 1 MG/ML
1 INJECTION INTRAMUSCULAR; INTRAVENOUS
Status: COMPLETED | OUTPATIENT
Start: 2022-10-14 | End: 2022-10-14

## 2022-10-14 RX ORDER — ONDANSETRON 2 MG/ML
INJECTION INTRAMUSCULAR; INTRAVENOUS AS NEEDED
Status: DISCONTINUED | OUTPATIENT
Start: 2022-10-14 | End: 2022-10-14 | Stop reason: SURG

## 2022-10-14 RX ORDER — FENTANYL CITRATE 50 UG/ML
INJECTION, SOLUTION INTRAMUSCULAR; INTRAVENOUS AS NEEDED
Status: DISCONTINUED | OUTPATIENT
Start: 2022-10-14 | End: 2022-10-14 | Stop reason: SURG

## 2022-10-14 RX ORDER — ONDANSETRON 2 MG/ML
4 INJECTION INTRAMUSCULAR; INTRAVENOUS ONCE AS NEEDED
Status: DISCONTINUED | OUTPATIENT
Start: 2022-10-14 | End: 2022-10-14 | Stop reason: HOSPADM

## 2022-10-14 RX ORDER — HYDROCODONE BITARTRATE AND ACETAMINOPHEN 5; 325 MG/1; MG/1
1 TABLET ORAL ONCE AS NEEDED
Status: DISCONTINUED | OUTPATIENT
Start: 2022-10-14 | End: 2022-10-14 | Stop reason: HOSPADM

## 2022-10-14 RX ORDER — BUPIVACAINE HYDROCHLORIDE 5 MG/ML
INJECTION, SOLUTION EPIDURAL; INTRACAUDAL AS NEEDED
Status: DISCONTINUED | OUTPATIENT
Start: 2022-10-14 | End: 2022-10-14 | Stop reason: HOSPADM

## 2022-10-14 RX ORDER — SODIUM CHLORIDE, SODIUM LACTATE, POTASSIUM CHLORIDE, CALCIUM CHLORIDE 600; 310; 30; 20 MG/100ML; MG/100ML; MG/100ML; MG/100ML
1000 INJECTION, SOLUTION INTRAVENOUS CONTINUOUS
Status: DISCONTINUED | OUTPATIENT
Start: 2022-10-14 | End: 2022-10-14 | Stop reason: HOSPADM

## 2022-10-14 RX ORDER — OXYCODONE AND ACETAMINOPHEN 7.5; 325 MG/1; MG/1
2 TABLET ORAL EVERY 4 HOURS PRN
Status: DISCONTINUED | OUTPATIENT
Start: 2022-10-14 | End: 2022-10-14 | Stop reason: HOSPADM

## 2022-10-14 RX ORDER — SODIUM CHLORIDE 9 MG/ML
100 INJECTION, SOLUTION INTRAVENOUS CONTINUOUS
Status: DISPENSED | OUTPATIENT
Start: 2022-10-14 | End: 2022-10-14

## 2022-10-14 RX ORDER — FENTANYL CITRATE 50 UG/ML
25 INJECTION, SOLUTION INTRAMUSCULAR; INTRAVENOUS
Status: DISCONTINUED | OUTPATIENT
Start: 2022-10-14 | End: 2022-10-14 | Stop reason: HOSPADM

## 2022-10-14 RX ORDER — HEPARIN SODIUM 1000 [USP'U]/ML
INJECTION, SOLUTION INTRAVENOUS; SUBCUTANEOUS AS NEEDED
Status: DISCONTINUED | OUTPATIENT
Start: 2022-10-14 | End: 2022-10-14 | Stop reason: SURG

## 2022-10-14 RX ORDER — SODIUM CHLORIDE, SODIUM LACTATE, POTASSIUM CHLORIDE, CALCIUM CHLORIDE 600; 310; 30; 20 MG/100ML; MG/100ML; MG/100ML; MG/100ML
100 INJECTION, SOLUTION INTRAVENOUS CONTINUOUS
Status: DISCONTINUED | OUTPATIENT
Start: 2022-10-14 | End: 2022-10-14 | Stop reason: HOSPADM

## 2022-10-14 RX ORDER — LIDOCAINE HYDROCHLORIDE 20 MG/ML
INJECTION, SOLUTION EPIDURAL; INFILTRATION; INTRACAUDAL; PERINEURAL AS NEEDED
Status: DISCONTINUED | OUTPATIENT
Start: 2022-10-14 | End: 2022-10-14 | Stop reason: SURG

## 2022-10-14 RX ORDER — ACETAMINOPHEN 500 MG
1000 TABLET ORAL ONCE
Status: COMPLETED | OUTPATIENT
Start: 2022-10-14 | End: 2022-10-14

## 2022-10-14 RX ORDER — NALOXONE HCL 0.4 MG/ML
0.4 VIAL (ML) INJECTION AS NEEDED
Status: DISCONTINUED | OUTPATIENT
Start: 2022-10-14 | End: 2022-10-14 | Stop reason: HOSPADM

## 2022-10-14 RX ORDER — DROPERIDOL 2.5 MG/ML
0.62 INJECTION, SOLUTION INTRAMUSCULAR; INTRAVENOUS ONCE AS NEEDED
Status: DISCONTINUED | OUTPATIENT
Start: 2022-10-14 | End: 2022-10-14 | Stop reason: HOSPADM

## 2022-10-14 RX ORDER — SODIUM CHLORIDE 0.9 % (FLUSH) 0.9 %
3-10 SYRINGE (ML) INJECTION AS NEEDED
Status: DISCONTINUED | OUTPATIENT
Start: 2022-10-14 | End: 2022-10-14 | Stop reason: HOSPADM

## 2022-10-14 RX ORDER — OXYCODONE AND ACETAMINOPHEN 10; 325 MG/1; MG/1
1 TABLET ORAL ONCE AS NEEDED
Status: COMPLETED | OUTPATIENT
Start: 2022-10-14 | End: 2022-10-14

## 2022-10-14 RX ORDER — CLOPIDOGREL BISULFATE 75 MG/1
75 TABLET ORAL DAILY
Qty: 30 TABLET | Refills: 5 | Status: SHIPPED | OUTPATIENT
Start: 2022-10-14 | End: 2022-11-13

## 2022-10-14 RX ORDER — CLOPIDOGREL BISULFATE 75 MG/1
150 TABLET ORAL ONCE
Status: COMPLETED | OUTPATIENT
Start: 2022-10-14 | End: 2022-10-14

## 2022-10-14 RX ORDER — FLUMAZENIL 0.1 MG/ML
0.2 INJECTION INTRAVENOUS AS NEEDED
Status: DISCONTINUED | OUTPATIENT
Start: 2022-10-14 | End: 2022-10-14 | Stop reason: HOSPADM

## 2022-10-14 RX ORDER — SODIUM CHLORIDE 0.9 % (FLUSH) 0.9 %
3 SYRINGE (ML) INJECTION EVERY 12 HOURS SCHEDULED
Status: DISCONTINUED | OUTPATIENT
Start: 2022-10-14 | End: 2022-10-14 | Stop reason: HOSPADM

## 2022-10-14 RX ORDER — LABETALOL HYDROCHLORIDE 5 MG/ML
5 INJECTION, SOLUTION INTRAVENOUS
Status: DISCONTINUED | OUTPATIENT
Start: 2022-10-14 | End: 2022-10-14 | Stop reason: HOSPADM

## 2022-10-14 RX ORDER — PROPOFOL 10 MG/ML
INJECTION, EMULSION INTRAVENOUS AS NEEDED
Status: DISCONTINUED | OUTPATIENT
Start: 2022-10-14 | End: 2022-10-14 | Stop reason: SURG

## 2022-10-14 RX ADMIN — LABETALOL HYDROCHLORIDE 5 MG: 5 INJECTION INTRAVENOUS at 11:49

## 2022-10-14 RX ADMIN — HEPARIN SODIUM 1000 UNITS: 1000 INJECTION, SOLUTION INTRAVENOUS; SUBCUTANEOUS at 09:57

## 2022-10-14 RX ADMIN — PROPOFOL 75 MCG/KG/MIN: 10 INJECTION, EMULSION INTRAVENOUS at 09:42

## 2022-10-14 RX ADMIN — OXYCODONE AND ACETAMINOPHEN 1 TABLET: 325; 10 TABLET ORAL at 11:15

## 2022-10-14 RX ADMIN — SODIUM CHLORIDE, POTASSIUM CHLORIDE, SODIUM LACTATE AND CALCIUM CHLORIDE 1000 ML: 600; 310; 30; 20 INJECTION, SOLUTION INTRAVENOUS at 07:58

## 2022-10-14 RX ADMIN — CLOPIDOGREL 150 MG: 75 TABLET, FILM COATED ORAL at 11:48

## 2022-10-14 RX ADMIN — FENTANYL CITRATE 25 MCG: 50 INJECTION INTRAMUSCULAR; INTRAVENOUS at 09:44

## 2022-10-14 RX ADMIN — LIDOCAINE HYDROCHLORIDE 50 MG: 20 INJECTION, SOLUTION EPIDURAL; INFILTRATION; INTRACAUDAL; PERINEURAL at 09:42

## 2022-10-14 RX ADMIN — MIDAZOLAM 1 MG: 1 INJECTION INTRAMUSCULAR; INTRAVENOUS at 09:01

## 2022-10-14 RX ADMIN — FENTANYL CITRATE 25 MCG: 50 INJECTION INTRAMUSCULAR; INTRAVENOUS at 09:54

## 2022-10-14 RX ADMIN — FENTANYL CITRATE 25 MCG: 50 INJECTION INTRAMUSCULAR; INTRAVENOUS at 10:08

## 2022-10-14 RX ADMIN — ACETAMINOPHEN 1000 MG: 500 TABLET ORAL at 09:01

## 2022-10-14 RX ADMIN — PROPOFOL 30 MG: 10 INJECTION, EMULSION INTRAVENOUS at 09:42

## 2022-10-14 RX ADMIN — MIDAZOLAM 1 MG: 1 INJECTION INTRAMUSCULAR; INTRAVENOUS at 09:11

## 2022-10-14 RX ADMIN — FENTANYL CITRATE 25 MCG: 50 INJECTION INTRAMUSCULAR; INTRAVENOUS at 10:36

## 2022-10-14 RX ADMIN — ONDANSETRON 4 MG: 2 INJECTION INTRAMUSCULAR; INTRAVENOUS at 09:48

## 2022-10-14 RX ADMIN — HEPARIN SODIUM 4000 UNITS: 1000 INJECTION, SOLUTION INTRAVENOUS; SUBCUTANEOUS at 10:08

## 2022-10-14 NOTE — ANESTHESIA POSTPROCEDURE EVALUATION
Patient: Milton Rodriguez    Procedure Summary     Date: 10/14/22 Room / Location:  PAD OR  /  [facility] HYBRID OR 12    Anesthesia Start: 0937 Anesthesia Stop: 1108    Procedure: LEFT LOWER EXTREMITY ANGIOGRAM, HAWK ATHRECTOMY, BALLOON ANGIOPLASTY, RIGHT ILIAC BALLOON ANGIOPLASTY, STENT PLACEMENT, MYNX CLOSURE (Right: Groin) Diagnosis:       PAD (peripheral artery disease) ([facility])      Preop testing      (PAD (peripheral artery disease) (HCC) [I73.9])      (Preop testing [Z01.818])    Surgeons: Victorino Valera DO Provider: Ayden Chand CRNA    Anesthesia Type: MAC ASA Status: 3          Anesthesia Type: MAC    Vitals  Vitals Value Taken Time   /65 10/14/22 1601   Temp 98.4 °F (36.9 °C) 10/14/22 1230   Pulse 71 10/14/22 1625   Resp 12 10/14/22 1242   SpO2 97 % 10/14/22 1625   Vitals shown include unvalidated device data.        Post Anesthesia Care and Evaluation    PONV Status: none  Comments: Patient d/c from PACU prior to anes eval based on Ashlee score.  Please see RN notes for details of d/c criteria.    Blood pressure 151/65, pulse 71, temperature 98.4 °F (36.9 °C), temperature source Temporal, resp. rate 12, SpO2 96 %.

## 2022-10-14 NOTE — OP NOTE
Milton Rodriguez  10/14/2022     PREOPERATIVE DIAGNOSIS: PAD (peripheral artery disease) ([facility]) [I73.9]  Preop testing [Z01.818]     POSTOPERATIVE DIAGNOSIS: Post-Op Diagnosis Codes:     * PAD (peripheral artery disease) ([facility]) [I73.9]     * Preop testing [Z01.818]     PROCEDURE PERFORMED:   1.  Introduction of catheter/sheath into the aorta  2.  Aortoiliac angiogram with left lower extremity runoff  3.  Contralateral cannulation of the left common iliac artery  4.  Crossing of a below-knee popliteal artery chronic total occlusion  5.  Placement of a 5 mm spider distal embolic protection device in the anterior tibial artery  6.  Hawkone directional atherectomy of the below-knee popliteal artery   7.  Balloon angioplasty of the below-knee popliteal artery with a 5 x 60 mm Medtronic drug-coated balloon  8.  Retrieval of the spider distal embolic protection device  9.  Completion left lower extremity angiogram with radiographic supervision and interpretation  10.  Balloon angioplasty of the right common and external iliac arteries with a 7 x 80 mm EverCross balloon  11.  Placement of an 8 x 39 mm VBX balloon expandable stent in the right common iliac artery  12.  Completion aortoiliac angiogram with radiographic supervision and interpretation  13.  Mynx closure of the right common femoral artery     SURGEON: Victorino Valera DO      ANESTHESIA: MAC    PREPARATION: Routine.    STAFF: Circulator: Peace Pinedo RN  Scrub Person: Ifeoma Mckenzie Zachary A  Assistant: Marina Rice  Vascular Radiology Technician: Sheron Dowling    Estimated Blood Loss: minimal    SPECIMENS: None    COMPLICATIONS: None    INDICATIONS: Milton Rodriguez is a 61 y.o. male who we are currently following for lower extremity PAD.  Initially he was seen with complaints of significant short distance right lower extremity claudication.  He did undergo revascularization of his right lower extremity 3/11/16.   He  did have episode of clumsiness and weakness of his left upper extremity and was found to have significant carotid disease.  He did undergo a right carotid endarterectomy 9/14/2020.  He is having claudication to his lower extremities, worse to the left leg.  He does wear compression stockings and also has home lymphedema pumps but does not use them significantly.  He is maintained on aspirin, Plavix, and Lipitor. He did have noninvasive testing performed today, which I did personally review.  The indications, risks, and possible complications of the procedure were explained to the patient, who voiced understanding and wished to proceed with surgery.     PROCEDURE IN DETAIL: The patient was taken to the operating room and placed on the operating table in a supine position. After MAC anesthesia was obtained, the bilateral groins was prepped and draped in a sterile manner.  5 cc of 0.5% Marcaine plain was used to infiltrate the right groin for local anesthesia.  Using a micropuncture technique the right common femoral artery was cannulated and a micro sheath was placed.  Advantage Glidewire was advanced into the aorta and a short 6 Upper sorbian sheath was placed.  The patient was given 1000 units of intravenous heparin.  The Omni Flush catheter was advanced to the aorta and an aortoiliac angiogram was performed.  Findings are as follows:  1.  Patent renal arteries bilaterally without significant stenosis  2.  Patent aorta without stenosis  3.  Patent right iliac system with severe stenosis/plaque burden in the common iliac artery.  The external iliac artery was patent but heavy plaque burden was noted.  Hypogastric was patent with heavy plaque burden.   4.  Patent left iliac system throughout but heavy plaque burden was present    Contralateral cannulation was established of the left common iliac artery.  An angiogram with runoff was performed.  Findings are as follows:  1.  Patent common femoral, profunda femoris, and SFA  without significant stenosis but again heavy plaque burden is noted  2.  Occluded below-knee popliteal artery spanning approximately 4 to 5 cm  3.  Patent three-vessel runoff to the foot without stenosis    At this point, decision was made to revascularize these areas.  A 6 Italian by 45 cm destination sheath was placed into the left common iliac artery.  The patient was given 4000 units of intravenous heparin.  With the help of the Taveras cross catheter, the popliteal artery  was traversed.  An angiogram was performed distally to ensure that I was in true lumen.  The anterior tibial artery was selectively cannulated and a 5 mm spider distal embolic protection device was placed.  beprettykone directional atherectomy was then performed of the below-knee popliteal artery lesion.  Multiple passes were made achieving significant luminal gain.  Next a 5 x 60 mm Medtronic drug-coated balloon was used to balloon angioplasty the below-knee popliteal artery .  Completion angiogram was performed which showed rapid flow down through this area without any residual stenosis, dissection, or occlusion.  There was still maintained three-vessel runoff to the foot.  The spider was successfully retrieved.  Next, the attention was turned to the iliac vessels.  The right common and external iliac arteries were predilated with a 7 x 80 mm EverCross balloon.  A 7 Italian sheath was placed.  Next, an 8 x 39 mm VBX balloon expandable stent was deployed in the right common iliac artery.  Completion aortoiliac angiogram was performed which showed the right iliac system to be widely patent without any residual stenosis, dissection, or occlusion.  At this point, I felt the result was adequate and no further intervention was warranted.  The sheath and wire were removed.  A minx was used to seal off the right common femoral artery.  Direct pressure was held for an additional 10 to 15 minutes to help ensure hemostasis.  Sterile dressings were  applied. The patient tolerated the procedure well. Sponge and needle counts were correct. The patient was then awakened in the operating room and taken to the recovery room in good condition.    Victorino Valera,   Date: 10/14/2022 Time: 10:51 CDT     CC:Thanh Weeks MD

## 2022-10-14 NOTE — ANESTHESIA PREPROCEDURE EVALUATION
Anesthesia Evaluation     Patient summary reviewed and Nursing notes reviewed   no history of anesthetic complications:  NPO Solid Status: > 8 hours  NPO Liquid Status: > 8 hours           Airway   Mallampati: I  TM distance: >3 FB  Neck ROM: full  Dental    (+) partials        Pulmonary    (+) a smoker Current, COPD, home oxygen (2lpm ),   (-) asthma, sleep apnea  Cardiovascular   Exercise tolerance: poor (<4 METS)    ECG reviewed  PT is on anticoagulation therapy    (+) hypertension, dysrhythmias (s/p Watchman) Atrial Fib, Atrial Flutter, CHF Diastolic >=55%, PVD, hyperlipidemia,  carotid artery disease (s/p right cea) carotid bilateral    ROS comment: PFO    · Left ventricular wall thickness is consistent with mild to moderate concentric hypertrophy.  · Left ventricular ejection fraction appears to be 36 - 40%. Left ventricular systolic function is moderately decreased.  · Left ventricular diastolic dysfunction is noted.  · The right ventricular cavity is mildly dilated. Systolic function is normal.  · There is biatrial enlargement.  · Moderate mitral valve regurgitation is present.  · Moderate tricuspid valve regurgitation is present.  · Estimated right ventricular systolic pressure from tricuspid regurgitation is mildly elevated (35-45 mmHg).    Neuro/Psych  (+) TIA, CVA,    (-) seizures  GI/Hepatic/Renal/Endo    (+)  GERD,  renal disease CRI, diabetes mellitus,     Musculoskeletal     Abdominal    Substance History      OB/GYN          Other   blood dyscrasia anemia,                       Anesthesia Plan    ASA 3     MAC     (Jeri since last seen; Advised to bring oxygen tank next time; preop bronchodilator )  intravenous induction     Anesthetic plan, risks, benefits, and alternatives have been provided, discussed and informed consent has been obtained with: patient.

## 2022-10-20 ENCOUNTER — TELEPHONE (OUTPATIENT)
Dept: VASCULAR SURGERY | Facility: CLINIC | Age: 62
End: 2022-10-20

## 2022-10-20 NOTE — TELEPHONE ENCOUNTER
Caller: Milton Rodriguez    Relationship: Self    Best call back number: [phone]  What is the best time to reach you: AFTERNOON    Who are you requesting to speak with (clinical staff, provider,  specific staff member): CLINICAL    What was the call regarding: PT WAS CALLING IN CURIOUS OF WHEN HE CAN RETURN TO WORK-    Do you require a callback: YES PLEASE CALL BACK PT TO ADVISE.

## 2022-10-20 NOTE — TELEPHONE ENCOUNTER
Called pt and went over the AVS from surgery discharge.  Pt can return to work after 1 week post-op.  Pt asked if he can  something stating that he can return to work for his employer.  Letter is printed and pt is picking it up at our office.

## 2022-10-27 ENCOUNTER — TELEPHONE (OUTPATIENT)
Dept: VASCULAR SURGERY | Facility: CLINIC | Age: 62
End: 2022-10-27

## 2022-10-28 ENCOUNTER — OFFICE VISIT (OUTPATIENT)
Dept: VASCULAR SURGERY | Facility: CLINIC | Age: 62
End: 2022-10-28

## 2022-10-28 VITALS
BODY MASS INDEX: 23.13 KG/M2 | DIASTOLIC BLOOD PRESSURE: 62 MMHG | HEART RATE: 78 BPM | HEIGHT: 67 IN | OXYGEN SATURATION: 98 % | SYSTOLIC BLOOD PRESSURE: 118 MMHG

## 2022-10-28 DIAGNOSIS — I10 PRIMARY HYPERTENSION: ICD-10-CM

## 2022-10-28 DIAGNOSIS — I73.9 PAD (PERIPHERAL ARTERY DISEASE): Primary | ICD-10-CM

## 2022-10-28 DIAGNOSIS — E78.2 MIXED HYPERLIPIDEMIA: ICD-10-CM

## 2022-10-28 DIAGNOSIS — R53.1 WEAKNESS: ICD-10-CM
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
## 2022-10-28 PROCEDURE — 99214 OFFICE O/P EST MOD 30 MIN: CPT | Performed by: NURSE PRACTITIONER
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
## 2022-11-08 DIAGNOSIS — I65.23 BILATERAL CAROTID ARTERY STENOSIS: Primary | ICD-10-CM

## 2022-11-11 ENCOUNTER — OFFICE VISIT (OUTPATIENT)
Dept: NEUROLOGY | Facility: CLINIC | Age: 62
End: 2022-11-11

## 2022-11-11 VITALS
DIASTOLIC BLOOD PRESSURE: 70 MMHG | HEART RATE: 78 BPM | SYSTOLIC BLOOD PRESSURE: 120 MMHG | BODY MASS INDEX: 23.07 KG/M2 | RESPIRATION RATE: 18 BRPM | HEIGHT: 67 IN | WEIGHT: 147 LBS

## 2022-11-11 DIAGNOSIS — R29.898 WEAKNESS OF LEFT LOWER EXTREMITY: Primary | ICD-10-CM

## 2022-11-11 PROCEDURE — 99215 OFFICE O/P EST HI 40 MIN: CPT | Performed by: PSYCHIATRY & NEUROLOGY

## 2022-11-11 NOTE — PATIENT INSTRUCTIONS
Patient not to be driving until released.  Patient not to be climbing or using sharp cutting tools and to have caution working around hot fire/stove/grill/water.  Patient to be on fall precautions and safety precautions as discussed
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
## 2022-11-14 ENCOUNTER — TELEPHONE (OUTPATIENT)
Dept: NEUROLOGY | Facility: CLINIC | Age: 62
End: 2022-11-14

## 2022-11-14 DIAGNOSIS — R29.898 WEAKNESS OF LEFT LOWER EXTREMITY: Primary | ICD-10-CM

## 2022-11-14 NOTE — TELEPHONE ENCOUNTER
Caller: Milton Rodriguez    Relationship: Self    Best call back number: [phone]    Who are you requesting to speak with (clinical staff, provider,  specific staff member): DR SMITH    What was the call regarding:   SHORT TERM DISABILITY PAPERWORK  PT WILL HAVE THE PAPERWORK FAXED TO THE OFFICE. PLEASE FILL OUT ASAP AND FAX IT BACK TO THE FAX NUMBER LISTED ON THE PAPERWORK.    PLEASE LET THE PT KNOW WHEN THE PAPERWORK HAS BEEN COMPLETED AND FAXED.    PT WAS LAST SEEN BY DR SMITH ON 11-11-22.

## 2022-11-14 NOTE — TELEPHONE ENCOUNTER
Explained that he will need to fill out his part of the form before sending it to us.  He said he was unable to give them our fax number, they are going to send it to his regular doctor.

## 2022-12-02 ENCOUNTER — DOCUMENTATION (OUTPATIENT)
Dept: NEUROLOGY | Facility: CLINIC | Age: 62
End: 2022-12-02

## 2022-12-14 ENCOUNTER — TELEPHONE (OUTPATIENT)
Dept: CARDIOLOGY | Facility: CLINIC | Age: 62
End: 2022-12-14

## 2022-12-14 ENCOUNTER — LAB (OUTPATIENT)
Dept: LAB | Facility: HOSPITAL | Age: 62
End: 2022-12-14

## 2022-12-14 ENCOUNTER — APPOINTMENT (OUTPATIENT)
Dept: CT IMAGING | Facility: HOSPITAL | Age: 62
End: 2022-12-14

## 2022-12-14 ENCOUNTER — HOSPITAL ENCOUNTER (OUTPATIENT)
Dept: NEUROLOGY | Facility: HOSPITAL | Age: 62
Discharge: HOME | End: 2022-12-14
Admitting: PSYCHIATRY & NEUROLOGY

## 2022-12-14 DIAGNOSIS — I48.0 PAF (PAROXYSMAL ATRIAL FIBRILLATION): Primary | ICD-10-CM

## 2022-12-14 DIAGNOSIS — R29.898 WEAKNESS OF LEFT LOWER EXTREMITY: ICD-10-CM

## 2022-12-14 LAB
ALBUMIN SERPL-MCNC: 3.7 G/DL (ref 3.5–5.2)
ALBUMIN/GLOB SERPL: 1.2 G/DL
ALP SERPL-CCNC: 150 U/L (ref 39–117)
ALT SERPL W P-5'-P-CCNC: 73 U/L (ref 1–41)
ANION GAP SERPL CALCULATED.3IONS-SCNC: 11 MMOL/L (ref 5–15)
AST SERPL-CCNC: 83 U/L (ref 1–40)
BASOPHILS # BLD AUTO: 0.06 10*3/MM3 (ref 0–0.2)
BASOPHILS NFR BLD AUTO: 0.8 % (ref 0–1.5)
BILIRUB SERPL-MCNC: <0.2 MG/DL (ref 0–1.2)
BUN SERPL-MCNC: 54 MG/DL (ref 8–23)
BUN/CREAT SERPL: 21.7 (ref 7–25)
CALCIUM SPEC-SCNC: 8.6 MG/DL (ref 8.6–10.5)
CHLORIDE SERPL-SCNC: 102 MMOL/L (ref 98–107)
CHOLEST SERPL-MCNC: 216 MG/DL (ref 0–200)
CK SERPL-CCNC: 136 U/L (ref 20–200)
CO2 SERPL-SCNC: 26 MMOL/L (ref 22–29)
CREAT SERPL-MCNC: 2.49 MG/DL (ref 0.76–1.27)
DEPRECATED RDW RBC AUTO: 49.1 FL (ref 37–54)
EGFRCR SERPLBLD CKD-EPI 2021: 28.3 ML/MIN/1.73
EOSINOPHIL # BLD AUTO: 0.58 10*3/MM3 (ref 0–0.4)
EOSINOPHIL NFR BLD AUTO: 7.8 % (ref 0.3–6.2)
ERYTHROCYTE [DISTWIDTH] IN BLOOD BY AUTOMATED COUNT: 13.4 % (ref 12.3–15.4)
ERYTHROCYTE [SEDIMENTATION RATE] IN BLOOD: 50 MM/HR (ref 0–20)
GLOBULIN UR ELPH-MCNC: 3.2 GM/DL
GLUCOSE SERPL-MCNC: 101 MG/DL (ref 65–99)
HCT VFR BLD AUTO: 33.3 % (ref 37.5–51)
HDLC SERPL-MCNC: 74 MG/DL (ref 40–60)
HGB BLD-MCNC: 10.1 G/DL (ref 13–17.7)
IMM GRANULOCYTES # BLD AUTO: 0.03 10*3/MM3 (ref 0–0.05)
IMM GRANULOCYTES NFR BLD AUTO: 0.4 % (ref 0–0.5)
LDLC SERPL CALC-MCNC: 72 MG/DL (ref 0–100)
LDLC/HDLC SERPL: 0.69 {RATIO}
LYMPHOCYTES # BLD AUTO: 0.3 10*3/MM3 (ref 0.7–3.1)
LYMPHOCYTES NFR BLD AUTO: 4 % (ref 19.6–45.3)
MAGNESIUM SERPL-MCNC: 2.3 MG/DL (ref 1.6–2.4)
MCH RBC QN AUTO: 30.2 PG (ref 26.6–33)
MCHC RBC AUTO-ENTMCNC: 30.3 G/DL (ref 31.5–35.7)
MCV RBC AUTO: 99.7 FL (ref 79–97)
MONOCYTES # BLD AUTO: 0.6 10*3/MM3 (ref 0.1–0.9)
MONOCYTES NFR BLD AUTO: 8.1 % (ref 5–12)
NEUTROPHILS NFR BLD AUTO: 5.85 10*3/MM3 (ref 1.7–7)
NEUTROPHILS NFR BLD AUTO: 78.9 % (ref 42.7–76)
NRBC BLD AUTO-RTO: 0 /100 WBC (ref 0–0.2)
PLATELET # BLD AUTO: 265 10*3/MM3 (ref 140–450)
PMV BLD AUTO: 9.9 FL (ref 6–12)
POTASSIUM SERPL-SCNC: 3.8 MMOL/L (ref 3.5–5.2)
PROT SERPL-MCNC: 6.9 G/DL (ref 6–8.5)
RBC # BLD AUTO: 3.34 10*6/MM3 (ref 4.14–5.8)
SODIUM SERPL-SCNC: 139 MMOL/L (ref 136–145)
T4 FREE SERPL-MCNC: 1.3 NG/DL (ref 0.93–1.7)
TRIGL SERPL-MCNC: 455 MG/DL (ref 0–150)
VLDLC SERPL-MCNC: 70 MG/DL (ref 5–40)
WBC NRBC COR # BLD: 7.42 10*3/MM3 (ref 3.4–10.8)

## 2022-12-14 PROCEDURE — 82085 ASSAY OF ALDOLASE: CPT

## 2022-12-14 PROCEDURE — 95885 MUSC TST DONE W/NERV TST LIM: CPT

## 2022-12-14 PROCEDURE — 85025 COMPLETE CBC W/AUTO DIFF WBC: CPT

## 2022-12-14 PROCEDURE — 85652 RBC SED RATE AUTOMATED: CPT

## 2022-12-14 PROCEDURE — 83735 ASSAY OF MAGNESIUM: CPT

## 2022-12-14 PROCEDURE — 83519 RIA NONANTIBODY: CPT

## 2022-12-14 PROCEDURE — 95910 NRV CNDJ TEST 7-8 STUDIES: CPT | Performed by: PSYCHIATRY & NEUROLOGY

## 2022-12-14 PROCEDURE — 82746 ASSAY OF FOLIC ACID SERUM: CPT

## 2022-12-14 PROCEDURE — 80061 LIPID PANEL: CPT

## 2022-12-14 PROCEDURE — 36415 COLL VENOUS BLD VENIPUNCTURE: CPT

## 2022-12-14 PROCEDURE — 80053 COMPREHEN METABOLIC PANEL: CPT

## 2022-12-14 PROCEDURE — 84439 ASSAY OF FREE THYROXINE: CPT

## 2022-12-14 PROCEDURE — 82550 ASSAY OF CK (CPK): CPT

## 2022-12-14 PROCEDURE — 95910 NRV CNDJ TEST 7-8 STUDIES: CPT

## 2022-12-14 PROCEDURE — 95885 MUSC TST DONE W/NERV TST LIM: CPT | Performed by: PSYCHIATRY & NEUROLOGY

## 2022-12-14 PROCEDURE — 82607 VITAMIN B-12: CPT

## 2022-12-15 ENCOUNTER — DOCUMENTATION (OUTPATIENT)
Dept: NEUROLOGY | Facility: CLINIC | Age: 62
End: 2022-12-15

## 2022-12-15 LAB
ALDOLASE SERPL-CCNC: 10.4 U/L (ref 3.3–10.3)
FOLATE SERPL-MCNC: 15.8 NG/ML (ref 4.78–24.2)
VIT B12 BLD-MCNC: 807 PG/ML (ref 211–946)

## 2022-12-15 NOTE — PROGRESS NOTES
I spoke with the patient by phone in regards to his testing and getting those rescheduled.  [facility] has rescheduled him for Jan. 3rd at 1130.  The patient has been notified and is in agreement to this date and time.  I have mailed him a reminder as well.

## 2022-12-16 LAB — ACHR BIND AB SER-SCNC: <0.03 NMOL/L (ref 0–0.24)

## 2022-12-18 LAB — ACHR MOD AB SER QL FC: 15 % (ref 0–45)

## 2022-12-20 LAB — ACHR BLOCK AB SER-ACNC: 25 % (ref 0–25)

## 2022-12-25 LAB — MUSK AB SER IA-ACNC: <1 U/ML

## 2022-12-28 ENCOUNTER — HOSPITAL ENCOUNTER (OUTPATIENT)
Dept: CT IMAGING | Facility: HOSPITAL | Age: 62
Discharge: HOME | End: 2022-12-28

## 2022-12-28 DIAGNOSIS — I48.0 PAF (PAROXYSMAL ATRIAL FIBRILLATION): ICD-10-CM

## 2022-12-28 LAB — CREAT BLDA-MCNC: 2.8 MG/DL (ref 0.6–1.3)

## 2022-12-28 PROCEDURE — 82565 ASSAY OF CREATININE: CPT

## 2023-01-01 ENCOUNTER — APPOINTMENT (OUTPATIENT)
Dept: CT IMAGING | Facility: HOSPITAL | Age: 63
End: 2023-01-01
Payer: COMMERCIAL

## 2023-01-01 ENCOUNTER — OFFICE VISIT (OUTPATIENT)
Dept: SURGERY | Facility: CLINIC | Age: 63
End: 2023-01-01
Payer: COMMERCIAL

## 2023-01-01 ENCOUNTER — OFFICE VISIT (OUTPATIENT)
Dept: GASTROENTEROLOGY | Facility: CLINIC | Age: 63
End: 2023-01-01
Payer: COMMERCIAL

## 2023-01-01 ENCOUNTER — PATIENT ROUNDING (BHMG ONLY) (OUTPATIENT)
Dept: ONCOLOGY | Facility: CLINIC | Age: 63
End: 2023-01-01
Payer: COMMERCIAL

## 2023-01-01 ENCOUNTER — ANESTHESIA (OUTPATIENT)
Dept: GASTROENTEROLOGY | Facility: HOSPITAL | Age: 63
DRG: 375 | End: 2023-01-01
Payer: COMMERCIAL

## 2023-01-01 ENCOUNTER — HOSPITAL ENCOUNTER (OUTPATIENT)
Dept: CT IMAGING | Facility: HOSPITAL | Age: 63
Discharge: HOME | End: 2023-02-24
Payer: COMMERCIAL

## 2023-01-01 ENCOUNTER — HOSPITAL ENCOUNTER (OUTPATIENT)
Dept: CARDIOLOGY | Facility: HOSPITAL | Age: 63
Setting detail: HOSPITAL OUTPATIENT SURGERY
Discharge: HOME | End: 2023-01-20
Payer: COMMERCIAL

## 2023-01-01 ENCOUNTER — READMISSION MANAGEMENT (OUTPATIENT)
Dept: CALL CENTER | Facility: HOSPITAL | Age: 63
End: 2023-01-01
Payer: COMMERCIAL

## 2023-01-01 ENCOUNTER — OFFICE VISIT (OUTPATIENT)
Dept: CARDIOLOGY | Facility: CLINIC | Age: 63
End: 2023-01-01
Payer: COMMERCIAL

## 2023-01-01 ENCOUNTER — APPOINTMENT (OUTPATIENT)
Dept: GENERAL RADIOLOGY | Facility: HOSPITAL | Age: 63
DRG: 375 | End: 2023-01-01
Payer: COMMERCIAL

## 2023-01-01 ENCOUNTER — CONSULT (OUTPATIENT)
Dept: ONCOLOGY | Facility: CLINIC | Age: 63
End: 2023-01-01
Payer: COMMERCIAL

## 2023-01-01 ENCOUNTER — HOSPITAL ENCOUNTER (INPATIENT)
Facility: HOSPITAL | Age: 63
LOS: 5 days | Discharge: HOME | DRG: 375 | End: 2023-01-15
Attending: INTERNAL MEDICINE | Admitting: FAMILY MEDICINE
Payer: COMMERCIAL

## 2023-01-01 ENCOUNTER — LAB (OUTPATIENT)
Dept: LAB | Facility: HOSPITAL | Age: 63
End: 2023-01-01
Payer: COMMERCIAL

## 2023-01-01 ENCOUNTER — TELEPHONE (OUTPATIENT)
Dept: ONCOLOGY | Facility: CLINIC | Age: 63
End: 2023-01-01

## 2023-01-01 ENCOUNTER — TELEPHONE (OUTPATIENT)
Dept: NEUROLOGY | Facility: CLINIC | Age: 63
End: 2023-01-01
Payer: COMMERCIAL

## 2023-01-01 ENCOUNTER — TELEPHONE (OUTPATIENT)
Dept: ONCOLOGY | Facility: CLINIC | Age: 63
End: 2023-01-01
Payer: COMMERCIAL

## 2023-01-01 ENCOUNTER — APPOINTMENT (OUTPATIENT)
Dept: CT IMAGING | Facility: HOSPITAL | Age: 63
DRG: 375 | End: 2023-01-01
Payer: COMMERCIAL

## 2023-01-01 ENCOUNTER — TELEPHONE (OUTPATIENT)
Dept: CARDIOLOGY | Facility: CLINIC | Age: 63
End: 2023-01-01
Payer: COMMERCIAL

## 2023-01-01 ENCOUNTER — HOSPITAL ENCOUNTER (OUTPATIENT)
Dept: CT IMAGING | Facility: HOSPITAL | Age: 63
Discharge: HOME | End: 2023-02-02
Payer: COMMERCIAL

## 2023-01-01 ENCOUNTER — HOSPITAL ENCOUNTER (EMERGENCY)
Facility: HOSPITAL | Age: 63
Discharge: ANOTHER HOSPITAL | End: 2023-03-31
Attending: EMERGENCY MEDICINE | Admitting: EMERGENCY MEDICINE
Payer: COMMERCIAL

## 2023-01-01 ENCOUNTER — PREP FOR SURGERY (OUTPATIENT)
Dept: OTHER | Facility: HOSPITAL | Age: 63
End: 2023-01-01
Payer: COMMERCIAL

## 2023-01-01 ENCOUNTER — HOSPITAL ENCOUNTER (OUTPATIENT)
Dept: CARDIOLOGY | Facility: HOSPITAL | Age: 63
Discharge: HOME | End: 2023-01-20
Payer: COMMERCIAL

## 2023-01-01 ENCOUNTER — APPOINTMENT (OUTPATIENT)
Dept: GENERAL RADIOLOGY | Facility: HOSPITAL | Age: 63
End: 2023-01-01
Payer: COMMERCIAL

## 2023-01-01 ENCOUNTER — ANESTHESIA EVENT (OUTPATIENT)
Dept: GASTROENTEROLOGY | Facility: HOSPITAL | Age: 63
DRG: 375 | End: 2023-01-01
Payer: COMMERCIAL

## 2023-01-01 ENCOUNTER — OFFICE VISIT (OUTPATIENT)
Dept: NEUROLOGY | Facility: CLINIC | Age: 63
End: 2023-01-01
Payer: COMMERCIAL

## 2023-01-01 VITALS
BODY MASS INDEX: 25.74 KG/M2 | DIASTOLIC BLOOD PRESSURE: 62 MMHG | HEIGHT: 67 IN | SYSTOLIC BLOOD PRESSURE: 114 MMHG | OXYGEN SATURATION: 99 % | HEART RATE: 135 BPM | WEIGHT: 164 LBS

## 2023-01-01 VITALS
OXYGEN SATURATION: 99 % | TEMPERATURE: 97.2 F | BODY MASS INDEX: 23.53 KG/M2 | HEART RATE: 79 BPM | DIASTOLIC BLOOD PRESSURE: 84 MMHG | HEIGHT: 67 IN | WEIGHT: 149.91 LBS | SYSTOLIC BLOOD PRESSURE: 160 MMHG

## 2023-01-01 VITALS
HEIGHT: 67 IN | HEART RATE: 75 BPM | BODY MASS INDEX: 24.01 KG/M2 | DIASTOLIC BLOOD PRESSURE: 74 MMHG | SYSTOLIC BLOOD PRESSURE: 132 MMHG | TEMPERATURE: 98 F | WEIGHT: 153 LBS | OXYGEN SATURATION: 99 %

## 2023-01-01 VITALS
SYSTOLIC BLOOD PRESSURE: 128 MMHG | WEIGHT: 153.1 LBS | RESPIRATION RATE: 18 BRPM | TEMPERATURE: 98.1 F | HEIGHT: 67 IN | DIASTOLIC BLOOD PRESSURE: 72 MMHG | OXYGEN SATURATION: 78 % | HEART RATE: 76 BPM | BODY MASS INDEX: 24.03 KG/M2

## 2023-01-01 VITALS
HEIGHT: 67 IN | TEMPERATURE: 97 F | BODY MASS INDEX: 23.54 KG/M2 | WEIGHT: 150 LBS | DIASTOLIC BLOOD PRESSURE: 74 MMHG | OXYGEN SATURATION: 99 % | HEART RATE: 71 BPM | SYSTOLIC BLOOD PRESSURE: 110 MMHG

## 2023-01-01 VITALS
HEART RATE: 64 BPM | SYSTOLIC BLOOD PRESSURE: 98 MMHG | DIASTOLIC BLOOD PRESSURE: 66 MMHG | BODY MASS INDEX: 26.15 KG/M2 | HEIGHT: 67 IN | WEIGHT: 166.6 LBS

## 2023-01-01 VITALS
WEIGHT: 154 LBS | OXYGEN SATURATION: 100 % | HEIGHT: 67 IN | TEMPERATURE: 96.3 F | HEART RATE: 67 BPM | BODY MASS INDEX: 24.17 KG/M2 | RESPIRATION RATE: 25 BRPM | DIASTOLIC BLOOD PRESSURE: 58 MMHG | SYSTOLIC BLOOD PRESSURE: 84 MMHG

## 2023-01-01 VITALS
BODY MASS INDEX: 25.05 KG/M2 | OXYGEN SATURATION: 94 % | WEIGHT: 159.61 LBS | RESPIRATION RATE: 20 BRPM | DIASTOLIC BLOOD PRESSURE: 78 MMHG | HEIGHT: 67 IN | HEART RATE: 51 BPM | SYSTOLIC BLOOD PRESSURE: 93 MMHG | TEMPERATURE: 98.2 F

## 2023-01-01 VITALS
OXYGEN SATURATION: 100 % | DIASTOLIC BLOOD PRESSURE: 79 MMHG | WEIGHT: 160.2 LBS | TEMPERATURE: 97.7 F | RESPIRATION RATE: 28 BRPM | HEART RATE: 72 BPM | BODY MASS INDEX: 25.15 KG/M2 | HEIGHT: 67 IN | SYSTOLIC BLOOD PRESSURE: 108 MMHG

## 2023-01-01 DIAGNOSIS — C16.9 MALIGNANT NEOPLASM OF STOMACH, UNSPECIFIED LOCATION: ICD-10-CM

## 2023-01-01 DIAGNOSIS — I48.3 TYPICAL ATRIAL FLUTTER: ICD-10-CM

## 2023-01-01 DIAGNOSIS — I48.91 ATRIAL FIBRILLATION WITH RAPID VENTRICULAR RESPONSE: Primary | ICD-10-CM

## 2023-01-01 DIAGNOSIS — C16.8 MALIGNANT NEOPLASM OF OVERLAPPING SITES OF STOMACH: ICD-10-CM

## 2023-01-01 DIAGNOSIS — I50.42 CHRONIC COMBINED SYSTOLIC (CONGESTIVE) AND DIASTOLIC (CONGESTIVE) HEART FAILURE: ICD-10-CM

## 2023-01-01 DIAGNOSIS — N18.30 STAGE 3 CHRONIC KIDNEY DISEASE, UNSPECIFIED WHETHER STAGE 3A OR 3B CKD: ICD-10-CM

## 2023-01-01 DIAGNOSIS — R29.898 WEAKNESS OF LEFT LOWER EXTREMITY: ICD-10-CM

## 2023-01-01 DIAGNOSIS — E11.43 TYPE 2 DIABETES MELLITUS WITH DIABETIC AUTONOMIC NEUROPATHY, WITH LONG-TERM CURRENT USE OF INSULIN: ICD-10-CM

## 2023-01-01 DIAGNOSIS — C16.8 MALIGNANT NEOPLASM OF OVERLAPPING SITES OF STOMACH: Primary | ICD-10-CM

## 2023-01-01 DIAGNOSIS — Q21.12 PFO (PATENT FORAMEN OVALE): ICD-10-CM

## 2023-01-01 DIAGNOSIS — C16.9 GASTRIC CARCINOMA: ICD-10-CM

## 2023-01-01 DIAGNOSIS — R29.898 WEAKNESS OF LEFT LOWER EXTREMITY: Primary | ICD-10-CM

## 2023-01-01 DIAGNOSIS — Z79.4 TYPE 2 DIABETES MELLITUS WITH DIABETIC AUTONOMIC NEUROPATHY, WITH LONG-TERM CURRENT USE OF INSULIN: ICD-10-CM

## 2023-01-01 DIAGNOSIS — K92.2 GASTROINTESTINAL HEMORRHAGE, UNSPECIFIED GASTROINTESTINAL HEMORRHAGE TYPE: ICD-10-CM

## 2023-01-01 DIAGNOSIS — I48.91 ATRIAL FIBRILLATION WITH RAPID VENTRICULAR RESPONSE: ICD-10-CM

## 2023-01-01 DIAGNOSIS — N18.32 ACUTE RENAL FAILURE SUPERIMPOSED ON STAGE 3B CHRONIC KIDNEY DISEASE, UNSPECIFIED ACUTE RENAL FAILURE TYPE: ICD-10-CM

## 2023-01-01 DIAGNOSIS — R57.8 HEMORRHAGIC SHOCK: Primary | ICD-10-CM

## 2023-01-01 DIAGNOSIS — Z72.0 TOBACCO ABUSE: Primary | ICD-10-CM

## 2023-01-01 DIAGNOSIS — D50.9 IRON DEFICIENCY ANEMIA, UNSPECIFIED IRON DEFICIENCY ANEMIA TYPE: Primary | ICD-10-CM

## 2023-01-01 DIAGNOSIS — I48.91 ATRIAL FIBRILLATION WITH RVR: Primary | ICD-10-CM

## 2023-01-01 DIAGNOSIS — I89.0 LYMPHEDEMA: ICD-10-CM

## 2023-01-01 DIAGNOSIS — Z95.818 PRESENCE OF WATCHMAN LEFT ATRIAL APPENDAGE CLOSURE DEVICE: ICD-10-CM

## 2023-01-01 DIAGNOSIS — C16.9 MALIGNANT NEOPLASM OF STOMACH, UNSPECIFIED LOCATION: Primary | ICD-10-CM

## 2023-01-01 DIAGNOSIS — C16.9 GASTRIC CARCINOMA: Primary | ICD-10-CM

## 2023-01-01 DIAGNOSIS — I10 PRIMARY HYPERTENSION: ICD-10-CM

## 2023-01-01 DIAGNOSIS — N17.9 ACUTE RENAL FAILURE SUPERIMPOSED ON STAGE 3B CHRONIC KIDNEY DISEASE, UNSPECIFIED ACUTE RENAL FAILURE TYPE: ICD-10-CM

## 2023-01-01 DIAGNOSIS — I48.92 ATRIAL FLUTTER, UNSPECIFIED TYPE: ICD-10-CM

## 2023-01-01 LAB
A-A DO2: ABNORMAL
ABO GROUP BLD: NORMAL
ABO GROUP BLD: NORMAL
ALBUMIN SERPL-MCNC: 2.9 G/DL (ref 3.5–5.2)
ALBUMIN SERPL-MCNC: 2.9 G/DL (ref 3.5–5.2)
ALBUMIN SERPL-MCNC: 3.2 G/DL (ref 3.5–5.2)
ALBUMIN SERPL-MCNC: 3.3 G/DL (ref 3.5–5.2)
ALBUMIN SERPL-MCNC: 3.6 G/DL (ref 3.5–5.2)
ALBUMIN SERPL-MCNC: 3.7 G/DL (ref 3.5–5.2)
ALBUMIN SERPL-MCNC: 3.9 G/DL (ref 3.5–5.2)
ALBUMIN/GLOB SERPL: 1 G/DL
ALBUMIN/GLOB SERPL: 1.1 G/DL
ALBUMIN/GLOB SERPL: 1.1 G/DL
ALBUMIN/GLOB SERPL: 1.2 G/DL
ALBUMIN/GLOB SERPL: 1.3 G/DL
ALP SERPL-CCNC: 107 U/L (ref 39–117)
ALP SERPL-CCNC: 132 U/L (ref 39–117)
ALP SERPL-CCNC: 133 U/L (ref 39–117)
ALP SERPL-CCNC: 147 U/L (ref 39–117)
ALP SERPL-CCNC: 178 U/L (ref 39–117)
ALP SERPL-CCNC: 252 U/L (ref 39–117)
ALP SERPL-CCNC: 96 U/L (ref 39–117)
ALT SERPL W P-5'-P-CCNC: 11 U/L (ref 1–41)
ALT SERPL W P-5'-P-CCNC: 14 U/L (ref 1–41)
ALT SERPL W P-5'-P-CCNC: 17 U/L (ref 1–41)
ALT SERPL W P-5'-P-CCNC: 47 U/L (ref 1–41)
ALT SERPL W P-5'-P-CCNC: 68 U/L (ref 1–41)
ALT SERPL W P-5'-P-CCNC: 7 U/L (ref 1–41)
ALT SERPL W P-5'-P-CCNC: 9 U/L (ref 1–41)
AMORPH URATE CRY URNS QL MICRO: ABNORMAL /HPF
ANION GAP SERPL CALCULATED.3IONS-SCNC: 12 MMOL/L (ref 5–15)
ANION GAP SERPL CALCULATED.3IONS-SCNC: 14 MMOL/L (ref 5–15)
ANION GAP SERPL CALCULATED.3IONS-SCNC: 14 MMOL/L (ref 5–15)
ANION GAP SERPL CALCULATED.3IONS-SCNC: 15 MMOL/L (ref 5–15)
ANION GAP SERPL CALCULATED.3IONS-SCNC: 15 MMOL/L (ref 5–15)
ANION GAP SERPL CALCULATED.3IONS-SCNC: 16 MMOL/L (ref 5–15)
ANION GAP SERPL CALCULATED.3IONS-SCNC: 21 MMOL/L (ref 5–15)
ANION GAP SERPL CALCULATED.3IONS-SCNC: 32 MMOL/L (ref 5–15)
ANION GAP SERPL CALCULATED.3IONS-SCNC: 39 MMOL/L (ref 5–15)
ANISOCYTOSIS BLD QL: ABNORMAL
ARTERIAL PATENCY WRIST A: POSITIVE
ARTERIAL PATENCY WRIST A: POSITIVE
AST SERPL-CCNC: 16 U/L (ref 1–40)
AST SERPL-CCNC: 177 U/L (ref 1–40)
AST SERPL-CCNC: 19 U/L (ref 1–40)
AST SERPL-CCNC: 23 U/L (ref 1–40)
AST SERPL-CCNC: 24 U/L (ref 1–40)
AST SERPL-CCNC: 28 U/L (ref 1–40)
AST SERPL-CCNC: 29 U/L (ref 1–40)
ATMOSPHERIC PRESS: 750 MMHG
ATMOSPHERIC PRESS: 751 MMHG
BACTERIA UR QL AUTO: ABNORMAL /HPF
BACTERIA UR QL AUTO: ABNORMAL /HPF
BASE EXCESS BLDA CALC-SCNC: -23.5 MMOL/L (ref 0–2)
BASE EXCESS BLDA CALC-SCNC: -27.9 MMOL/L (ref 0–2)
BASOPHILS # BLD AUTO: 0 10*3/MM3 (ref 0–0.2)
BASOPHILS # BLD AUTO: 0.01 10*3/MM3 (ref 0–0.2)
BASOPHILS # BLD AUTO: 0.03 10*3/MM3 (ref 0–0.2)
BASOPHILS # BLD AUTO: 0.03 10*3/MM3 (ref 0–0.2)
BASOPHILS NFR BLD AUTO: 0 % (ref 0–1.5)
BASOPHILS NFR BLD AUTO: 0.1 % (ref 0–1.5)
BASOPHILS NFR BLD AUTO: 0.3 % (ref 0–1.5)
BASOPHILS NFR BLD AUTO: 0.4 % (ref 0–1.5)
BDY SITE: ABNORMAL
BDY SITE: ABNORMAL
BH BB BLOOD EXPIRATION DATE: NORMAL
BH BB BLOOD TYPE BARCODE: 5100
BH BB DISPENSE STATUS: NORMAL
BH BB PRODUCT CODE: NORMAL
BH BB UNIT NUMBER: NORMAL
BH CV ECHO MEAS - RAP SYSTOLE: 10 MMHG
BH CV ECHO MEAS - RVSP: 56.2 MMHG
BH CV ECHO MEAS - TR MAX PG: 46.2 MMHG
BH CV ECHO MEAS - TR MAX VEL: 340 CM/SEC
BILIRUB SERPL-MCNC: 0.2 MG/DL (ref 0–1.2)
BILIRUB SERPL-MCNC: 0.3 MG/DL (ref 0–1.2)
BILIRUB SERPL-MCNC: 0.4 MG/DL (ref 0–1.2)
BILIRUB SERPL-MCNC: 0.5 MG/DL (ref 0–1.2)
BILIRUB SERPL-MCNC: 0.5 MG/DL (ref 0–1.2)
BILIRUB UR QL STRIP: NEGATIVE
BILIRUB UR QL STRIP: NEGATIVE
BLD GP AB SCN SERPL QL: NEGATIVE
BLD GP AB SCN SERPL QL: NEGATIVE
BODY TEMPERATURE: 37 C
BODY TEMPERATURE: 37 C
BUN SERPL-MCNC: 121 MG/DL (ref 8–23)
BUN SERPL-MCNC: 124 MG/DL (ref 8–23)
BUN SERPL-MCNC: 57 MG/DL (ref 8–23)
BUN SERPL-MCNC: 66 MG/DL (ref 8–23)
BUN SERPL-MCNC: 67 MG/DL (ref 8–23)
BUN SERPL-MCNC: 68 MG/DL (ref 8–23)
BUN SERPL-MCNC: 81 MG/DL (ref 8–23)
BUN SERPL-MCNC: 84 MG/DL (ref 8–23)
BUN SERPL-MCNC: 86 MG/DL (ref 8–23)
BUN SERPL-MCNC: 89 MG/DL (ref 8–23)
BUN SERPL-MCNC: 91 MG/DL (ref 8–23)
BUN/CREAT SERPL: 18.1 (ref 7–25)
BUN/CREAT SERPL: 21.4 (ref 7–25)
BUN/CREAT SERPL: 22.2 (ref 7–25)
BUN/CREAT SERPL: 22.7 (ref 7–25)
BUN/CREAT SERPL: 23.5 (ref 7–25)
BUN/CREAT SERPL: 23.9 (ref 7–25)
BUN/CREAT SERPL: 23.9 (ref 7–25)
BUN/CREAT SERPL: 24.1 (ref 7–25)
BUN/CREAT SERPL: 24.3 (ref 7–25)
BUN/CREAT SERPL: 24.3 (ref 7–25)
BUN/CREAT SERPL: 24.4 (ref 7–25)
BURR CELLS BLD QL SMEAR: ABNORMAL
CALCIUM SPEC-SCNC: 7.2 MG/DL (ref 8.6–10.5)
CALCIUM SPEC-SCNC: 7.7 MG/DL (ref 8.6–10.5)
CALCIUM SPEC-SCNC: 7.7 MG/DL (ref 8.6–10.5)
CALCIUM SPEC-SCNC: 7.8 MG/DL (ref 8.6–10.5)
CALCIUM SPEC-SCNC: 7.8 MG/DL (ref 8.6–10.5)
CALCIUM SPEC-SCNC: 7.9 MG/DL (ref 8.6–10.5)
CALCIUM SPEC-SCNC: 8 MG/DL (ref 8.6–10.5)
CALCIUM SPEC-SCNC: 8.4 MG/DL (ref 8.6–10.5)
CALCIUM SPEC-SCNC: 8.7 MG/DL (ref 8.6–10.5)
CHLORIDE SERPL-SCNC: 100 MMOL/L (ref 98–107)
CHLORIDE SERPL-SCNC: 104 MMOL/L (ref 98–107)
CHLORIDE SERPL-SCNC: 107 MMOL/L (ref 98–107)
CHLORIDE SERPL-SCNC: 81 MMOL/L (ref 98–107)
CHLORIDE SERPL-SCNC: 84 MMOL/L (ref 98–107)
CHLORIDE SERPL-SCNC: 86 MMOL/L (ref 98–107)
CHLORIDE SERPL-SCNC: 90 MMOL/L (ref 98–107)
CHLORIDE SERPL-SCNC: 93 MMOL/L (ref 98–107)
CHLORIDE SERPL-SCNC: 93 MMOL/L (ref 98–107)
CHLORIDE SERPL-SCNC: 97 MMOL/L (ref 98–107)
CHLORIDE SERPL-SCNC: 99 MMOL/L (ref 98–107)
CHOLEST SERPL-MCNC: 150 MG/DL (ref 0–200)
CLARITY UR: ABNORMAL
CLARITY UR: CLEAR
CO2 SERPL-SCNC: 24 MMOL/L (ref 22–29)
CO2 SERPL-SCNC: 25 MMOL/L (ref 22–29)
CO2 SERPL-SCNC: 25 MMOL/L (ref 22–29)
CO2 SERPL-SCNC: 26 MMOL/L (ref 22–29)
CO2 SERPL-SCNC: 27 MMOL/L (ref 22–29)
CO2 SERPL-SCNC: 28 MMOL/L (ref 22–29)
CO2 SERPL-SCNC: 29 MMOL/L (ref 22–29)
CO2 SERPL-SCNC: 30 MMOL/L (ref 22–29)
CO2 SERPL-SCNC: 30 MMOL/L (ref 22–29)
CO2 SERPL-SCNC: 6 MMOL/L (ref 22–29)
CO2 SERPL-SCNC: 9 MMOL/L (ref 22–29)
COHGB MFR BLD: 1.1 % (ref 0–5)
COLOR UR: YELLOW
COLOR UR: YELLOW
CREAT SERPL-MCNC: 2.38 MG/DL (ref 0.76–1.27)
CREAT SERPL-MCNC: 2.85 MG/DL (ref 0.76–1.27)
CREAT SERPL-MCNC: 2.99 MG/DL (ref 0.76–1.27)
CREAT SERPL-MCNC: 3.33 MG/DL (ref 0.76–1.27)
CREAT SERPL-MCNC: 3.51 MG/DL (ref 0.76–1.27)
CREAT SERPL-MCNC: 3.57 MG/DL (ref 0.76–1.27)
CREAT SERPL-MCNC: 3.64 MG/DL (ref 0.76–1.27)
CREAT SERPL-MCNC: 4.01 MG/DL (ref 0.76–1.27)
CREAT SERPL-MCNC: 4.25 MG/DL (ref 0.76–1.27)
CREAT SERPL-MCNC: 4.97 MG/DL (ref 0.76–1.27)
CREAT SERPL-MCNC: 5.08 MG/DL (ref 0.76–1.27)
CREAT UR-MCNC: 53.7 MG/DL
CREAT UR-MCNC: 66 MG/DL
CROSSMATCH INTERPRETATION: NORMAL
CRP SERPL-MCNC: 2.36 MG/DL (ref 0–0.5)
CYTO UR: NORMAL
D-LACTATE SERPL-SCNC: 1.8 MMOL/L (ref 0.5–2)
DEPRECATED RDW RBC AUTO: 57.4 FL (ref 37–54)
DEPRECATED RDW RBC AUTO: 57.7 FL (ref 37–54)
DEPRECATED RDW RBC AUTO: 58.4 FL (ref 37–54)
DEPRECATED RDW RBC AUTO: 58.9 FL (ref 37–54)
DEPRECATED RDW RBC AUTO: 60 FL (ref 37–54)
DEPRECATED RDW RBC AUTO: 61 FL (ref 37–54)
DEPRECATED RDW RBC AUTO: 64.7 FL (ref 37–54)
DEPRECATED RDW RBC AUTO: 73.3 FL (ref 37–54)
EGFRCR SERPLBLD CKD-EPI 2021: 12 ML/MIN/1.73
EGFRCR SERPLBLD CKD-EPI 2021: 12.3 ML/MIN/1.73
EGFRCR SERPLBLD CKD-EPI 2021: 14.9 ML/MIN/1.73
EGFRCR SERPLBLD CKD-EPI 2021: 16 ML/MIN/1.73
EGFRCR SERPLBLD CKD-EPI 2021: 17.9 ML/MIN/1.73
EGFRCR SERPLBLD CKD-EPI 2021: 18.4 ML/MIN/1.73
EGFRCR SERPLBLD CKD-EPI 2021: 18.7 ML/MIN/1.73
EGFRCR SERPLBLD CKD-EPI 2021: 20 ML/MIN/1.73
EGFRCR SERPLBLD CKD-EPI 2021: 22.7 ML/MIN/1.73
EGFRCR SERPLBLD CKD-EPI 2021: 24.1 ML/MIN/1.73
EGFRCR SERPLBLD CKD-EPI 2021: 29.9 ML/MIN/1.73
EOSINOPHIL # BLD AUTO: 0.01 10*3/MM3 (ref 0–0.4)
EOSINOPHIL # BLD AUTO: 0.1 10*3/MM3 (ref 0–0.4)
EOSINOPHIL # BLD AUTO: 0.34 10*3/MM3 (ref 0–0.4)
EOSINOPHIL # BLD AUTO: 0.51 10*3/MM3 (ref 0–0.4)
EOSINOPHIL # BLD AUTO: 0.53 10*3/MM3 (ref 0–0.4)
EOSINOPHIL # BLD AUTO: 0.75 10*3/MM3 (ref 0–0.4)
EOSINOPHIL NFR BLD AUTO: 0.1 % (ref 0.3–6.2)
EOSINOPHIL NFR BLD AUTO: 1 % (ref 0.3–6.2)
EOSINOPHIL NFR BLD AUTO: 3.9 % (ref 0.3–6.2)
EOSINOPHIL NFR BLD AUTO: 5.6 % (ref 0.3–6.2)
EOSINOPHIL NFR BLD AUTO: 5.9 % (ref 0.3–6.2)
EOSINOPHIL NFR BLD AUTO: 9.4 % (ref 0.3–6.2)
ERYTHROCYTE [DISTWIDTH] IN BLOOD BY AUTOMATED COUNT: 16.2 % (ref 12.3–15.4)
ERYTHROCYTE [DISTWIDTH] IN BLOOD BY AUTOMATED COUNT: 16.3 % (ref 12.3–15.4)
ERYTHROCYTE [DISTWIDTH] IN BLOOD BY AUTOMATED COUNT: 16.7 % (ref 12.3–15.4)
ERYTHROCYTE [DISTWIDTH] IN BLOOD BY AUTOMATED COUNT: 16.8 % (ref 12.3–15.4)
ERYTHROCYTE [DISTWIDTH] IN BLOOD BY AUTOMATED COUNT: 17.3 % (ref 12.3–15.4)
ERYTHROCYTE [DISTWIDTH] IN BLOOD BY AUTOMATED COUNT: 17.6 % (ref 12.3–15.4)
ERYTHROCYTE [DISTWIDTH] IN BLOOD BY AUTOMATED COUNT: 18 % (ref 12.3–15.4)
ERYTHROCYTE [DISTWIDTH] IN BLOOD BY AUTOMATED COUNT: 21 % (ref 12.3–15.4)
FERRITIN SERPL-MCNC: 69.28 NG/ML (ref 30–400)
FERRITIN SERPL-MCNC: 88.95 NG/ML (ref 30–400)
FLUAV RNA RESP QL NAA+PROBE: NOT DETECTED
FLUBV RNA RESP QL NAA+PROBE: NOT DETECTED
FOLATE SERPL-MCNC: >20 NG/ML (ref 4.78–24.2)
GIANT PLATELETS: ABNORMAL
GLOBULIN UR ELPH-MCNC: 2.6 GM/DL
GLOBULIN UR ELPH-MCNC: 2.8 GM/DL
GLOBULIN UR ELPH-MCNC: 2.9 GM/DL
GLOBULIN UR ELPH-MCNC: 3 GM/DL
GLOBULIN UR ELPH-MCNC: 3.1 GM/DL
GLUCOSE BLDC GLUCOMTR-MCNC: 110 MG/DL (ref 70–130)
GLUCOSE BLDC GLUCOMTR-MCNC: 127 MG/DL (ref 70–130)
GLUCOSE BLDC GLUCOMTR-MCNC: 138 MG/DL (ref 70–130)
GLUCOSE BLDC GLUCOMTR-MCNC: 150 MG/DL (ref 70–130)
GLUCOSE BLDC GLUCOMTR-MCNC: 158 MG/DL (ref 70–130)
GLUCOSE BLDC GLUCOMTR-MCNC: 162 MG/DL (ref 70–130)
GLUCOSE BLDC GLUCOMTR-MCNC: 163 MG/DL (ref 70–130)
GLUCOSE BLDC GLUCOMTR-MCNC: 168 MG/DL (ref 70–130)
GLUCOSE BLDC GLUCOMTR-MCNC: 178 MG/DL (ref 70–130)
GLUCOSE BLDC GLUCOMTR-MCNC: 203 MG/DL (ref 70–130)
GLUCOSE BLDC GLUCOMTR-MCNC: 205 MG/DL (ref 70–130)
GLUCOSE BLDC GLUCOMTR-MCNC: 251 MG/DL (ref 70–130)
GLUCOSE BLDC GLUCOMTR-MCNC: 281 MG/DL (ref 70–130)
GLUCOSE BLDC GLUCOMTR-MCNC: 285 MG/DL (ref 70–130)
GLUCOSE BLDC GLUCOMTR-MCNC: 290 MG/DL (ref 70–130)
GLUCOSE BLDC GLUCOMTR-MCNC: 306 MG/DL (ref 70–130)
GLUCOSE BLDC GLUCOMTR-MCNC: 33 MG/DL (ref 70–130)
GLUCOSE BLDC GLUCOMTR-MCNC: 337 MG/DL (ref 70–130)
GLUCOSE BLDC GLUCOMTR-MCNC: 379 MG/DL (ref 70–130)
GLUCOSE BLDC GLUCOMTR-MCNC: 42 MG/DL (ref 70–130)
GLUCOSE BLDC GLUCOMTR-MCNC: 443 MG/DL (ref 70–130)
GLUCOSE BLDC GLUCOMTR-MCNC: 478 MG/DL (ref 70–130)
GLUCOSE BLDC GLUCOMTR-MCNC: 53 MG/DL (ref 70–130)
GLUCOSE BLDC GLUCOMTR-MCNC: 53 MG/DL (ref 70–130)
GLUCOSE BLDC GLUCOMTR-MCNC: 68 MG/DL (ref 70–130)
GLUCOSE BLDC GLUCOMTR-MCNC: 76 MG/DL (ref 70–130)
GLUCOSE BLDC GLUCOMTR-MCNC: 89 MG/DL (ref 70–130)
GLUCOSE SERPL-MCNC: 138 MG/DL (ref 65–99)
GLUCOSE SERPL-MCNC: 147 MG/DL (ref 65–99)
GLUCOSE SERPL-MCNC: 155 MG/DL (ref 65–99)
GLUCOSE SERPL-MCNC: 159 MG/DL (ref 65–99)
GLUCOSE SERPL-MCNC: 173 MG/DL (ref 65–99)
GLUCOSE SERPL-MCNC: 224 MG/DL (ref 65–99)
GLUCOSE SERPL-MCNC: 258 MG/DL (ref 65–99)
GLUCOSE SERPL-MCNC: 284 MG/DL (ref 65–99)
GLUCOSE SERPL-MCNC: 38 MG/DL (ref 65–99)
GLUCOSE SERPL-MCNC: 43 MG/DL (ref 65–99)
GLUCOSE SERPL-MCNC: 520 MG/DL (ref 65–99)
GLUCOSE SERPL-MCNC: 79 MG/DL (ref 65–99)
GLUCOSE UR STRIP-MCNC: NEGATIVE MG/DL
GLUCOSE UR STRIP-MCNC: NEGATIVE MG/DL
HBA1C MFR BLD: 5.5 % (ref 4.8–5.6)
HCO3 BLDA-SCNC: 4 MMOL/L (ref 20–26)
HCO3 BLDA-SCNC: 6.7 MMOL/L (ref 20–26)
HCT VFR BLD AUTO: 19.9 % (ref 37.5–51)
HCT VFR BLD AUTO: 20.8 % (ref 37.5–51)
HCT VFR BLD AUTO: 22.6 % (ref 37.5–51)
HCT VFR BLD AUTO: 22.8 % (ref 37.5–51)
HCT VFR BLD AUTO: 23 % (ref 37.5–51)
HCT VFR BLD AUTO: 23.7 % (ref 37.5–51)
HCT VFR BLD AUTO: 25.1 % (ref 37.5–51)
HCT VFR BLD AUTO: 25.9 % (ref 37.5–51)
HCT VFR BLD AUTO: 26.4 % (ref 37.5–51)
HCT VFR BLD AUTO: 26.7 % (ref 37.5–51)
HCT VFR BLD AUTO: 27 % (ref 37.5–51)
HCT VFR BLD AUTO: 27.5 % (ref 37.5–51)
HCT VFR BLD AUTO: 27.9 % (ref 37.5–51)
HCT VFR BLD AUTO: 29.9 % (ref 37.5–51)
HCT VFR BLD CALC: 23.5 % (ref 38–51)
HDLC SERPL-MCNC: 63 MG/DL (ref 40–60)
HEMOCCULT STL QL: NEGATIVE
HGB BLD-MCNC: 5.6 G/DL (ref 13–17.7)
HGB BLD-MCNC: 5.9 G/DL (ref 13–17.7)
HGB BLD-MCNC: 7 G/DL (ref 13–17.7)
HGB BLD-MCNC: 7 G/DL (ref 13–17.7)
HGB BLD-MCNC: 7.1 G/DL (ref 13–17.7)
HGB BLD-MCNC: 7.1 G/DL (ref 13–17.7)
HGB BLD-MCNC: 7.4 G/DL (ref 13–17.7)
HGB BLD-MCNC: 7.9 G/DL (ref 13–17.7)
HGB BLD-MCNC: 8.1 G/DL (ref 13–17.7)
HGB BLD-MCNC: 8.3 G/DL (ref 13–17.7)
HGB BLD-MCNC: 8.4 G/DL (ref 13–17.7)
HGB BLD-MCNC: 8.5 G/DL (ref 13–17.7)
HGB BLD-MCNC: 8.6 G/DL (ref 13–17.7)
HGB BLD-MCNC: 9.1 G/DL (ref 13–17.7)
HGB BLDA-MCNC: 7.7 G/DL (ref 14–18)
HGB UR QL STRIP.AUTO: ABNORMAL
HGB UR QL STRIP.AUTO: NEGATIVE
HOLD SPECIMEN: NORMAL
HYALINE CASTS UR QL AUTO: ABNORMAL /LPF
HYALINE CASTS UR QL AUTO: ABNORMAL /LPF
IMM GRANULOCYTES # BLD AUTO: 0.03 10*3/MM3 (ref 0–0.05)
IMM GRANULOCYTES # BLD AUTO: 0.04 10*3/MM3 (ref 0–0.05)
IMM GRANULOCYTES # BLD AUTO: 0.04 10*3/MM3 (ref 0–0.05)
IMM GRANULOCYTES NFR BLD AUTO: 0.3 % (ref 0–0.5)
IMM GRANULOCYTES NFR BLD AUTO: 0.4 % (ref 0–0.5)
INHALED O2 CONCENTRATION: 100 %
INHALED O2 CONCENTRATION: 100 %
INR PPP: 1.05 (ref 0.91–1.09)
INR PPP: 2.04 (ref 0.91–1.09)
IRON 24H UR-MRATE: 18 MCG/DL (ref 59–158)
IRON 24H UR-MRATE: 29 MCG/DL (ref 59–158)
IRON SATN MFR SERPL: 4 % (ref 20–50)
IRON SATN MFR SERPL: 9 % (ref 20–50)
KETONES UR QL STRIP: ABNORMAL
KETONES UR QL STRIP: NEGATIVE
LAB AP CASE REPORT: NORMAL
LAB AP DIAGNOSIS COMMENT: NORMAL
LDLC SERPL CALC-MCNC: 66 MG/DL (ref 0–100)
LDLC/HDLC SERPL: 1 {RATIO}
LEUKOCYTE ESTERASE UR QL STRIP.AUTO: NEGATIVE
LEUKOCYTE ESTERASE UR QL STRIP.AUTO: NEGATIVE
LIPASE SERPL-CCNC: 18 U/L (ref 13–60)
LIPASE SERPL-CCNC: 29 U/L (ref 13–60)
LV EF 2D ECHO EST: 60 %
LYMPHOCYTES # BLD AUTO: 0.14 10*3/MM3 (ref 0.7–3.1)
LYMPHOCYTES # BLD AUTO: 0.14 10*3/MM3 (ref 0.7–3.1)
LYMPHOCYTES # BLD AUTO: 0.18 10*3/MM3 (ref 0.7–3.1)
LYMPHOCYTES # BLD AUTO: 0.19 10*3/MM3 (ref 0.7–3.1)
LYMPHOCYTES # BLD AUTO: 0.2 10*3/MM3 (ref 0.7–3.1)
LYMPHOCYTES # BLD AUTO: 0.3 10*3/MM3 (ref 0.7–3.1)
LYMPHOCYTES # BLD MANUAL: 2.04 10*3/MM3 (ref 0.7–3.1)
LYMPHOCYTES NFR BLD AUTO: 1.6 % (ref 19.6–45.3)
LYMPHOCYTES NFR BLD AUTO: 1.6 % (ref 19.6–45.3)
LYMPHOCYTES NFR BLD AUTO: 1.8 % (ref 19.6–45.3)
LYMPHOCYTES NFR BLD AUTO: 1.8 % (ref 19.6–45.3)
LYMPHOCYTES NFR BLD AUTO: 2.1 % (ref 19.6–45.3)
LYMPHOCYTES NFR BLD AUTO: 3.8 % (ref 19.6–45.3)
LYMPHOCYTES NFR BLD MANUAL: 11.1 % (ref 5–12)
Lab: ABNORMAL
Lab: NORMAL
MACROCYTES BLD QL SMEAR: ABNORMAL
MAGNESIUM SERPL-MCNC: 2.4 MG/DL (ref 1.6–2.4)
MAGNESIUM SERPL-MCNC: 2.8 MG/DL (ref 1.6–2.4)
MAGNESIUM SERPL-MCNC: 2.9 MG/DL (ref 1.6–2.4)
MAGNESIUM SERPL-MCNC: 2.9 MG/DL (ref 1.6–2.4)
MAGNESIUM SERPL-MCNC: 3.4 MG/DL (ref 1.6–2.4)
MAXIMAL PREDICTED HEART RATE: 157 BPM
MAXIMAL PREDICTED HEART RATE: 157 BPM
MCH RBC QN AUTO: 29 PG (ref 26.6–33)
MCH RBC QN AUTO: 29.2 PG (ref 26.6–33)
MCH RBC QN AUTO: 29.3 PG (ref 26.6–33)
MCH RBC QN AUTO: 29.3 PG (ref 26.6–33)
MCH RBC QN AUTO: 29.4 PG (ref 26.6–33)
MCH RBC QN AUTO: 29.6 PG (ref 26.6–33)
MCHC RBC AUTO-ENTMCNC: 26.9 G/DL (ref 31.5–35.7)
MCHC RBC AUTO-ENTMCNC: 29.5 G/DL (ref 31.5–35.7)
MCHC RBC AUTO-ENTMCNC: 29.6 G/DL (ref 31.5–35.7)
MCHC RBC AUTO-ENTMCNC: 30.4 G/DL (ref 31.5–35.7)
MCHC RBC AUTO-ENTMCNC: 30.7 G/DL (ref 31.5–35.7)
MCHC RBC AUTO-ENTMCNC: 30.7 G/DL (ref 31.5–35.7)
MCHC RBC AUTO-ENTMCNC: 30.9 G/DL (ref 31.5–35.7)
MCHC RBC AUTO-ENTMCNC: 31.5 G/DL (ref 31.5–35.7)
MCV RBC AUTO: 110.1 FL (ref 79–97)
MCV RBC AUTO: 92.6 FL (ref 79–97)
MCV RBC AUTO: 94.6 FL (ref 79–97)
MCV RBC AUTO: 95 FL (ref 79–97)
MCV RBC AUTO: 95.4 FL (ref 79–97)
MCV RBC AUTO: 96.8 FL (ref 79–97)
MCV RBC AUTO: 99 FL (ref 79–97)
MCV RBC AUTO: 99.6 FL (ref 79–97)
METHGB BLD QL: 0.8 % (ref 0–3)
MODALITY: ABNORMAL
MODALITY: ABNORMAL
MONOCYTES # BLD AUTO: 0.72 10*3/MM3 (ref 0.1–0.9)
MONOCYTES # BLD AUTO: 1.01 10*3/MM3 (ref 0.1–0.9)
MONOCYTES # BLD AUTO: 1.06 10*3/MM3 (ref 0.1–0.9)
MONOCYTES # BLD AUTO: 1.23 10*3/MM3 (ref 0.1–0.9)
MONOCYTES # BLD AUTO: 1.39 10*3/MM3 (ref 0.1–0.9)
MONOCYTES # BLD AUTO: 1.41 10*3/MM3 (ref 0.1–0.9)
MONOCYTES # BLD: 2.04 10*3/MM3 (ref 0.1–0.9)
MONOCYTES NFR BLD AUTO: 11.7 % (ref 5–12)
MONOCYTES NFR BLD AUTO: 12.4 % (ref 5–12)
MONOCYTES NFR BLD AUTO: 13.1 % (ref 5–12)
MONOCYTES NFR BLD AUTO: 13.4 % (ref 5–12)
MONOCYTES NFR BLD AUTO: 14.2 % (ref 5–12)
MONOCYTES NFR BLD AUTO: 9 % (ref 5–12)
NEUTROPHILS # BLD AUTO: 14.3 10*3/MM3 (ref 1.7–7)
NEUTROPHILS NFR BLD AUTO: 6.16 10*3/MM3 (ref 1.7–7)
NEUTROPHILS NFR BLD AUTO: 6.83 10*3/MM3 (ref 1.7–7)
NEUTROPHILS NFR BLD AUTO: 7.09 10*3/MM3 (ref 1.7–7)
NEUTROPHILS NFR BLD AUTO: 7.41 10*3/MM3 (ref 1.7–7)
NEUTROPHILS NFR BLD AUTO: 77 % (ref 42.7–76)
NEUTROPHILS NFR BLD AUTO: 78.7 % (ref 42.7–76)
NEUTROPHILS NFR BLD AUTO: 79.7 % (ref 42.7–76)
NEUTROPHILS NFR BLD AUTO: 8.15 10*3/MM3 (ref 1.7–7)
NEUTROPHILS NFR BLD AUTO: 8.78 10*3/MM3 (ref 1.7–7)
NEUTROPHILS NFR BLD AUTO: 82.2 % (ref 42.7–76)
NEUTROPHILS NFR BLD AUTO: 83.4 % (ref 42.7–76)
NEUTROPHILS NFR BLD AUTO: 83.5 % (ref 42.7–76)
NEUTROPHILS NFR BLD MANUAL: 67.7 % (ref 42.7–76)
NEUTS BAND NFR BLD MANUAL: 10.1 % (ref 0–5)
NITRITE UR QL STRIP: NEGATIVE
NITRITE UR QL STRIP: NEGATIVE
NOTE: ABNORMAL
NOTIFIED BY: ABNORMAL
NOTIFIED BY: ABNORMAL
NOTIFIED WHO: ABNORMAL
NOTIFIED WHO: ABNORMAL
NRBC BLD AUTO-RTO: 0 /100 WBC (ref 0–0.2)
NRBC SPEC MANUAL: 4 /100 WBC (ref 0–0.2)
NT-PROBNP SERPL-MCNC: ABNORMAL PG/ML (ref 0–900)
OSMOLALITY UR: 333 MOSM/KG (ref 50–1400)
OXYHGB MFR BLDV: 98.2 % (ref 94–99)
PATH REPORT.FINAL DX SPEC: NORMAL
PATH REPORT.GROSS SPEC: NORMAL
PCO2 BLDA: 24.6 MM HG (ref 35–45)
PCO2 BLDA: 30.2 MM HG (ref 35–45)
PCO2 TEMP ADJ BLD: 24.6 MM HG (ref 35–45)
PCO2 TEMP ADJ BLD: 30.2 MM HG (ref 35–45)
PEEP RESPIRATORY: 5 CM[H2O]
PEEP RESPIRATORY: 5 CM[H2O]
PH BLDA: 6.82 PH UNITS (ref 7.35–7.45)
PH BLDA: 6.96 PH UNITS (ref 7.35–7.45)
PH UR STRIP.AUTO: <=5 [PH] (ref 5–8)
PH UR STRIP.AUTO: <=5 [PH] (ref 5–8)
PH, TEMP CORRECTED: 6.82 PH UNITS (ref 7.35–7.45)
PH, TEMP CORRECTED: 6.96 PH UNITS (ref 7.35–7.45)
PHOSPHATE SERPL-MCNC: 8.4 MG/DL (ref 2.5–4.5)
PLATELET # BLD AUTO: 257 10*3/MM3 (ref 140–450)
PLATELET # BLD AUTO: 263 10*3/MM3 (ref 140–450)
PLATELET # BLD AUTO: 287 10*3/MM3 (ref 140–450)
PLATELET # BLD AUTO: 298 10*3/MM3 (ref 140–450)
PLATELET # BLD AUTO: 309 10*3/MM3 (ref 140–450)
PLATELET # BLD AUTO: 333 10*3/MM3 (ref 140–450)
PLATELET # BLD AUTO: 380 10*3/MM3 (ref 140–450)
PLATELET # BLD AUTO: 393 10*3/MM3 (ref 140–450)
PMV BLD AUTO: 10.3 FL (ref 6–12)
PMV BLD AUTO: 10.5 FL (ref 6–12)
PMV BLD AUTO: 10.6 FL (ref 6–12)
PMV BLD AUTO: 11.5 FL (ref 6–12)
PMV BLD AUTO: 9.5 FL (ref 6–12)
PMV BLD AUTO: 9.6 FL (ref 6–12)
PMV BLD AUTO: 9.8 FL (ref 6–12)
PMV BLD AUTO: 9.9 FL (ref 6–12)
PO2 BLDA: 223 MM HG (ref 83–108)
PO2 BLDA: 416 MM HG (ref 83–108)
PO2 TEMP ADJ BLD: 223 MM HG (ref 83–108)
PO2 TEMP ADJ BLD: 416 MM HG (ref 83–108)
POIKILOCYTOSIS BLD QL SMEAR: ABNORMAL
POLYCHROMASIA BLD QL SMEAR: ABNORMAL
POTASSIUM BLDA-SCNC: 6.9 MMOL/L (ref 3.5–5.2)
POTASSIUM SERPL-SCNC: 2.8 MMOL/L (ref 3.5–5.2)
POTASSIUM SERPL-SCNC: 2.8 MMOL/L (ref 3.5–5.2)
POTASSIUM SERPL-SCNC: 2.9 MMOL/L (ref 3.5–5.2)
POTASSIUM SERPL-SCNC: 3.3 MMOL/L (ref 3.5–5.2)
POTASSIUM SERPL-SCNC: 3.3 MMOL/L (ref 3.5–5.2)
POTASSIUM SERPL-SCNC: 3.4 MMOL/L (ref 3.5–5.2)
POTASSIUM SERPL-SCNC: 3.4 MMOL/L (ref 3.5–5.2)
POTASSIUM SERPL-SCNC: 3.6 MMOL/L (ref 3.5–5.2)
POTASSIUM SERPL-SCNC: 3.6 MMOL/L (ref 3.5–5.2)
POTASSIUM SERPL-SCNC: 3.9 MMOL/L (ref 3.5–5.2)
POTASSIUM SERPL-SCNC: 4.3 MMOL/L (ref 3.5–5.2)
POTASSIUM SERPL-SCNC: 4.5 MMOL/L (ref 3.5–5.2)
POTASSIUM SERPL-SCNC: 7.2 MMOL/L (ref 3.5–5.2)
POTASSIUM SERPL-SCNC: 7.4 MMOL/L (ref 3.5–5.2)
PROT ?TM UR-MCNC: 153.8 MG/DL
PROT SERPL-MCNC: 5.5 G/DL (ref 6–8.5)
PROT SERPL-MCNC: 5.7 G/DL (ref 6–8.5)
PROT SERPL-MCNC: 6 G/DL (ref 6–8.5)
PROT SERPL-MCNC: 6.1 G/DL (ref 6–8.5)
PROT SERPL-MCNC: 6.7 G/DL (ref 6–8.5)
PROT SERPL-MCNC: 6.7 G/DL (ref 6–8.5)
PROT SERPL-MCNC: 6.8 G/DL (ref 6–8.5)
PROT UR QL STRIP: ABNORMAL
PROT UR QL STRIP: ABNORMAL
PROTHROMBIN TIME: 13.8 SECONDS (ref 11.8–14.8)
PROTHROMBIN TIME: 23.3 SECONDS (ref 11.8–14.8)
PTH-INTACT SERPL-MCNC: 265.7 PG/ML (ref 15–65)
QT INTERVAL: 380 MS
QT INTERVAL: 386 MS
QT INTERVAL: 398 MS
QT INTERVAL: 446 MS
QTC INTERVAL: 474 MS
QTC INTERVAL: 494 MS
QTC INTERVAL: 495 MS
QTC INTERVAL: 500 MS
RBC # BLD AUTO: 1.89 10*6/MM3 (ref 4.14–5.8)
RBC # BLD AUTO: 2.01 10*6/MM3 (ref 4.14–5.8)
RBC # BLD AUTO: 2.38 10*6/MM3 (ref 4.14–5.8)
RBC # BLD AUTO: 2.39 10*6/MM3 (ref 4.14–5.8)
RBC # BLD AUTO: 2.42 10*6/MM3 (ref 4.14–5.8)
RBC # BLD AUTO: 2.71 10*6/MM3 (ref 4.14–5.8)
RBC # BLD AUTO: 2.79 10*6/MM3 (ref 4.14–5.8)
RBC # BLD AUTO: 3.09 10*6/MM3 (ref 4.14–5.8)
RBC # UR STRIP: ABNORMAL /HPF
RBC # UR STRIP: ABNORMAL /HPF
REF LAB TEST METHOD: ABNORMAL
REF LAB TEST METHOD: ABNORMAL
RETICS # AUTO: 0.18 10*6/MM3 (ref 0.02–0.13)
RETICS/RBC NFR AUTO: 9.18 % (ref 0.7–1.9)
RH BLD: POSITIVE
RH BLD: POSITIVE
SAO2 % BLDCOA: 100 % (ref 94–99)
SAO2 % BLDCOA: 99.2 % (ref 94–99)
SARS-COV-2 RNA RESP QL NAA+PROBE: NOT DETECTED
SCHISTOCYTES BLD QL SMEAR: ABNORMAL
SET MECH RESP RATE: 16
SET MECH RESP RATE: 16
SODIUM BLDA-SCNC: 137 MMOL/L (ref 136–145)
SODIUM SERPL-SCNC: 127 MMOL/L (ref 136–145)
SODIUM SERPL-SCNC: 129 MMOL/L (ref 136–145)
SODIUM SERPL-SCNC: 131 MMOL/L (ref 136–145)
SODIUM SERPL-SCNC: 133 MMOL/L (ref 136–145)
SODIUM SERPL-SCNC: 134 MMOL/L (ref 136–145)
SODIUM SERPL-SCNC: 135 MMOL/L (ref 136–145)
SODIUM SERPL-SCNC: 136 MMOL/L (ref 136–145)
SODIUM SERPL-SCNC: 138 MMOL/L (ref 136–145)
SODIUM SERPL-SCNC: 140 MMOL/L (ref 136–145)
SODIUM SERPL-SCNC: 146 MMOL/L (ref 136–145)
SODIUM SERPL-SCNC: 146 MMOL/L (ref 136–145)
SODIUM UR-SCNC: <20 MMOL/L
SODIUM UR-SCNC: <20 MMOL/L
SP GR UR STRIP: 1.01 (ref 1–1.03)
SP GR UR STRIP: 1.02 (ref 1–1.03)
SQUAMOUS #/AREA URNS HPF: ABNORMAL /HPF
SQUAMOUS #/AREA URNS HPF: ABNORMAL /HPF
STRESS TARGET HR: 133 BPM
STRESS TARGET HR: 133 BPM
T&S EXPIRATION DATE: NORMAL
T&S EXPIRATION DATE: NORMAL
TIBC SERPL-MCNC: 338 MCG/DL (ref 298–536)
TIBC SERPL-MCNC: 407 MCG/DL (ref 298–536)
TRANSFERRIN SERPL-MCNC: 227 MG/DL (ref 200–360)
TRANSFERRIN SERPL-MCNC: 273 MG/DL (ref 200–360)
TRIGL SERPL-MCNC: 121 MG/DL (ref 0–150)
UNIT  ABO: NORMAL
UNIT  RH: NORMAL
URATE SERPL-MCNC: 12.4 MG/DL (ref 3.4–7)
UROBILINOGEN UR QL STRIP: ABNORMAL
UROBILINOGEN UR QL STRIP: ABNORMAL
UUN 24H UR-MCNC: 473 MG/DL
VARIANT LYMPHS NFR BLD MANUAL: 11.1 % (ref 19.6–45.3)
VENTILATOR MODE: ABNORMAL
VENTILATOR MODE: AC
VIT B12 BLD-MCNC: 709 PG/ML (ref 211–946)
VLDLC SERPL-MCNC: 21 MG/DL (ref 5–40)
VT ON VENT VENT: 450 ML
VT ON VENT VENT: 450 ML
WBC # UR STRIP: ABNORMAL /HPF
WBC # UR STRIP: ABNORMAL /HPF
WBC MORPH BLD: NORMAL
WBC NRBC COR # BLD: 10.52 10*3/MM3 (ref 3.4–10.8)
WBC NRBC COR # BLD: 18.38 10*3/MM3 (ref 3.4–10.8)
WBC NRBC COR # BLD: 7.99 10*3/MM3 (ref 3.4–10.8)
WBC NRBC COR # BLD: 8.58 10*3/MM3 (ref 3.4–10.8)
WBC NRBC COR # BLD: 8.64 10*3/MM3 (ref 3.4–10.8)
WBC NRBC COR # BLD: 9.42 10*3/MM3 (ref 3.4–10.8)
WBC NRBC COR # BLD: 9.77 10*3/MM3 (ref 3.4–10.8)
WBC NRBC COR # BLD: 9.94 10*3/MM3 (ref 3.4–10.8)
WHOLE BLOOD HOLD COAG: NORMAL
WHOLE BLOOD HOLD COAG: NORMAL
WHOLE BLOOD HOLD SPECIMEN: NORMAL
WHOLE BLOOD HOLD SPECIMEN: NORMAL

## 2023-01-01 PROCEDURE — 93325 DOPPLER ECHO COLOR FLOW MAPG: CPT

## 2023-01-01 PROCEDURE — 25010000002 NA FERRIC GLUC CPLX PER 12.5 MG: Performed by: NURSE PRACTITIONER

## 2023-01-01 PROCEDURE — 85610 PROTHROMBIN TIME: CPT | Performed by: EMERGENCY MEDICINE

## 2023-01-01 PROCEDURE — 94761 N-INVAS EAR/PLS OXIMETRY MLT: CPT

## 2023-01-01 PROCEDURE — 84132 ASSAY OF SERUM POTASSIUM: CPT | Performed by: FAMILY MEDICINE

## 2023-01-01 PROCEDURE — 99205 OFFICE O/P NEW HI 60 MIN: CPT | Performed by: INTERNAL MEDICINE

## 2023-01-01 PROCEDURE — 96368 THER/DIAG CONCURRENT INF: CPT

## 2023-01-01 PROCEDURE — 80048 BASIC METABOLIC PNL TOTAL CA: CPT | Performed by: NURSE PRACTITIONER

## 2023-01-01 PROCEDURE — 86923 COMPATIBILITY TEST ELECTRIC: CPT

## 2023-01-01 PROCEDURE — 82962 GLUCOSE BLOOD TEST: CPT

## 2023-01-01 PROCEDURE — 83605 ASSAY OF LACTIC ACID: CPT | Performed by: NURSE PRACTITIONER

## 2023-01-01 PROCEDURE — 85007 BL SMEAR W/DIFF WBC COUNT: CPT | Performed by: EMERGENCY MEDICINE

## 2023-01-01 PROCEDURE — 83735 ASSAY OF MAGNESIUM: CPT | Performed by: NURSE PRACTITIONER

## 2023-01-01 PROCEDURE — 63710000001 INSULIN REGULAR HUMAN PER 5 UNITS: Performed by: EMERGENCY MEDICINE

## 2023-01-01 PROCEDURE — 85027 COMPLETE CBC AUTOMATED: CPT | Performed by: NURSE PRACTITIONER

## 2023-01-01 PROCEDURE — 71046 X-RAY EXAM CHEST 2 VIEWS: CPT

## 2023-01-01 PROCEDURE — 94640 AIRWAY INHALATION TREATMENT: CPT

## 2023-01-01 PROCEDURE — 94664 DEMO&/EVAL PT USE INHALER: CPT

## 2023-01-01 PROCEDURE — 99232 SBSQ HOSP IP/OBS MODERATE 35: CPT | Performed by: INTERNAL MEDICINE

## 2023-01-01 PROCEDURE — 99285 EMERGENCY DEPT VISIT HI MDM: CPT

## 2023-01-01 PROCEDURE — 80061 LIPID PANEL: CPT | Performed by: NURSE PRACTITIONER

## 2023-01-01 PROCEDURE — 94799 UNLISTED PULMONARY SVC/PX: CPT

## 2023-01-01 PROCEDURE — 25010000002 SODIUM CHLORIDE 0.9 % WITH KCL 20 MEQ 20-0.9 MEQ/L-% SOLUTION: Performed by: INTERNAL MEDICINE

## 2023-01-01 PROCEDURE — 99221 1ST HOSP IP/OBS SF/LOW 40: CPT | Performed by: CLINICAL NURSE SPECIALIST

## 2023-01-01 PROCEDURE — 93321 DOPPLER ECHO F-UP/LMTD STD: CPT | Performed by: INTERNAL MEDICINE

## 2023-01-01 PROCEDURE — 31500 INSERT EMERGENCY AIRWAY: CPT

## 2023-01-01 PROCEDURE — 82803 BLOOD GASES ANY COMBINATION: CPT

## 2023-01-01 PROCEDURE — 85018 HEMOGLOBIN: CPT | Performed by: NURSE PRACTITIONER

## 2023-01-01 PROCEDURE — 25010000002 ATROPINE SULFATE

## 2023-01-01 PROCEDURE — 0 POTASSIUM CHLORIDE PER 2 MEQ: Performed by: FAMILY MEDICINE

## 2023-01-01 PROCEDURE — 83540 ASSAY OF IRON: CPT | Performed by: NURSE PRACTITIONER

## 2023-01-01 PROCEDURE — 93005 ELECTROCARDIOGRAM TRACING: CPT | Performed by: NURSE PRACTITIONER

## 2023-01-01 PROCEDURE — 85025 COMPLETE CBC W/AUTO DIFF WBC: CPT | Performed by: FAMILY MEDICINE

## 2023-01-01 PROCEDURE — 86850 RBC ANTIBODY SCREEN: CPT | Performed by: NURSE PRACTITIONER

## 2023-01-01 PROCEDURE — 85025 COMPLETE CBC W/AUTO DIFF WBC: CPT | Performed by: INTERNAL MEDICINE

## 2023-01-01 PROCEDURE — 86850 RBC ANTIBODY SCREEN: CPT | Performed by: EMERGENCY MEDICINE

## 2023-01-01 PROCEDURE — 85018 HEMOGLOBIN: CPT | Performed by: INTERNAL MEDICINE

## 2023-01-01 PROCEDURE — 25010000002 EPINEPHRINE 1 MG/10ML SOLUTION PREFILLED SYRINGE

## 2023-01-01 PROCEDURE — 83735 ASSAY OF MAGNESIUM: CPT | Performed by: FAMILY MEDICINE

## 2023-01-01 PROCEDURE — 25010000002 SUCCINYLCHOLINE PER 20 MG: Performed by: EMERGENCY MEDICINE

## 2023-01-01 PROCEDURE — 83970 ASSAY OF PARATHORMONE: CPT | Performed by: INTERNAL MEDICINE

## 2023-01-01 PROCEDURE — 85014 HEMATOCRIT: CPT | Performed by: INTERNAL MEDICINE

## 2023-01-01 PROCEDURE — 83050 HGB METHEMOGLOBIN QUAN: CPT

## 2023-01-01 PROCEDURE — 36430 TRANSFUSION BLD/BLD COMPNT: CPT

## 2023-01-01 PROCEDURE — 84466 ASSAY OF TRANSFERRIN: CPT | Performed by: NURSE PRACTITIONER

## 2023-01-01 PROCEDURE — 85014 HEMATOCRIT: CPT | Performed by: FAMILY MEDICINE

## 2023-01-01 PROCEDURE — 85014 HEMATOCRIT: CPT | Performed by: NURSE PRACTITIONER

## 2023-01-01 PROCEDURE — 85018 HEMOGLOBIN: CPT | Performed by: EMERGENCY MEDICINE

## 2023-01-01 PROCEDURE — 63710000001 INSULIN LISPRO (HUMAN) PER 5 UNITS: Performed by: INTERNAL MEDICINE

## 2023-01-01 PROCEDURE — 63710000001 INSULIN LISPRO (HUMAN) PER 5 UNITS: Performed by: NURSE PRACTITIONER

## 2023-01-01 PROCEDURE — 84300 ASSAY OF URINE SODIUM: CPT | Performed by: INTERNAL MEDICINE

## 2023-01-01 PROCEDURE — 82746 ASSAY OF FOLIC ACID SERUM: CPT | Performed by: INTERNAL MEDICINE

## 2023-01-01 PROCEDURE — P9016 RBC LEUKOCYTES REDUCED: HCPCS

## 2023-01-01 PROCEDURE — 25010000002 PROPOFOL 10 MG/ML EMULSION: Performed by: NURSE ANESTHETIST, CERTIFIED REGISTERED

## 2023-01-01 PROCEDURE — A9552 F18 FDG: HCPCS | Performed by: SPECIALIST

## 2023-01-01 PROCEDURE — 84156 ASSAY OF PROTEIN URINE: CPT | Performed by: NURSE PRACTITIONER

## 2023-01-01 PROCEDURE — 93000 ELECTROCARDIOGRAM COMPLETE: CPT | Performed by: NURSE PRACTITIONER

## 2023-01-01 PROCEDURE — 85018 HEMOGLOBIN: CPT | Performed by: FAMILY MEDICINE

## 2023-01-01 PROCEDURE — 25010000002 DOPAMINE PER 40 MG

## 2023-01-01 PROCEDURE — 82375 ASSAY CARBOXYHB QUANT: CPT

## 2023-01-01 PROCEDURE — 63710000001 INSULIN DETEMIR PER 5 UNITS: Performed by: FAMILY MEDICINE

## 2023-01-01 PROCEDURE — 71045 X-RAY EXAM CHEST 1 VIEW: CPT

## 2023-01-01 PROCEDURE — 84132 ASSAY OF SERUM POTASSIUM: CPT | Performed by: ANESTHESIOLOGY

## 2023-01-01 PROCEDURE — 84540 ASSAY OF URINE/UREA-N: CPT | Performed by: NURSE PRACTITIONER

## 2023-01-01 PROCEDURE — 86901 BLOOD TYPING SEROLOGIC RH(D): CPT

## 2023-01-01 PROCEDURE — 92960 CARDIOVERSION ELECTRIC EXT: CPT

## 2023-01-01 PROCEDURE — 83690 ASSAY OF LIPASE: CPT | Performed by: NURSE PRACTITIONER

## 2023-01-01 PROCEDURE — 25010000002 DIGOXIN PER 500 MCG: Performed by: FAMILY MEDICINE

## 2023-01-01 PROCEDURE — 93010 ELECTROCARDIOGRAM REPORT: CPT | Performed by: EMERGENCY MEDICINE

## 2023-01-01 PROCEDURE — 85045 AUTOMATED RETICULOCYTE COUNT: CPT | Performed by: NURSE PRACTITIONER

## 2023-01-01 PROCEDURE — 86901 BLOOD TYPING SEROLOGIC RH(D): CPT | Performed by: NURSE PRACTITIONER

## 2023-01-01 PROCEDURE — 82272 OCCULT BLD FECES 1-3 TESTS: CPT | Performed by: FAMILY MEDICINE

## 2023-01-01 PROCEDURE — 25010000002 FENTANYL CITRATE (PF) 50 MCG/ML SOLUTION: Performed by: INTERNAL MEDICINE

## 2023-01-01 PROCEDURE — 25010000002 LEVOFLOXACIN PER 250 MG: Performed by: INTERNAL MEDICINE

## 2023-01-01 PROCEDURE — 25010000002 EPINEPHRINE 1 MG/10ML SOLUTION PREFILLED SYRINGE: Performed by: NURSE ANESTHETIST, CERTIFIED REGISTERED

## 2023-01-01 PROCEDURE — 25010000002 LEVOFLOXACIN PER 250 MG: Performed by: NURSE PRACTITIONER

## 2023-01-01 PROCEDURE — 93321 DOPPLER ECHO F-UP/LMTD STD: CPT

## 2023-01-01 PROCEDURE — 99291 CRITICAL CARE FIRST HOUR: CPT

## 2023-01-01 PROCEDURE — 0 FLUDEOXYGLUCOSE F18 SOLUTION: Performed by: SPECIALIST

## 2023-01-01 PROCEDURE — 83036 HEMOGLOBIN GLYCOSYLATED A1C: CPT | Performed by: NURSE PRACTITIONER

## 2023-01-01 PROCEDURE — 99214 OFFICE O/P EST MOD 30 MIN: CPT | Performed by: SPECIALIST

## 2023-01-01 PROCEDURE — 99214 OFFICE O/P EST MOD 30 MIN: CPT | Performed by: PSYCHIATRY & NEUROLOGY

## 2023-01-01 PROCEDURE — 83735 ASSAY OF MAGNESIUM: CPT | Performed by: EMERGENCY MEDICINE

## 2023-01-01 PROCEDURE — 78815 PET IMAGE W/CT SKULL-THIGH: CPT

## 2023-01-01 PROCEDURE — 80053 COMPREHEN METABOLIC PANEL: CPT | Performed by: INTERNAL MEDICINE

## 2023-01-01 PROCEDURE — 25010000002 ONDANSETRON PER 1 MG: Performed by: NURSE PRACTITIONER

## 2023-01-01 PROCEDURE — 80053 COMPREHEN METABOLIC PANEL: CPT | Performed by: NURSE PRACTITIONER

## 2023-01-01 PROCEDURE — 93010 ELECTROCARDIOGRAM REPORT: CPT | Performed by: HOSPITALIST

## 2023-01-01 PROCEDURE — 84100 ASSAY OF PHOSPHORUS: CPT | Performed by: NURSE PRACTITIONER

## 2023-01-01 PROCEDURE — 82728 ASSAY OF FERRITIN: CPT | Performed by: INTERNAL MEDICINE

## 2023-01-01 PROCEDURE — 63710000001 INSULIN DETEMIR PER 5 UNITS: Performed by: INTERNAL MEDICINE

## 2023-01-01 PROCEDURE — 96366 THER/PROPH/DIAG IV INF ADDON: CPT

## 2023-01-01 PROCEDURE — 86901 BLOOD TYPING SEROLOGIC RH(D): CPT | Performed by: EMERGENCY MEDICINE

## 2023-01-01 PROCEDURE — 93005 ELECTROCARDIOGRAM TRACING: CPT

## 2023-01-01 PROCEDURE — 25010000002 PROPOFOL 10 MG/ML EMULSION: Performed by: EMERGENCY MEDICINE

## 2023-01-01 PROCEDURE — 85014 HEMATOCRIT: CPT | Performed by: EMERGENCY MEDICINE

## 2023-01-01 PROCEDURE — 85025 COMPLETE CBC W/AUTO DIFF WBC: CPT | Performed by: NURSE PRACTITIONER

## 2023-01-01 PROCEDURE — 96376 TX/PRO/DX INJ SAME DRUG ADON: CPT

## 2023-01-01 PROCEDURE — 85025 COMPLETE CBC W/AUTO DIFF WBC: CPT | Performed by: EMERGENCY MEDICINE

## 2023-01-01 PROCEDURE — 63710000001 INSULIN DETEMIR PER 5 UNITS: Performed by: NURSE PRACTITIONER

## 2023-01-01 PROCEDURE — 82728 ASSAY OF FERRITIN: CPT | Performed by: NURSE PRACTITIONER

## 2023-01-01 PROCEDURE — 93010 ELECTROCARDIOGRAM REPORT: CPT | Performed by: INTERNAL MEDICINE

## 2023-01-01 PROCEDURE — 82805 BLOOD GASES W/O2 SATURATION: CPT

## 2023-01-01 PROCEDURE — 85014 HEMATOCRIT: CPT | Performed by: ANESTHESIOLOGY

## 2023-01-01 PROCEDURE — 25010000002 ATROPINE SULFATE 1 MG/10ML SOLUTION PREFILLED SYRINGE: Performed by: EMERGENCY MEDICINE

## 2023-01-01 PROCEDURE — 86900 BLOOD TYPING SEROLOGIC ABO: CPT

## 2023-01-01 PROCEDURE — 96375 TX/PRO/DX INJ NEW DRUG ADDON: CPT

## 2023-01-01 PROCEDURE — 83935 ASSAY OF URINE OSMOLALITY: CPT | Performed by: INTERNAL MEDICINE

## 2023-01-01 PROCEDURE — 70450 CT HEAD/BRAIN W/O DYE: CPT

## 2023-01-01 PROCEDURE — 63710000001 INSULIN ASPART PER 5 UNITS: Performed by: INTERNAL MEDICINE

## 2023-01-01 PROCEDURE — 99204 OFFICE O/P NEW MOD 45 MIN: CPT | Performed by: SPECIALIST

## 2023-01-01 PROCEDURE — 74176 CT ABD & PELVIS W/O CONTRAST: CPT

## 2023-01-01 PROCEDURE — 88305 TISSUE EXAM BY PATHOLOGIST: CPT | Performed by: INTERNAL MEDICINE

## 2023-01-01 PROCEDURE — 25010000002 MIDAZOLAM PER 1 MG: Performed by: INTERNAL MEDICINE

## 2023-01-01 PROCEDURE — 80053 COMPREHEN METABOLIC PANEL: CPT | Performed by: EMERGENCY MEDICINE

## 2023-01-01 PROCEDURE — 99152 MOD SED SAME PHYS/QHP 5/>YRS: CPT

## 2023-01-01 PROCEDURE — 85018 HEMOGLOBIN: CPT | Performed by: ANESTHESIOLOGY

## 2023-01-01 PROCEDURE — 72131 CT LUMBAR SPINE W/O DYE: CPT

## 2023-01-01 PROCEDURE — 87636 SARSCOV2 & INF A&B AMP PRB: CPT | Performed by: NURSE PRACTITIONER

## 2023-01-01 PROCEDURE — 25010000002 CALCIUM GLUCONATE PER 10 ML: Performed by: EMERGENCY MEDICINE

## 2023-01-01 PROCEDURE — 93010 ELECTROCARDIOGRAM REPORT: CPT | Performed by: STUDENT IN AN ORGANIZED HEALTH CARE EDUCATION/TRAINING PROGRAM

## 2023-01-01 PROCEDURE — P9612 CATHETERIZE FOR URINE SPEC: HCPCS

## 2023-01-01 PROCEDURE — 83540 ASSAY OF IRON: CPT | Performed by: INTERNAL MEDICINE

## 2023-01-01 PROCEDURE — 36600 WITHDRAWAL OF ARTERIAL BLOOD: CPT

## 2023-01-01 PROCEDURE — 93312 ECHO TRANSESOPHAGEAL: CPT | Performed by: INTERNAL MEDICINE

## 2023-01-01 PROCEDURE — 82607 VITAMIN B-12: CPT | Performed by: INTERNAL MEDICINE

## 2023-01-01 PROCEDURE — 93005 ELECTROCARDIOGRAM TRACING: CPT | Performed by: ANESTHESIOLOGY

## 2023-01-01 PROCEDURE — 83690 ASSAY OF LIPASE: CPT | Performed by: EMERGENCY MEDICINE

## 2023-01-01 PROCEDURE — 43239 EGD BIOPSY SINGLE/MULTIPLE: CPT | Performed by: INTERNAL MEDICINE

## 2023-01-01 PROCEDURE — 93312 ECHO TRANSESOPHAGEAL: CPT

## 2023-01-01 PROCEDURE — 93005 ELECTROCARDIOGRAM TRACING: CPT | Performed by: INTERNAL MEDICINE

## 2023-01-01 PROCEDURE — 96365 THER/PROPH/DIAG IV INF INIT: CPT

## 2023-01-01 PROCEDURE — 86140 C-REACTIVE PROTEIN: CPT | Performed by: NURSE PRACTITIONER

## 2023-01-01 PROCEDURE — 81001 URINALYSIS AUTO W/SCOPE: CPT | Performed by: NURSE PRACTITIONER

## 2023-01-01 PROCEDURE — 80053 COMPREHEN METABOLIC PANEL: CPT | Performed by: FAMILY MEDICINE

## 2023-01-01 PROCEDURE — 85610 PROTHROMBIN TIME: CPT | Performed by: NURSE PRACTITIONER

## 2023-01-01 PROCEDURE — 81001 URINALYSIS AUTO W/SCOPE: CPT | Performed by: EMERGENCY MEDICINE

## 2023-01-01 PROCEDURE — 93005 ELECTROCARDIOGRAM TRACING: CPT | Performed by: EMERGENCY MEDICINE

## 2023-01-01 PROCEDURE — 99214 OFFICE O/P EST MOD 30 MIN: CPT | Performed by: NURSE PRACTITIONER

## 2023-01-01 PROCEDURE — 36415 COLL VENOUS BLD VENIPUNCTURE: CPT | Performed by: NURSE PRACTITIONER

## 2023-01-01 PROCEDURE — 86900 BLOOD TYPING SEROLOGIC ABO: CPT | Performed by: EMERGENCY MEDICINE

## 2023-01-01 PROCEDURE — 82947 ASSAY GLUCOSE BLOOD QUANT: CPT | Performed by: FAMILY MEDICINE

## 2023-01-01 PROCEDURE — 94002 VENT MGMT INPAT INIT DAY: CPT

## 2023-01-01 PROCEDURE — 83735 ASSAY OF MAGNESIUM: CPT | Performed by: INTERNAL MEDICINE

## 2023-01-01 PROCEDURE — 93325 DOPPLER ECHO COLOR FLOW MAPG: CPT | Performed by: INTERNAL MEDICINE

## 2023-01-01 PROCEDURE — 92950 HEART/LUNG RESUSCITATION CPR: CPT

## 2023-01-01 PROCEDURE — 83880 ASSAY OF NATRIURETIC PEPTIDE: CPT | Performed by: NURSE PRACTITIONER

## 2023-01-01 PROCEDURE — 86900 BLOOD TYPING SEROLOGIC ABO: CPT | Performed by: NURSE PRACTITIONER

## 2023-01-01 PROCEDURE — 84466 ASSAY OF TRANSFERRIN: CPT | Performed by: INTERNAL MEDICINE

## 2023-01-01 PROCEDURE — 0DB68ZX EXCISION OF STOMACH, VIA NATURAL OR ARTIFICIAL OPENING ENDOSCOPIC, DIAGNOSTIC: ICD-10-PCS | Performed by: INTERNAL MEDICINE

## 2023-01-01 PROCEDURE — 82570 ASSAY OF URINE CREATININE: CPT | Performed by: NURSE PRACTITIONER

## 2023-01-01 PROCEDURE — 86920 COMPATIBILITY TEST SPIN: CPT

## 2023-01-01 PROCEDURE — 25010000002 EPINEPHRINE 1 MG/10ML SOLUTION PREFILLED SYRINGE: Performed by: EMERGENCY MEDICINE

## 2023-01-01 PROCEDURE — 84550 ASSAY OF BLOOD/URIC ACID: CPT | Performed by: INTERNAL MEDICINE

## 2023-01-01 PROCEDURE — 74018 RADEX ABDOMEN 1 VIEW: CPT

## 2023-01-01 PROCEDURE — 84300 ASSAY OF URINE SODIUM: CPT | Performed by: NURSE PRACTITIONER

## 2023-01-01 RX ORDER — PROPOFOL 10 MG/ML
VIAL (ML) INTRAVENOUS AS NEEDED
Status: DISCONTINUED | OUTPATIENT
Start: 2023-01-01 | End: 2023-01-01 | Stop reason: SURG

## 2023-01-01 RX ORDER — BUPIVACAINE HCL/0.9 % NACL/PF 0.125 %
PLASTIC BAG, INJECTION (ML) EPIDURAL AS NEEDED
Status: DISCONTINUED | OUTPATIENT
Start: 2023-01-01 | End: 2023-01-01 | Stop reason: SURG

## 2023-01-01 RX ORDER — SODIUM CHLORIDE 9 MG/ML
40 INJECTION, SOLUTION INTRAVENOUS AS NEEDED
Status: CANCELLED | OUTPATIENT
Start: 2023-01-01

## 2023-01-01 RX ORDER — POTASSIUM CHLORIDE 750 MG/1
20 CAPSULE, EXTENDED RELEASE ORAL 2 TIMES DAILY WITH MEALS
Status: DISCONTINUED | OUTPATIENT
Start: 2023-01-01 | End: 2023-01-01

## 2023-01-01 RX ORDER — METRONIDAZOLE 500 MG/100ML
500 INJECTION, SOLUTION INTRAVENOUS EVERY 8 HOURS
Status: DISCONTINUED | OUTPATIENT
Start: 2023-01-01 | End: 2023-01-01 | Stop reason: HOSPADM

## 2023-01-01 RX ORDER — SODIUM CHLORIDE 0.9 % (FLUSH) 0.9 %
10 SYRINGE (ML) INJECTION EVERY 12 HOURS SCHEDULED
Status: DISCONTINUED | OUTPATIENT
Start: 2023-01-01 | End: 2023-01-01 | Stop reason: HOSPADM

## 2023-01-01 RX ORDER — SODIUM CHLORIDE 9 MG/ML
100 INJECTION, SOLUTION INTRAVENOUS CONTINUOUS
Status: DISCONTINUED | OUTPATIENT
Start: 2023-01-01 | End: 2023-01-01

## 2023-01-01 RX ORDER — NICOTINE POLACRILEX 4 MG
15 LOZENGE BUCCAL
Status: DISCONTINUED | OUTPATIENT
Start: 2023-01-01 | End: 2023-01-01 | Stop reason: HOSPADM

## 2023-01-01 RX ORDER — BETAMETHASONE DIPROPIONATE 0.05 %
1 OINTMENT (GRAM) TOPICAL 2 TIMES DAILY
COMMUNITY

## 2023-01-01 RX ORDER — MIDAZOLAM HYDROCHLORIDE 1 MG/ML
INJECTION INTRAMUSCULAR; INTRAVENOUS
Status: DISCONTINUED
Start: 2023-01-01 | End: 2023-01-01 | Stop reason: WASHOUT

## 2023-01-01 RX ORDER — EPINEPHRINE 0.1 MG/ML
SYRINGE (ML) INJECTION AS NEEDED
Status: DISCONTINUED | OUTPATIENT
Start: 2023-01-01 | End: 2023-01-01 | Stop reason: SURG

## 2023-01-01 RX ORDER — SODIUM CHLORIDE 9 MG/ML
40 INJECTION, SOLUTION INTRAVENOUS AS NEEDED
Status: DISCONTINUED | OUTPATIENT
Start: 2023-01-01 | End: 2023-01-01 | Stop reason: HOSPADM

## 2023-01-01 RX ORDER — POTASSIUM CHLORIDE 750 MG/1
40 CAPSULE, EXTENDED RELEASE ORAL ONCE
Status: DISCONTINUED | OUTPATIENT
Start: 2023-01-01 | End: 2023-01-01

## 2023-01-01 RX ORDER — CLOPIDOGREL BISULFATE 75 MG/1
TABLET ORAL
Qty: 90 TABLET | Refills: 1 | Status: SHIPPED | OUTPATIENT
Start: 2023-01-01 | End: 2023-01-01 | Stop reason: ALTCHOICE

## 2023-01-01 RX ORDER — FENTANYL CITRATE 50 UG/ML
INJECTION, SOLUTION INTRAMUSCULAR; INTRAVENOUS
Status: DISCONTINUED
Start: 2023-01-01 | End: 2023-01-01 | Stop reason: HOSPADM

## 2023-01-01 RX ORDER — METOPROLOL SUCCINATE 100 MG/1
100 TABLET, EXTENDED RELEASE ORAL
Status: DISCONTINUED | OUTPATIENT
Start: 2023-01-01 | End: 2023-01-01 | Stop reason: HOSPADM

## 2023-01-01 RX ORDER — PANTOPRAZOLE SODIUM 40 MG/1
40 TABLET, DELAYED RELEASE ORAL 2 TIMES DAILY
Status: DISCONTINUED | OUTPATIENT
Start: 2023-01-01 | End: 2023-01-01 | Stop reason: HOSPADM

## 2023-01-01 RX ORDER — ACETAMINOPHEN 650 MG/1
650 SUPPOSITORY RECTAL EVERY 4 HOURS PRN
Status: DISCONTINUED | OUTPATIENT
Start: 2023-01-01 | End: 2023-01-01 | Stop reason: HOSPADM

## 2023-01-01 RX ORDER — SODIUM CHLORIDE 0.9 % (FLUSH) 0.9 %
10 SYRINGE (ML) INJECTION AS NEEDED
Status: DISCONTINUED | OUTPATIENT
Start: 2023-01-01 | End: 2023-01-01 | Stop reason: HOSPADM

## 2023-01-01 RX ORDER — ETOMIDATE 2 MG/ML
INJECTION INTRAVENOUS
Status: COMPLETED | OUTPATIENT
Start: 2023-01-01 | End: 2023-01-01

## 2023-01-01 RX ORDER — MAGNESIUM SULFATE HEPTAHYDRATE 40 MG/ML
2 INJECTION, SOLUTION INTRAVENOUS ONCE
Status: DISCONTINUED | OUTPATIENT
Start: 2023-01-01 | End: 2023-01-01 | Stop reason: HOSPADM

## 2023-01-01 RX ORDER — INSULIN ASPART 100 [IU]/ML
15 INJECTION, SUSPENSION SUBCUTANEOUS 2 TIMES DAILY WITH MEALS
Status: DISCONTINUED | OUTPATIENT
Start: 2023-01-01 | End: 2023-01-01 | Stop reason: HOSPADM

## 2023-01-01 RX ORDER — DIGOXIN 0.25 MG/ML
125 INJECTION INTRAMUSCULAR; INTRAVENOUS ONCE
Status: COMPLETED | OUTPATIENT
Start: 2023-01-01 | End: 2023-01-01

## 2023-01-01 RX ORDER — SODIUM CHLORIDE 0.9 % (FLUSH) 0.9 %
10 SYRINGE (ML) INJECTION EVERY 12 HOURS SCHEDULED
Status: CANCELLED | OUTPATIENT
Start: 2023-01-01

## 2023-01-01 RX ORDER — IPRATROPIUM BROMIDE AND ALBUTEROL SULFATE 2.5; .5 MG/3ML; MG/3ML
3 SOLUTION RESPIRATORY (INHALATION) EVERY 4 HOURS PRN
Status: DISCONTINUED | OUTPATIENT
Start: 2023-01-01 | End: 2023-01-01 | Stop reason: HOSPADM

## 2023-01-01 RX ORDER — FUROSEMIDE 40 MG/1
40 TABLET ORAL DAILY
Status: DISCONTINUED | OUTPATIENT
Start: 2023-01-01 | End: 2023-01-01 | Stop reason: HOSPADM

## 2023-01-01 RX ORDER — SODIUM CHLORIDE AND POTASSIUM CHLORIDE 150; 900 MG/100ML; MG/100ML
50 INJECTION, SOLUTION INTRAVENOUS CONTINUOUS
Status: DISCONTINUED | OUTPATIENT
Start: 2023-01-01 | End: 2023-01-01

## 2023-01-01 RX ORDER — DEXTROSE MONOHYDRATE 25 G/50ML
INJECTION, SOLUTION INTRAVENOUS
Status: DISCONTINUED
Start: 2023-01-01 | End: 2023-01-01 | Stop reason: HOSPADM

## 2023-01-01 RX ORDER — POTASSIUM CHLORIDE 1.5 G/1.77G
40 POWDER, FOR SOLUTION ORAL AS NEEDED
Status: DISCONTINUED | OUTPATIENT
Start: 2023-01-01 | End: 2023-01-01 | Stop reason: HOSPADM

## 2023-01-01 RX ORDER — BUDESONIDE AND FORMOTEROL FUMARATE DIHYDRATE 160; 4.5 UG/1; UG/1
2 AEROSOL RESPIRATORY (INHALATION)
Status: DISCONTINUED | OUTPATIENT
Start: 2023-01-01 | End: 2023-01-01 | Stop reason: HOSPADM

## 2023-01-01 RX ORDER — LEVOFLOXACIN 500 MG/1
500 TABLET, FILM COATED ORAL EVERY OTHER DAY
Qty: 3 TABLET | Refills: 0 | Status: SHIPPED | OUTPATIENT
Start: 2023-01-01

## 2023-01-01 RX ORDER — METOPROLOL TARTRATE 5 MG/5ML
2.5 INJECTION INTRAVENOUS
Status: COMPLETED | OUTPATIENT
Start: 2023-01-01 | End: 2023-01-01

## 2023-01-01 RX ORDER — DEXTROSE MONOHYDRATE 25 G/50ML
50 INJECTION, SOLUTION INTRAVENOUS ONCE
Status: COMPLETED | OUTPATIENT
Start: 2023-01-01 | End: 2023-01-01

## 2023-01-01 RX ORDER — DEXTROSE MONOHYDRATE 25 G/50ML
25 INJECTION, SOLUTION INTRAVENOUS
Status: DISCONTINUED | OUTPATIENT
Start: 2023-01-01 | End: 2023-01-01 | Stop reason: HOSPADM

## 2023-01-01 RX ORDER — ALLOPURINOL 100 MG/1
100 TABLET ORAL DAILY
Status: DISCONTINUED | OUTPATIENT
Start: 2023-01-01 | End: 2023-01-01 | Stop reason: HOSPADM

## 2023-01-01 RX ORDER — DEXTROSE MONOHYDRATE 25 G/50ML
INJECTION, SOLUTION INTRAVENOUS
Status: COMPLETED | OUTPATIENT
Start: 2023-01-01 | End: 2023-01-01

## 2023-01-01 RX ORDER — ESMOLOL HYDROCHLORIDE 10 MG/ML
INJECTION INTRAVENOUS AS NEEDED
Status: DISCONTINUED | OUTPATIENT
Start: 2023-01-01 | End: 2023-01-01 | Stop reason: SURG

## 2023-01-01 RX ORDER — POTASSIUM CHLORIDE 750 MG/1
20 CAPSULE, EXTENDED RELEASE ORAL 2 TIMES DAILY WITH MEALS
Status: DISPENSED | OUTPATIENT
Start: 2023-01-01 | End: 2023-01-01

## 2023-01-01 RX ORDER — ACETAMINOPHEN 160 MG/5ML
650 SOLUTION ORAL EVERY 4 HOURS PRN
Status: DISCONTINUED | OUTPATIENT
Start: 2023-01-01 | End: 2023-01-01 | Stop reason: HOSPADM

## 2023-01-01 RX ORDER — SODIUM CHLORIDE 9 MG/ML
75 INJECTION, SOLUTION INTRAVENOUS CONTINUOUS
Status: DISCONTINUED | OUTPATIENT
Start: 2023-01-01 | End: 2023-01-01

## 2023-01-01 RX ORDER — FERROUS SULFATE 325(65) MG
325 TABLET ORAL 2 TIMES DAILY
COMMUNITY

## 2023-01-01 RX ORDER — PHENYLEPHRINE HYDROCHLORIDE 10 MG/ML
100 INJECTION INTRAVENOUS ONCE
Status: DISCONTINUED | OUTPATIENT
Start: 2023-01-01 | End: 2023-01-01 | Stop reason: ALTCHOICE

## 2023-01-01 RX ORDER — CALCITRIOL 0.25 UG/1
0.5 CAPSULE, LIQUID FILLED ORAL DAILY
Status: DISCONTINUED | OUTPATIENT
Start: 2023-01-01 | End: 2023-01-01 | Stop reason: HOSPADM

## 2023-01-01 RX ORDER — CLOPIDOGREL BISULFATE 75 MG/1
TABLET ORAL
COMMUNITY
Start: 2023-01-01 | End: 2023-01-01

## 2023-01-01 RX ORDER — LEVOFLOXACIN 5 MG/ML
500 INJECTION, SOLUTION INTRAVENOUS
Status: DISCONTINUED | OUTPATIENT
Start: 2023-01-01 | End: 2023-01-01 | Stop reason: HOSPADM

## 2023-01-01 RX ORDER — POTASSIUM CHLORIDE 29.8 MG/ML
20 INJECTION INTRAVENOUS
Status: COMPLETED | OUTPATIENT
Start: 2023-01-01 | End: 2023-01-01

## 2023-01-01 RX ORDER — ONDANSETRON 4 MG/1
4 TABLET, FILM COATED ORAL EVERY 6 HOURS PRN
Status: DISCONTINUED | OUTPATIENT
Start: 2023-01-01 | End: 2023-01-01 | Stop reason: HOSPADM

## 2023-01-01 RX ORDER — SODIUM CHLORIDE 9 MG/ML
20 INJECTION, SOLUTION INTRAVENOUS CONTINUOUS
Status: DISCONTINUED | OUTPATIENT
Start: 2023-01-01 | End: 2023-01-01 | Stop reason: HOSPADM

## 2023-01-01 RX ORDER — ACETAMINOPHEN 325 MG/1
650 TABLET ORAL EVERY 4 HOURS PRN
Status: DISCONTINUED | OUTPATIENT
Start: 2023-01-01 | End: 2023-01-01 | Stop reason: HOSPADM

## 2023-01-01 RX ORDER — PHENYLEPHRINE HYDROCHLORIDE 10 MG/ML
100 INJECTION INTRAVENOUS ONCE
Status: DISCONTINUED | OUTPATIENT
Start: 2023-01-01 | End: 2023-01-01

## 2023-01-01 RX ORDER — METRONIDAZOLE 375 MG/1
375 CAPSULE ORAL 3 TIMES DAILY
Qty: 15 CAPSULE | Refills: 0 | Status: SHIPPED | OUTPATIENT
Start: 2023-01-01 | End: 2023-01-01

## 2023-01-01 RX ORDER — SODIUM CHLORIDE 0.9 % (FLUSH) 0.9 %
10 SYRINGE (ML) INJECTION AS NEEDED
Status: CANCELLED | OUTPATIENT
Start: 2023-01-01

## 2023-01-01 RX ORDER — METOPROLOL SUCCINATE 100 MG/1
100 TABLET, EXTENDED RELEASE ORAL
Status: DISCONTINUED | OUTPATIENT
Start: 2023-01-01 | End: 2023-01-01

## 2023-01-01 RX ORDER — ASPIRIN 81 MG/1
81 TABLET ORAL DAILY
Status: DISCONTINUED | OUTPATIENT
Start: 2023-01-01 | End: 2023-01-01

## 2023-01-01 RX ORDER — NOREPINEPHRINE BIT/0.9 % NACL 8 MG/250ML
.02-.3 INFUSION BOTTLE (ML) INTRAVENOUS
Status: DISCONTINUED | OUTPATIENT
Start: 2023-01-01 | End: 2023-01-01 | Stop reason: HOSPADM

## 2023-01-01 RX ORDER — SODIUM CHLORIDE 9 MG/ML
20 INJECTION, SOLUTION INTRAVENOUS CONTINUOUS
Status: CANCELLED | OUTPATIENT
Start: 2023-01-01

## 2023-01-01 RX ORDER — INSULIN LISPRO 100 [IU]/ML
2-7 INJECTION, SOLUTION INTRAVENOUS; SUBCUTANEOUS
Status: DISCONTINUED | OUTPATIENT
Start: 2023-01-01 | End: 2023-01-01 | Stop reason: HOSPADM

## 2023-01-01 RX ORDER — ONDANSETRON 2 MG/ML
4 INJECTION INTRAMUSCULAR; INTRAVENOUS EVERY 6 HOURS PRN
Status: DISCONTINUED | OUTPATIENT
Start: 2023-01-01 | End: 2023-01-01 | Stop reason: HOSPADM

## 2023-01-01 RX ORDER — ATROPINE SULFATE 0.1 MG/ML
INJECTION INTRAVENOUS
Status: DISCONTINUED | OUTPATIENT
Start: 2023-01-01 | End: 2023-01-01 | Stop reason: HOSPADM

## 2023-01-01 RX ORDER — ONDANSETRON 2 MG/ML
4 INJECTION INTRAMUSCULAR; INTRAVENOUS ONCE
Status: COMPLETED | OUTPATIENT
Start: 2023-01-01 | End: 2023-01-01

## 2023-01-01 RX ORDER — POTASSIUM CHLORIDE 750 MG/1
40 CAPSULE, EXTENDED RELEASE ORAL AS NEEDED
Status: DISCONTINUED | OUTPATIENT
Start: 2023-01-01 | End: 2023-01-01 | Stop reason: HOSPADM

## 2023-01-01 RX ORDER — SUCCINYLCHOLINE CHLORIDE 20 MG/ML
INJECTION INTRAMUSCULAR; INTRAVENOUS
Status: COMPLETED | OUTPATIENT
Start: 2023-01-01 | End: 2023-01-01

## 2023-01-01 RX ORDER — DILTIAZEM HYDROCHLORIDE 5 MG/ML
5 INJECTION INTRAVENOUS EVERY 6 HOURS PRN
Status: DISCONTINUED | OUTPATIENT
Start: 2023-01-01 | End: 2023-01-01 | Stop reason: HOSPADM

## 2023-01-01 RX ORDER — ATORVASTATIN CALCIUM 40 MG/1
80 TABLET, FILM COATED ORAL NIGHTLY
Status: DISCONTINUED | OUTPATIENT
Start: 2023-01-01 | End: 2023-01-01 | Stop reason: HOSPADM

## 2023-01-01 RX ORDER — CALCITRIOL 0.5 UG/1
0.5 CAPSULE, LIQUID FILLED ORAL DAILY
Qty: 30 CAPSULE | Refills: 0 | Status: SHIPPED | OUTPATIENT
Start: 2023-01-01

## 2023-01-01 RX ORDER — DEXTROSE MONOHYDRATE 25 G/50ML
50 INJECTION, SOLUTION INTRAVENOUS
Status: DISCONTINUED | OUTPATIENT
Start: 2023-01-01 | End: 2023-01-01 | Stop reason: HOSPADM

## 2023-01-01 RX ORDER — MIDAZOLAM HYDROCHLORIDE 1 MG/ML
INJECTION INTRAMUSCULAR; INTRAVENOUS
Status: COMPLETED | OUTPATIENT
Start: 2023-01-01 | End: 2023-01-01

## 2023-01-01 RX ORDER — FENTANYL CITRATE 50 UG/ML
INJECTION, SOLUTION INTRAMUSCULAR; INTRAVENOUS
Status: COMPLETED | OUTPATIENT
Start: 2023-01-01 | End: 2023-01-01

## 2023-01-01 RX ORDER — POTASSIUM CHLORIDE 1.5 G/1.77G
40 POWDER, FOR SOLUTION ORAL ONCE
Status: DISCONTINUED | OUTPATIENT
Start: 2023-01-01 | End: 2023-01-01

## 2023-01-01 RX ORDER — METRONIDAZOLE 500 MG/100ML
500 INJECTION, SOLUTION INTRAVENOUS EVERY 8 HOURS
Status: DISCONTINUED | OUTPATIENT
Start: 2023-01-01 | End: 2023-01-01

## 2023-01-01 RX ORDER — LEVOFLOXACIN 5 MG/ML
750 INJECTION, SOLUTION INTRAVENOUS ONCE
Status: COMPLETED | OUTPATIENT
Start: 2023-01-01 | End: 2023-01-01

## 2023-01-01 RX ORDER — NICOTINE 21 MG/24HR
1 PATCH, TRANSDERMAL 24 HOURS TRANSDERMAL EVERY 24 HOURS
Status: DISCONTINUED | OUTPATIENT
Start: 2023-01-01 | End: 2023-01-01 | Stop reason: HOSPADM

## 2023-01-01 RX ORDER — EPINEPHRINE 0.1 MG/ML
SYRINGE (ML) INJECTION
Status: COMPLETED | OUTPATIENT
Start: 2023-01-01 | End: 2023-01-01

## 2023-01-01 RX ADMIN — SUCCINYLCHOLINE CHLORIDE 100 MG: 20 INJECTION, SOLUTION INTRAMUSCULAR; INTRAVENOUS at 19:23

## 2023-01-01 RX ADMIN — Medication 0.3 MCG/KG/MIN: at 01:30

## 2023-01-01 RX ADMIN — METRONIDAZOLE 500 MG: 500 INJECTION, SOLUTION INTRAVENOUS at 01:41

## 2023-01-01 RX ADMIN — Medication 10 ML: at 09:01

## 2023-01-01 RX ADMIN — SODIUM BICARBONATE 50 MEQ: 84 INJECTION INTRAVENOUS at 23:48

## 2023-01-01 RX ADMIN — PANTOPRAZOLE SODIUM 40 MG: 40 TABLET, DELAYED RELEASE ORAL at 07:45

## 2023-01-01 RX ADMIN — SODIUM CHLORIDE 100 ML/HR: 9 INJECTION, SOLUTION INTRAVENOUS at 13:01

## 2023-01-01 RX ADMIN — DEXTROSE MONOHYDRATE 25 G: 25 INJECTION, SOLUTION INTRAVENOUS at 02:59

## 2023-01-01 RX ADMIN — INSULIN DETEMIR 16 UNITS: 100 INJECTION, SOLUTION SUBCUTANEOUS at 20:16

## 2023-01-01 RX ADMIN — DEXTROSE MONOHYDRATE 50 ML: 25 INJECTION, SOLUTION INTRAVENOUS at 23:48

## 2023-01-01 RX ADMIN — BUDESONIDE AND FORMOTEROL FUMARATE DIHYDRATE 2 PUFF: 160; 4.5 AEROSOL RESPIRATORY (INHALATION) at 06:02

## 2023-01-01 RX ADMIN — LEVOFLOXACIN 750 MG: 750 INJECTION, SOLUTION INTRAVENOUS at 03:11

## 2023-01-01 RX ADMIN — CALCITRIOL 0.5 MCG: 0.25 CAPSULE ORAL at 09:08

## 2023-01-01 RX ADMIN — METRONIDAZOLE 500 MG: 500 INJECTION, SOLUTION INTRAVENOUS at 00:38

## 2023-01-01 RX ADMIN — ATORVASTATIN CALCIUM 80 MG: 40 TABLET, FILM COATED ORAL at 21:17

## 2023-01-01 RX ADMIN — ALLOPURINOL 100 MG: 100 TABLET ORAL at 09:29

## 2023-01-01 RX ADMIN — SODIUM CHLORIDE 1000 ML: 9 INJECTION, SOLUTION INTRAVENOUS at 22:21

## 2023-01-01 RX ADMIN — METRONIDAZOLE 500 MG: 500 INJECTION, SOLUTION INTRAVENOUS at 09:29

## 2023-01-01 RX ADMIN — INSULIN LISPRO 4 UNITS: 100 INJECTION, SOLUTION INTRAVENOUS; SUBCUTANEOUS at 17:06

## 2023-01-01 RX ADMIN — Medication 10 ML: at 20:49

## 2023-01-01 RX ADMIN — INSULIN LISPRO 4 UNITS: 100 INJECTION, SOLUTION INTRAVENOUS; SUBCUTANEOUS at 11:17

## 2023-01-01 RX ADMIN — DEXTROSE MONOHYDRATE 25 G: 25 INJECTION, SOLUTION INTRAVENOUS at 07:45

## 2023-01-01 RX ADMIN — INSULIN LISPRO 6 UNITS: 100 INJECTION, SOLUTION INTRAVENOUS; SUBCUTANEOUS at 11:29

## 2023-01-01 RX ADMIN — FUROSEMIDE 40 MG: 40 TABLET ORAL at 09:01

## 2023-01-01 RX ADMIN — METRONIDAZOLE 500 MG: 500 INJECTION, SOLUTION INTRAVENOUS at 01:18

## 2023-01-01 RX ADMIN — DEXTROSE MONOHYDRATE 25 ML: 500 INJECTION PARENTERAL at 15:00

## 2023-01-01 RX ADMIN — PROPOFOL INJECTABLE EMULSION 50 MG: 10 INJECTION, EMULSION INTRAVENOUS at 10:14

## 2023-01-01 RX ADMIN — POTASSIUM CHLORIDE AND SODIUM CHLORIDE 50 ML/HR: 900; 150 INJECTION, SOLUTION INTRAVENOUS at 12:53

## 2023-01-01 RX ADMIN — ALLOPURINOL 100 MG: 100 TABLET ORAL at 09:10

## 2023-01-01 RX ADMIN — POTASSIUM CHLORIDE 40 MEQ: 1.5 POWDER, FOR SOLUTION ORAL at 03:41

## 2023-01-01 RX ADMIN — INSULIN DETEMIR 9 UNITS: 100 INJECTION, SOLUTION SUBCUTANEOUS at 22:55

## 2023-01-01 RX ADMIN — SODIUM CHLORIDE 100 ML/HR: 9 INJECTION, SOLUTION INTRAVENOUS at 21:16

## 2023-01-01 RX ADMIN — NICOTINE 1 PATCH: 21 PATCH, EXTENDED RELEASE TRANSDERMAL at 22:57

## 2023-01-01 RX ADMIN — Medication 10 ML: at 01:19

## 2023-01-01 RX ADMIN — METRONIDAZOLE 500 MG: 500 INJECTION, SOLUTION INTRAVENOUS at 16:55

## 2023-01-01 RX ADMIN — METRONIDAZOLE 500 MG: 500 INJECTION, SOLUTION INTRAVENOUS at 09:09

## 2023-01-01 RX ADMIN — PANTOPRAZOLE SODIUM 40 MG: 40 TABLET, DELAYED RELEASE ORAL at 08:14

## 2023-01-01 RX ADMIN — DILTIAZEM HYDROCHLORIDE 5 MG: 5 INJECTION, SOLUTION INTRAVENOUS at 00:38

## 2023-01-01 RX ADMIN — MIDAZOLAM 2 MG: 1 INJECTION INTRAMUSCULAR; INTRAVENOUS at 15:11

## 2023-01-01 RX ADMIN — INSULIN HUMAN 8 UNITS: 100 INJECTION, SOLUTION PARENTERAL at 21:36

## 2023-01-01 RX ADMIN — METRONIDAZOLE 500 MG: 500 INJECTION, SOLUTION INTRAVENOUS at 00:36

## 2023-01-01 RX ADMIN — INSULIN ASPART 15 UNITS: 100 INJECTION, SUSPENSION SUBCUTANEOUS at 09:02

## 2023-01-01 RX ADMIN — Medication 10 ML: at 08:15

## 2023-01-01 RX ADMIN — BUDESONIDE AND FORMOTEROL FUMARATE DIHYDRATE 2 PUFF: 160; 4.5 AEROSOL RESPIRATORY (INHALATION) at 05:47

## 2023-01-01 RX ADMIN — SODIUM CHLORIDE 75 ML/HR: 9 INJECTION, SOLUTION INTRAVENOUS at 16:51

## 2023-01-01 RX ADMIN — INSULIN LISPRO 2 UNITS: 100 INJECTION, SOLUTION INTRAVENOUS; SUBCUTANEOUS at 09:00

## 2023-01-01 RX ADMIN — METOPROLOL SUCCINATE 100 MG: 100 TABLET, FILM COATED, EXTENDED RELEASE ORAL at 00:36

## 2023-01-01 RX ADMIN — METRONIDAZOLE 500 MG: 500 INJECTION, SOLUTION INTRAVENOUS at 17:18

## 2023-01-01 RX ADMIN — NICOTINE 1 PATCH: 21 PATCH, EXTENDED RELEASE TRANSDERMAL at 20:42

## 2023-01-01 RX ADMIN — FENTANYL CITRATE 25 MCG: 50 INJECTION, SOLUTION INTRAMUSCULAR; INTRAVENOUS at 15:09

## 2023-01-01 RX ADMIN — DEXTROSE MONOHYDRATE 25 G: 25 INJECTION, SOLUTION INTRAVENOUS at 08:28

## 2023-01-01 RX ADMIN — ALLOPURINOL 100 MG: 100 TABLET ORAL at 08:15

## 2023-01-01 RX ADMIN — SODIUM CHLORIDE 100 ML/HR: 9 INJECTION, SOLUTION INTRAVENOUS at 01:16

## 2023-01-01 RX ADMIN — BUDESONIDE AND FORMOTEROL FUMARATE DIHYDRATE 2 PUFF: 160; 4.5 AEROSOL RESPIRATORY (INHALATION) at 06:16

## 2023-01-01 RX ADMIN — BUDESONIDE AND FORMOTEROL FUMARATE DIHYDRATE 2 PUFF: 160; 4.5 AEROSOL RESPIRATORY (INHALATION) at 19:18

## 2023-01-01 RX ADMIN — PANTOPRAZOLE SODIUM 40 MG: 40 TABLET, DELAYED RELEASE ORAL at 09:01

## 2023-01-01 RX ADMIN — PROPOFOL INJECTABLE EMULSION 30 MG: 10 INJECTION, EMULSION INTRAVENOUS at 10:15

## 2023-01-01 RX ADMIN — FUROSEMIDE 40 MG: 40 TABLET ORAL at 16:51

## 2023-01-01 RX ADMIN — SODIUM CHLORIDE 1000 ML: 9 INJECTION, SOLUTION INTRAVENOUS at 19:33

## 2023-01-01 RX ADMIN — EPINEPHRINE 0.05 MG: 0.1 INJECTION INTRACARDIAC; INTRAVENOUS at 10:26

## 2023-01-01 RX ADMIN — LEVOFLOXACIN 500 MG: 500 INJECTION, SOLUTION INTRAVENOUS at 20:16

## 2023-01-01 RX ADMIN — ALLOPURINOL 100 MG: 100 TABLET ORAL at 09:02

## 2023-01-01 RX ADMIN — Medication 10 ML: at 20:16

## 2023-01-01 RX ADMIN — SODIUM CHLORIDE 125 MG: 9 INJECTION, SOLUTION INTRAVENOUS at 08:14

## 2023-01-01 RX ADMIN — DILTIAZEM HYDROCHLORIDE 5 MG: 5 INJECTION, SOLUTION INTRAVENOUS at 01:34

## 2023-01-01 RX ADMIN — MIDAZOLAM 1 MG: 1 INJECTION INTRAMUSCULAR; INTRAVENOUS at 15:10

## 2023-01-01 RX ADMIN — FUROSEMIDE 40 MG: 40 TABLET ORAL at 08:14

## 2023-01-01 RX ADMIN — CALCITRIOL 0.5 MCG: 0.25 CAPSULE ORAL at 15:41

## 2023-01-01 RX ADMIN — FLUDEOXYGLUCOSE F18 1 DOSE: 300 INJECTION INTRAVENOUS at 09:20

## 2023-01-01 RX ADMIN — Medication 200 MCG: at 10:18

## 2023-01-01 RX ADMIN — PANTOPRAZOLE SODIUM 40 MG: 40 TABLET, DELAYED RELEASE ORAL at 01:18

## 2023-01-01 RX ADMIN — BUDESONIDE AND FORMOTEROL FUMARATE DIHYDRATE 2 PUFF: 160; 4.5 AEROSOL RESPIRATORY (INHALATION) at 07:00

## 2023-01-01 RX ADMIN — ATORVASTATIN CALCIUM 80 MG: 40 TABLET, FILM COATED ORAL at 20:16

## 2023-01-01 RX ADMIN — METOPROLOL TARTRATE 2.5 MG: 5 INJECTION INTRAVENOUS at 12:15

## 2023-01-01 RX ADMIN — METRONIDAZOLE 500 MG: 500 INJECTION, SOLUTION INTRAVENOUS at 17:06

## 2023-01-01 RX ADMIN — EPINEPHRINE 1 MG: 0.1 INJECTION INTRACARDIAC; INTRAVENOUS at 19:28

## 2023-01-01 RX ADMIN — TOPICAL ANESTHETIC 1 SPRAY: 200 SPRAY DENTAL; PERIODONTAL at 15:06

## 2023-01-01 RX ADMIN — PANTOPRAZOLE SODIUM 40 MG: 40 TABLET, DELAYED RELEASE ORAL at 09:00

## 2023-01-01 RX ADMIN — Medication 10 ML: at 09:10

## 2023-01-01 RX ADMIN — PANTOPRAZOLE SODIUM 40 MG: 40 TABLET, DELAYED RELEASE ORAL at 20:48

## 2023-01-01 RX ADMIN — CALCIUM GLUCONATE 1 G: 98 INJECTION, SOLUTION INTRAVENOUS at 20:34

## 2023-01-01 RX ADMIN — POTASSIUM CHLORIDE 20 MEQ: 10 CAPSULE, COATED, EXTENDED RELEASE ORAL at 17:28

## 2023-01-01 RX ADMIN — ATORVASTATIN CALCIUM 80 MG: 40 TABLET, FILM COATED ORAL at 20:48

## 2023-01-01 RX ADMIN — INSULIN DETEMIR 16 UNITS: 100 INJECTION, SOLUTION SUBCUTANEOUS at 21:23

## 2023-01-01 RX ADMIN — ONDANSETRON 4 MG: 2 INJECTION INTRAMUSCULAR; INTRAVENOUS at 18:06

## 2023-01-01 RX ADMIN — FENTANYL CITRATE 25 MCG: 50 INJECTION, SOLUTION INTRAMUSCULAR; INTRAVENOUS at 15:10

## 2023-01-01 RX ADMIN — POTASSIUM CHLORIDE 20 MEQ: 10 CAPSULE, COATED, EXTENDED RELEASE ORAL at 17:22

## 2023-01-01 RX ADMIN — DILTIAZEM HYDROCHLORIDE 5 MG: 5 INJECTION, SOLUTION INTRAVENOUS at 06:45

## 2023-01-01 RX ADMIN — EPINEPHRINE 1 MG: 0.1 INJECTION INTRACARDIAC; INTRAVENOUS at 19:25

## 2023-01-01 RX ADMIN — ATORVASTATIN CALCIUM 80 MG: 40 TABLET, FILM COATED ORAL at 20:41

## 2023-01-01 RX ADMIN — FUROSEMIDE 40 MG: 40 TABLET ORAL at 09:08

## 2023-01-01 RX ADMIN — NICOTINE 1 PATCH: 21 PATCH, EXTENDED RELEASE TRANSDERMAL at 20:16

## 2023-01-01 RX ADMIN — LEVOFLOXACIN 500 MG: 500 INJECTION, SOLUTION INTRAVENOUS at 20:49

## 2023-01-01 RX ADMIN — ETOMIDATE 20 MG: 20 INJECTION, SOLUTION INTRAVENOUS at 19:23

## 2023-01-01 RX ADMIN — METRONIDAZOLE 500 MG: 500 INJECTION, SOLUTION INTRAVENOUS at 08:46

## 2023-01-01 RX ADMIN — ESMOLOL HYDROCHLORIDE 30 MG: 10 INJECTION, SOLUTION INTRAVENOUS at 10:34

## 2023-01-01 RX ADMIN — METOPROLOL TARTRATE 2.5 MG: 5 INJECTION INTRAVENOUS at 16:25

## 2023-01-01 RX ADMIN — INSULIN LISPRO 7 UNITS: 100 INJECTION, SOLUTION INTRAVENOUS; SUBCUTANEOUS at 12:31

## 2023-01-01 RX ADMIN — EPINEPHRINE 0.02 MG: 0.1 INJECTION INTRACARDIAC; INTRAVENOUS at 10:18

## 2023-01-01 RX ADMIN — NICOTINE 1 PATCH: 21 PATCH, EXTENDED RELEASE TRANSDERMAL at 21:18

## 2023-01-01 RX ADMIN — Medication 200 MCG: at 10:16

## 2023-01-01 RX ADMIN — METOPROLOL SUCCINATE 100 MG: 100 TABLET, FILM COATED, EXTENDED RELEASE ORAL at 09:01

## 2023-01-01 RX ADMIN — DIGOXIN 125 MCG: 0.25 INJECTION INTRAMUSCULAR; INTRAVENOUS at 18:02

## 2023-01-01 RX ADMIN — INSULIN LISPRO 2 UNITS: 100 INJECTION, SOLUTION INTRAVENOUS; SUBCUTANEOUS at 17:28

## 2023-01-01 RX ADMIN — Medication 10 ML: at 21:23

## 2023-01-01 RX ADMIN — ATORVASTATIN CALCIUM 80 MG: 40 TABLET, FILM COATED ORAL at 22:57

## 2023-01-01 RX ADMIN — SODIUM CHLORIDE 75 ML/HR: 9 INJECTION, SOLUTION INTRAVENOUS at 15:41

## 2023-01-01 RX ADMIN — CALCITRIOL 0.5 MCG: 0.25 CAPSULE ORAL at 09:02

## 2023-01-01 RX ADMIN — METRONIDAZOLE 500 MG: 500 INJECTION, SOLUTION INTRAVENOUS at 16:51

## 2023-01-01 RX ADMIN — POTASSIUM CHLORIDE 20 MEQ: 29.8 INJECTION, SOLUTION INTRAVENOUS at 10:40

## 2023-01-01 RX ADMIN — EPINEPHRINE 1 MG: 0.1 INJECTION INTRACARDIAC; INTRAVENOUS at 19:18

## 2023-01-01 RX ADMIN — PANTOPRAZOLE SODIUM 40 MG: 40 TABLET, DELAYED RELEASE ORAL at 20:15

## 2023-01-01 RX ADMIN — METRONIDAZOLE 500 MG: 500 INJECTION, SOLUTION INTRAVENOUS at 09:01

## 2023-01-01 RX ADMIN — PROPOFOL 5 MCG/KG/MIN: 10 INJECTION, EMULSION INTRAVENOUS at 19:55

## 2023-01-01 RX ADMIN — SODIUM CHLORIDE, POTASSIUM CHLORIDE, SODIUM LACTATE AND CALCIUM CHLORIDE 1000 ML: 600; 310; 30; 20 INJECTION, SOLUTION INTRAVENOUS at 18:06

## 2023-01-01 RX ADMIN — INSULIN HUMAN 8 UNITS: 100 INJECTION, SOLUTION PARENTERAL at 23:46

## 2023-01-01 RX ADMIN — MIDAZOLAM 1 MG: 1 INJECTION INTRAMUSCULAR; INTRAVENOUS at 15:09

## 2023-01-01 RX ADMIN — SODIUM CHLORIDE 125 MG: 9 INJECTION, SOLUTION INTRAVENOUS at 09:29

## 2023-01-01 RX ADMIN — POTASSIUM CHLORIDE 40 MEQ: 1.5 POWDER, FOR SOLUTION ORAL at 08:34

## 2023-01-01 RX ADMIN — Medication 10 ML: at 09:00

## 2023-01-01 RX ADMIN — INSULIN LISPRO 7 UNITS: 100 INJECTION, SOLUTION INTRAVENOUS; SUBCUTANEOUS at 06:02

## 2023-01-01 RX ADMIN — SODIUM BICARBONATE 50 MEQ: 84 INJECTION INTRAVENOUS at 19:26

## 2023-01-01 RX ADMIN — BUDESONIDE AND FORMOTEROL FUMARATE DIHYDRATE 2 PUFF: 160; 4.5 AEROSOL RESPIRATORY (INHALATION) at 07:42

## 2023-01-01 RX ADMIN — PANTOPRAZOLE SODIUM 40 MG: 40 TABLET, DELAYED RELEASE ORAL at 21:17

## 2023-01-01 RX ADMIN — SODIUM BICARBONATE 50 MEQ: 84 INJECTION INTRAVENOUS at 21:38

## 2023-01-01 RX ADMIN — BUDESONIDE AND FORMOTEROL FUMARATE DIHYDRATE 2 PUFF: 160; 4.5 AEROSOL RESPIRATORY (INHALATION) at 20:23

## 2023-01-01 RX ADMIN — CALCIUM GLUCONATE 1 G: 98 INJECTION, SOLUTION INTRAVENOUS at 00:50

## 2023-01-01 RX ADMIN — ATROPINE SULFATE 1 MG: 0.1 INJECTION, SOLUTION ENDOTRACHEAL; INTRAMUSCULAR; INTRAVENOUS; SUBCUTANEOUS at 19:49

## 2023-01-01 RX ADMIN — PANTOPRAZOLE SODIUM 40 MG: 40 TABLET, DELAYED RELEASE ORAL at 20:41

## 2023-01-01 RX ADMIN — BUDESONIDE AND FORMOTEROL FUMARATE DIHYDRATE 2 PUFF: 160; 4.5 AEROSOL RESPIRATORY (INHALATION) at 20:16

## 2023-01-01 RX ADMIN — SODIUM CHLORIDE 100 ML/HR: 9 INJECTION, SOLUTION INTRAVENOUS at 10:02

## 2023-01-01 RX ADMIN — DIGOXIN 125 MCG: 0.25 INJECTION INTRAMUSCULAR; INTRAVENOUS at 14:04

## 2023-01-01 RX ADMIN — Medication 0.05 MCG/KG/MIN: at 22:07

## 2023-01-01 RX ADMIN — POTASSIUM CHLORIDE AND SODIUM CHLORIDE 50 ML/HR: 900; 150 INJECTION, SOLUTION INTRAVENOUS at 05:08

## 2023-01-01 RX ADMIN — BUDESONIDE AND FORMOTEROL FUMARATE DIHYDRATE 2 PUFF: 160; 4.5 AEROSOL RESPIRATORY (INHALATION) at 19:34

## 2023-01-01 RX ADMIN — POTASSIUM CHLORIDE 20 MEQ: 29.8 INJECTION, SOLUTION INTRAVENOUS at 12:04

## 2023-01-01 RX ADMIN — METRONIDAZOLE 500 MG: 500 INJECTION, SOLUTION INTRAVENOUS at 02:04

## 2023-01-03 NOTE — TELEPHONE ENCOUNTER
Explained that Dr. Blank wrote a letter on 11-11, he said he didn't receive it.  I will mail a copy to him.  He said the problem is [employer], explained that Dr. Blank filled out a form on 12-29.  He said he will call them.

## 2023-01-03 NOTE — TELEPHONE ENCOUNTER
Caller: Milton Rodriguez    Relationship: Self    Best call back number: [phone]    What form or medical record are you requesting: LETTER FOR DISABILITY/WORK    Who is requesting this form or medical record from you: PATIENT    How would you like to receive the form or medical records (pick-up, mail, fax): N/A  If fax, what is the fax number: N/A  If mail, what is the address: [facility] [place] [address]  If pick-up, provide patient with address and location details    Timeframe paperwork needed: ASAP     Additional notes: PATIENT TELEPHONED REQUESTING A STATEMENT FROM MD STATING HE IS NOT ABLE TO RETURN TO WORK UNTIL AFTER MD IS DONE WITH HIS CARE AS HE HAS NOT RETURNED TO WORK YET  DUE TO THERAPY. EMPLOYER WILL NOT PAY THE PATIENT WITHOUT LETTER FROM MD     PLEASE WRITE & MAIL TO ADDRESS ON FILE

## 2023-01-10 PROBLEM — D64.9 PROFOUND ANEMIA: Status: ACTIVE | Noted: 2023-01-10

## 2023-01-10 PROBLEM — R11.2 NAUSEA & VOMITING: Status: ACTIVE | Noted: 2023-01-10

## 2023-01-10 PROBLEM — D50.9 IRON DEFICIENCY ANEMIA, UNSPECIFIED IRON DEFICIENCY ANEMIA TYPE: Status: ACTIVE | Noted: 2023-01-10

## 2023-01-10 PROBLEM — E87.1 HYPONATREMIA: Status: ACTIVE | Noted: 2023-01-01

## 2023-01-10 PROBLEM — K52.9 ACUTE GASTROENTERITIS: Status: ACTIVE | Noted: 2023-01-01

## 2023-01-13 PROBLEM — I48.91 ATRIAL FIBRILLATION WITH RVR (HCC): Status: ACTIVE | Noted: 2023-01-13

## 2023-01-13 NOTE — ANESTHESIA POSTPROCEDURE EVALUATION
"Patient: Milton Rodriguez    Procedure Summary     Date: 01/13/23 Room / Location: [facility] ENDOSCOPY 2 / [facility] ENDOSCOPY    Anesthesia Start: 1010 Anesthesia Stop: 1101    Procedure: ESOPHAGOGASTRODUODENOSCOPY WITH ANESTHESIA Diagnosis:       Iron deficiency anemia, unspecified iron deficiency anemia type      (Iron deficiency anemia, unspecified iron deficiency anemia type [D50.9])    Surgeons: Corbin Nagy MD Provider: Rach Stern CRNA    Anesthesia Type: MAC ASA Status: 4          Anesthesia Type: MAC    Vitals  Vitals Value Taken Time   /81 01/13/23 1216   Temp 98.7 °F (37.1 °C) 01/13/23 1148   Pulse 137 01/13/23 1216   Resp 18 01/13/23 1148   SpO2 99 % 01/13/23 1216   Vitals shown include unvalidated device data.        Post Anesthesia Care and Evaluation    Patient location during evaluation: PACU  Patient participation: complete - patient participated  Level of consciousness: awake and alert  Pain management: adequate    Airway patency: patent  Anesthetic complications: anesthesia complication (hemodynamic instability )    Cardiovascular status: acceptable  Respiratory status: acceptable  Hydration status: acceptable    Comments: Blood pressure 104/81, pulse (!) 132, temperature 98.7 °F (37.1 °C), resp. rate 18, height 170.2 cm (67\"), weight 64.5 kg (142 lb 3.2 oz), SpO2 96 %.    Pt discharged from PACU based on annabella score >8      Pt stable in recovery without further intervention beyond already documented blood transfusion. I have recommended ICU overnight.         "

## 2023-01-16 NOTE — OUTREACH NOTE
Prep Survey    Flowsheet Row Responses   [facility]? [facility]   Is LACE score < 7 ? No   Eligibility Readm Mgmt   Discharge diagnosis ANTHONY, GI bleed, anemia   Does the patient have one of the following disease processes/diagnoses(primary or secondary)? Other   Does the patient have Home health ordered? No   Is there a DME ordered? No   Prep survey completed? Yes          NHAN AYALA - Registered Nurse

## 2023-01-18 NOTE — PROGRESS NOTES
"    Subjective:     Encounter Date:01/18/2023      Patient ID: Milton Rodriguez is a 62 y.o. male     Chief Complaint: \"my legs are swollen\"  Congestive Heart Failure  Presents for follow-up visit. Associated symptoms include edema and orthopnea. Pertinent negatives include no chest pain, fatigue, near-syncope, palpitations, paroxysmal nocturnal dyspnea, shortness of breath or unexpected weight change. The symptoms have been worsening.   Atrial Flutter  This is a chronic problem. The current episode started more than 1 year ago. The problem occurs rarely. The problem has been rapidly improving. Associated symptoms include weakness. Pertinent negatives include no chest pain, coughing, fatigue or rash.   Hypertension  This is a chronic problem. The current episode started more than 1 year ago. The problem has been gradually improving since onset. The problem is controlled. Pertinent negatives include no chest pain, malaise/fatigue, orthopnea, palpitations, PND or shortness of breath.   Hyperlipidemia  This is a chronic problem. The current episode started more than 1 year ago. The problem is controlled. Recent lipid tests were reviewed and are normal. Pertinent negatives include no chest pain or shortness of breath.   Atrial Fibrillation  Presents for initial visit. Symptoms include weakness. Symptoms are negative for chest pain, dizziness, palpitations, shortness of breath and syncope. The symptoms have been worsening. Past medical history includes atrial fibrillation, CHF and hyperlipidemia.     Patient presents today for a hospital follow up. He is followed for combined CHF with EF 36-40% that was felt to be tachycardia induced as he was noted to be in new onset atrial flutter with RVR at the time of diagnosis and has since returned to normal after sustained NSR following a successful ablation per Dr. Quesada on 9/8/21. He is s/p watchman in 12/2021with 45 day RADHA noting the device to be well seated without " "iftikhar-device leak. He is due for his 1 year post-Watchman RADHA. He was admitted to the hospital from 1/10-1/15 for ANTHONY secondary to gastroenteritis and anemia. Per hospital records, there was mention he had an episode of afib on 1/13 with RVR and received IV digoxin which resulted in a 2.25 second pause (telemetry images below).     Today his complaint is BLE edema which started while inpatient. He notes while showering on Saturday, he was unaware of edema and his wife agrees, however on Sunday his legs appeared to be significantly swollen and has not worsened since onset. He mentions being told \"my heart stopped\" while he was in the hospital and also notes he was completely unaware. He notes he has gained 30lbs but is unable to tell me when the last time he weighed 130lbs. Per hospital records, he weighed 150-155lbs and has gained 9-14lbs. He has not weighed at home in the last 2 days. He denies dyspnea, orthopnea and PND. He is weak and attributes this to his legs being swollen. He has not increased his lasix and remains on 40mg daily. He denies chest pain and palpitations.     The following portions of the patient's history were reviewed and updated as appropriate: allergies, current medications, past family history, past medical history, past social history, past surgical history and problem list.    No Known Allergies    Current Outpatient Medications:   •  allopurinol (ZYLOPRIM) 100 MG tablet, Take 100 mg by mouth Daily., Disp: , Rfl:   •  atorvastatin (Lipitor) 80 MG tablet, Take 1 tablet by mouth Every Night., Disp: 30 tablet, Rfl: 0  •  betamethasone dipropionate (DIPROLENE) 0.05 % ointment, Apply 1 application topically to the appropriate area as directed 2 (Two) Times a Day. APPLY TO AFFECTED AREA TWICE A DAY AS NEEDED FOR RASH, Disp: , Rfl:   •  budesonide-formoterol (Symbicort) 160-4.5 MCG/ACT inhaler, Inhale 2 puffs 2 (Two) Times a Day., Disp: 1 each, Rfl: 12  •  clopidogrel (PLAVIX) 75 MG tablet, , " Disp: , Rfl:   •  ferrous sulfate 325 (65 FE) MG tablet, Take 325 mg by mouth 2 (Two) Times a Day., Disp: , Rfl:   •  furosemide (LASIX) 40 MG tablet, Take 40 mg by mouth Daily., Disp: , Rfl:   •  Insulin Glargine (Lantus SoloStar) 100 UNIT/ML injection pen, Inject 9 Units under the skin into the appropriate area as directed Every Night. PATIENT TAKING 9 UNITS NIGHTLY, Disp: , Rfl:   •  levoFLOXacin (Levaquin) 500 MG tablet, Take 1 tablet by mouth Every Other Day., Disp: 3 tablet, Rfl: 0  •  magnesium oxide (MAG-OX) 400 MG tablet, Take 400 mg by mouth 2 (Two) Times a Day., Disp: , Rfl:   •  metoprolol succinate XL (TOPROL-XL) 100 MG 24 hr tablet, Take 1 tablet by mouth Daily., Disp: 90 tablet, Rfl: 1  •  metroNIDAZOLE (Flagyl) 375 MG capsule, Take 1 capsule by mouth 3 (Three) Times a Day for 5 days., Disp: 15 capsule, Rfl: 0  •  pantoprazole (Protonix) 40 MG EC tablet, Take 1 tablet by mouth 2 (two) times a day., Disp: 60 tablet, Rfl: 0  •  calcitriol (ROCALTROL) 0.5 MCG capsule, Take 1 capsule by mouth Daily., Disp: 30 capsule, Rfl: 0  Past Medical History:   Diagnosis Date   • Abnormal PFT    • Atrial fibrillation ([facility])    • Atrial flutter by electrocardiogram ([facility])     11/2014- ECHO: EF 55-60%, mild PHTN, left atrium mild-moderately dilated, mild-moderate TR.   • Cerebrovascular accident (CVA) ([facility]ly [facility]) 07/29/2020   • Chronic combined systolic (congestive) and diastolic (congestive) heart failure ([facility])    • Diabetes mellitus ([facility]ly [facility])    • Edema    • Essential hypertension    • GERD (gastroesophageal reflux disease)    • Hyperlipidemia    • Iron deficiency anemia    • Loose bowel movement    • Multiple pulmonary nodules     Followed by [facility] pulmonary clinic   • Muscle weakness of lower extremity     LEFT   • PAD (peripheral artery disease) ([facility])    • Pulmonary HTN ([facility])    • Stage 3 severe COPD by GOLD classification ([facility]) 08/16/2021   • Tobacco abuse    • Type 2 diabetes mellitus with autonomic neuropathy ([facility])         Social History     Socioeconomic History   • Marital status:    Tobacco Use   • Smoking status: Every Day     Packs/day: 0.25     Years: 30.00     Pack years: 7.50     Types: Cigarettes     Passive exposure: Current   • Smokeless tobacco: Never   • Tobacco comments:     less than a quarter a pack a day   Vaping Use   • Vaping Use: Never used   Substance and Sexual Activity   • Alcohol use: Not Currently     Comment: 4 beers per day   • Drug use: No   • Sexual activity: Defer       Review of Systems   Constitutional: Positive for weight gain. Negative for fatigue, malaise/fatigue, unexpected weight change and weight loss.   Cardiovascular: Positive for leg swelling. Negative for chest pain, dyspnea on exertion, irregular heartbeat, near-syncope, orthopnea, palpitations, paroxysmal nocturnal dyspnea and syncope.   Respiratory: Negative for cough, shortness of breath, sleep disturbances due to breathing, sputum production and wheezing.    Skin: Negative for dry skin, flushing, itching and rash.   Gastrointestinal: Negative for hematemesis and hematochezia.   Neurological: Positive for weakness. Negative for dizziness, light-headedness and loss of balance.   All other systems reviewed and are negative.         Objective:     Vitals reviewed.   Constitutional:       General: Not in acute distress.     Appearance: Healthy appearance. Well-developed. Not diaphoretic.   Eyes:      General: No scleral icterus.     Conjunctiva/sclera: Conjunctivae normal.      Pupils: Pupils are equal, round, and reactive to light.   HENT:      Head: Normocephalic.    Mouth/Throat:      Pharynx: No oropharyngeal exudate.   Neck:      Vascular: No JVD or JVR.   Pulmonary:      Effort: Pulmonary effort is normal. No respiratory distress.      Breath sounds: Examination of the right-lower field reveals rales. Examination of the left-lower field reveals rales. No decreased breath sounds. No wheezing. No rhonchi. Rales present.  "  Chest:      Chest wall: Not tender to palpatation.   Cardiovascular:      Tachycardia present. Irregularly irregular rhythm.      Murmurs: There is a high frequency blowing holosystolic murmur at the apex.   Pulses:     Intact distal pulses.   Edema:     Peripheral edema present.     Pretibial: bilateral 4+ pitting edema of the pretibial area.     Ankle: bilateral 4+ pitting edema of the ankle.     Feet: bilateral 4+ pitting edema of the feet.  Abdominal:      General: Bowel sounds are normal. There is no distension.      Palpations: Abdomen is soft.      Tenderness: There is no abdominal tenderness.   Musculoskeletal: Normal range of motion.      Cervical back: Normal range of motion and neck supple. Skin:     General: Skin is warm and dry.      Coloration: Skin is not pale.      Findings: No erythema or rash.   Neurological:      Mental Status: Alert, oriented to person, place, and time and oriented to person, place and time.      Deep Tendon Reflexes: Reflexes are normal and symmetric.   Psychiatric:         Behavior: Behavior normal.             ECG 12 Lead    Date/Time: 1/18/2023 1:43 PM  Performed by: Kkii Desir APRN  Authorized by: Kiki Desir APRN   Comparison: compared with previous ECG from 1/13/2023  Comparison to previous ECG: afib with RVR has replaced Sinus tach with PACs  Rhythm: atrial fibrillation  Rate: tachycardic  BPM: 135  Conduction: conduction normal  ST Segments: ST segments normal  QRS axis: normal  Other findings: T wave abnormality    Clinical impression: abnormal EKG          /62 (BP Location: Left arm, Patient Position: Sitting, Cuff Size: Adult)   Pulse (!) 135   Ht 170.2 cm (67\")   Wt 74.4 kg (164 lb)   SpO2 99%   BMI 25.69 kg/m²     Lab Review:   I have reviewed previous office notes, recent labs and recent cardiac testing.   Telemetry while inpatient:   Pause:    ?AF to sinus:      Results for orders placed during the hospital encounter of 02/18/22    Adult " Transesophageal Echo (RADHA) W/ Cont if Necessary Per Protocol    Interpretation Summary  · Left ventricular wall thickness is consistent with mild concentric hypertrophy.  · Estimated left ventricular EF = 60% Left ventricular systolic function is normal.  · The watchman device is well seated without evidence of peridevice leak          Assessment:          Diagnosis Plan   1. Atrial fibrillation with RVR (HCC)        2. Typical atrial flutter (HCC)        3. Chronic combined systolic (congestive) and diastolic (congestive) heart failure (HCC)        4. Stage 3 chronic kidney disease, unspecified whether stage 3a or 3b CKD (HCC)        5. Primary hypertension        6. PFO (patent foramen ovale)        7. Lymphedema               Plan:       1. Afib with RVR- new in onset. Unsure of duration of arrhythmia. Likely contributing to decompensation in CHF. He is due for his 1 year post-watchman RADHA. Will plan to cardiovert at that time (hopefully Friday). BP stable today but has a history of being low. Continue Toprol 100mg for now. If he is unable to be cardioverted will call and increase Toprol to 200mg daily. Will discuss with Dr. Patel about referral to Dr. Trimble as he is unable to drive to [place] to see Dr. Quesada.    2. Aflutter- stable. s/p ablation per Dr. Quesada on 9/8/21. S/p watchman. Continue ASA.   3. Combined CHF-decompensated with class 3 symptoms. EF improved to normal after restoration of NSR per echo in December and remained normal per RADHA in Feb. Increase lasix to 80mg daily until cardioversion. Will obtain BNP and BMP at that time. creatinine 2.99 at d/c on 1/15. Continue BB. Not on ACEi/ARB due to hypotension and CKD.   4. CKD- last creatinine 2.99 on 1/15. Followed by nephrology. I may have to contact them about diuretic adjustment if he does not improve after cardioversion.   5. HTN- controlled. Recent issues with hypotension.   6. PFO- noted per echo in 8/2020 following a CVA that was not  recommended for closure per neurology.   7. Lymphedema- presence of BLE edema due to volume overload. Usually has chronic edema in LLE.      Follow up in 2 weeks or sooner if symptoms worsen.     I spent 30 minutes caring for Milton on this date of service. This time includes time spent by me in the following activities:preparing for the visit, reviewing tests, obtaining and/or reviewing a separately obtained history, performing a medically appropriate examination and/or evaluation , counseling and educating the patient/family/caregiver, ordering medications, tests, or procedures, documenting information in the medical record, independently interpreting results and communicating that information with the patient/family/caregiver and care coordination     Current outpatient and discharge medications have been reconciled for the patient.  Reviewed by: DARIEN Lewis    I spent 2 minutes on the separately reported service of EKG interpretation. This time is not included in the time used to support the E/M service also reported today.

## 2023-01-18 NOTE — H&P (VIEW-ONLY)
"    Subjective:     Encounter Date:01/18/2023      Patient ID: Milton Rodriguez is a 62 y.o. male     Chief Complaint: \"my legs are swollen\"  Congestive Heart Failure  Presents for follow-up visit. Associated symptoms include edema and orthopnea. Pertinent negatives include no chest pain, fatigue, near-syncope, palpitations, paroxysmal nocturnal dyspnea, shortness of breath or unexpected weight change. The symptoms have been worsening.   Atrial Flutter  This is a chronic problem. The current episode started more than 1 year ago. The problem occurs rarely. The problem has been rapidly improving. Associated symptoms include weakness. Pertinent negatives include no chest pain, coughing, fatigue or rash.   Hypertension  This is a chronic problem. The current episode started more than 1 year ago. The problem has been gradually improving since onset. The problem is controlled. Pertinent negatives include no chest pain, malaise/fatigue, orthopnea, palpitations, PND or shortness of breath.   Hyperlipidemia  This is a chronic problem. The current episode started more than 1 year ago. The problem is controlled. Recent lipid tests were reviewed and are normal. Pertinent negatives include no chest pain or shortness of breath.   Atrial Fibrillation  Presents for initial visit. Symptoms include weakness. Symptoms are negative for chest pain, dizziness, palpitations, shortness of breath and syncope. The symptoms have been worsening. Past medical history includes atrial fibrillation, CHF and hyperlipidemia.     Patient presents today for a hospital follow up. He is followed for combined CHF with EF 36-40% that was felt to be tachycardia induced as he was noted to be in new onset atrial flutter with RVR at the time of diagnosis and has since returned to normal after sustained NSR following a successful ablation per Dr. Quesada on 9/8/21. He is s/p watchman in 12/2021with 45 day RADHA noting the device to be well seated without " "iftikhar-device leak. He is due for his 1 year post-Watchman RADHA. He was admitted to the hospital from 1/10-1/15 for ANTHONY secondary to gastroenteritis and anemia. Per hospital records, there was mention he had an episode of afib on 1/13 with RVR and received IV digoxin which resulted in a 2.25 second pause (telemetry images below).     Today his complaint is BLE edema which started while inpatient. He notes while showering on Saturday, he was unaware of edema and his wife agrees, however on Sunday his legs appeared to be significantly swollen and has not worsened since onset. He mentions being told \"my heart stopped\" while he was in the hospital and also notes he was completely unaware. He notes he has gained 30lbs but is unable to tell me when the last time he weighed 130lbs. Per hospital records, he weighed 150-155lbs and has gained 9-14lbs. He has not weighed at home in the last 2 days. He denies dyspnea, orthopnea and PND. He is weak and attributes this to his legs being swollen. He has not increased his lasix and remains on 40mg daily. He denies chest pain and palpitations.     The following portions of the patient's history were reviewed and updated as appropriate: allergies, current medications, past family history, past medical history, past social history, past surgical history and problem list.    No Known Allergies    Current Outpatient Medications:   •  allopurinol (ZYLOPRIM) 100 MG tablet, Take 100 mg by mouth Daily., Disp: , Rfl:   •  atorvastatin (Lipitor) 80 MG tablet, Take 1 tablet by mouth Every Night., Disp: 30 tablet, Rfl: 0  •  betamethasone dipropionate (DIPROLENE) 0.05 % ointment, Apply 1 application topically to the appropriate area as directed 2 (Two) Times a Day. APPLY TO AFFECTED AREA TWICE A DAY AS NEEDED FOR RASH, Disp: , Rfl:   •  budesonide-formoterol (Symbicort) 160-4.5 MCG/ACT inhaler, Inhale 2 puffs 2 (Two) Times a Day., Disp: 1 each, Rfl: 12  •  clopidogrel (PLAVIX) 75 MG tablet, , " Disp: , Rfl:   •  ferrous sulfate 325 (65 FE) MG tablet, Take 325 mg by mouth 2 (Two) Times a Day., Disp: , Rfl:   •  furosemide (LASIX) 40 MG tablet, Take 40 mg by mouth Daily., Disp: , Rfl:   •  Insulin Glargine (Lantus SoloStar) 100 UNIT/ML injection pen, Inject 9 Units under the skin into the appropriate area as directed Every Night. PATIENT TAKING 9 UNITS NIGHTLY, Disp: , Rfl:   •  levoFLOXacin (Levaquin) 500 MG tablet, Take 1 tablet by mouth Every Other Day., Disp: 3 tablet, Rfl: 0  •  magnesium oxide (MAG-OX) 400 MG tablet, Take 400 mg by mouth 2 (Two) Times a Day., Disp: , Rfl:   •  metoprolol succinate XL (TOPROL-XL) 100 MG 24 hr tablet, Take 1 tablet by mouth Daily., Disp: 90 tablet, Rfl: 1  •  metroNIDAZOLE (Flagyl) 375 MG capsule, Take 1 capsule by mouth 3 (Three) Times a Day for 5 days., Disp: 15 capsule, Rfl: 0  •  pantoprazole (Protonix) 40 MG EC tablet, Take 1 tablet by mouth 2 (two) times a day., Disp: 60 tablet, Rfl: 0  •  calcitriol (ROCALTROL) 0.5 MCG capsule, Take 1 capsule by mouth Daily., Disp: 30 capsule, Rfl: 0  Past Medical History:   Diagnosis Date   • Abnormal PFT    • Atrial fibrillation ([facility])    • Atrial flutter by electrocardiogram ([facility])     11/2014- ECHO: EF 55-60%, mild PHTN, left atrium mild-moderately dilated, mild-moderate TR.   • Cerebrovascular accident (CVA) ([facility]) 07/29/2020   • Chronic combined systolic (congestive) and diastolic (congestive) heart failure ([facility])    • Diabetes mellitus ([facility])    • Edema    • Essential hypertension    • GERD (gastroesophageal reflux disease)    • Hyperlipidemia    • Iron deficiency anemia    • Loose bowel movement    • Multiple pulmonary nodules     Followed by [facility] pulmonary clinic   • Muscle weakness of lower extremity     LEFT   • PAD (peripheral artery disease) ([facility])    • Pulmonary HTN ([facility])    • Stage 3 severe COPD by GOLD classification ([facility]) 08/16/2021   • Tobacco abuse    • Type 2 diabetes mellitus with autonomic neuropathy ([facility])         Social History     Socioeconomic History   • Marital status:    Tobacco Use   • Smoking status: Every Day     Packs/day: 0.25     Years: 30.00     Pack years: 7.50     Types: Cigarettes     Passive exposure: Current   • Smokeless tobacco: Never   • Tobacco comments:     less than a quarter a pack a day   Vaping Use   • Vaping Use: Never used   Substance and Sexual Activity   • Alcohol use: Not Currently     Comment: 4 beers per day   • Drug use: No   • Sexual activity: Defer       Review of Systems   Constitutional: Positive for weight gain. Negative for fatigue, malaise/fatigue, unexpected weight change and weight loss.   Cardiovascular: Positive for leg swelling. Negative for chest pain, dyspnea on exertion, irregular heartbeat, near-syncope, orthopnea, palpitations, paroxysmal nocturnal dyspnea and syncope.   Respiratory: Negative for cough, shortness of breath, sleep disturbances due to breathing, sputum production and wheezing.    Skin: Negative for dry skin, flushing, itching and rash.   Gastrointestinal: Negative for hematemesis and hematochezia.   Neurological: Positive for weakness. Negative for dizziness, light-headedness and loss of balance.   All other systems reviewed and are negative.         Objective:     Vitals reviewed.   Constitutional:       General: Not in acute distress.     Appearance: Healthy appearance. Well-developed. Not diaphoretic.   Eyes:      General: No scleral icterus.     Conjunctiva/sclera: Conjunctivae normal.      Pupils: Pupils are equal, round, and reactive to light.   HENT:      Head: Normocephalic.    Mouth/Throat:      Pharynx: No oropharyngeal exudate.   Neck:      Vascular: No JVD or JVR.   Pulmonary:      Effort: Pulmonary effort is normal. No respiratory distress.      Breath sounds: Examination of the right-lower field reveals rales. Examination of the left-lower field reveals rales. No decreased breath sounds. No wheezing. No rhonchi. Rales present.  "  Chest:      Chest wall: Not tender to palpatation.   Cardiovascular:      Tachycardia present. Irregularly irregular rhythm.      Murmurs: There is a high frequency blowing holosystolic murmur at the apex.   Pulses:     Intact distal pulses.   Edema:     Peripheral edema present.     Pretibial: bilateral 4+ pitting edema of the pretibial area.     Ankle: bilateral 4+ pitting edema of the ankle.     Feet: bilateral 4+ pitting edema of the feet.  Abdominal:      General: Bowel sounds are normal. There is no distension.      Palpations: Abdomen is soft.      Tenderness: There is no abdominal tenderness.   Musculoskeletal: Normal range of motion.      Cervical back: Normal range of motion and neck supple. Skin:     General: Skin is warm and dry.      Coloration: Skin is not pale.      Findings: No erythema or rash.   Neurological:      Mental Status: Alert, oriented to person, place, and time and oriented to person, place and time.      Deep Tendon Reflexes: Reflexes are normal and symmetric.   Psychiatric:         Behavior: Behavior normal.             ECG 12 Lead    Date/Time: 1/18/2023 1:43 PM  Performed by: Kiki Desir APRN  Authorized by: Kiki Desir APRN   Comparison: compared with previous ECG from 1/13/2023  Comparison to previous ECG: afib with RVR has replaced Sinus tach with PACs  Rhythm: atrial fibrillation  Rate: tachycardic  BPM: 135  Conduction: conduction normal  ST Segments: ST segments normal  QRS axis: normal  Other findings: T wave abnormality    Clinical impression: abnormal EKG          /62 (BP Location: Left arm, Patient Position: Sitting, Cuff Size: Adult)   Pulse (!) 135   Ht 170.2 cm (67\")   Wt 74.4 kg (164 lb)   SpO2 99%   BMI 25.69 kg/m²     Lab Review:   I have reviewed previous office notes, recent labs and recent cardiac testing.   Telemetry while inpatient:   Pause:    ?AF to sinus:      Results for orders placed during the hospital encounter of 02/18/22    Adult " Transesophageal Echo (RADHA) W/ Cont if Necessary Per Protocol    Interpretation Summary  · Left ventricular wall thickness is consistent with mild concentric hypertrophy.  · Estimated left ventricular EF = 60% Left ventricular systolic function is normal.  · The watchman device is well seated without evidence of peridevice leak          Assessment:          Diagnosis Plan   1. Atrial fibrillation with RVR (HCC)        2. Typical atrial flutter (HCC)        3. Chronic combined systolic (congestive) and diastolic (congestive) heart failure (HCC)        4. Stage 3 chronic kidney disease, unspecified whether stage 3a or 3b CKD (HCC)        5. Primary hypertension        6. PFO (patent foramen ovale)        7. Lymphedema               Plan:       1. Afib with RVR- new in onset. Unsure of duration of arrhythmia. Likely contributing to decompensation in CHF. He is due for his 1 year post-watchman RADHA. Will plan to cardiovert at that time (hopefully Friday). BP stable today but has a history of being low. Continue Toprol 100mg for now. If he is unable to be cardioverted will call and increase Toprol to 200mg daily. Will discuss with Dr. Patel about referral to Dr. Trimble as he is unable to drive to [place] to see Dr. Quesada.    2. Aflutter- stable. s/p ablation per Dr. Quesada on 9/8/21. S/p watchman. Continue ASA.   3. Combined CHF-decompensated with class 3 symptoms. EF improved to normal after restoration of NSR per echo in December and remained normal per RADHA in Feb. Increase lasix to 80mg daily until cardioversion. Will obtain BNP and BMP at that time. creatinine 2.99 at d/c on 1/15. Continue BB. Not on ACEi/ARB due to hypotension and CKD.   4. CKD- last creatinine 2.99 on 1/15. Followed by nephrology. I may have to contact them about diuretic adjustment if he does not improve after cardioversion.   5. HTN- controlled. Recent issues with hypotension.   6. PFO- noted per echo in 8/2020 following a CVA that was not  recommended for closure per neurology.   7. Lymphedema- presence of BLE edema due to volume overload. Usually has chronic edema in LLE.      Follow up in 2 weeks or sooner if symptoms worsen.     I spent 30 minutes caring for Milton on this date of service. This time includes time spent by me in the following activities:preparing for the visit, reviewing tests, obtaining and/or reviewing a separately obtained history, performing a medically appropriate examination and/or evaluation , counseling and educating the patient/family/caregiver, ordering medications, tests, or procedures, documenting information in the medical record, independently interpreting results and communicating that information with the patient/family/caregiver and care coordination     Current outpatient and discharge medications have been reconciled for the patient.  Reviewed by: DARIEN Lewis    I spent 2 minutes on the separately reported service of EKG interpretation. This time is not included in the time used to support the E/M service also reported today.

## 2023-01-19 NOTE — OUTREACH NOTE
Medical Week 1 Survey    Flowsheet Row Responses   [facility] patient discharged from? [place]   Does the patient have one of the following disease processes/diagnoses(primary or secondary)? Other   Week 1 attempt successful? Yes   Call start time 1417   Call end time 1421   Discharge diagnosis ANTHONY, GI bleed, anemia   Meds reviewed with patient/caregiver? Yes   Is the patient having any side effects they believe may be caused by any medication additions or changes? No   Does the patient have all medications ordered at discharge? Yes   Is the patient taking all medications as directed (includes completed medication regime)? Yes   Medication comments Still taking ATBs, confirmed he is taking Lasix as he discusses issues with edema to legs   Comments regarding appointments Cardiology completed on 1/18/23 and ECHO on 1/20/23   Does the patient have a primary care provider?  Yes   What is the patient's perception of their health status since discharge? Same  [Unable to fully complete call due to issues w/phone connectivity--reports he is having some issues with edema to legs and following up with cardiology, has an Echo scheduled tomorrow.]   Week 1 call completed? Yes          BENJIE AYALA - Registered Nurse

## 2023-01-20 NOTE — TELEPHONE ENCOUNTER
----- Message from DARIEN Lewis sent at 1/20/2023  2:31 PM CST -----  Patient is present for RADHA. Spoke with Dr. Patel regarding results and he recommends forwarding results to nephrology for further recommendations. If you could fax these. Thanks.

## 2023-01-23 NOTE — TELEPHONE ENCOUNTER
Kiki Desir APRN [place], [place], [place]  Ok good deal. Thank you.           Previous Messages       ----- Message -----   From: Carol Stewart MA   Sent: 1/23/2023   8:17 AM CST   To: DARIEN Lewis     Patient states his swelling in both legs has went down significantly, but not completely resolved.  He has not called nephrology but will do so today.  Carol Stewart MA       ----- Message -----   From: Kiki Desir APRN   Sent: 1/23/2023   8:03 AM CST   To: Carol Stewart MA     Can you call and check on his swelling? Also see if he has talked with nephrology. If not, he needs to call them to see what they want to do in regards to his diuretics

## 2023-01-27 NOTE — OUTREACH NOTE
Medical Week 2 Survey    Flowsheet Row Responses   [facility] facility patient discharged from? [place]   Does the patient have one of the following disease processes/diagnoses(primary or secondary)? Other   Week 2 attempt successful? No   Unsuccessful attempts Attempt 1          AUSTIN BRITT - Registered Nurse

## 2023-01-31 NOTE — OUTREACH NOTE
Medical Week 2 Survey    Flowsheet Row Responses   [facility] patient discharged from? [employer]   Does the patient have one of the following disease processes/diagnoses(primary or secondary)? Other   Week 2 attempt successful? Yes   Call start time 1543   Discharge diagnosis ANTHONY, GI bleed, anemia   Call end time 1552   Is patient permission given to speak with other caregiver? Yes   List who call center can speak with spouse- Jodi   Person spoke with today (if not patient) and relationship spouse   Is the patient taking all medications as directed (includes completed medication regime)? Yes   Comments regarding appointments will f/u with cardiology tomorrow (2/1)   Does the patient have a primary care provider?  Yes   Has the patient kept scheduled appointments due by today? Yes   Psychosocial issues? No   What is the patient's perception of their health status since discharge? Improving   Is the patient/caregiver able to teach back the hierarchy of who to call/visit for symptoms/problems? PCP, Specialist, Home health nurse, Urgent Care, ED, 911 Yes   Week 2 Call Completed? Yes   Graduated Yes   Is the patient interested in additional calls from an ambulatory ?  NOTE:  applies to high risk patients requiring additional follow-up. No   Did the patient feel the follow up calls were helpful during their recovery period? Yes   Was the number of calls appropriate? Yes   Wrap up additional comments Per spouse, patient is doing well.          SHANICE MEZA - Registered Nurse
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
## 2023-02-06 NOTE — TELEPHONE ENCOUNTER
Provider:DEVI  Caller: MAGALY  Relationship to Patient: SELF  Pharmacy: N/A  Phone Number: [phone]  Reason for Call: PT CALLED AND WANTED TO LET PROVIDER KNOW THAT INSURANCE REACHED OUT TO PT STATING THAT INSURANCE NEEDED MORE PROOF OF PT BEING SEEN BY PROVIDER AND THAT THERE IS A SOMETHING THAT PT NEEDS TO SIGN. PT IS CONFUSED ON WHAT INSURANCE COULD NEED AND WOULD LIKE A CALL BACK.    PLEASE REVIEW AND ADVISE.  THANK YOU.

## 2023-02-07 NOTE — TELEPHONE ENCOUNTER
Patient came by the office and dropped off the form that is needed to be filed out by . I will fax the form once it has been completed.

## 2023-02-09 RX ORDER — CALCITRIOL 0.5 UG/1
0.5 CAPSULE, LIQUID FILLED ORAL DAILY
COMMUNITY
Start: 2023-01-16

## 2023-02-09 RX ORDER — ATORVASTATIN CALCIUM 80 MG/1
TABLET, FILM COATED ORAL
COMMUNITY
Start: 2023-02-07

## 2023-02-09 RX ORDER — METOPROLOL SUCCINATE 100 MG/1
100 TABLET, EXTENDED RELEASE ORAL
COMMUNITY
Start: 2021-06-11

## 2023-02-09 RX ORDER — BUDESONIDE AND FORMOTEROL FUMARATE DIHYDRATE 160; 4.5 UG/1; UG/1
2 AEROSOL RESPIRATORY (INHALATION)
COMMUNITY
Start: 2021-06-11

## 2023-02-09 RX ORDER — CLOPIDOGREL BISULFATE 75 MG/1
TABLET ORAL
COMMUNITY
Start: 2023-01-09

## 2023-02-09 RX ORDER — FERROUS SULFATE 325(65) MG
325 TABLET ORAL 2 TIMES DAILY
COMMUNITY

## 2023-02-09 RX ORDER — PANTOPRAZOLE SODIUM 40 MG/1
TABLET, DELAYED RELEASE ORAL
COMMUNITY
Start: 2022-12-01

## 2023-02-09 RX ORDER — ALLOPURINOL 100 MG/1
100 TABLET ORAL DAILY
COMMUNITY

## 2023-02-09 RX ORDER — INSULIN GLARGINE 100 [IU]/ML
9 INJECTION, SOLUTION SUBCUTANEOUS NIGHTLY
COMMUNITY
Start: 2021-07-28

## 2023-02-09 RX ORDER — LANOLIN ALCOHOL/MO/W.PET/CERES
CREAM (GRAM) TOPICAL
COMMUNITY
Start: 2022-12-20

## 2023-02-09 RX ORDER — FUROSEMIDE 40 MG/1
TABLET ORAL
COMMUNITY
Start: 2022-12-09

## 2023-02-09 RX ORDER — LEVOFLOXACIN 500 MG/1
TABLET, FILM COATED ORAL
COMMUNITY
Start: 2023-01-15

## 2023-02-09 RX ORDER — BETAMETHASONE DIPROPIONATE 0.05 %
OINTMENT (GRAM) TOPICAL
COMMUNITY
Start: 2023-01-14
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
## 2023-02-09 NOTE — TELEPHONE ENCOUNTER
----- Message from Belén Thomas MD sent at 2/9/2023  8:30 AM CST -----  Let him know his CT scans did not clearly show another reason for his leg weakness and we can talk about them in more detail at his next visit.

## 2023-02-13 ENCOUNTER — TELEPHONE (OUTPATIENT)
Dept: GASTROENTEROLOGY | Age: 63
End: 2023-02-13

## 2023-02-13 NOTE — TELEPHONE ENCOUNTER
Patient: Gunner Dumont    YOB: 1960      Clearance was received on February 13, 2023. for Endoscopy / Colonoscopy scheduled for: 2/17/23    Patient may discontinue the use of PLAVIX for 5  days prior to the procedure.   CONTINUE TAKING ASA    IS Lovenox required:  NO    PATIENT NOTIFIED ON:  2/13/23      Clearance scanned into media

## 2023-02-14 NOTE — TELEPHONE ENCOUNTER
Left message reminding Mr Rodriguez of his appointments for Friday, November 30th, 2018. Reminded [facility] and follow up afterwards at 3 pm with Kina LEDEZMA. Also advised if he had any questions or needed to reschedule to please call the office at [phone].   Neck , no lymphadenopathy

## 2023-02-17 ENCOUNTER — HOSPITAL ENCOUNTER (OUTPATIENT)
Age: 63
Setting detail: OBSERVATION
Discharge: HOME | End: 2023-02-17
Attending: INTERNAL MEDICINE | Admitting: INTERNAL MEDICINE
Payer: COMMERCIAL

## 2023-02-17 ENCOUNTER — ANESTHESIA (OUTPATIENT)
Dept: ENDOSCOPY | Age: 63
End: 2023-02-17
Payer: COMMERCIAL

## 2023-02-17 ENCOUNTER — ANESTHESIA EVENT (OUTPATIENT)
Dept: ENDOSCOPY | Age: 63
End: 2023-02-17
Payer: COMMERCIAL

## 2023-02-17 VITALS
HEIGHT: 67 IN | RESPIRATION RATE: 18 BRPM | SYSTOLIC BLOOD PRESSURE: 145 MMHG | OXYGEN SATURATION: 99 % | BODY MASS INDEX: 24.8 KG/M2 | WEIGHT: 158 LBS | DIASTOLIC BLOOD PRESSURE: 71 MMHG | TEMPERATURE: 98.8 F | HEART RATE: 93 BPM

## 2023-02-17 DIAGNOSIS — C16.9 GASTRIC CARCINOMA (HCC): ICD-10-CM

## 2023-02-17 LAB
ABO/RH: NORMAL
ABO/RH: NORMAL
ALBUMIN SERPL-MCNC: 3.4 G/DL (ref 3.5–5.2)
ALP BLD-CCNC: 117 U/L (ref 40–130)
ALT SERPL-CCNC: 32 U/L (ref 5–41)
ANION GAP SERPL CALCULATED.3IONS-SCNC: 15 MMOL/L (ref 7–19)
ANTIBODY SCREEN: NORMAL
AST SERPL-CCNC: 27 U/L (ref 5–40)
BASE EXCESS VENOUS: -8
BILIRUB SERPL-MCNC: <0.2 MG/DL (ref 0.2–1.2)
BLOOD BANK DISPENSE STATUS: NORMAL
BLOOD BANK PRODUCT CODE: NORMAL
BPU ID: NORMAL
BUN BLDV-MCNC: 83 MG/DL (ref 8–23)
CA 19-9: 21 U/ML (ref 0–35)
CALCIUM IONIZED: 1.17 MMOL/L (ref 1.1–1.3)
CALCIUM SERPL-MCNC: 8.7 MG/DL (ref 8.8–10.2)
CEA: 5.6 NG/ML (ref 0–4.7)
CHLORIDE BLD-SCNC: 101 MMOL/L (ref 98–111)
CO2: 17 MEQ/L (ref 21–32)
CO2: 24 MMOL/L (ref 22–29)
CREAT SERPL-MCNC: 2.5 MG/DL (ref 0.5–1.2)
DESCRIPTION BLOOD BANK: NORMAL
GFR SERPL CREATININE-BSD FRML MDRD: 28 ML/MIN/{1.73_M2}
GFR SERPL CREATININE-BSD FRML MDRD: 56 ML/MIN/{1.73_M2}
GLUCOSE BLD-MCNC: 140 MG/DL (ref 74–109)
GLUCOSE BLD-MCNC: 145 MG/DL (ref 70–99)
GLUCOSE BLD-MCNC: 76 MG/DL (ref 70–99)
HCT VFR BLD CALC: 22.2 % (ref 42–52)
HCT VFR BLD CALC: 26.8 % (ref 42–52)
HEMOGLOBIN: 3.8 GM/DL (ref 12–18)
HEMOGLOBIN: 6.7 G/DL (ref 14–18)
HEMOGLOBIN: 8.7 G/DL (ref 14–18)
MCH RBC QN AUTO: 29.4 PG (ref 27–31)
MCHC RBC AUTO-ENTMCNC: 30.2 G/DL (ref 33–37)
MCV RBC AUTO: 97.4 FL (ref 80–94)
O2 SAT, VEN: 29 %
PCO2, VEN: 29.3 MM HG (ref 40–50)
PDW BLD-RTO: 15.9 % (ref 11.5–14.5)
PERFORMED ON: ABNORMAL
PERFORMED ON: ABNORMAL
PH VENOUS: 7.36
PLATELET # BLD: 210 K/UL (ref 130–400)
PMV BLD AUTO: 10.3 FL (ref 9.4–12.4)
PO2, VEN: 19.3 MM HG
POC ANION GAP: 19
POC CHLORIDE: 104 MEQ/L (ref 99–110)
POC CREATININE: 1.4 MG/DL (ref 0.3–1.3)
POC HEMATOCRIT: 11 % (ref 37–52)
POC POTASSIUM: 3.7 MEQ/L (ref 3.5–5.1)
POC SAMPLE TYPE: ABNORMAL
POC SODIUM: 140 MEQ/L (ref 136–145)
POTASSIUM SERPL-SCNC: 3.4 MMOL/L (ref 3.5–5)
RBC # BLD: 2.28 M/UL (ref 4.7–6.1)
SODIUM BLD-SCNC: 140 MMOL/L (ref 136–145)
TCO2 CALC VENOUS: 18 MMOL/L
TOTAL PROTEIN: 5.7 G/DL (ref 6.6–8.7)
WBC # BLD: 5.7 K/UL (ref 4.8–10.8)

## 2023-02-17 PROCEDURE — 82810 BLOOD GASES O2 SAT ONLY: CPT

## 2023-02-17 PROCEDURE — 3609012400 HC EGD TRANSORAL BIOPSY SINGLE/MULTIPLE: Performed by: INTERNAL MEDICINE

## 2023-02-17 PROCEDURE — 84132 ASSAY OF SERUM POTASSIUM: CPT

## 2023-02-17 PROCEDURE — 94760 N-INVAS EAR/PLS OXIMETRY 1: CPT

## 2023-02-17 PROCEDURE — 82330 ASSAY OF CALCIUM: CPT

## 2023-02-17 PROCEDURE — 2709999900 HC NON-CHARGEABLE SUPPLY: Performed by: INTERNAL MEDICINE

## 2023-02-17 PROCEDURE — P9016 RBC LEUKOCYTES REDUCED: HCPCS

## 2023-02-17 PROCEDURE — 86850 RBC ANTIBODY SCREEN: CPT

## 2023-02-17 PROCEDURE — 7100000011 HC PHASE II RECOVERY - ADDTL 15 MIN: Performed by: INTERNAL MEDICINE

## 2023-02-17 PROCEDURE — 82374 ASSAY BLOOD CARBON DIOXIDE: CPT

## 2023-02-17 PROCEDURE — G0378 HOSPITAL OBSERVATION PER HR: HCPCS

## 2023-02-17 PROCEDURE — 84295 ASSAY OF SERUM SODIUM: CPT

## 2023-02-17 PROCEDURE — 3700000000 HC ANESTHESIA ATTENDED CARE: Performed by: INTERNAL MEDICINE

## 2023-02-17 PROCEDURE — 82947 ASSAY GLUCOSE BLOOD QUANT: CPT

## 2023-02-17 PROCEDURE — 82962 GLUCOSE BLOOD TEST: CPT

## 2023-02-17 PROCEDURE — 43239 EGD BIOPSY SINGLE/MULTIPLE: CPT | Performed by: INTERNAL MEDICINE

## 2023-02-17 PROCEDURE — 2500000003 HC RX 250 WO HCPCS

## 2023-02-17 PROCEDURE — 86923 COMPATIBILITY TEST ELECTRIC: CPT

## 2023-02-17 PROCEDURE — 82800 BLOOD PH: CPT

## 2023-02-17 PROCEDURE — 2580000003 HC RX 258: Performed by: INTERNAL MEDICINE

## 2023-02-17 PROCEDURE — 36415 COLL VENOUS BLD VENIPUNCTURE: CPT

## 2023-02-17 PROCEDURE — 82435 ASSAY OF BLOOD CHLORIDE: CPT

## 2023-02-17 PROCEDURE — 2700000000 HC OXYGEN THERAPY PER DAY

## 2023-02-17 PROCEDURE — 86900 BLOOD TYPING SEROLOGIC ABO: CPT

## 2023-02-17 PROCEDURE — 85027 COMPLETE CBC AUTOMATED: CPT

## 2023-02-17 PROCEDURE — 80053 COMPREHEN METABOLIC PANEL: CPT

## 2023-02-17 PROCEDURE — 82378 CARCINOEMBRYONIC ANTIGEN: CPT

## 2023-02-17 PROCEDURE — 85014 HEMATOCRIT: CPT

## 2023-02-17 PROCEDURE — 86901 BLOOD TYPING SEROLOGIC RH(D): CPT

## 2023-02-17 PROCEDURE — 3700000001 HC ADD 15 MINUTES (ANESTHESIA): Performed by: INTERNAL MEDICINE

## 2023-02-17 PROCEDURE — 7100000010 HC PHASE II RECOVERY - FIRST 15 MIN: Performed by: INTERNAL MEDICINE

## 2023-02-17 PROCEDURE — 36430 TRANSFUSION BLD/BLD COMPNT: CPT

## 2023-02-17 PROCEDURE — 85018 HEMOGLOBIN: CPT

## 2023-02-17 PROCEDURE — 86301 IMMUNOASSAY TUMOR CA 19-9: CPT

## 2023-02-17 PROCEDURE — 82565 ASSAY OF CREATININE: CPT

## 2023-02-17 PROCEDURE — 6360000002 HC RX W HCPCS

## 2023-02-17 RX ORDER — LIDOCAINE HYDROCHLORIDE 10 MG/ML
INJECTION, SOLUTION EPIDURAL; INFILTRATION; INTRACAUDAL; PERINEURAL PRN
Status: DISCONTINUED | OUTPATIENT
Start: 2023-02-17 | End: 2023-02-17 | Stop reason: SDUPTHER

## 2023-02-17 RX ORDER — SODIUM CHLORIDE 9 MG/ML
INJECTION, SOLUTION INTRAVENOUS PRN
Status: DISCONTINUED | OUTPATIENT
Start: 2023-02-17 | End: 2023-02-17 | Stop reason: HOSPADM

## 2023-02-17 RX ORDER — ASPIRIN 81 MG/1
81 TABLET ORAL DAILY
COMMUNITY

## 2023-02-17 RX ORDER — SODIUM CHLORIDE, SODIUM LACTATE, POTASSIUM CHLORIDE, CALCIUM CHLORIDE 600; 310; 30; 20 MG/100ML; MG/100ML; MG/100ML; MG/100ML
INJECTION, SOLUTION INTRAVENOUS CONTINUOUS
Status: DISCONTINUED | OUTPATIENT
Start: 2023-02-17 | End: 2023-02-17 | Stop reason: HOSPADM

## 2023-02-17 RX ORDER — SUCRALFATE ORAL 1 G/10ML
1 SUSPENSION ORAL 3 TIMES DAILY
Qty: 1200 ML | Refills: 3 | Status: SHIPPED | OUTPATIENT
Start: 2023-02-17

## 2023-02-17 RX ORDER — PROPOFOL 10 MG/ML
INJECTION, EMULSION INTRAVENOUS PRN
Status: DISCONTINUED | OUTPATIENT
Start: 2023-02-17 | End: 2023-02-17 | Stop reason: SDUPTHER

## 2023-02-17 RX ADMIN — PROPOFOL 300 MG: 10 INJECTION, EMULSION INTRAVENOUS at 14:23

## 2023-02-17 RX ADMIN — SODIUM CHLORIDE, POTASSIUM CHLORIDE, SODIUM LACTATE AND CALCIUM CHLORIDE: 600; 310; 30; 20 INJECTION, SOLUTION INTRAVENOUS at 12:47

## 2023-02-17 RX ADMIN — LIDOCAINE HYDROCHLORIDE 50 MG: 10 INJECTION, SOLUTION EPIDURAL; INFILTRATION; INTRACAUDAL; PERINEURAL at 14:23

## 2023-02-17 ASSESSMENT — LIFESTYLE VARIABLES: SMOKING_STATUS: 1

## 2023-02-17 ASSESSMENT — PAIN SCALES - GENERAL
PAINLEVEL_OUTOF10: 0

## 2023-02-17 ASSESSMENT — PAIN - FUNCTIONAL ASSESSMENT: PAIN_FUNCTIONAL_ASSESSMENT: 0-10

## 2023-02-17 NOTE — PROGRESS NOTES
Post Procedure Labs show     Hgb of 6.7 with large clot in patient's stomach. Discussed with patient and family at bedside. We will plan for transfusion of 1U PRBC today. Patient refusing admission unless absolutely necessary as he states \"this is nothing new. \" I discussed that admittedly this is an outpatient procedure and has likely been an acute on chronic drop over the past few weeks since his admission at [facility]. Vitals are stable and patient largely asymptomatic. Plan  - Transfuse 1U PRBC  - I will plan for follow up outpatient CBC on Monday/Tuesday and referrals based off path results.

## 2023-02-17 NOTE — PROGRESS NOTES
Blood consent signed by patient. 20g IV remains in right forearm. Patient taken to room 423 via wheelchair. Wife present. Patient seated in recliner. Handoff report given to MARIANNA Arriaza.

## 2023-02-17 NOTE — OP NOTE
Endoscopic Procedure Note    Patient: Sabina Sides: 1960  Med Rec#: [id number] Acc#: [id number]     Primary Care Provider  Fernanda Davis MD    Referring Provider(s):   Karina Lenz DO     Endoscopist: Tiki Hunter MD    Date of Procedure:  2/17/2023    Procedure:   1. EGD with biopsy    Indications:   1. Recent EGD done during inpatient stay at [facility] with large gastric cardia lesion/ulcer noted - biopsied with carcinoma in situ present. Patient presented to [facility] on Ron[facility] 10 with Hgb of 5.9. I was asked to evaluate this lesion with EUS and possible EMR if indicated. Anesthesia:  Sedation was administered by anesthesia who monitored the patient during the procedure. Estimated Blood Loss: minimal    Procedure:   After reviewing the patient's chart and obtaining informed consent, the patient was placed in the left lateral decubitus position. A forward-viewing Olympus endoscope was lubricated and inserted through the mouth into the oropharynx. Under direct visualization, the upper esophagus was intubated. The scope was advanced to the level of the third portion of duodenum. Scope was slowly withdrawn with careful inspection of the mucosal surfaces. The scope was retroflexed for inspection of the gastric fundus and incisura. Findings and maneuvers are listed in impression below. The patient tolerated the procedure well. The scope was removed. There were no immediate complications. Findings:   Esophagus: normal  There is no significant hiatal hernia present. Stomach:  abnormal: Immediately upon entering the stomach, there was evidence of a large amount of clotted blood. This was contained to the proximal stomach and gastric cardia. The majority the gastric body and the antrum appeared normal with no blood or clot. There was contact oozing in the cardia of the stomach. There was evidence of abnormal tissue underneath the clot suggestive of the known gastric ulcer/mass. The clot was very large and significantly adherent to the underlying lesion. I exchanged the typical EGD scope for the larger caliber therapeutic scope. Using copious tap water lavage and suction a large portion of the clot was removed. However full removal was not possible at this time. Multiple forcep biopsies were obtained of the abnormal tissue is visible underneath the clot. The abnormal tissue appeared to be significantly large    The initial plans for this procedure were for an EUS to evaluate the lesion itself including its depth of invasion and possible endoscopic mucosal resection. In my opinion, given the large amount of clot present today, this was not feasible in the current situation therefore EUS was not pursued. Duodenum: normal      IMPRESSION:  1. Gastric mass worrisome for invasive gastric carcinoma  2. Adherent Blood Clot      RECOMMENDATIONS:    1. Await path results  2. Check CBC, CMP, CEA, CA 19-9 today  3. Patient will need Contrasted CT Abdomen/Pelvis and likely PET scan. 4.  Pending this further workup, we can consider repeat EGD +/- EUS in the future    The results were discussed with the patient and family. A copy of the images obtained were given to the patient.      Brandyn Mcdermott MD  2/17/2023  3:04 PM
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
## 2023-02-17 NOTE — ANESTHESIA POSTPROCEDURE EVALUATION
Department of Anesthesiology  Postprocedure Note    Patient: Josie Andrew  MRN: [id number]  YOB: 1960  Date of evaluation: 2/17/2023      Procedure Summary     Date: 02/17/23 Room / Location: [address]    Anesthesia Start: 1415 Anesthesia Stop:     Procedure: EGD BIOPSY Diagnosis:       Gastric carcinoma ([address]      (Gastric carcinoma ([address] [C16.9])    Surgeons: Brandyn Mcdermott MD Responsible Provider: KALYAN Orozco CRNA    Anesthesia Type: general, TIVA ASA Status: Not recorded          Anesthesia Type: No value filed.     Danya Phase I: Danya Score: 10    Danya Phase II:        Anesthesia Post Evaluation    Patient location during evaluation: bedside  Patient participation: complete - patient participated  Level of consciousness: sleepy but conscious  Airway patency: patent  Nausea & Vomiting: no nausea and no vomiting  Complications: no  Cardiovascular status: hemodynamically stable  Respiratory status: spontaneous ventilation and nasal cannula  Hydration status: euvolemic  Comments: /60   Pulse 80   Temp 97 °F (36.1 °C) (Temporal)   Resp 16   Ht 5' 7\" (1.702 m)   Wt 158 lb (71.7 kg)   SpO2 96%   BMI 24.75 kg/m²     Multimodal analgesia pain management approach
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
## 2023-02-18 NOTE — PROGRESS NOTES
Pt blood finished at 2020, line flushed. IV in right forearm was removed and pt is waiting for lab to draw one hour post infusion H&H. Pt tolerated blood well.

## 2023-02-20 ENCOUNTER — TELEPHONE (OUTPATIENT)
Dept: GASTROENTEROLOGY | Age: 63
End: 2023-02-20

## 2023-02-20 NOTE — TELEPHONE ENCOUNTER
IMPRESSION:  1. Gastric mass worrisome for invasive gastric carcinoma  2. Adherent Blood Clot      RECOMMENDATIONS:    1. Await path results  2. Check CBC, CMP, CEA, CA 19-9 today  3. Patient will need Contrasted CT Abdomen/Pelvis and likely PET scan. 4.  Pending this further workup, we can consider repeat EGD +/- EUS in the future     The results were discussed with the patient and family. A copy of the images obtained were given to the patient. Carol Salas MD  2/17/2023  3:04 PM      No previous OV noted and no FU scheduled. Egd as in patient per  2-17-23, see above. Patient calling today from [phone] said he was wanting to get that CT Scan scheduled as soon as possible. I will forward to  and HEATHER reynoso to review. Patient asking for a return call.  Jer reynoso

## 2023-02-20 NOTE — TELEPHONE ENCOUNTER
Pt got transferred to my line. Pt said he needed to schedule a CT scan. However, I don't see an order for a CT scan in the chart? I told him I would transfer this to the nurse, but it won't be until tomorrow until we find out something.
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
## 2023-02-21 NOTE — TELEPHONE ENCOUNTER
After discussing with Dr Raejean Collet in clinic, he will decide if patient needs either the CT or PET scan once his path is back. Pt advised Dr Raejean Collet wanted him to complete a CBC yesterday or today, I will attach the not regarding that below. Also patient is scheduled for a PET @ [facility] by Dr Rudolph Bustamante Friday. I called and confirmed this. Progress Notes by Melvern Claude, MD at 2/17/2023  3:54 PM    Author: Melvern Claude, MD Service: Gastroenterology Author Type: Physician   Filed: 2/17/2023  4:01 PM Date of Service: 2/17/2023  3:54 PM Note Type: Progress Notes   Status: Addendum : Melvern Claude, MD (Physician)   Related Notes: Original Note by Melvern Claude, MD (Physician) filed at 2/17/2023  3:57 PM   Post Procedure Labs show      Hgb of 6.7 with large clot in patient's stomach. Discussed with patient and family at bedside. We will plan for transfusion of 1U PRBC today. Patient refusing admission unless absolutely necessary as he states \"this is nothing new. \" I discussed that admittedly this is an outpatient procedure and has likely been an acute on chronic drop over the past few weeks since his admission at [facility]. Vitals are stable and patient largely asymptomatic. Plan  - Transfuse 1U PRBC  - I will plan for follow up outpatient CBC on Monday/Tuesday and referrals based off path results. Dr Raejean Collet,    Path pending, how do you want to proceed, CT or PET?

## 2023-02-23 PROBLEM — C16.8 MALIGNANT NEOPLASM OF OVERLAPPING SITES OF STOMACH: Status: ACTIVE | Noted: 2023-01-01

## 2023-02-24 NOTE — TELEPHONE ENCOUNTER
CALLED DR. ELIE FERRARO OFFICE ON [employer] [facility] AND EXPLAINED TO HIS OFFICE WHAT DR BRITT WAS NEEDED HIM TO SEE DR. FERRARO FOR AND THEY WANTED ME TO FAX OVER OFFICE NOTES AND SCANS. I HAVE FAXED RECORDS [phone] DR. FERRARO OFFICE NUMBER [phone]. THEY STATED THAT ONCE THEY RECEIVE LAUREN'S RECORDS THEY WILL CALL LAUREN TO SCHEDULE. 02/24/2023 BS

## 2023-02-27 NOTE — PROGRESS NOTES
February 27, 2023    Hello, may I speak with Milton Rodriguez?    My name is FRANK    I am  with [facility] HEMATOLOGY & ONCOLOGY [address]  [facility] [address]  [phone].    Before we get started may I verify your date of birth? 1960    I am calling to officially welcome you to our practice and ask about your recent visit. Is this a good time to talk? YES    Tell me about your visit with us. What things went well? VISIT WENT WELL       We're always looking for ways to make our patients' experiences even better. Do you have recommendations on ways we may improve?  NO    Overall were you satisfied with your first visit to our practice? YES       I appreciate you taking the time to speak with me today. Is there anything else I can do for you? NO      Thank you, and have a great day.
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
## 2023-03-12 PROBLEM — D64.9 ANEMIA: Status: ACTIVE | Noted: 2023-03-12

## 2023-03-17 NOTE — TELEPHONE ENCOUNTER
Call from dr fernandez. patient scheduled for exploratory laparoscopy.  If no mets/carcinomatosis will resect primarily then adjuvant therapy.  If bulky tumor, will reappoint to see us for neoadjuvant chemo (FLOT) prior to surgery.  If mets then biopsy for confirmation/molecular target acquisition prior to palliative systemic tx

## 2023-03-23 NOTE — TELEPHONE ENCOUNTER
Caller: Milton Rodriguez    Relationship: Self    Best call back number: [phone]    What is the best time to reach you: ANYTIME    Who are you requesting to speak with (clinical staff, provider, specific staff member): CLINICAL TEAM    What was the call regarding: PT STATED HE HAD A PET SCAN 2/24/23 (RESULTS IN CHART) AND HE WANTED TO KNOW IF HE NEEDED ANOTHER PET SCAN ON 3/30/23. PLEASE CALL TO ADVISE.     Do you require a callback: YES

## 2023-03-24 NOTE — TELEPHONE ENCOUNTER
Spoke with Dr. Arriaza about rather or not the patient needed a second PET scan. He had one PET scan done in Feb. From Dr. Villanueva. And had another scan scheduled for .  stated he would just look at and use the PET scan already done. So we will cancel the scan schedule on the 30th. Patient verbalized understanding.

## 2023-03-31 NOTE — ED NOTES
Spoke to [facility] at this time to arrange transport for pt. [facility] states they will call back to confirm transport.
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
## 2023-03-31 NOTE — PROGRESS NOTES
Patient transported to CT on transport vent with current settings: Vt 450, RR 16, +5, FiO2 100%. ETT 22@ lip. End Tidal 32. No issues or complications before, during or after transport. Patient placed on previous settings once back in room.

## 2023-03-31 NOTE — ED NOTES
Verbal consent for blood transfusion received from spouse; emergent transfusion to be given.  Transfusion reaction education provided to spouse, denies questions.

## 2023-03-31 NOTE — PLAN OF CARE
Goal Outcome Evaluation:    Problem: Communication Impairment (Mechanical Ventilation, Invasive)  Goal: Effective Communication  Outcome: Ongoing, Progressing     Problem: Device-Related Complication Risk (Mechanical Ventilation, Invasive)  Goal: Optimal Device Function  Outcome: Ongoing, Progressing     Problem: Inability to Wean (Mechanical Ventilation, Invasive)  Goal: Mechanical Ventilation Liberation  Outcome: Ongoing, Progressing     Problem: Nutrition Impairment (Mechanical Ventilation, Invasive)  Goal: Optimal Nutrition Delivery  Outcome: Ongoing, Progressing     Problem: Skin and Tissue Injury (Mechanical Ventilation, Invasive)  Goal: Absence of Device-Related Skin and Tissue Injury  Outcome: Ongoing, Progressing  Intervention: Maintain Skin and Tissue Health  Recent Flowsheet Documentation  Taken 3/30/2023 1934 by Desi Vasquez, RRT  Device Skin Pressure Protection:   absorbent pad utilized/changed   skin-to-device areas padded     Problem: Ventilator-Induced Lung Injury (Mechanical Ventilation, Invasive)  Goal: Absence of Ventilator-Induced Lung Injury  Outcome: Ongoing, Progressing

## 2023-03-31 NOTE — ED NOTES
Patient's wife states she wished for aggressive treatment for patient. Explained emergent/rapid blood transfusion protocol, patient's wife verbally consented at this time.

## 2023-03-31 NOTE — ED NOTES
Accepting facility states they will send their air transport team to transport pt to facility. Still awaiting confirmation.

## 2023-03-31 NOTE — ED NOTES
Per KUB results, OG tube advanced from 45cm to 70cm at the lip. confirmed with xray. OG secured to ETT

## 2023-03-31 NOTE — ED NOTES
Patient family was educated on the signs and symptoms of a transfusion reaction which may include:    Hives  Itching/Rash/Urticarias  Flushing  Chills  Fever (if greater than 1.8 degrees F or 1 degree C from the pre-transfusion baseline temperature And the increased result is a temperature greater than or equal to 100.4 °F)  Nausea/Vomiting  Chest/Abdominal/Back Pain  Pain at the Infusion Site  Headache  Muscle Aches/Myalgia  Dyspnea/Pulmonary Edema/Rales  Cyanosis  Coughing/Wheezing  Shock  Rigors  Hypoxemia  Hypertension  Hypotension  Abnormal Bleeding  Oliguria/Anuria  Hemoglobinuria  Tachycardia    Patient family to notify staff if any signs/symptoms occur.  Patient verbalizes understanding.

## 2023-03-31 NOTE — PROGRESS NOTES
RT EQUIPMENT DEVICE RELATED - SKIN ASSESSMENT    RT Medical Equipment/Device:     ETT [place]/Anchorfast    Skin Assessment:      Cheek:  Intact  Neck:  Intact  Lips:  Intact  Mouth:  Intact    Device Skin Pressure Protection:  Skin-to-device areas padded:  Anchorfast    Nurse Notification:  Sarah Vasquez, RRT

## 2023-04-03 LAB
QT INTERVAL: 522 MS
QT INTERVAL: 610 MS
QTC INTERVAL: 484 MS
QTC INTERVAL: 525 MS

## 2023-12-21 NOTE — TELEPHONE ENCOUNTER
Left message reminding Mr Rodriguez of his appointments for Friday, January 14th, 2022. Reminded Mr Rodriguez to [facility] at 630 am for 7 o'clock testing and follow up afterwards at 915 am with Kina LEDEZMA. Also advised Mr Rodriguez if he had any questions, concerns or needs to reschedule to please call the office at [phone].   fob

## 2024-07-18 ENCOUNTER — TELEPHONE (OUTPATIENT)
Dept: GASTROENTEROLOGY | Age: 64
End: 2024-07-18

## 2024-07-18 NOTE — TELEPHONE ENCOUNTER
2024 Katie Wife called and stated that she is needing records-all.  She stated that she is filing a claim with [facility] and is needing them.     Patient  on 2023    Routed to MG Kumar MA
